# Patient Record
Sex: FEMALE | Race: WHITE | NOT HISPANIC OR LATINO | Employment: OTHER
[De-identification: names, ages, dates, MRNs, and addresses within clinical notes are randomized per-mention and may not be internally consistent; named-entity substitution may affect disease eponyms.]

---

## 2017-08-12 ENCOUNTER — HEALTH MAINTENANCE LETTER (OUTPATIENT)
Age: 57
End: 2017-08-12

## 2017-08-29 ENCOUNTER — HOSPITAL ENCOUNTER (EMERGENCY)
Facility: CLINIC | Age: 57
Discharge: HOME | End: 2017-08-29
Attending: PHYSICIAN ASSISTANT | Admitting: PHYSICIAN ASSISTANT
Payer: COMMERCIAL

## 2017-08-29 VITALS
TEMPERATURE: 98.9 F | SYSTOLIC BLOOD PRESSURE: 153 MMHG | HEART RATE: 87 BPM | OXYGEN SATURATION: 97 % | BODY MASS INDEX: 32.17 KG/M2 | DIASTOLIC BLOOD PRESSURE: 89 MMHG | WEIGHT: 210 LBS

## 2017-08-29 DIAGNOSIS — H92.02 OTALGIA OF LEFT EAR: ICD-10-CM

## 2017-08-29 DIAGNOSIS — H61.22 IMPACTED CERUMEN OF LEFT EAR: ICD-10-CM

## 2017-08-29 PROCEDURE — 99213 OFFICE O/P EST LOW 20 MIN: CPT | Performed by: PHYSICIAN ASSISTANT

## 2017-08-29 PROCEDURE — 99212 OFFICE O/P EST SF 10 MIN: CPT

## 2017-08-29 PROCEDURE — 99213 OFFICE O/P EST LOW 20 MIN: CPT

## 2017-08-29 PROCEDURE — 25600 CLTX DST RDL FX/EPHYS SEP WO: CPT

## 2017-08-29 RX ORDER — AMOXICILLIN 500 MG/1
500 CAPSULE ORAL 2 TIMES DAILY
Qty: 14 CAPSULE | Refills: 0 | Status: SHIPPED | OUTPATIENT
Start: 2017-08-29 | End: 2017-09-05

## 2017-08-29 NOTE — ED AVS SNAPSHOT
[facility] Emergency Department    [address] [facility] [address]    Phone:  [phone]    Fax:  [phone]                                       Monika Nam   MRN: [id number]    Department:  [facility] Emergency Department   Date of Visit:  8/29/2017           Patient Information     Date Of Birth          1960        Your diagnoses for this visit were:     Otalgia of left ear     Impacted cerumen of left ear        You were seen by Lisbeth Nelson PA-C.      Follow-up Information     Follow up with Servando Valladares MD In 1 week.    Specialty:  Family Practice    Why:  As needed    Contact information:    [address] [facility] [id number] [phone]          Discharge Instructions       Use medication as directed.    May use acetaminophen, ibuprofen prn.  Follow up with PCP for recheck in 7 days, return sooner if symptoms worsen or change.    Discussed with patient using debrox drops as long as no ear canal pain or drainage from ears to remove the remaining wax after ear pain resolved from antibiotic use.     Patient voiced understanding of instructions given.         24 Hour Appointment Hotline       To make an appointment at any [facility], call [phone] ([phone]). If you don't have a family doctor or clinic, we will help you find one. [facility] clinics are conveniently located to serve the needs of you and your family.             Review of your medicines      START taking        Dose / Directions Last dose taken    amoxicillin 500 MG capsule   Commonly known as:  AMOXIL   Dose:  500 mg   Quantity:  14 capsule        Take 1 capsule (500 mg) by mouth 2 times daily for 7 days   Refills:  0        carbamide peroxide 6.5 % otic solution   Commonly known as:  DEBROX   Dose:  5-10 drop   Quantity:  7 mL        Place 5-10 drops Into the left ear 2 times daily for 7 days   Refills:  0          Our records show that you are taking the medicines listed  below. If these are incorrect, please call your family doctor or clinic.        Dose / Directions Last dose taken    albuterol 108 (90 BASE) MCG/ACT Inhaler   Commonly known as:  PROAIR HFA   Dose:  2 puff   Quantity:  1 Inhaler        Inhale 2 puffs into the lungs every 4 hours as needed for shortness of breath / dyspnea   Refills:  11        aspirin 81 MG tablet   Dose:  81 mg        Take 1 tablet (81 mg) by mouth daily   Refills:  0        augmented betamethasone dipropionate 0.05 % cream   Commonly known as:  DIPROLENE AF   Quantity:  45 g        Apply  topically 2 times daily.   Refills:  11        CALCIUM 600-D 600-400 MG-UNIT per tablet   Dose:  1 tablet   Generic drug:  calcium-vitamin D        Take 1 tablet by mouth daily   Refills:  0        FLUoxetine 20 MG capsule   Commonly known as:  PROzac   Quantity:  90 capsule        Take 60 mg daily.   Refills:  5        loratadine 10 MG tablet   Commonly known as:  CLARITIN   Dose:  10 mg        Take 10 mg by mouth daily.   Refills:  0        mometasone 50 MCG/ACT spray   Commonly known as:  NASONEX   Dose:  2 spray   Quantity:  1 Box        Spray 2 sprays into both nostrils daily.   Refills:  11        MULTIVITAMIN TABS   OR        1 TABLET BY MOUTH DAILY   Refills:  0        ranitidine 150 MG tablet   Commonly known as:  ZANTAC   Dose:  150 mg        Take 1 tablet (150 mg) by mouth daily   Refills:  0                Prescriptions were sent or printed at these locations (2 Prescriptions)                   [facility] [address] [place] [address] [address] AT [facility] OF [address] [place] [address]   [address] [facility] [address]    Telephone:  [phone]   Fax:  [phone]   Hours:                  E-Prescribed (2 of 2)         amoxicillin (AMOXIL) 500 MG capsule               carbamide peroxide (DEBROX) 6.5 % otic solution                Orders Needing Specimen Collection     None      Pending Results     No orders found from 8/27/2017 to  "2017.            Pending Culture Results     No orders found from 2017 to 2017.            Pending Results Instructions     If you had any lab results that were not finalized at the time of your Discharge, you can call the ED Lab Result RN at [phone]. You will be contacted by this team for any positive Lab results or changes in treatment. The nurses are available 7 days a week from 10A to 6:30P.  You can leave a message 24 hours per day and they will return your call.        Test Results From Your Hospital Stay               Thank you for choosing [facility]       Thank you for choosing [facility] for your care. Our goal is always to provide you with excellent care. Hearing back from our patients is one way we can continue to improve our services. Please take a few minutes to complete the written survey that you may receive in the mail after you visit with us. Thank you!        [employer] Information     [employer] lets you send messages to your doctor, view your test results, renew your prescriptions, schedule appointments and more. To sign up, go to [email or web address] . Click on \"Log in\" on the left side of the screen, which will take you to the Welcome page. Then click on \"Sign up Now\" on the right side of the page.     You will be asked to enter the access code listed below, as well as some personal information. Please follow the directions to create your username and password.     Your access code is: [email or web address]  Expires: 2017  5:55 PM     Your access code will  in 90 days. If you need help or a new code, please call your [facility] clinic or [phone].        Care EveryWhere ID     This is your Care EveryWhere ID. This could be used by other organizations to access your [facility] medical records  [id number]        Equal Access to Services     EREN CRUZ : radha Coyle, scott sher. So " [facility] [phone].    ATENCIÓN: Si habla español, tiene a barraza disposición servicios gratuitos de asistencia lingüística. Llame al [phone].    We comply with applicable federal civil rights laws and [place] laws. We do not discriminate on the basis of race, color, national origin, age, disability sex, sexual orientation or gender identity.            After Visit Summary       This is your record. Keep this with you and show to your community pharmacist(s) and doctor(s) at your next visit.

## 2017-08-29 NOTE — ED AVS SNAPSHOT
[facility] Emergency Department    [address] [facility] [address]    Phone:  [phone]    Fax:  [phone]                                       Monika Nam   MRN: [id number]    Department:  [facility] Emergency Department   Date of Visit:  8/29/2017           After Visit Summary Signature Page     I have received my discharge instructions, and my questions have been answered. I have discussed any challenges I see with this plan with the nurse or doctor.    ..........................................................................................................................................  Patient/Patient Representative Signature      ..........................................................................................................................................  Patient Representative Print Name and Relationship to Patient    ..................................................               ................................................  Date                                            Time    ..........................................................................................................................................  Reviewed by Signature/Title    ...................................................              ..............................................  Date                                                            Time

## 2017-08-29 NOTE — ED PROVIDER NOTES
History     Chief Complaint   Patient presents with     Otalgia     HPI  Monika Nam is a 57 year old female who presents with left ear pain for 1 week(s) worse over the past 2 days.   Severity: mild   Patient states she has had a dry cough, runny nose congestion, sinus pressure, positive nasal drainage for the past week. Patient denies ringing in ears, no pain with touching ear, no sore throat, no loss of hearing, shortness of breath, chest pain, or fevers. Patient states she used to get ear infections yearly, but hasn't had one in about 10 years.       Problem list, Medication list, Allergies, and Medical/Social/Surgical histories reviewed in [facility] and updated as appropriate.    Review of Systems     All normal unless stated above    Physical Exam   BP: 153/89  Pulse: 87  Temp: 98.9  F (37.2  C)  Weight: 95.3 kg (210 lb)  SpO2: 97 %  Physical Exam     /89  Pulse 87  Temp 98.9  F (37.2  C) (Oral)  Wt 95.3 kg (210 lb)  LMP 04/02/2010  SpO2 97%  BMI 32.17 kg/m2   Right TM is normal: no effusions, no erythema, and normal landmarks     The Right auditory canal is normal and without drainage, edema or erythema  Left TM is erythematous noted after partial cerumen removed, but unable to appreciate entire TM due to retained Cerumen after several attempts with ear lavage. Patient tolerated ear lavage well with no pain per patient.   The Left auditory canal is no erythema, non-tender, not swollen and obstructed with cerumen  Oropharynx exam is normal: no lesions, erythema, adenopathy or exudate.  GENERAL: no acute distress  EYES: EOMI,  PERRL, conjunctiva clear  NECK: supple, non-tender to palpation, no adenopathy noted  RESP: lungs clear to auscultation - no rales, rhonchi or wheezes  SKIN: no suspicious lesions or rashes   CV: regular rates and rhythm, normal    No results found for this or any previous visit (from the past 24 hour(s)).    ED Course     ED Course     Procedures            Critical Care  time:  none               Labs Ordered and Resulted from Time of ED Arrival Up to the Time of Departure from the ED - No data to display    Assessments & Plan (with Medical Decision Making)     I have reviewed the nursing notes.    I have reviewed the findings, diagnosis, plan and need for follow up with the patient.    Will treat for otitis media due to history and patient presentation, but discussed with patient that the pain could also be from the retained cerumen pushing up on the TM. Patient aware and agrees with plan.        New Prescriptions    AMOXICILLIN (AMOXIL) 500 MG CAPSULE    Take 1 capsule (500 mg) by mouth 2 times daily for 7 days    CARBAMIDE PEROXIDE (DEBROX) 6.5 % OTIC SOLUTION    Place 5-10 drops Into the left ear 2 times daily for 7 days       Final diagnoses:   Otalgia of left ear   Impacted cerumen of left ear       8/29/2017   [facility] EMERGENCY DEPARTMENT     Lisbeth Nelson PA-C  08/29/17 1190

## 2017-09-11 DIAGNOSIS — L30.9 ECZEMA: ICD-10-CM

## 2017-09-11 NOTE — LETTER
[facility]  [address] [facility] [address]  Phone: [phone]       September 18, 2017         Monika Nam  [id number] [place] [address]            Dear Monika:    We are concerned about your health care.  We recently provided you with medication refills.  Many medications require routine follow-up with your doctor.  You are due for a clinic appointment for further refills of your medications.    Your prescription(s) have been refilled for one month so you may have time for the above noted follow-up. Please call to schedule soon so we can assure you have an appointment before your next refills are needed.    Thank you,      Dr. Valladares's Care Team

## 2017-09-11 NOTE — TELEPHONE ENCOUNTER
Betamethasone     Last Written Prescription Date: 03/30/16  Last Fill Quantity: 45g,  # refills: 11   Last Office Visit with G, [facility] or [facility] prescribing provider: 10/13/16

## 2017-09-18 RX ORDER — BETAMETHASONE DIPROPIONATE 0.5 MG/G
CREAM TOPICAL
Qty: 50 G | Refills: 0 | Status: SHIPPED | OUTPATIENT
Start: 2017-09-18 | End: 2017-11-15

## 2017-11-15 ENCOUNTER — OFFICE VISIT (OUTPATIENT)
Dept: FAMILY MEDICINE | Facility: CLINIC | Age: 57
End: 2017-11-15
Payer: COMMERCIAL

## 2017-11-15 VITALS
SYSTOLIC BLOOD PRESSURE: 137 MMHG | WEIGHT: 215 LBS | TEMPERATURE: 97.9 F | BODY MASS INDEX: 32.58 KG/M2 | HEART RATE: 81 BPM | HEIGHT: 68 IN | DIASTOLIC BLOOD PRESSURE: 85 MMHG

## 2017-11-15 DIAGNOSIS — Z23 NEED FOR PROPHYLACTIC VACCINATION AND INOCULATION AGAINST INFLUENZA: ICD-10-CM

## 2017-11-15 DIAGNOSIS — F34.1 CHRONIC DEPRESSIVE PERSONALITY DISORDER: ICD-10-CM

## 2017-11-15 DIAGNOSIS — J98.01 BRONCHOSPASM: ICD-10-CM

## 2017-11-15 DIAGNOSIS — Z00.00 ROUTINE HISTORY AND PHYSICAL EXAMINATION OF ADULT: Primary | ICD-10-CM

## 2017-11-15 DIAGNOSIS — L30.9 ECZEMA, UNSPECIFIED TYPE: ICD-10-CM

## 2017-11-15 PROCEDURE — 99396 PREV VISIT EST AGE 40-64: CPT | Mod: 25 | Performed by: FAMILY MEDICINE

## 2017-11-15 PROCEDURE — 99213 OFFICE O/P EST LOW 20 MIN: CPT | Mod: 25 | Performed by: FAMILY MEDICINE

## 2017-11-15 PROCEDURE — 90686 IIV4 VACC NO PRSV 0.5 ML IM: CPT | Performed by: FAMILY MEDICINE

## 2017-11-15 PROCEDURE — 90471 IMMUNIZATION ADMIN: CPT | Performed by: FAMILY MEDICINE

## 2017-11-15 RX ORDER — ALBUTEROL SULFATE 90 UG/1
2 AEROSOL, METERED RESPIRATORY (INHALATION) EVERY 4 HOURS PRN
Qty: 1 INHALER | Refills: 11 | Status: SHIPPED | OUTPATIENT
Start: 2017-11-15 | End: 2019-01-07

## 2017-11-15 RX ORDER — BETAMETHASONE DIPROPIONATE 0.5 MG/G
CREAM TOPICAL
Qty: 50 G | Refills: 6 | Status: SHIPPED | OUTPATIENT
Start: 2017-11-15 | End: 2018-11-16

## 2017-11-15 ASSESSMENT — ANXIETY QUESTIONNAIRES
GAD7 TOTAL SCORE: 2
1. FEELING NERVOUS, ANXIOUS, OR ON EDGE: SEVERAL DAYS
3. WORRYING TOO MUCH ABOUT DIFFERENT THINGS: SEVERAL DAYS
6. BECOMING EASILY ANNOYED OR IRRITABLE: NOT AT ALL
5. BEING SO RESTLESS THAT IT IS HARD TO SIT STILL: NOT AT ALL
7. FEELING AFRAID AS IF SOMETHING AWFUL MIGHT HAPPEN: NOT AT ALL
2. NOT BEING ABLE TO STOP OR CONTROL WORRYING: NOT AT ALL

## 2017-11-15 ASSESSMENT — PATIENT HEALTH QUESTIONNAIRE - PHQ9
5. POOR APPETITE OR OVEREATING: NOT AT ALL
SUM OF ALL RESPONSES TO PHQ QUESTIONS 1-9: 4

## 2017-11-15 NOTE — NURSING NOTE
"Chief Complaint   Patient presents with     Physical     General physical exam.     Refill Request     Discuss about medication refill.  For the Fluoxetine she is taking 20 mg daily.       Initial /85  Pulse 81  Temp 97.9  F (36.6  C) (Tympanic)  Ht 5' 8.25\" (1.734 m)  Wt 215 lb (97.5 kg)  LMP 04/02/2010  BMI 32.45 kg/m2 Estimated body mass index is 32.45 kg/(m^2) as calculated from the following:    Height as of this encounter: 5' 8.25\" (1.734 m).    Weight as of this encounter: 215 lb (97.5 kg).  Medication Reconciliation: complete  "

## 2017-11-15 NOTE — PROGRESS NOTES
SUBJECTIVE:   CC: Monika Nam is an 57 year old woman who presents for preventive health visit.     Healthy Habits:    Do you get at least three servings of calcium containing foods daily (dairy, green leafy vegetables, etc.)? 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    Problems taking medications regularly No    Medication side effects: No    Have you had an eye exam in the past two years? yes    Do you see a dentist twice per year? yes  Do you have sleep apnea, excessive snoring or daytime drowsiness?  Daytime drowsiness.       Chief Complaint   Patient presents with     Physical     General physical exam.     Refill Request     Discuss about medication refill.  For the Fluoxetine she is taking 20 mg daily.     Today's PHQ-2 Score: No flowsheet data found.       Current Outpatient Prescriptions:      FLUoxetine (PROZAC) 20 MG capsule, Take 20 mg daily., Disp: , Rfl:      augmented betamethasone dipropionate (DIPROLENE-AF) 0.05 % cream, APPLY EXTERNALLY TO THE AFFECTED AREA TWICE DAILY, Disp: 50 g, Rfl: 0     albuterol (ALBUTEROL) 108 (90 BASE) MCG/ACT inhaler, Inhale 2 puffs into the lungs every 4 hours as needed for shortness of breath / dyspnea, Disp: 1 Inhaler, Rfl: 11     calcium-vitamin D (CALCIUM 600-D) 600-400 MG-UNIT per tablet, Take 1 tablet by mouth daily, Disp: , Rfl:      ranitidine (ZANTAC) 150 MG tablet, Take 1 tablet (150 mg) by mouth daily, Disp: , Rfl:      loratadine (CLARITIN) 10 MG tablet, Take 10 mg by mouth daily.  , Disp: , Rfl:      MULTIVITAMIN TABS   OR, 1 TABLET BY MOUTH DAILY, Disp: , Rfl:      [DISCONTINUED] FLUoxetine (PROZAC) 20 MG capsule, Take 60 mg daily., Disp: 90 capsule, Rfl: 5     aspirin 81 MG tablet, Take 1 tablet (81 mg) by mouth daily, Disp: , Rfl:     Patient Active Problem List   Diagnosis     Allergic rhinitis     Anxiety disorder due to medical condition     Tobacco use disorder      Chronic depressive personality disorder     Obesity     CARDIOVASCULAR SCREENING; LDL GOAL LESS THAN 130     CTS (carpal tunnel syndrome)     Cervical high risk HPV (human papillomavirus) test positive     GERD (gastroesophageal reflux disease)     Obstructive sleep apnea syndrome         Abuse: Current or Past(Physical, Sexual or Emotional)- No  Do you feel safe in your environment - Yes    Social History   Substance Use Topics     Smoking status: Current Every Day Smoker     Packs/day: 0.50     Years: 25.00     Types: Cigarettes     Smokeless tobacco: Never Used      Comment: 1/2 ppd  Has been using electronic. Did try the patch- gets a rash.     Alcohol use Yes      Comment: 3-4 X per week.     The patient does not drink >3 drinks per day nor >7 drinks per week.    Reviewed orders with patient.  Reviewed health maintenance and updated orders accordingly - Yes    The 10-year ASCVD risk score (Homesteadjenni DELATORRE Jr, et al., 2013) is: 5.2%    Values used to calculate the score:      Age: 57 years      Sex: Female      Is Non- : No      Diabetic: No      Tobacco smoker: Yes      Systolic Blood Pressure: 137 mmHg      Is BP treated: No      HDL Cholesterol: 71 mg/dL      Total Cholesterol: 185 mg/dL    Patient is eligible for use of low-dose aspirin for primary prevention of heart attack and stroke.  Provider has discussed aspirin with patient and our decision was:     Prescribe:  Daily low-dose aspirin recommended for primary prevention, patient agrees with plan.    Pap is not needed.   Mammogram ordered.       ROS:  C: NEGATIVE for fever, chills, change in weight  I: NEGATIVE for worrisome rashes, moles or lesions  E: NEGATIVE for vision changes or irritation  ENT: NEGATIVE for ear, mouth and throat problems  R: NEGATIVE for significant cough or SOB  B: NEGATIVE for masses, tenderness or discharge  CV: NEGATIVE for chest pain, palpitations or peripheral edema  GI: NEGATIVE for nausea, abdominal  "pain, heartburn, or change in bowel habits  : NEGATIVE for unusual urinary or vaginal symptoms. No vaginal bleeding.  M: NEGATIVE for significant arthralgias or myalgia  N: NEGATIVE for weakness, dizziness or paresthesias  P: NEGATIVE for changes in mood or affect     OBJECTIVE:   /85  Pulse 81  Temp 97.9  F (36.6  C) (Tympanic)  Ht 5' 8.25\" (1.734 m)  Wt 215 lb (97.5 kg)  LMP 04/02/2010  BMI 32.45 kg/m2  EXAM:  GENERAL APPEARANCE: healthy, alert and no distress  GENERAL APPEARANCE: over weight  EYES: EOMI,  PERRL  HENT: ear canals and TM's normal and nose and mouth without ulcers or lesions  RESP: lungs clear to auscultation - no rales, rhonchi or wheezes  BREAST: normal without masses, tenderness or nipple discharge and no palpable axillary masses or adenopathy  CV: regular rates and rhythm, normal S1 S2, no S3 or S4 and no murmur, click or rub -  ABDOMEN:  soft, nontender, no HSM or masses and bowel sounds normal  GU_female: normal cervix, adnexae, and uterus without masses or discharge  MS: extremities normal- no gross deformities noted, no evidence of inflammation in joints, FROM in all extremities.  SKIN: no suspicious lesions or rashes  NEURO: Normal strength and tone, sensory exam grossly normal, mentation intact and speech normal  PSYCH: mentation appears normal and affect normal/bright  LYMPHATICS: No axillary, cervical, inguinal, or supraclavicular nodes      ASSESSMENT/PLAN:   1.  Routine history and physical examination of adult  - *MA Screening Digital Bilateral; Future  COUNSELING:   Reviewed preventive health counseling, as reflected in patient instructions       Regular exercise       Healthy diet/nutrition       Vision screening       Hearing screening   reports that she has been smoking Cigarettes.  She has a 12.50 pack-year smoking history. She has never used smokeless tobacco.  Tobacco Cessation Action Plan: Self help information given to patient  Estimated body mass index is 32.45 " "kg/(m^2) as calculated from the following:    Height as of this encounter: 5' 8.25\" (1.734 m).    Weight as of this encounter: 215 lb (97.5 kg).   She is using the lower calorie diet, portion control and exercise.   The goal for the weight loss is 2 lbs per month.   Counseling Resources:  ATP IV Guidelines  Pooled Cohorts Equation Calculator  Breast Cancer Risk Calculator  FRAX Risk Assessment  ICSI Preventive Guidelines  Dietary Guidelines for Americans, 2010  USDA's MyPlate  ASA Prophylaxis  Lung CA Screening    Chronic depressive personality disorder  Stay on the med daily and use the non drug therapies.   If doing well then we will refill the med for one year.   - FLUoxetine (PROZAC) 20 MG capsule; Take 20 mg daily.    (J98.01) Bronchospasm  Comment:   Plan: albuterol (PROAIR HFA) 108 (90 BASE) MCG/ACT         Inhaler        Use the inhaler as needed, 2 puffs every 4 hours. Find and avoid triggers if possible.   Exercise daily. We will do the flu shot today and do this annually.     (L30.9) Eczema, unspecified type  Comment:   Plan: augmented betamethasone dipropionate         (DIPROLENE-AF) 0.05 % cream        Use as directed and refills are given.        Servando Valladares MD  [facility]  "

## 2017-11-15 NOTE — PATIENT INSTRUCTIONS
Preventive Health Recommendations  Female Ages 50 - 64    Yearly exam: See your health care provider every year in order to  o Review health changes.   o Discuss preventive care.    o Review your medicines if your doctor has prescribed any.      Get a Pap test every three years (unless you have an abnormal result and your provider advises testing more often).    If you get Pap tests with HPV test, you only need to test every 5 years, unless you have an abnormal result.     You do not need a Pap test if your uterus was removed (hysterectomy) and you have not had cancer.    You should be tested each year for STDs (sexually transmitted diseases) if you're at risk.     Have a mammogram every 1 to 2 years.    Have a colonoscopy at age 50, or have a yearly FIT test (stool test). These exams screen for colon cancer.      Have a cholesterol test every 5 years, or more often if advised.    Have a diabetes test (fasting glucose) every three years. If you are at risk for diabetes, you should have this test more often.     If you are at risk for osteoporosis (brittle bone disease), think about having a bone density scan (DEXA).    Shots: Get a flu shot each year. Get a tetanus shot every 10 years.    Nutrition:     Eat at least 5 servings of fruits and vegetables each day.    Eat whole-grain bread, whole-wheat pasta and brown rice instead of white grains and rice.    Talk to your provider about Calcium and Vitamin D.     Lifestyle    Exercise at least 150 minutes a week (30 minutes a day, 5 days a week). This will help you control your weight and prevent disease.    Limit alcohol to one drink per day.    No smoking.     Wear sunscreen to prevent skin cancer.     See your dentist every six months for an exam and cleaning.    See your eye doctor every 1 to 2 years.            Thank you for choosing [facility].  You may be receiving a survey in the mail from [employer] regarding your visit today.  Please take a few  "minutes to complete and return the survey to let us know how we are doing.      If you have questions or concerns, please contact us via [facility] or you can contact your care team at [phone].    Our Clinic hours are:  Monday 6:40 am  to 7:00 pm  Tuesday -Friday 6:40 am to 5:00 pm    The [place] outpatient lab hours are:  Monday - Friday 6:10 am to 4:45 pm  Saturdays 7:00 am to 11:00 am  Appointments are required, call [phone]    If you have clinical questions after hours or would like to schedule an appointment,  call the clinic at [phone].    ASSESSMENT/PLAN:   1.  Routine history and physical examination of adult  - *MA Screening Digital Bilateral; Future  COUNSELING:   Reviewed preventive health counseling, as reflected in patient instructions       Regular exercise       Healthy diet/nutrition       Vision screening       Hearing screening   reports that she has been smoking Cigarettes.  She has a 12.50 pack-year smoking history. She has never used smokeless tobacco.  Tobacco Cessation Action Plan: Self help information given to patient  Estimated body mass index is 32.45 kg/(m^2) as calculated from the following:    Height as of this encounter: 5' 8.25\" (1.734 m).    Weight as of this encounter: 215 lb (97.5 kg).   She is using the lower calorie diet, portion control and exercise.   The goal for the weight loss is 2 lbs per month.   Counseling Resources:  ATP IV Guidelines  Pooled Cohorts Equation Calculator  Breast Cancer Risk Calculator  FRAX Risk Assessment  ICSI Preventive Guidelines  Dietary Guidelines for Americans, 2010  USDA's MyPlate  ASA Prophylaxis  Lung CA Screening    Chronic depressive personality disorder  Stay on the med daily and use the non drug therapies.   If doing well then we will refill the med for one year.   - FLUoxetine (PROZAC) 20 MG capsule; Take 20 mg daily.    (J98.01) Bronchospasm  Comment:   Plan: albuterol (PROAIR HFA) 108 (90 BASE) MCG/ACT         Inhaler        " Use the inhaler as needed, 2 puffs every 4 hours. Find and avoid triggers if possible.   Exercise daily. We will do the flu shot today and do this annually.     (L30.9) Eczema, unspecified type  Comment:   Plan: augmented betamethasone dipropionate         (DIPROLENE-AF) 0.05 % cream        Use as directed and refills are given.

## 2017-11-15 NOTE — MR AVS SNAPSHOT
After Visit Summary   11/15/2017    Monika Nam    MRN: [id number]           Patient Information     Date Of Birth          1960        Visit Information        Provider Department      11/15/2017 9:20 AM Servando Valladares MD [facility]        Today's Diagnoses     Routine history and physical examination of adult    -  1    Chronic depressive personality disorder        Bronchospasm        Eczema, unspecified type          Care Instructions      Preventive Health Recommendations  Female Ages 50 - 64    Yearly exam: See your health care provider every year in order to  o Review health changes.   o Discuss preventive care.    o Review your medicines if your doctor has prescribed any.      Get a Pap test every three years (unless you have an abnormal result and your provider advises testing more often).    If you get Pap tests with HPV test, you only need to test every 5 years, unless you have an abnormal result.     You do not need a Pap test if your uterus was removed (hysterectomy) and you have not had cancer.    You should be tested each year for STDs (sexually transmitted diseases) if you're at risk.     Have a mammogram every 1 to 2 years.    Have a colonoscopy at age 50, or have a yearly FIT test (stool test). These exams screen for colon cancer.      Have a cholesterol test every 5 years, or more often if advised.    Have a diabetes test (fasting glucose) every three years. If you are at risk for diabetes, you should have this test more often.     If you are at risk for osteoporosis (brittle bone disease), think about having a bone density scan (DEXA).    Shots: Get a flu shot each year. Get a tetanus shot every 10 years.    Nutrition:     Eat at least 5 servings of fruits and vegetables each day.    Eat whole-grain bread, whole-wheat pasta and brown rice instead of white grains and rice.    Talk to your provider about Calcium and Vitamin D.     Lifestyle    Exercise  "at least 150 minutes a week (30 minutes a day, 5 days a week). This will help you control your weight and prevent disease.    Limit alcohol to one drink per day.    No smoking.     Wear sunscreen to prevent skin cancer.     See your dentist every six months for an exam and cleaning.    See your eye doctor every 1 to 2 years.            Thank you for choosing [facility].  You may be receiving a survey in the mail from [employer] regarding your visit today.  Please take a few minutes to complete and return the survey to let us know how we are doing.      If you have questions or concerns, please contact us via Jobyal or you can contact your care team at [phone].    Our Clinic hours are:  Monday 6:40 am  to 7:00 pm  Tuesday -Friday 6:40 am to 5:00 pm    The [place] outpatient lab hours are:  Monday - Friday 6:10 am to 4:45 pm  Saturdays 7:00 am to 11:00 am  Appointments are required, call [phone]    If you have clinical questions after hours or would like to schedule an appointment,  call the clinic at [phone].    ASSESSMENT/PLAN:   1.  Routine history and physical examination of adult  - *MA Screening Digital Bilateral; Future  COUNSELING:   Reviewed preventive health counseling, as reflected in patient instructions       Regular exercise       Healthy diet/nutrition       Vision screening       Hearing screening   reports that she has been smoking Cigarettes.  She has a 12.50 pack-year smoking history. She has never used smokeless tobacco.  Tobacco Cessation Action Plan: Self help information given to patient  Estimated body mass index is 32.45 kg/(m^2) as calculated from the following:    Height as of this encounter: 5' 8.25\" (1.734 m).    Weight as of this encounter: 215 lb (97.5 kg).   She is using the lower calorie diet, portion control and exercise.   The goal for the weight loss is 2 lbs per month.   Counseling Resources:  ATP IV Guidelines  Pooled Cohorts Equation Calculator  Breast " Cancer Risk Calculator  FRAX Risk Assessment  ICSI Preventive Guidelines  Dietary Guidelines for Americans, 2010  USDA's MyPlate  ASA Prophylaxis  Lung CA Screening    Chronic depressive personality disorder  Stay on the med daily and use the non drug therapies.   If doing well then we will refill the med for one year.   - FLUoxetine (PROZAC) 20 MG capsule; Take 20 mg daily.    (J98.01) Bronchospasm  Comment:   Plan: albuterol (PROAIR HFA) 108 (90 BASE) MCG/ACT         Inhaler        Use the inhaler as needed, 2 puffs every 4 hours. Find and avoid triggers if possible.   Exercise daily. We will do the flu shot today and do this annually.     (L30.9) Eczema, unspecified type  Comment:   Plan: augmented betamethasone dipropionate         (DIPROLENE-AF) 0.05 % cream        Use as directed and refills are given.            Follow-ups after your visit        Future tests that were ordered for you today     Open Future Orders        Priority Expected Expires Ordered    *MA Screening Digital Bilateral Routine  11/15/2018 11/15/2017            Who to contact     If you have questions or need follow up information about today's clinic visit or your schedule please contact [facility] directly at [phone].  Normal or non-critical lab and imaging results will be communicated to you by nuMVChart, letter or phone within 4 business days after the clinic has received the results. If you do not hear from us within 7 days, please contact the clinic through nuMVChart or phone. If you have a critical or abnormal lab result, we will notify you by phone as soon as possible.  Submit refill requests through [employer] or call your pharmacy and they will forward the refill request to us. Please allow 3 business days for your refill to be completed.          Additional Information About Your Visit        [employer] Information     [employer] lets you send messages to your doctor, view your test results, renew your prescriptions,  "schedule appointments and more. To sign up, go to [email or web address] . Click on \"Log in\" on the left side of the screen, which will take you to the Welcome page. Then click on \"Sign up Now\" on the right side of the page.     You will be asked to enter the access code listed below, as well as some personal information. Please follow the directions to create your username and password.     Your access code is: [email or web address]  Expires: 2017  4:55 PM     Your access code will  in 90 days. If you need help or a new code, please call your [place] clinic or [phone].        Care EveryWhere ID     This is your Care EveryWhere ID. This could be used by other organizations to access your [place] medical records  [id number]        Your Vitals Were     Pulse Temperature Height Last Period BMI (Body Mass Index)       81 97.9  F (36.6  C) (Tympanic) 5' 8.25\" (1.734 m) 2010 32.45 kg/m2        Blood Pressure from Last 3 Encounters:   11/15/17 137/85   17 153/89   10/14/16 144/84    Weight from Last 3 Encounters:   11/15/17 215 lb (97.5 kg)   17 210 lb (95.3 kg)   10/14/16 212 lb (96.2 kg)                 Today's Medication Changes          These changes are accurate as of: 11/15/17  9:57 AM.  If you have any questions, ask your nurse or doctor.               Start taking these medicines.        Dose/Directions    FLUoxetine 20 MG capsule   Commonly known as:  PROzac   Used for:  Chronic depressive personality disorder   Started by:  Servando Valladares MD        Take 20 mg daily.   Quantity:  30 capsule   Refills:  11         These medicines have changed or have updated prescriptions.        Dose/Directions    augmented betamethasone dipropionate 0.05 % cream   Commonly known as:  DIPROLENE-AF   This may have changed:  See the new instructions.   Used for:  Eczema, unspecified type   Changed by:  Servando Valladares MD        APPLY EXTERNALLY TO THE AFFECTED AREA TWICE DAILY   Quantity:  " 50 g   Refills:  6         Stop taking these medicines if you haven't already. Please contact your care team if you have questions.     mometasone 50 MCG/ACT spray   Commonly known as:  NASONEX   Stopped by:  Servando Valladares MD                Where to get your medicines      These medications were sent to [id number] - [facility] [address]  [address] [address]     Phone:  [phone]     albuterol 108 (90 BASE) MCG/ACT Inhaler    augmented betamethasone dipropionate 0.05 % cream    FLUoxetine 20 MG capsule                Primary Care Provider Office Phone # Fax #    Servando Valladares [phone] [address] [facility] [address]        Equal Access to Services     EREN CRUZ : Hadii robyn alvarez hadasho Sokendy, waaxda luqadaha, qaybta kaalmada adeyudyyada, scott carrera . So [facility] [phone].    ATENCIÓN: Si habla español, tiene a barraza disposición servicios gratuitos de asistencia lingüística. Joycelyn al [phone].    We comply with applicable federal civil rights laws and [place] laws. We do not discriminate on the basis of race, color, national origin, age, disability, sex, sexual orientation, or gender identity.            Thank you!     Thank you for choosing [facility]  for your care. Our goal is always to provide you with excellent care. Hearing back from our patients is one way we can continue to improve our services. Please take a few minutes to complete the written survey that you may receive in the mail after your visit with us. Thank you!             Your Updated Medication List - Protect others around you: Learn how to safely use, store and throw away your medicines at www.disposemymeds.org.          This list is accurate as of: 11/15/17  9:57 AM.  Always use your most recent med list.                   Brand Name Dispense Instructions for use  Diagnosis    albuterol 108 (90 BASE) MCG/ACT Inhaler    PROAIR HFA    1 Inhaler    Inhale 2 puffs into the lungs every 4 hours as needed for shortness of breath / dyspnea    Bronchospasm       aspirin 81 MG tablet      Take 1 tablet (81 mg) by mouth daily        augmented betamethasone dipropionate 0.05 % cream    DIPROLENE-AF    50 g    APPLY EXTERNALLY TO THE AFFECTED AREA TWICE DAILY    Eczema, unspecified type       CALCIUM 600-D 600-400 MG-UNIT per tablet   Generic drug:  calcium-vitamin D      Take 1 tablet by mouth daily        FLUoxetine 20 MG capsule    PROzac    30 capsule    Take 20 mg daily.    Chronic depressive personality disorder       loratadine 10 MG tablet    CLARITIN     Take 10 mg by mouth daily.        MULTIVITAMIN TABS   OR      1 TABLET BY MOUTH DAILY        ranitidine 150 MG tablet    ZANTAC     Take 1 tablet (150 mg) by mouth daily

## 2017-11-16 ASSESSMENT — ANXIETY QUESTIONNAIRES: GAD7 TOTAL SCORE: 2

## 2018-05-23 ENCOUNTER — TELEPHONE (OUTPATIENT)
Dept: FAMILY MEDICINE | Facility: CLINIC | Age: 58
End: 2018-05-23

## 2018-05-23 DIAGNOSIS — J45.20 MILD INTERMITTENT ASTHMA WITHOUT COMPLICATION: Primary | ICD-10-CM

## 2018-05-23 DIAGNOSIS — F34.1 CHRONIC DEPRESSIVE PERSONALITY DISORDER: ICD-10-CM

## 2018-05-24 PROBLEM — J45.20 MILD INTERMITTENT ASTHMA WITHOUT COMPLICATION: Status: ACTIVE | Noted: 2018-05-24

## 2018-05-24 RX ORDER — ALBUTEROL SULFATE 90 UG/1
2 AEROSOL, METERED RESPIRATORY (INHALATION) EVERY 4 HOURS PRN
Qty: 1 INHALER | Refills: 11 | Status: SHIPPED | OUTPATIENT
Start: 2018-05-24 | End: 2018-11-16

## 2018-05-24 NOTE — TELEPHONE ENCOUNTER
"Requested Prescriptions   Pending Prescriptions Disp Refills     FLUoxetine (PROZAC) 20 MG capsule [Pharmacy Med Name: FLUOXETINE 20MG CAPSULES]  Last Written Prescription Date:  11/15/17  Last Fill Quantity: 30,  # refills: 11   Last office visit: 11/15/2017 with prescribing provider:  11/15/17   Future Office Visit:     90 capsule 0     Sig: TAKE 3 CAPSULES BY MOUTH DAILY    SSRIs Protocol Failed    5/23/2018  9:17 PM       Failed - PHQ-9 score less than 5 in past 6 months    Please review last PHQ-9 score.   PHQ-9 SCORE 8/29/2016 10/14/2016 11/15/2017   Total Score - - -   Total Score 13 6 4          Failed - Recent (6 mo) or future (30 days) visit within the authorizing provider's specialty    Patient had office visit in the last 6 months or has a visit in the next 30 days with authorizing provider or within the authorizing provider's specialty.  See \"Patient Info\" tab in inbasket, or \"Choose Columns\" in Meds & Orders section of the refill encounter.           Passed - Patient is age 18 or older       Passed - No active pregnancy on record       Passed - No positive pregnancy test in last 12 months          "

## 2018-08-12 ENCOUNTER — HEALTH MAINTENANCE LETTER (OUTPATIENT)
Age: 58
End: 2018-08-12

## 2018-08-19 ENCOUNTER — HEALTH MAINTENANCE LETTER (OUTPATIENT)
Age: 58
End: 2018-08-19

## 2018-11-16 ENCOUNTER — OFFICE VISIT (OUTPATIENT)
Dept: FAMILY MEDICINE | Facility: CLINIC | Age: 58
End: 2018-11-16
Payer: COMMERCIAL

## 2018-11-16 VITALS
TEMPERATURE: 98 F | DIASTOLIC BLOOD PRESSURE: 80 MMHG | WEIGHT: 207 LBS | BODY MASS INDEX: 31.37 KG/M2 | HEART RATE: 75 BPM | RESPIRATION RATE: 24 BRPM | HEIGHT: 68 IN | OXYGEN SATURATION: 97 % | SYSTOLIC BLOOD PRESSURE: 114 MMHG

## 2018-11-16 DIAGNOSIS — J98.01 BRONCHOSPASM: ICD-10-CM

## 2018-11-16 DIAGNOSIS — F34.1 CHRONIC DEPRESSIVE PERSONALITY DISORDER: ICD-10-CM

## 2018-11-16 DIAGNOSIS — Z00.00 ROUTINE HISTORY AND PHYSICAL EXAMINATION OF ADULT: Primary | ICD-10-CM

## 2018-11-16 DIAGNOSIS — Z12.11 COLON CANCER SCREENING: ICD-10-CM

## 2018-11-16 DIAGNOSIS — J45.20 MILD INTERMITTENT ASTHMA WITHOUT COMPLICATION: ICD-10-CM

## 2018-11-16 DIAGNOSIS — L30.9 ECZEMA, UNSPECIFIED TYPE: ICD-10-CM

## 2018-11-16 PROCEDURE — 99396 PREV VISIT EST AGE 40-64: CPT | Performed by: FAMILY MEDICINE

## 2018-11-16 RX ORDER — ALBUTEROL SULFATE 90 UG/1
2 AEROSOL, METERED RESPIRATORY (INHALATION) EVERY 4 HOURS PRN
Qty: 1 INHALER | Refills: 11 | Status: SHIPPED | OUTPATIENT
Start: 2018-11-16 | End: 2020-01-15

## 2018-11-16 RX ORDER — BETAMETHASONE DIPROPIONATE 0.5 MG/G
CREAM TOPICAL
Qty: 50 G | Refills: 6 | Status: SHIPPED | OUTPATIENT
Start: 2018-11-16 | End: 2020-02-03

## 2018-11-16 ASSESSMENT — PATIENT HEALTH QUESTIONNAIRE - PHQ9
SUM OF ALL RESPONSES TO PHQ QUESTIONS 1-9: 2
5. POOR APPETITE OR OVEREATING: NOT AT ALL

## 2018-11-16 ASSESSMENT — ANXIETY QUESTIONNAIRES
7. FEELING AFRAID AS IF SOMETHING AWFUL MIGHT HAPPEN: NOT AT ALL
GAD7 TOTAL SCORE: 2
3. WORRYING TOO MUCH ABOUT DIFFERENT THINGS: SEVERAL DAYS
1. FEELING NERVOUS, ANXIOUS, OR ON EDGE: SEVERAL DAYS
5. BEING SO RESTLESS THAT IT IS HARD TO SIT STILL: NOT AT ALL
2. NOT BEING ABLE TO STOP OR CONTROL WORRYING: NOT AT ALL
6. BECOMING EASILY ANNOYED OR IRRITABLE: NOT AT ALL

## 2018-11-16 NOTE — PROGRESS NOTES
SUBJECTIVE:   CC: Monika Nam is an 58 year old woman who presents for preventive health visit.     Healthy Habits:    Do you get at least three servings of calcium containing foods daily (dairy, green leafy vegetables, etc.)? yes    Amount of exercise or daily activities, outside of work: Walking some.    Problems taking medications regularly No    Medication side effects: No    Have you had an eye exam in the past two years? yes    Do you see a dentist twice per year? yes    Do you have sleep apnea, excessive snoring or daytime drowsiness?no      Chief Complaint   Patient presents with     Physical     General physical exam.         Today's PHQ-2 Score: No flowsheet data found.    Abuse: Current or Past(Physical, Sexual or Emotional)- No  Do you feel safe in your environment - Yes    Social History   Substance Use Topics     Smoking status: Current Every Day Smoker     Packs/day: 0.50     Years: 25.00     Types: Cigarettes     Smokeless tobacco: Never Used      Comment: 1/2 ppd . Did try the patch- gets a rash.     Alcohol use Yes      Comment: 3-4 X per week.     If you drink alcohol do you typically have >3 drinks per day or >7 drinks per week? No                     Reviewed orders with patient.  Reviewed health maintenance and updated orders accordingly - Yes  Mammogram is ordered for 11-20-18.     No more Paps needed.   PAP / HPV 7/25/2014 7/15/2013 5/24/2012   PAP ASC-US(A) ASC-US(A) NIL     Reviewed and updated as needed this visit by clinical staff  Tobacco  Allergies  Meds  Med Hx  Surg Hx  Fam Hx  Soc Hx        Reviewed and updated as needed this visit by Provider        ROS:  CONSTITUTIONAL: NEGATIVE for fever, chills, change in weight  INTEGUMENTARY/SKIN: NEGATIVE for worrisome rashes, moles or lesions  EYES: NEGATIVE for vision changes or irritation  ENT: NEGATIVE for ear, mouth and throat problems  RESP: NEGATIVE for significant cough or SOB  BREAST: NEGATIVE for masses, tenderness or  "discharge  CV: NEGATIVE for chest pain, palpitations or peripheral edema  GI: NEGATIVE for nausea, abdominal pain, heartburn, or change in bowel habits  : NEGATIVE for unusual urinary or vaginal symptoms. No vaginal bleeding.  MUSCULOSKELETAL: NEGATIVE for significant arthralgias or myalgia  NEURO: NEGATIVE for weakness, dizziness or paresthesias  PSYCHIATRIC: NEGATIVE for changes in mood or affect     OBJECTIVE:   LMP 04/02/2010   Blood pressure 114/80, pulse 75, temperature 98  F (36.7  C), temperature source Tympanic, resp. rate 24, height 5' 8.25\" (1.734 m), weight 207 lb (93.9 kg), last menstrual period 04/02/2010, SpO2 97 %.    EXAM:  GENERAL APPEARANCE: healthy, alert and no distress  EYES: EOMI,  PERRL  HENT: ear canals and TM's normal and nose and mouth without ulcers or lesions  NECK: no adenopathy, no asymmetry, masses, or scars and thyroid normal to palpation  RESP: lungs clear to auscultation - no rales, rhonchi or wheezes  BREAST: normal without masses, tenderness or nipple discharge and no palpable axillary masses or adenopathy  CV: regular rates and rhythm, normal S1 S2, no S3 or S4 and no murmur, click or rub -  ABDOMEN:  soft, nontender, no HSM or masses and bowel sounds normal  GU_male: testicles normal without atrophy or masses, no hernias and penis normal without urethral discharge  MS: extremities normal- no gross deformities noted, no evidence of inflammation in joints, FROM in all extremities.  SKIN: no suspicious lesions or rashes  NEURO: Normal strength and tone, sensory exam grossly normal, mentation intact and speech normal  PSYCH: mentation appears normal and affect normal/bright  LYMPHATICS: No axillary, cervical, inguinal, or supraclavicular nodes      ASSESSMENT/PLAN:   (Z00.00) Routine history and physical examination of adult  (primary encounter diagnosis)  Comment:   Plan: COUNSELING:   Reviewed preventive health counseling, as reflected in patient instructions       Regular " "exercise       Healthy diet/nutrition       Vision screening       Hearing screening    BP Readings from Last 1 Encounters:   11/15/17 137/85     Estimated body mass index is 32.45 kg/(m^2) as calculated from the following:    Height as of 11/15/17: 5' 8.25\" (1.734 m).    Weight as of 11/15/17: 215 lb (97.5 kg).     reports that she has been smoking Cigarettes.  She has a 12.50 pack-year smoking history. She has never used smokeless tobacco.  Tobacco Cessation Action Plan: Self help information given to patient    Counseling Resources:  ATP IV Guidelines  Pooled Cohorts Equation Calculator  Breast Cancer Risk Calculator  FRAX Risk Assessment  ICSI Preventive Guidelines  Dietary Guidelines for Americans, 2010  USDA's MyPlate  ASA Prophylaxis  Lung CA Screening    (J45.20) Mild intermittent asthma without complication  Comment:   Plan: albuterol (PROAIR HFA/PROVENTIL HFA/VENTOLIN         HFA) 108 (90 Base) MCG/ACT inhaler        Use the med as discussed. Refills are done for one year. Identify triggers to avoid.   You had the flu shot.     (L30.9) Eczema, unspecified type  Comment:   Plan: augmented betamethasone dipropionate         (DIPROLENE-AF) 0.05 % cream        Instructions given on diagnoses and refills done for one year.     (F34.1) Chronic depressive personality disorder  Comment:   Plan: FLUoxetine (PROZAC) 20 MG capsule        Today the PHQ and the LEBRON were both 2!!! You are doing very well and continue the med daily and the   Non drug therapies.     (Z12.11) Colon cancer screening  Comment:   Plan: COLORECTAL SURGERY REFERRAL        Call [phone] to schedule.       Servando Valladares MD  [facility]  "

## 2018-11-16 NOTE — MR AVS SNAPSHOT
After Visit Summary   11/16/2018    Monika Nam    MRN: [id number]           Patient Information     Date Of Birth          1960        Visit Information        Provider Department      11/16/2018 1:40 PM Servando Valladares MD [facility]        Today's Diagnoses     Routine history and physical examination of adult    -  1    Bronchospasm        Mild intermittent asthma without complication        Eczema, unspecified type        Chronic depressive personality disorder        Colon cancer screening          Care Instructions      Preventive Health Recommendations  Female Ages 50 - 64    Yearly exam: See your health care provider every year in order to  o Review health changes.   o Discuss preventive care.    o Review your medicines if your doctor has prescribed any.      Get a Pap test every three years (unless you have an abnormal result and your provider advises testing more often).    If you get Pap tests with HPV test, you only need to test every 5 years, unless you have an abnormal result.     You do not need a Pap test if your uterus was removed (hysterectomy) and you have not had cancer.    You should be tested each year for STDs (sexually transmitted diseases) if you're at risk.     Have a mammogram every 1 to 2 years.    Have a colonoscopy at age 50, or have a yearly FIT test (stool test). These exams screen for colon cancer.      Have a cholesterol test every 5 years, or more often if advised.    Have a diabetes test (fasting glucose) every three years. If you are at risk for diabetes, you should have this test more often.     If you are at risk for osteoporosis (brittle bone disease), think about having a bone density scan (DEXA).    Shots: Get a flu shot each year. Get a tetanus shot every 10 years.    Nutrition:     Eat at least 5 servings of fruits and vegetables each day.    Eat whole-grain bread, whole-wheat pasta and brown rice instead of white grains and  "rice.    Get adequate Calcium and Vitamin D.     Lifestyle    Exercise at least 150 minutes a week (30 minutes a day, 5 days a week). This will help you control your weight and prevent disease.    Limit alcohol to one drink per day.    No smoking.     Wear sunscreen to prevent skin cancer.     See your dentist every six months for an exam and cleaning.    See your eye doctor every 1 to 2 years.    ASSESSMENT/PLAN:   (Z00.00) Routine history and physical examination of adult  (primary encounter diagnosis)  Comment:   Plan: COUNSELING:   Reviewed preventive health counseling, as reflected in patient instructions       Regular exercise       Healthy diet/nutrition       Vision screening       Hearing screening    BP Readings from Last 1 Encounters:   11/15/17 137/85     Estimated body mass index is 32.45 kg/(m^2) as calculated from the following:    Height as of 11/15/17: 5' 8.25\" (1.734 m).    Weight as of 11/15/17: 215 lb (97.5 kg).     reports that she has been smoking Cigarettes.  She has a 12.50 pack-year smoking history. She has never used smokeless tobacco.  Tobacco Cessation Action Plan: Self help information given to patient    Counseling Resources:  ATP IV Guidelines  Pooled Cohorts Equation Calculator  Breast Cancer Risk Calculator  FRAX Risk Assessment  ICSI Preventive Guidelines  Dietary Guidelines for Americans, 2010  USDA's MyPlate  ASA Prophylaxis  Lung CA Screening    (J45.20) Mild intermittent asthma without complication  Comment:   Plan: albuterol (PROAIR HFA/PROVENTIL HFA/VENTOLIN         HFA) 108 (90 Base) MCG/ACT inhaler        Use the med as discussed. Refills are done for one year. Identify triggers to avoid.   You had the flu shot.     (L30.9) Eczema, unspecified type  Comment:   Plan: augmented betamethasone dipropionate         (DIPROLENE-AF) 0.05 % cream        Instructions given on diagnoses and refills done for one year.     (F34.1) Chronic depressive personality disorder  Comment:   Plan: " FLUoxetine (PROZAC) 20 MG capsule        Today the PHQ and the LEBRON were both 2!!! You are doing very well and continue the med daily and the   Non drug therapies.     (Z12.11) Colon cancer screening  Comment:   Plan: COLORECTAL SURGERY REFERRAL        Call [phone] to schedule.           Follow-ups after your visit        Additional Services     COLORECTAL SURGERY REFERRAL       Your provider has referred you to:  Jan    Referral ReasonsColon Cancer  Special concerns None  This referral is Elective (week +)  It is OK to leave a message on patient's voicemail.    Please be aware that coverage of these services is subject to the terms and limitations of your health insurance plan.  Call member services at your health plan with any benefit or coverage questions.      Please bring the following to your appointment:  >>   Any x-rays, CTs or MRIs which have been performed.  Contact the facility where they were done to arrange for  prior to your scheduled appointment.  Any new CT, MRI or other procedures ordered by your specialist must be performed at a [place] facility or coordinated by your clinic's referral office.   >>   List of current medications  >>   This referral request   >>   Any documents/labs given to you for this referral                  Your next 10 appointments already scheduled     Nov 20, 2018  2:45 PM CST   MA SCREENING DIGITAL BILATERAL with [facility]   [facility] ([facility])    [address] [address] [phone]           How do I prepare for my exam? (Food and drink instructions) No Food and Drink Restrictions.  How do I prepare for my exam? (Other instructions) Do not use any powder, lotion or deodorant under your arms or on your breast. If you do, we will ask you to remove it before your exam.  What should I wear: Wear comfortable, two-piece clothing.  How long does the exam take: Most scans will take 15 minutes.  What should I bring:  "Bring any previous mammograms from other facilities or have them mailed to the breast center.  Do I need a :  No  is needed.  What do I need to tell my doctor: If you have any allergies, tell your care team.  What should I do after the exam: No restrictions, You may resume normal activities.  What is this test: This test is an x-ray of the breast to look for breast disease. The breast is pressed between two plates to flatten and spread the tissue. An X-ray is taken of the breast from different angles.  Who should I call with questions: If you have any questions, please call the Imaging Department where you will have your exam. Directions, parking instructions, and other information is available on our website, [email or web address].  Other information about my exam Three-dimensional (3D) mammograms are available at [facility] locations in [facility], [facility], [place], [facility] and [place]. [facility] locations include [place] and [facility] in [place].  Benefits of 3D mammograms include * Improved rate of cancer detection * Decreases your chance of having to go back for more tests, which means fewer: * \"False-positive\" results (This means that there is an abnormal area but it isn't cancer.) * Invasive testing procedures, such as a biopsy or surgery * Can provide clearer images of the breast if you have dense breast tissue.  *3D mammography is an optional exam that anyone can have with a 2D mammogram. It doesn't replace or take the place of a 2D mammogram. 2D mammograms remain an effective screening test for all women.  Not all insurance companies cover the cost of a 3D mammogram. Check with your insurance. Three-dimensional (3D) mammograms are available at [facility] locations in [facility], [facility], [facility], and [place]. [facility] locations include [place] and [facility] in " "[place]. Benefits of 3D mammograms include: - Improved rate of cancer detection - Decreases your chance of having to go back for more tests, which means fewer: - \"False-positive\" results (This means that there is an abnormal area but it isn't cancer.) - Invasive testing procedures, such as a biopsy or surgery - Can provide clearer images of the breast if you have dense breast tissue. 3D mammography is an optional exam that anyone can have with a 2D mammogram. It doesn't replace or take the place of a 2D mammogram. 2D mammograms remain an effective screening test for all women.  Not all insurance companies cover the cost of a 3D mammogram. Check with your insurance.              Who to contact     If you have questions or need follow up information about today's clinic visit or your schedule please contact [facility] directly at [phone].  Normal or non-critical lab and imaging results will be communicated to you by DailyCredhart, letter or phone within 4 business days after the clinic has received the results. If you do not hear from us within 7 days, please contact the clinic through BioCeet or phone. If you have a critical or abnormal lab result, we will notify you by phone as soon as possible.  Submit refill requests through [employer] or call your pharmacy and they will forward the refill request to us. Please allow 3 business days for your refill to be completed.          Additional Information About Your Visit        MyChart Information     [employer] lets you send messages to your doctor, view your test results, renew your prescriptions, schedule appointments and more. To sign up, go to [email or web address] . Click on \"Log in\" on the left side of the screen, which will take you to the Welcome page. Then click on \"Sign up Now\" on the right side of the page.     You will be asked to enter the access code listed below, as well as some personal information. Please follow the directions to create " "your username and password.     Your access code is: [email or web address]  Expires: 2019  1:31 PM     Your access code will  in 90 days. If you need help or a new code, please call your [place] clinic or [phone].        Care EveryWhere ID     This is your Care EveryWhere ID. This could be used by other organizations to access your [place] medical records  [id number]        Your Vitals Were     Pulse Temperature Respirations Height Last Period Pulse Oximetry    75 98  F (36.7  C) (Tympanic) 24 5' 8.25\" (1.734 m) 2010 97%    BMI (Body Mass Index)                   31.24 kg/m2            Blood Pressure from Last 3 Encounters:   18 114/80   11/15/17 137/85   17 153/89    Weight from Last 3 Encounters:   18 207 lb (93.9 kg)   11/15/17 215 lb (97.5 kg)   17 210 lb (95.3 kg)              We Performed the Following     COLORECTAL SURGERY REFERRAL          Where to get your medicines      These medications were sent to [facility] [address] [place] [address] [address] [facility] AT [facility] OF [address] [place] [address]  [address] [facility] [address]     Phone:  [phone]     albuterol 108 (90 Base) MCG/ACT inhaler    augmented betamethasone dipropionate 0.05 % cream    FLUoxetine 20 MG capsule          Primary Care Provider Office Phone # Fax #    Servando Valladares [phone] [address] [facility] [address]        Equal Access to Services     EREN CRUZ AH: Hadeloy Rainey, wagatoda luqadaha, qaybta kaalscott mueller. So [facility] [phone].    ATENCIÓN: Si habla español, tiene a barraza disposición servicios gratuitos de asistencia lingüística. Joycelyn rm [phone].    We comply with applicable federal civil rights laws and [place] laws. We do not discriminate on the basis of race, color, national origin, age, disability, sex, sexual orientation, or gender identity.            Thank you!  "    Thank you for choosing [facility]  for your care. Our goal is always to provide you with excellent care. Hearing back from our patients is one way we can continue to improve our services. Please take a few minutes to complete the written survey that you may receive in the mail after your visit with us. Thank you!             Your Updated Medication List - Protect others around you: Learn how to safely use, store and throw away your medicines at www.disposemymeds.org.          This list is accurate as of 11/16/18  2:31 PM.  Always use your most recent med list.                   Brand Name Dispense Instructions for use Diagnosis    * albuterol 108 (90 Base) MCG/ACT inhaler    PROAIR HFA    1 Inhaler    Inhale 2 puffs into the lungs every 4 hours as needed for shortness of breath / dyspnea    Bronchospasm       * albuterol 108 (90 Base) MCG/ACT inhaler    PROAIR HFA/PROVENTIL HFA/VENTOLIN HFA    1 Inhaler    Inhale 2 puffs into the lungs every 4 hours as needed for shortness of breath / dyspnea or wheezing    Mild intermittent asthma without complication       aspirin 81 MG tablet      Take 1 tablet (81 mg) by mouth daily        augmented betamethasone dipropionate 0.05 % cream    DIPROLENE-AF    50 g    APPLY EXTERNALLY TO THE AFFECTED AREA TWICE DAILY    Eczema, unspecified type       CALCIUM 600-D 600-400 MG-UNIT per tablet   Generic drug:  calcium carbonate 600 mg-vitamin D 400 units      Take 1 tablet by mouth daily        FLUoxetine 20 MG capsule    PROzac    30 capsule    Take 20 mg daily.    Chronic depressive personality disorder       loratadine 10 MG tablet    CLARITIN     Take 10 mg by mouth daily.        MULTIVITAMIN TABS   OR      1 TABLET BY MOUTH DAILY        ranitidine 150 MG tablet    ZANTAC     Take 1 tablet (150 mg) by mouth daily        * Notice:  This list has 2 medication(s) that are the same as other medications prescribed for you. Read the directions carefully, and ask your  doctor or other care provider to review them with you.

## 2018-11-16 NOTE — PATIENT INSTRUCTIONS
Preventive Health Recommendations  Female Ages 50 - 64    Yearly exam: See your health care provider every year in order to  o Review health changes.   o Discuss preventive care.    o Review your medicines if your doctor has prescribed any.      Get a Pap test every three years (unless you have an abnormal result and your provider advises testing more often).    If you get Pap tests with HPV test, you only need to test every 5 years, unless you have an abnormal result.     You do not need a Pap test if your uterus was removed (hysterectomy) and you have not had cancer.    You should be tested each year for STDs (sexually transmitted diseases) if you're at risk.     Have a mammogram every 1 to 2 years.    Have a colonoscopy at age 50, or have a yearly FIT test (stool test). These exams screen for colon cancer.      Have a cholesterol test every 5 years, or more often if advised.    Have a diabetes test (fasting glucose) every three years. If you are at risk for diabetes, you should have this test more often.     If you are at risk for osteoporosis (brittle bone disease), think about having a bone density scan (DEXA).    Shots: Get a flu shot each year. Get a tetanus shot every 10 years.    Nutrition:     Eat at least 5 servings of fruits and vegetables each day.    Eat whole-grain bread, whole-wheat pasta and brown rice instead of white grains and rice.    Get adequate Calcium and Vitamin D.     Lifestyle    Exercise at least 150 minutes a week (30 minutes a day, 5 days a week). This will help you control your weight and prevent disease.    Limit alcohol to one drink per day.    No smoking.     Wear sunscreen to prevent skin cancer.     See your dentist every six months for an exam and cleaning.    See your eye doctor every 1 to 2 years.    ASSESSMENT/PLAN:   (Z00.00) Routine history and physical examination of adult  (primary encounter diagnosis)  Comment:   Plan: COUNSELING:   Reviewed preventive health  "counseling, as reflected in patient instructions       Regular exercise       Healthy diet/nutrition       Vision screening       Hearing screening    BP Readings from Last 1 Encounters:   11/15/17 137/85     Estimated body mass index is 32.45 kg/(m^2) as calculated from the following:    Height as of 11/15/17: 5' 8.25\" (1.734 m).    Weight as of 11/15/17: 215 lb (97.5 kg).     reports that she has been smoking Cigarettes.  She has a 12.50 pack-year smoking history. She has never used smokeless tobacco.  Tobacco Cessation Action Plan: Self help information given to patient    Counseling Resources:  ATP IV Guidelines  Pooled Cohorts Equation Calculator  Breast Cancer Risk Calculator  FRAX Risk Assessment  ICSI Preventive Guidelines  Dietary Guidelines for Americans, 2010  USDA's MyPlate  ASA Prophylaxis  Lung CA Screening    (J45.20) Mild intermittent asthma without complication  Comment:   Plan: albuterol (PROAIR HFA/PROVENTIL HFA/VENTOLIN         HFA) 108 (90 Base) MCG/ACT inhaler        Use the med as discussed. Refills are done for one year. Identify triggers to avoid.   You had the flu shot.     (L30.9) Eczema, unspecified type  Comment:   Plan: augmented betamethasone dipropionate         (DIPROLENE-AF) 0.05 % cream        Instructions given on diagnoses and refills done for one year.     (F34.1) Chronic depressive personality disorder  Comment:   Plan: FLUoxetine (PROZAC) 20 MG capsule        Today the PHQ and the LEBRON were both 2!!! You are doing very well and continue the med daily and the   Non drug therapies.     (Z12.11) Colon cancer screening  Comment:   Plan: COLORECTAL SURGERY REFERRAL        Call [phone] to schedule.   "

## 2018-11-17 ASSESSMENT — ASTHMA QUESTIONNAIRES: ACT_TOTALSCORE: 23

## 2018-11-17 ASSESSMENT — ANXIETY QUESTIONNAIRES: GAD7 TOTAL SCORE: 2

## 2018-11-19 DIAGNOSIS — J98.01 BRONCHOSPASM: ICD-10-CM

## 2018-11-19 NOTE — TELEPHONE ENCOUNTER
"Requested Prescriptions   Pending Prescriptions Disp Refills     albuterol (PROAIR HFA) 108 (90 Base) MCG/ACT inhaler 1 Inhaler 11    Last Written Prescription Date:  11/16/18  Last Fill Quantity: 1,  # refills: 11   Last office visit: 11/16/2018 with prescribing provider:  Jacquelyn   Future Office Visit:     Sig: Inhale 2 puffs into the lungs every 4 hours as needed for shortness of breath / dyspnea    Asthma Maintenance Inhalers - Anticholinergics Passed    11/19/2018  4:13 PM       Passed - Patient is age 12 years or older       Passed - Asthma control assessment score within normal limits in last 6 months    Please review ACT score.          Passed - Recent (6 mo) or future (30 days) visit within the authorizing provider's specialty    Patient had office visit in the last 6 months or has a visit in the next 30 days with authorizing provider or within the authorizing provider's specialty.  See \"Patient Info\" tab in inbasket, or \"Choose Columns\" in Meds & Orders section of the refill encounter.              "

## 2018-11-20 ENCOUNTER — HOSPITAL ENCOUNTER (OUTPATIENT)
Dept: MAMMOGRAPHY | Facility: CLINIC | Age: 58
Discharge: HOME | End: 2018-11-20
Attending: FAMILY MEDICINE | Admitting: FAMILY MEDICINE
Payer: COMMERCIAL

## 2018-11-20 DIAGNOSIS — Z12.31 VISIT FOR SCREENING MAMMOGRAM: ICD-10-CM

## 2018-11-20 PROCEDURE — 77067 SCR MAMMO BI INCL CAD: CPT

## 2018-11-20 RX ORDER — ALBUTEROL SULFATE 90 UG/1
2 AEROSOL, METERED RESPIRATORY (INHALATION) EVERY 4 HOURS PRN
Qty: 1 INHALER | Refills: 11 | OUTPATIENT
Start: 2018-11-20

## 2018-11-20 NOTE — TELEPHONE ENCOUNTER
Duplicate  Medication Detail      Disp Refills Start End LOLA   albuterol (PROAIR HFA/PROVENTIL HFA/VENTOLIN HFA) 108 (90 Base) MCG/ACT inhaler 1 Inhaler 11 11/16/2018  No   Sig - Route: Inhale 2 puffs into the lungs every 4 hours as needed for shortness of breath / dyspnea or wheezing - Inhalation   Class: E-Prescribe   Order: [id number]   E-Prescribing Status: Receipt confirmed by pharmacy (11/16/2018  2:26 PM CST)     Bindu LAWRENCE RN

## 2018-11-30 ENCOUNTER — HOSPITAL ENCOUNTER (OUTPATIENT)
Dept: ULTRASOUND IMAGING | Facility: CLINIC | Age: 58
Discharge: HOME | End: 2018-11-30
Attending: FAMILY MEDICINE | Admitting: FAMILY MEDICINE
Payer: COMMERCIAL

## 2018-11-30 DIAGNOSIS — R92.8 ABNORMAL MAMMOGRAM: ICD-10-CM

## 2018-11-30 PROCEDURE — 76642 ULTRASOUND BREAST LIMITED: CPT | Mod: LT

## 2018-12-16 DIAGNOSIS — F34.1 CHRONIC DEPRESSIVE PERSONALITY DISORDER: ICD-10-CM

## 2018-12-17 ENCOUNTER — HOSPITAL ENCOUNTER (OUTPATIENT)
Facility: CLINIC | Age: 58
End: 2018-12-17
Attending: SURGERY | Admitting: SURGERY
Payer: COMMERCIAL

## 2018-12-17 ENCOUNTER — HOSPITAL ENCOUNTER (OUTPATIENT)
Dept: ULTRASOUND IMAGING | Facility: CLINIC | Age: 58
Discharge: HOME | End: 2018-12-17
Attending: FAMILY MEDICINE | Admitting: FAMILY MEDICINE
Payer: COMMERCIAL

## 2018-12-17 ENCOUNTER — HOSPITAL ENCOUNTER (OUTPATIENT)
Dept: MAMMOGRAPHY | Facility: CLINIC | Age: 58
End: 2018-12-17
Attending: FAMILY MEDICINE
Payer: COMMERCIAL

## 2018-12-17 DIAGNOSIS — N63.20 LEFT BREAST MASS: ICD-10-CM

## 2018-12-17 PROCEDURE — 88342 IMHCHEM/IMCYTCHM 1ST ANTB: CPT | Mod: 26 | Performed by: RADIOLOGY

## 2018-12-17 PROCEDURE — 27211116 US BREAST BIOPSY CORE NEEDLE LEFT

## 2018-12-17 PROCEDURE — 88377 M/PHMTRC ALYS ISHQUANT/SEMIQ: CPT | Performed by: PATHOLOGY

## 2018-12-17 PROCEDURE — 25000125 ZZHC RX 250: Performed by: RADIOLOGY

## 2018-12-17 PROCEDURE — 40000986 MA POST PROCEDURE LEFT

## 2018-12-17 PROCEDURE — 88360 TUMOR IMMUNOHISTOCHEM/MANUAL: CPT | Performed by: RADIOLOGY

## 2018-12-17 PROCEDURE — 00000159 ZZHCL STATISTIC H-SEND OUTS PREP: Performed by: RADIOLOGY

## 2018-12-17 PROCEDURE — 88360 TUMOR IMMUNOHISTOCHEM/MANUAL: CPT | Mod: 26 | Performed by: RADIOLOGY

## 2018-12-17 PROCEDURE — 88305 TISSUE EXAM BY PATHOLOGIST: CPT | Mod: 26 | Performed by: RADIOLOGY

## 2018-12-17 PROCEDURE — 88305 TISSUE EXAM BY PATHOLOGIST: CPT | Performed by: RADIOLOGY

## 2018-12-17 PROCEDURE — 00000158 ZZHCL STATISTIC H-FISH PROCESS B/S: Performed by: RADIOLOGY

## 2018-12-17 PROCEDURE — 88342 IMHCHEM/IMCYTCHM 1ST ANTB: CPT | Mod: XU | Performed by: RADIOLOGY

## 2018-12-17 RX ORDER — LIDOCAINE HYDROCHLORIDE 10 MG/ML
10 INJECTION, SOLUTION EPIDURAL; INFILTRATION; INTRACAUDAL; PERINEURAL ONCE
Status: COMPLETED | OUTPATIENT
Start: 2018-12-17 | End: 2018-12-17

## 2018-12-17 RX ADMIN — LIDOCAINE HYDROCHLORIDE 8 ML: 10 INJECTION, SOLUTION EPIDURAL; INFILTRATION; INTRACAUDAL; PERINEURAL at 12:47

## 2018-12-17 NOTE — IP AVS SNAPSHOT
[facility] [place] [facility]  [address]  [facility] [address]  Phone:  [phone]                                    After Visit Summary   12/17/2018    Monika Nam    MRN: [id number]           After Visit Summary Signature Page    I have received my discharge instructions, and my questions have been answered. I have discussed any challenges I see with this plan with the nurse or doctor.    ..........................................................................................................................................  Patient/Patient Representative Signature      ..........................................................................................................................................  Patient Representative Print Name and Relationship to Patient    ..................................................               ................................................  Date                                   Time    ..........................................................................................................................................  Reviewed by Signature/Title    ...................................................              ..............................................  Date                                               Time          22EPIC Rev 08/18

## 2018-12-17 NOTE — DISCHARGE INSTRUCTIONS
Breast Biopsy Care Instructions      Site Care:    You may have some discomfort in the breast and may see some bruising at the needle site.    You will be given an ice pack which should be kept in place over the dressing until bedtime tonight.Always keep a gauze pad between your skin and the ice pack.    Wearing your bra continuously for 24 hours post biopsy will give optimal support to the biopsy site.    Activity:       You may go back to your normal routine.     No heavy lifting for 24 hours.    Diet and Medications:       You may go back to your regular diet.     Tylenol is the best choice to relieve your discomfort, the first 24 hours. If you have no bleeding on the first day, Ibuprofen (such as Advil, or Motrin), may be taken on the second day after for pain.     Do not take aspirin for 72 hours following your biopsy.    Questions:     If you have any questions about your procedure performed in Ultrasound, you may contact us at [phone].If you have any questions about your procedure performed in Mammography, you may contact us at [phone].    Results:       The pathology report on your biopsy is usually available within 72 hours. The Radiologist or your personal physician will call you with the results.     You will receive information about any further follow up from your physician.    Call your doctor if you have:       More than slight bleeding or significant pain, or experience swelling of the breast.     If your physician is unavailable, please call the Nurse Advice Line at [phone], or [facility] Emergency Department at [phone].      Patient:______________________________  Time:_________  Date:_____________    Instructor:____________________________   Time:_________  Date:_____________

## 2018-12-17 NOTE — PROGRESS NOTES
RADIOLOGY PROCEDURE NOTE  Patient name: Monika Nam  MRN: [id number]  : 1960    Pre-procedure diagnosis: Left breast lesion.  Post-procedure diagnosis: Same    Procedure Date/Time: 2018  1:57 PM  Procedure: US guided needle core biopsy.  Estimated blood loss: None  Specimen(s) collected with description:  Three needle cores.  The patient tolerated the procedure well with no immediate complications.    See imaging dictation for procedural details.    Provider name: Omid Powell  Assistant(s):None

## 2018-12-19 LAB — COPATH REPORT: NORMAL

## 2018-12-20 ENCOUNTER — TELEPHONE (OUTPATIENT)
Dept: ONCOLOGY | Facility: CLINIC | Age: 58
End: 2018-12-20

## 2018-12-20 NOTE — TELEPHONE ENCOUNTER
RECORDS STATUS - BREAST    RECORDS RECEIVED FROM: [facility]   DATE RECEIVED: 12/20/18   NOTES STATUS DETAILS   OFFICE NOTE from referring provider [facility]   OFFICE NOTE from medical oncologist N/A    OFFICE NOTE from surgeon [facility]   OFFICE NOTE from radiation oncologist     DISCHARGE SUMMARY from hospital     DISCHARGE REPORT from the ER     OPERATIVE REPORT     MEDICATION LIST [facility]   CLINICAL TRIAL TREATMENTS TO DATE     LABS     PATHOLOGY REPORTS  (Tissue diagnosis, Stage, ER/IN percentage positive and intensity of staining, HER2 IHC, FISH, and all biopsies from breast and any distant metastasis)                 Complete Epic   GENONOMIC TESTING     TYPE:   (Next Generation Sequencing, including Foundation One testing, and Oncotype score) N/A    IMAGING (NEED IMAGES & REPORT)     CT SCANS     MRI     MAMMO Complete PACS   ULTRASOUND Complete PACS   PET     BONE SCAN     BRAIN MRI

## 2018-12-20 NOTE — TELEPHONE ENCOUNTER
ONCOLOGY INTAKE: Records Information      APPT INFORMATION: 01/07  Referring provider:  Servando Valladares MD  Referring provider s clinic:  [facility]  Reason for visit/diagnosis:  Breast Cancer     Were the records received with the referral (via [employer])? Complete     Has patient been seen for any external appt for this diagnosis (enter clinic/location)? [facility]    Patient was informed of results from her primary and was given phone numbers for Oncology clinic and the surgeon. She was told to call and set up appointments.  Left message on patient's answering machine. Tried to reach out to patient to get her scheduled. Tenatively scheduled her for 9 AM on 12/21/18.  Yuliet Posada RN

## 2018-12-21 ENCOUNTER — PRE VISIT (OUTPATIENT)
Dept: ONCOLOGY | Facility: CLINIC | Age: 58
End: 2018-12-21

## 2018-12-22 LAB — COPATH REPORT: NORMAL

## 2018-12-26 ENCOUNTER — ONCOLOGY VISIT (OUTPATIENT)
Dept: ONCOLOGY | Facility: CLINIC | Age: 58
End: 2018-12-26
Attending: INTERNAL MEDICINE
Payer: COMMERCIAL

## 2018-12-26 VITALS
WEIGHT: 205 LBS | RESPIRATION RATE: 16 BRPM | DIASTOLIC BLOOD PRESSURE: 81 MMHG | TEMPERATURE: 98.7 F | SYSTOLIC BLOOD PRESSURE: 146 MMHG | HEIGHT: 68 IN | BODY MASS INDEX: 31.07 KG/M2 | OXYGEN SATURATION: 97 % | HEART RATE: 104 BPM

## 2018-12-26 DIAGNOSIS — F34.1 CHRONIC DEPRESSIVE PERSONALITY DISORDER: ICD-10-CM

## 2018-12-26 DIAGNOSIS — C50.112 INFILTRATING DUCTAL CARCINOMA OF CENTRAL PORTION OF LEFT BREAST IN FEMALE (H): ICD-10-CM

## 2018-12-26 DIAGNOSIS — F17.200 TOBACCO USE DISORDER: ICD-10-CM

## 2018-12-26 DIAGNOSIS — K21.9 GASTROESOPHAGEAL REFLUX DISEASE WITHOUT ESOPHAGITIS: ICD-10-CM

## 2018-12-26 DIAGNOSIS — J45.20 MILD INTERMITTENT ASTHMA WITHOUT COMPLICATION: ICD-10-CM

## 2018-12-26 PROCEDURE — G0463 HOSPITAL OUTPT CLINIC VISIT: HCPCS

## 2018-12-26 PROCEDURE — 99205 OFFICE O/P NEW HI 60 MIN: CPT | Performed by: INTERNAL MEDICINE

## 2018-12-26 ASSESSMENT — PAIN SCALES - GENERAL: PAINLEVEL: NO PAIN (0)

## 2018-12-26 ASSESSMENT — MIFFLIN-ST. JEOR: SCORE: 1553.37

## 2018-12-26 NOTE — NURSING NOTE
"Oncology Rooming Note    December 26, 2018 2:02 PM   Monika Nam is a 58 year old female who presents for:    Chief Complaint   Patient presents with     Oncology Clinic Visit     New Patient - Left Breast CA, review Pathology     Initial Vitals: /81 (BP Location: Right arm, Patient Position: Sitting, Cuff Size: Adult Large)   Pulse 104   Temp 98.7  F (37.1  C) (Tympanic)   Resp 16   Ht 1.727 m (5' 8\")   Wt 93 kg (205 lb)   LMP 04/02/2010   SpO2 97%   BMI 31.17 kg/m   Estimated body mass index is 31.17 kg/m  as calculated from the following:    Height as of this encounter: 1.727 m (5' 8\").    Weight as of this encounter: 93 kg (205 lb). Body surface area is 2.11 meters squared.  No Pain (0) Comment: Data Unavailable   Patient's last menstrual period was 04/02/2010.  Allergies reviewed: Yes  Medications reviewed: Yes    Medications: Medication refills not needed today.  Pharmacy name entered into [employer]:    [facility] - [address] AT [address]      Clinical concerns: New Patient - Left Breast CA, review Pathology    7  minutes for nursing intake (face to face time)     Monika Curtis CMA              "

## 2018-12-26 NOTE — PATIENT INSTRUCTIONS
We would like you to keep your appointment with the surgeon on 12/31/19. Dr. Gonzales would like to see you back in 2 weeks after your surgery for a follow up appointment.     Copy of appointments, and after visit summary (AVS) given to patient.      If you have any questions during business hours (M-F 8 AM- 4PM), please call Yuliet Posada RN Oncology Hematology  at [facility] [phone].       For questions after business hours, or on holidays/weekends, please call our after hours Nurse Triage line [phone]. Thank you.

## 2018-12-26 NOTE — LETTER
12/26/2018         RE: Monika Nam  [address] [facility] [id number]        Dear Colleague,    Thank you for referring your patient, Monika Nam, to the [facility]. Please see a copy of my visit note below.        DATE OF VISIT: Dec 26, 2018    REASON FOR REFERRAL: Management of breast cancer    CHIEF COMPLAINT:   Chief Complaint   Patient presents with     Oncology Clinic Visit     New Patient - Left Breast CA, review Pathology       HISTORY OF PRESENT ILLNESS:   This is a 58-year-old female patient who presented following her annual mammogram with new focal asymmetry at 12-1 o'clock position of the left breast around 10.7 cm from the nipple.  It measured 1.4 cm.  Subsequently the patient went on to have breast ultrasound on member 30th 2018 showing 1.6 cm in maximum dimension hypoechoic mass.  Subsequently ultrasound breast biopsy was done on December 17, 2018 showing invasive ductal carcinoma grade 3 out of 3 angiolymphatic invasion was not identified.  Ductal carcinoma in situ was not identified . estrogen receptor was negative, progesterone receptor was negative and HER-2/mercedes receptor was negative.  The patient is here today to discuss management plan.    REVIEW OF SYSTEMS:   Constitutional: Negative for fever, chills, and night sweats.  Skin: negative.  Eyes: negative.  Ears/Nose/Throat: negative.  Respiratory: No shortness of breath, dyspnea on exertion, cough, or hemoptysis.  Cardiovascular: negative.  Gastrointestinal: negative.  Genitourinary: negative.  Musculoskeletal: negative.  Neurologic: negative.  Psychiatric: negative.  Hematologic/Lymphatic/Immunologic: negative.  Endocrine: negative.    PAST MEDICAL HISTORY:   Past Medical History:   Diagnosis Date     Abnormal Papanicolaou smear of cervix and cervical HPV     history of LEEP ion 1994     ASCUS favor benign 7/2013, 7/2014    Neg high risk HPV      Cervical high risk HPV (human papillomavirus) test positive  5/24/12    type 66       PAST SURGICAL HISTORY:   Past Surgical History:   Procedure Laterality Date     SURGICAL HISTORY OF -   1996    cholecystectomy     SURGICAL HISTORY OF -   1994    LEEP        ALLERGIES:   Allergies as of 12/26/2018 - Reviewed 12/26/2018   Allergen Reaction Noted     Sulfa drugs Swelling 06/09/2005       MEDICATIONS:   Current Outpatient Medications   Medication Sig Dispense Refill     albuterol (PROAIR HFA) 108 (90 BASE) MCG/ACT Inhaler Inhale 2 puffs into the lungs every 4 hours as needed for shortness of breath / dyspnea 1 Inhaler 11     albuterol (PROAIR HFA/PROVENTIL HFA/VENTOLIN HFA) 108 (90 Base) MCG/ACT inhaler Inhale 2 puffs into the lungs every 4 hours as needed for shortness of breath / dyspnea or wheezing 1 Inhaler 11     augmented betamethasone dipropionate (DIPROLENE-AF) 0.05 % cream APPLY EXTERNALLY TO THE AFFECTED AREA TWICE DAILY 50 g 6     calcium-vitamin D (CALCIUM 600-D) 600-400 MG-UNIT per tablet Take 1 tablet by mouth daily       FLUoxetine (PROZAC) 20 MG capsule Take 20 mg daily. 30 capsule 11     loratadine (CLARITIN) 10 MG tablet Take 10 mg by mouth daily.         MULTIVITAMIN TABS   OR 1 TABLET BY MOUTH DAILY       ranitidine (ZANTAC) 150 MG tablet Take 1 tablet (150 mg) by mouth daily       aspirin 81 MG tablet Take 1 tablet (81 mg) by mouth daily          FAMILY HISTORY:   Family History   Problem Relation Age of Onset     Gastrointestinal Disease Mother         desmond dz     Hypertension Father      Lipids Father      Breast Cancer Paternal Grandmother      Neurologic Disorder Paternal Grandfather         parkinsons     Cancer - colorectal No family hx of      Prostate Cancer No family hx of      Diabetes No family hx of      C.A.D. No family hx of       Paternal grandmother had breast cancer in her late 80s    SOCIAL HISTORY:   Social History     Socioeconomic History     Marital status:      Spouse name: None     Number of children: None     Years of  "education: None     Highest education level: None   Social Needs     Financial resource strain: None     Food insecurity - worry: None     Food insecurity - inability: None     Transportation needs - medical: None     Transportation needs - non-medical: None   Occupational History     None   Tobacco Use     Smoking status: Current Every Day Smoker     Packs/day: 0.50     Years: 25.00     Pack years: 12.50     Types: Cigarettes     Smokeless tobacco: Never Used     Tobacco comment: 1/2 ppd . Did try the patch- gets a rash.   Substance and Sexual Activity     Alcohol use: Yes     Comment: 3-4 X per week.  About 7 drinks per week.     Drug use: No     Sexual activity: Yes     Partners: Male   Other Topics Concern     Parent/sibling w/ CABG, MI or angioplasty before 65F 55M? No   Social History Narrative     None       PHYSICAL EXAMINATION:   /81 (BP Location: Right arm, Patient Position: Sitting, Cuff Size: Adult Large)   Pulse 104   Temp 98.7  F (37.1  C) (Tympanic)   Resp 16   Ht 1.727 m (5' 8\")   Wt 93 kg (205 lb)   LMP 04/02/2010   SpO2 97%   BMI 31.17 kg/m     Wt Readings from Last 10 Encounters:   12/26/18 93 kg (205 lb)   11/16/18 93.9 kg (207 lb)   11/15/17 97.5 kg (215 lb)   08/29/17 95.3 kg (210 lb)   10/14/16 96.2 kg (212 lb)   08/24/16 93 kg (205 lb)   03/30/16 94.8 kg (209 lb)   08/11/15 93 kg (205 lb)   06/26/15 94.1 kg (207 lb 6.4 oz)   07/25/14 98 kg (216 lb)      ECOG performance status: 0  GENERAL APPEARANCE: Healthy, alert and in no acute distress.  HEENT: Sclerae anicteric. PERRLA. Oropharynx without ulcers, lesions, or thrush.  NECK: Supple. No asymmetry or masses.  LYMPHATICS: No palpable cervical, supraclavicular, axillary, or inguinal lymphadenopathy.  RESP: Lungs clear to auscultation bilaterally without rales, rhonchi or wheezes.  CARDIOVASCULAR: Regular rate and rhythm. Normal S1, S2; no S3 or S4. No murmur, gallop, or rub.  BREAST: Left breast there is about 1.5 cm lesion felt " in the left breast.  There is no axillary adenopathy.  Right breast no dominant masses or axillary adenopathy.  ABDOMEN: Soft, nontender. Bowel sounds normal. No palpable organomegaly or masses.  MUSCULOSKELETAL: Extremities without gross deformities noted. No edema of bilateral lower extremities.  SKIN: No suspicious lesions or rashes.  NEURO: Alert and oriented x 3. Cranial nerves II-XII grossly intact.  PSYCHIATRIC: Mentation and affect appear normal.    LABORATORY RESULTS:  Hospital Outpatient Visit on 12/17/2018   Component Date Value Ref Range Status     Copath Report 12/17/2018    Final                    Value:Patient Name: BRY ASCENCIO  MR#: [id number]  Specimen #: [id number]  Collected: 12/17/2018  Received: 12/18/2018  Reported: 12/19/2018 14:23  Ordering Phy(s): MAYKEL PERKINS    For improved result formatting, select 'View Enhanced Report Format' under   Linked Documents section.    SPECIMEN(S):  Left breast core needle biopsy    FINAL DIAGNOSIS:  Breast, left, ultrasound-guided needle core biopsy:  - Invasive ductal carcinoma:       - Kemp grade: III/III; Vern score: 8/9 (tubules:3;   nuclear:3; mitosis:3)       - Angiolymphatic invasion not identified in the planes examined.       - Ductal carcinoma in-situ not identified in the planes examined       - ER: Negative (no staining in tumor nuclei)       - WA: Negative (no staining in tumor nuclei)    COMMENT:  HER2 gene amplification will be performed and reported by the Cytogenetics   laboratory.    Electronically signed out by:    Hector Wolfe M.D., PhD    CLINICAL HISTORY:  58 year old female.  There is a new foc                          al asymmetry at 12-1:00 in the left   breast and 10.7 cm from the nipple.  It measures 1.4 cm.  Directed sonography in the left breast at the site of   the mammographic finding shows a  hypoechoic mass which measures 1.6 cm in maximal dimension.    GROSS:  A single specimen container with  "formalin and saline is received labeled   with the patient's name, date of  birth, and medical record number. Information on the requisition slip,   container, and associated labels is  confirmed.    The specimen is designated \"left breast mass core needle biopsy\". The   specimen consists of 3 pale tan needle  core biopsies ranging in length from 1.4 to 1.8 cm and each approximately   0.1 cm in diameter. The specimen is  wrapped and is entirely submitted in one cassette. Time tissue removed   1255 hours, time in formalin 1255 hours  12/17/2018. (Dictated by: Ashanti Ashraf 12/18/2018 11:05 AM)    MICROSCOPIC:  Microscopic examination is performed.  The immunohistochemical stains for   p63, ER and FL wer                          e performed and  reviewed.  The staining patterns support the interpretation.    IMMUNOHISTOCHEMICAL ANALYSIS FOR ESTROGEN AND PROGESTERONE RECEPTORS    External and internal controls stain appropriately.  Standard assay conditions are met.  Assay Conditions:  Fixative and processing:  10% neutral buffered formalin, paraffin   embedded.  Cold ischemia time less than one hour.  Time of specimen collection 12:55.    Time placed in formalin 12:55.  Total duration of formalin fixation greater than 6 hours and less than 72   hours.  Staining method used:  [place] predilute monoclonal antibodies, estrogen   receptor clone SP1 and progesterone  receptor clone 1E2, antigen heat retrieval in EDTA alkaline pH for 60   minutes, [place] ultraview detection  system and automatic immunostainer.    Scoring system:  The ASCO-CAP scoring guidelines are used.  A positive   test is defined as positive nuclei  staining in greater than or equal to 1% of tumor cells.  A negative test   is defined as nuclear                           staining in  less than 1% of tumor cells.  For positive tests, an estimation of   intensity of nuclear staining over the  entire tumor on tissue sections is also provided.    Reference:  " MALINA Mejia, DELTA Busby, MARGOT Dinh, et al.  American Society of   Clinical Oncology/College of  American Pathologists guideline recommendations for immunohistochemical   testing of estrogen and progesterone  receptors in breast cancer, Arch Pathol Lab Med.  2010;134:907-922    CPT Codes:  A: 06871-WM9, 20875-WM, 02751-GW, HFISH, SOH, 83308-LXQ    TESTING LAB LOCATION:  [address] [address] [phone]    COLLECTION SITE:  Client: [facility]  Location: [place] [place])       Copath Report 12/17/2018    Final                    Value:Patient Name: MONIKA ASCENCIO  MR#: [id number]  Specimen #: [id number]  Collected: 12/17/2018 12:55  Received: 12/20/2018 10:24  Reported: 12/22/2018 23:14  Ordering Phy(s): TREVA GONZALEZ TAWFISAIRA  Additional Phy(s): MAYKEL PERKINS    For improved result formatting, select 'View Enhanced Report Format' under   Linked Documents section.  __________________________________________    TEST(S) REQUESTED:  [facility] HER2 FISH    SPECIMEN DESCRIPTION:  Breast Tissue, Paraffin Embedded    CLINICAL COMMENTS:  [id number].  Left breast core needle biopsy.    RESULTS:    Ratio of HER2/OMAR-17 signals  Monika Ascencio:  1.8 (ASIF Negative)                               Avg.   number HER2 signals/nucleus:  3.3                                                                        Avg.   number OMAR-17 signals/nucleus:  1.9    **Interpretive guidelines per the American Society of Clinical   Oncology/College of American Pathologists  Clinical Practice Guideline Focused Update (Akiko THOMAS et al                          , 2018, Arch   Pathol Lab Med  doi:10.5858/arpa.[id number]-QR):    -- Group 1: HER2/OMAR-17 ratio 2.0 or more -AND- avg. number HER2   signals/nucleus 4.0 or more (ASIF Positive)  -- Group 2: HER2/OMAR-17 ratio 2.0 or more -AND- avg. number HER2   signals/nucleus <4.0 (Additional  work  required)  -- Group 3: HER2/OMAR-17 ratio <2.0 -AND- avg. number HER2 signals/nucleus   6.0 or more (Additional work  required)  -- Group 4: HER2/OMAR-17 ratio <2.0 -AND- avg. number HER2 signals/nucleus   4.0 or more and <6.0 (Additional  work required)  -- Group 5: HER2/OMAR-17 ratio <2.0 -AND- avg. number HER2 signals/nucleus   <4.0 (ASIF Negative)    INTERPRETATION:  Per the American Society of Clinical Oncology/College of American   Pathologists Clinical Practice Guideline  Focused Update (Akiko THOMAS et al, 2018, Arch Pathol Lab Med    doi:10.5858/arpa.[id number]-ZG), the HER2/OMAR 17  ratio of 1.8 and average number of HER2 signals/cell of 3.3 places this   specimen in Group 5 (ASIF Negative).    Specimen:  Brian                          e [id number], Block A1  Reported formalin fixation time:  6-72 hours  Number of cells scored: 60  Probe:   [employer]ako HER2/OMAR-17 IQFISH pharmDx Probe Mix to HER2 (17q12) and to   the centromere region of chromosome  17    ADDITIONAL COMMENTS:  The IQFISH pharmDx test has been approved by the FDA for the evaluation of   HER2 (ERBB2) gene amplification  status in formalin-fixed, paraffin-embedded breast cancer tissue specimens   and gastric or gastroesophageal  junction adenocarcinoma.  It is intended for use as an adjunct to other   existing clinicopathologic information  used to evaluate patients with such tumors. This test was developed and   its performance characteristics  determined by the [facility] [place].    Electronically Signed Out By:  Belkis Solorio M.D., [facility] MacombsiciansT Codes:  A: 13342-ERD3, [id number]    TESTING LAB LOCATION:  [facility]   [address], [facility] [address] [facility]  [place] [id number] [phone]    COLLECTION SITE:  Client:  [facility]  Location:  [facility] ()         IMAGING RESULTS:  Recent Results (from the past 744  hour(s))   US Breast Left    Narrative    US BREAST LEFT LIMITED 1-3 QUADRANTS 11/30/2018 3:52 PM    HISTORY:  Focal asymmetry in left breast.    COMPARISON:  11/20/2018.    FINDINGS: Directed sonography in the left breast at the site of the  mammographic finding shows a hypoechoic mass which measures 1.6 cm in  maximal dimension. Ultrasound-guided needle core biopsy is recommended  at this time. This was discussed with the patient. We will help her  get set up for this. Additional sonography to the left axilla shows no  abnormal lymph nodes.      Impression    IMPRESSION: BI-RADS CATEGORY: 4 - Suspicious Abnormality-Biopsy Should  Be Considered.    RECOMMENDED FOLLOW-UP: Biopsy.    MAYKEL PERKINS MD   US Breast Biopsy Core Needle Left    Addendum: 12/19/2018    Monika Nam  Accession # [id number]    Addendum begins:    ADDENDUM:  The original report on this patient was dictated by Dr. Perkins.  The pathology results have returned and show invasive ductal  carcinoma without DCIS. These pathology results are concordant with  the imaging findings. These results were called to Dr. Valladares today.  He will make sure the patient is contacted and set up for further  workup.    ( Date of Addendum: 12/19/2018 )    Addendum ends.    MAYKEL PERKINS MD      Narrative    HISTORY: Left breast lesion.    COMPARISON: 11/30/2018.    ULTRASOUND-GUIDED NEEDLE CORE BIOPSY OF LEFT BREAST LESION: Risks and  benefits of the procedure are discussed with the patient. Sonographic  guidance and 8 mL of 1% lidocaine (local anesthetic) are utilized.  Three 14-gauge core biopsy samples are obtained of the left breast  lesion at the wall o'clock position. The patient tolerated the  procedure well.  There is no significant blood loss.    After the biopsy a hydrophilic marker is placed in case any further  future intervention is necessary. Before this is done risks and  benefits are discussed with the patient.    POSTPROCEDURE MAMMOGRAMS FOR MARKER  PLACEMENT: The hydrophilic marker  has appropriately deployed.      Impression    IMPRESSION: Technically uneventful ultrasound-guided needle core  biopsy of breast lesion. Pathologic results are pending.    MAYKEL PERKINS MD   MA Post Procedure Left    Addendum: 12/19/2018    Monika Nam  Accession # [id number]    Addendum begins:    ADDENDUM:  The original report on this patient was dictated by Dr. Perkins.  The pathology results have returned and show invasive ductal  carcinoma without DCIS. These pathology results are concordant with  the imaging findings. These results were called to Dr. Valladares today.  He will make sure the patient is contacted and set up for further  workup.    ( Date of Addendum: 12/19/2018 )    Addendum ends.    MAYKEL PERKINS MD      Narrative    HISTORY: Left breast lesion.    COMPARISON: 11/30/2018.    ULTRASOUND-GUIDED NEEDLE CORE BIOPSY OF LEFT BREAST LESION: Risks and  benefits of the procedure are discussed with the patient. Sonographic  guidance and 8 mL of 1% lidocaine (local anesthetic) are utilized.  Three 14-gauge core biopsy samples are obtained of the left breast  lesion at the wall o'clock position. The patient tolerated the  procedure well.  There is no significant blood loss.    After the biopsy a hydrophilic marker is placed in case any further  future intervention is necessary. Before this is done risks and  benefits are discussed with the patient.    POSTPROCEDURE MAMMOGRAMS FOR MARKER PLACEMENT: The hydrophilic marker  has appropriately deployed.      Impression    IMPRESSION: Technically uneventful ultrasound-guided needle core  biopsy of breast lesion. Pathologic results are pending.    MAYKEL PERKINS MD       ASSESSMENT AND PLAN:    (C50.112) Infiltrating ductal carcinoma of central portion of left breast in female (H)  This is a 58-year-old female postmenopausal women with infiltrating ductal carcinoma of the left breast clinical stage T1cN0 M0.  Hormone receptor  negative and HER-2/mercedes negative as well.  I reviewed with the patient today the natural history of breast cancer, we talked about biology, staging, management, follow-up and prognosis.  We talked about different modalities and treatment of breast cancer including surgical resection, breast radiation therapy and systemic therapy.  Patient is scheduled to see surgery next week.  Today we talked about lumpectomy versus mastectomy.  We talked about neoadjuvant therapy versus adjuvant therapy.  The patient is a candidate for chemotherapy with dose dense Adriamycin Cytoxan and Taxol.  I gave the patient overview about potential side effects.  I will meet with the patient 2 weeks following surgical resection to discuss management plan in details.    (F17.200) Tobacco use disorder  Strongly emphasized the importance of quitting smoking.    (K21.9) Gastroesophageal reflux disease without esophagitis  Currently on Zantac 150 milligrams daily    (J45.20) Mild intermittent asthma without complication  Patient currently on butyryl inhaler as needed.    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    The patient is ready to learn, no apparent learning barriers were identified, Diagnosis and treatment plans were explained to the patient. The patient expressed understanding of the content. The patient questions were answered to her satisfaction.    Madison Gonzales MD    Chart documentation with Dragon Voice recognition Software. Although reviewed after completion, some words and grammatical errors may remain.Again, thank you for allowing me to participate in the care of your patient.        Sincerely,        Madison Gonzales MD

## 2018-12-27 NOTE — PROGRESS NOTES
DATE OF VISIT: Dec 26, 2018    REASON FOR REFERRAL: Management of breast cancer    CHIEF COMPLAINT:   Chief Complaint   Patient presents with     Oncology Clinic Visit     New Patient - Left Breast CA, review Pathology       HISTORY OF PRESENT ILLNESS:   This is a 58-year-old female patient who presented following her annual mammogram with new focal asymmetry at 12-1 o'clock position of the left breast around 10.7 cm from the nipple.  It measured 1.4 cm.  Subsequently the patient went on to have breast ultrasound on member 30th 2018 showing 1.6 cm in maximum dimension hypoechoic mass.  Subsequently ultrasound breast biopsy was done on December 17, 2018 showing invasive ductal carcinoma grade 3 out of 3 angiolymphatic invasion was not identified.  Ductal carcinoma in situ was not identified . estrogen receptor was negative, progesterone receptor was negative and HER-2/mercedes receptor was negative.  The patient is here today to discuss management plan.    REVIEW OF SYSTEMS:   Constitutional: Negative for fever, chills, and night sweats.  Skin: negative.  Eyes: negative.  Ears/Nose/Throat: negative.  Respiratory: No shortness of breath, dyspnea on exertion, cough, or hemoptysis.  Cardiovascular: negative.  Gastrointestinal: negative.  Genitourinary: negative.  Musculoskeletal: negative.  Neurologic: negative.  Psychiatric: negative.  Hematologic/Lymphatic/Immunologic: negative.  Endocrine: negative.    PAST MEDICAL HISTORY:   Past Medical History:   Diagnosis Date     Abnormal Papanicolaou smear of cervix and cervical HPV     history of LEEP ion 1994     ASCUS favor benign 7/2013, 7/2014    Neg high risk HPV      Cervical high risk HPV (human papillomavirus) test positive 5/24/12    type 66       PAST SURGICAL HISTORY:   Past Surgical History:   Procedure Laterality Date     SURGICAL HISTORY OF -   1996    cholecystectomy     SURGICAL HISTORY OF -   1994    LEEP        ALLERGIES:   Allergies as of 12/26/2018 - Reviewed  12/26/2018   Allergen Reaction Noted     Sulfa drugs Swelling 06/09/2005       MEDICATIONS:   Current Outpatient Medications   Medication Sig Dispense Refill     albuterol (PROAIR HFA) 108 (90 BASE) MCG/ACT Inhaler Inhale 2 puffs into the lungs every 4 hours as needed for shortness of breath / dyspnea 1 Inhaler 11     albuterol (PROAIR HFA/PROVENTIL HFA/VENTOLIN HFA) 108 (90 Base) MCG/ACT inhaler Inhale 2 puffs into the lungs every 4 hours as needed for shortness of breath / dyspnea or wheezing 1 Inhaler 11     augmented betamethasone dipropionate (DIPROLENE-AF) 0.05 % cream APPLY EXTERNALLY TO THE AFFECTED AREA TWICE DAILY 50 g 6     calcium-vitamin D (CALCIUM 600-D) 600-400 MG-UNIT per tablet Take 1 tablet by mouth daily       FLUoxetine (PROZAC) 20 MG capsule Take 20 mg daily. 30 capsule 11     loratadine (CLARITIN) 10 MG tablet Take 10 mg by mouth daily.         MULTIVITAMIN TABS   OR 1 TABLET BY MOUTH DAILY       ranitidine (ZANTAC) 150 MG tablet Take 1 tablet (150 mg) by mouth daily       aspirin 81 MG tablet Take 1 tablet (81 mg) by mouth daily          FAMILY HISTORY:   Family History   Problem Relation Age of Onset     Gastrointestinal Disease Mother         desmond dz     Hypertension Father      Lipids Father      Breast Cancer Paternal Grandmother      Neurologic Disorder Paternal Grandfather         parkinsons     Cancer - colorectal No family hx of      Prostate Cancer No family hx of      Diabetes No family hx of      C.A.D. No family hx of       Paternal grandmother had breast cancer in her late 80s    SOCIAL HISTORY:   Social History     Socioeconomic History     Marital status:      Spouse name: None     Number of children: None     Years of education: None     Highest education level: None   Social Needs     Financial resource strain: None     Food insecurity - worry: None     Food insecurity - inability: None     Transportation needs - medical: None     Transportation needs - non-medical:  "None   Occupational History     None   Tobacco Use     Smoking status: Current Every Day Smoker     Packs/day: 0.50     Years: 25.00     Pack years: 12.50     Types: Cigarettes     Smokeless tobacco: Never Used     Tobacco comment: 1/2 ppd . Did try the patch- gets a rash.   Substance and Sexual Activity     Alcohol use: Yes     Comment: 3-4 X per week.  About 7 drinks per week.     Drug use: No     Sexual activity: Yes     Partners: Male   Other Topics Concern     Parent/sibling w/ CABG, MI or angioplasty before 65F 55M? No   Social History Narrative     None       PHYSICAL EXAMINATION:   /81 (BP Location: Right arm, Patient Position: Sitting, Cuff Size: Adult Large)   Pulse 104   Temp 98.7  F (37.1  C) (Tympanic)   Resp 16   Ht 1.727 m (5' 8\")   Wt 93 kg (205 lb)   LMP 04/02/2010   SpO2 97%   BMI 31.17 kg/m    Wt Readings from Last 10 Encounters:   12/26/18 93 kg (205 lb)   11/16/18 93.9 kg (207 lb)   11/15/17 97.5 kg (215 lb)   08/29/17 95.3 kg (210 lb)   10/14/16 96.2 kg (212 lb)   08/24/16 93 kg (205 lb)   03/30/16 94.8 kg (209 lb)   08/11/15 93 kg (205 lb)   06/26/15 94.1 kg (207 lb 6.4 oz)   07/25/14 98 kg (216 lb)      ECOG performance status: 0  GENERAL APPEARANCE: Healthy, alert and in no acute distress.  HEENT: Sclerae anicteric. PERRLA. Oropharynx without ulcers, lesions, or thrush.  NECK: Supple. No asymmetry or masses.  LYMPHATICS: No palpable cervical, supraclavicular, axillary, or inguinal lymphadenopathy.  RESP: Lungs clear to auscultation bilaterally without rales, rhonchi or wheezes.  CARDIOVASCULAR: Regular rate and rhythm. Normal S1, S2; no S3 or S4. No murmur, gallop, or rub.  BREAST: Left breast there is about 1.5 cm lesion felt in the left breast.  There is no axillary adenopathy.  Right breast no dominant masses or axillary adenopathy.  ABDOMEN: Soft, nontender. Bowel sounds normal. No palpable organomegaly or masses.  MUSCULOSKELETAL: Extremities without gross deformities " "noted. No edema of bilateral lower extremities.  SKIN: No suspicious lesions or rashes.  NEURO: Alert and oriented x 3. Cranial nerves II-XII grossly intact.  PSYCHIATRIC: Mentation and affect appear normal.    LABORATORY RESULTS:  Hospital Outpatient Visit on 12/17/2018   Component Date Value Ref Range Status     Copath Report 12/17/2018    Final                    Value:Patient Name: BRY ASCENCIO  MR#: [id number]  Specimen #: [id number]  Collected: 12/17/2018  Received: 12/18/2018  Reported: 12/19/2018 14:23  Ordering Phy(s): MAYKEL PERIKNS    For improved result formatting, select 'View Enhanced Report Format' under   Linked Documents section.    SPECIMEN(S):  Left breast core needle biopsy    FINAL DIAGNOSIS:  Breast, left, ultrasound-guided needle core biopsy:  - Invasive ductal carcinoma:       - [place] grade: III/III; [place] score: 8/9 (tubules:3;   nuclear:3; mitosis:3)       - Angiolymphatic invasion not identified in the planes examined.       - Ductal carcinoma in-situ not identified in the planes examined       - ER: Negative (no staining in tumor nuclei)       - MA: Negative (no staining in tumor nuclei)    COMMENT:  HER2 gene amplification will be performed and reported by the [facility]   laboratory.    Electronically signed out by:    Hector Wolfe M.D., PhD    CLINICAL HISTORY:  58 year old female.  There is a new foc                          al asymmetry at 12-1:00 in the left   breast and 10.7 cm from the nipple.  It measures 1.4 cm.  Directed sonography in the left breast at the site of   the mammographic finding shows a  hypoechoic mass which measures 1.6 cm in maximal dimension.    GROSS:  A single specimen container with formalin and saline is received labeled   with the patient's name, date of  birth, and medical record number. Information on the requisition slip,   container, and associated labels is  confirmed.    The specimen is designated \"left breast mass core needle " "biopsy\". The   specimen consists of 3 pale tan needle  core biopsies ranging in length from 1.4 to 1.8 cm and each approximately   0.1 cm in diameter. The specimen is  wrapped and is entirely submitted in one cassette. Time tissue removed   1255 hours, time in formalin 1255 hours  12/17/2018. (Dictated by: Ashanti Ashraf 12/18/2018 11:05 AM)    MICROSCOPIC:  Microscopic examination is performed.  The immunohistochemical stains for   p63, ER and NC wer                          e performed and  reviewed.  The staining patterns support the interpretation.    IMMUNOHISTOCHEMICAL ANALYSIS FOR ESTROGEN AND PROGESTERONE RECEPTORS    External and internal controls stain appropriately.  Standard assay conditions are met.  Assay Conditions:  Fixative and processing:  10% neutral buffered formalin, paraffin   embedded.  Cold ischemia time less than one hour.  Time of specimen collection 12:55.    Time placed in formalin 12:55.  Total duration of formalin fixation greater than 6 hours and less than 72   hours.  Staining method used:  [place] predilute monoclonal antibodies, estrogen   receptor clone SP1 and progesterone  receptor clone 1E2, antigen heat retrieval in EDTA alkaline pH for 60   minutes, Event[employer] ultraview detection  system and automatic immunostainer.    Scoring system:  The ASCO-CAP scoring guidelines are used.  A positive   test is defined as positive nuclei  staining in greater than or equal to 1% of tumor cells.  A negative test   is defined as nuclear                           staining in  less than 1% of tumor cells.  For positive tests, an estimation of   intensity of nuclear staining over the  entire tumor on tissue sections is also provided.    Reference:  MALINA Mejia, DELTA Busby, MARGOT Dinh, et al.  American Society of   Clinical Oncology/College of  American Pathologists guideline recommendations for immunohistochemical   testing of estrogen and progesterone  receptors in breast cancer, Arch Pathol Lab Med.  " 2010;134:907-922    CPT Codes:  A: 14412-DB9, 60563-IO, 04094-SV, HFISH, SOH, 59356-BRW    TESTING LAB LOCATION:  [address] [id number] [phone]    COLLECTION SITE:  Client: [facility]  Location: [facility] ()       Copath Report 12/17/2018    Final                    Value:Patient Name: MONIKA ASCENCIO  MR#: [id number]  Specimen #: [id number]  Collected: 12/17/2018 12:55  Received: 12/20/2018 10:24  Reported: 12/22/2018 23:14  Ordering Phy(s): TREVA SANDHU  Additional Phy(s): MAYKEL PERKINS    For improved result formatting, select 'View Enhanced Report Format' under   Linked Documents section.  __________________________________________    TEST(S) REQUESTED:  [facility] HER2 FISH    SPECIMEN DESCRIPTION:  Breast Tissue, Paraffin Embedded    CLINICAL COMMENTS:  [id number].  Left breast core needle biopsy.    RESULTS:    Ratio of HER2/OMAR-17 signals  Monika Ascencio:  1.8 (ASIF Negative)                               Avg.   number HER2 signals/nucleus:  3.3                                                                        Avg.   number OMAR-17 signals/nucleus:  1.9    **Interpretive guidelines per the American Society of Clinical   Oncology/College of American Pathologists  Clinical Practice Guideline Focused Update (Akiko THOMAS et al                          , 2018, Arch   Pathol Lab Med  doi:10.5858/arpa.[id number]-UQ):    -- Group 1: HER2/OMAR-17 ratio 2.0 or more -AND- avg. number HER2   signals/nucleus 4.0 or more (ASIF Positive)  -- Group 2: HER2/OMAR-17 ratio 2.0 or more -AND- avg. number HER2   signals/nucleus <4.0 (Additional work  required)  -- Group 3: HER2/OMAR-17 ratio <2.0 -AND- avg. number HER2 signals/nucleus   6.0 or more (Additional work  required)  -- Group 4: HER2/OMAR-17 ratio <2.0 -AND- avg. number HER2 signals/nucleus   4.0 or more and <6.0 (Additional  work required)  --  Group 5: HER2/OMAR-17 ratio <2.0 -AND- avg. number HER2 signals/nucleus   <4.0 (ASIF Negative)    INTERPRETATION:  Per the American Society of Clinical Oncology/College of American   Pathologists Clinical Practice Guideline  Focused Update (Akiko THOMAS et al, 2018, Arch Pathol Lab Med    doi:10.5858/arpa.[id number]-NF), the HER2/OMAR 17  ratio of 1.8 and average number of HER2 signals/cell of 3.3 places this   specimen in Group 5 (ASIF Negative).    Specimen:  Brian                          e [id number], Block A1  Reported formalin fixation time:  6-72 hours  Number of cells scored: 60  Probe:   [employer] HER2/OMAR-17 IQFISH pharmDx Probe Mix to HER2 (17q12) and to   the centromere region of chromosome  17    ADDITIONAL COMMENTS:  The IQFISH pharmDx test has been approved by the FDA for the evaluation of   HER2 (ERBB2) gene amplification  status in formalin-fixed, paraffin-embedded breast cancer tissue specimens   and gastric or gastroesophageal  junction adenocarcinoma.  It is intended for use as an adjunct to other   existing clinicopathologic information  used to evaluate patients with such tumors. This test was developed and   its performance characteristics  determined by the [facility] [place]   Clinical Laboratories.    Electronically Signed Out By:  Belkis Solorio M.D., UMPhysiciansT Codes:  A: 20102-ZWM8, [id number]    TESTING LAB LOCATION:  [address], [address] [address]  [phone]    COLLECTION SITE:  Client:  [facility]  Location:  [facility])         IMAGING RESULTS:  Recent Results (from the past 744 hour(s))   US Breast Left    Narrative    US BREAST LEFT LIMITED 1-3 QUADRANTS 11/30/2018 3:52 PM    HISTORY:  Focal asymmetry in left breast.    COMPARISON:  11/20/2018.    FINDINGS: Directed sonography in the left breast at the site of the  mammographic finding  shows a hypoechoic mass which measures 1.6 cm in  maximal dimension. Ultrasound-guided needle core biopsy is recommended  at this time. This was discussed with the patient. We will help her  get set up for this. Additional sonography to the left axilla shows no  abnormal lymph nodes.      Impression    IMPRESSION: BI-RADS CATEGORY: 4 - Suspicious Abnormality-Biopsy Should  Be Considered.    RECOMMENDED FOLLOW-UP: Biopsy.    MAYKEL PERKINS MD   US Breast Biopsy Core Needle Left    Addendum: 12/19/2018    Monika Nam  Accession # [id number]    Addendum begins:    ADDENDUM:  The original report on this patient was dictated by Dr. Perkins.  The pathology results have returned and show invasive ductal  carcinoma without DCIS. These pathology results are concordant with  the imaging findings. These results were called to Dr. Valladares today.  He will make sure the patient is contacted and set up for further  workup.    ( Date of Addendum: 12/19/2018 )    Addendum ends.    MAYKEL PERKINS MD      Narrative    HISTORY: Left breast lesion.    COMPARISON: 11/30/2018.    ULTRASOUND-GUIDED NEEDLE CORE BIOPSY OF LEFT BREAST LESION: Risks and  benefits of the procedure are discussed with the patient. Sonographic  guidance and 8 mL of 1% lidocaine (local anesthetic) are utilized.  Three 14-gauge core biopsy samples are obtained of the left breast  lesion at the wall o'clock position. The patient tolerated the  procedure well.  There is no significant blood loss.    After the biopsy a hydrophilic marker is placed in case any further  future intervention is necessary. Before this is done risks and  benefits are discussed with the patient.    POSTPROCEDURE MAMMOGRAMS FOR MARKER PLACEMENT: The hydrophilic marker  has appropriately deployed.      Impression    IMPRESSION: Technically uneventful ultrasound-guided needle core  biopsy of breast lesion. Pathologic results are pending.    MAYKEL PERKINS MD   MA Post Procedure Left    Addendum:  12/19/2018    Monika Nam  Accession # [id number]    Addendum begins:    ADDENDUM:  The original report on this patient was dictated by Dr. Powell.  The pathology results have returned and show invasive ductal  carcinoma without DCIS. These pathology results are concordant with  the imaging findings. These results were called to Dr. Valladares today.  He will make sure the patient is contacted and set up for further  workup.    ( Date of Addendum: 12/19/2018 )    Addendum ends.    MAYKEL POWELL MD      Narrative    HISTORY: Left breast lesion.    COMPARISON: 11/30/2018.    ULTRASOUND-GUIDED NEEDLE CORE BIOPSY OF LEFT BREAST LESION: Risks and  benefits of the procedure are discussed with the patient. Sonographic  guidance and 8 mL of 1% lidocaine (local anesthetic) are utilized.  Three 14-gauge core biopsy samples are obtained of the left breast  lesion at the wall o'clock position. The patient tolerated the  procedure well.  There is no significant blood loss.    After the biopsy a hydrophilic marker is placed in case any further  future intervention is necessary. Before this is done risks and  benefits are discussed with the patient.    POSTPROCEDURE MAMMOGRAMS FOR MARKER PLACEMENT: The hydrophilic marker  has appropriately deployed.      Impression    IMPRESSION: Technically uneventful ultrasound-guided needle core  biopsy of breast lesion. Pathologic results are pending.    MAYKEL POWELL MD       ASSESSMENT AND PLAN:    (C50.112) Infiltrating ductal carcinoma of central portion of left breast in female (H)  This is a 58-year-old female postmenopausal women with infiltrating ductal carcinoma of the left breast clinical stage T1cN0 M0.  Hormone receptor negative and HER-2/mercedes negative as well.  I reviewed with the patient today the natural history of breast cancer, we talked about biology, staging, management, follow-up and prognosis.  We talked about different modalities and treatment of breast cancer including  surgical resection, breast radiation therapy and systemic therapy.  Patient is scheduled to see surgery next week.  Today we talked about lumpectomy versus mastectomy.  We talked about neoadjuvant therapy versus adjuvant therapy.  The patient is a candidate for chemotherapy with dose dense Adriamycin Cytoxan and Taxol.  I gave the patient overview about potential side effects.  I will meet with the patient 2 weeks following surgical resection to discuss management plan in details.    (F17.200) Tobacco use disorder  Strongly emphasized the importance of quitting smoking.    (K21.9) Gastroesophageal reflux disease without esophagitis  Currently on Zantac 150 milligrams daily    (J45.20) Mild intermittent asthma without complication  Patient currently on butyryl inhaler as needed.    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    The patient is ready to learn, no apparent learning barriers were identified, Diagnosis and treatment plans were explained to the patient. The patient expressed understanding of the content. The patient questions were answered to her satisfaction.    Madison Gonzales MD    Chart documentation with Dragon Voice recognition Software. Although reviewed after completion, some words and grammatical errors may remain.

## 2018-12-31 ENCOUNTER — OFFICE VISIT (OUTPATIENT)
Dept: SURGERY | Facility: CLINIC | Age: 58
End: 2018-12-31
Payer: COMMERCIAL

## 2018-12-31 VITALS
DIASTOLIC BLOOD PRESSURE: 89 MMHG | HEART RATE: 105 BPM | HEIGHT: 68 IN | WEIGHT: 205 LBS | TEMPERATURE: 98 F | BODY MASS INDEX: 31.07 KG/M2 | SYSTOLIC BLOOD PRESSURE: 169 MMHG

## 2018-12-31 DIAGNOSIS — C50.112 MALIGNANT NEOPLASM OF CENTRAL PORTION OF LEFT BREAST IN FEMALE, ESTROGEN RECEPTOR NEGATIVE (H): Primary | ICD-10-CM

## 2018-12-31 DIAGNOSIS — Z17.1 MALIGNANT NEOPLASM OF CENTRAL PORTION OF LEFT BREAST IN FEMALE, ESTROGEN RECEPTOR NEGATIVE (H): Primary | ICD-10-CM

## 2018-12-31 PROCEDURE — 99205 OFFICE O/P NEW HI 60 MIN: CPT | Performed by: SURGERY

## 2018-12-31 ASSESSMENT — MIFFLIN-ST. JEOR: SCORE: 1553.37

## 2018-12-31 NOTE — LETTER
12/31/2018         RE: Monika Nam  [id number] [facility] [address]        Dear Colleague,    Thank you for referring your patient, Monika Nam, to the [facility]. Please see a copy of my visit note below.    PCP:  Servando Valladares    Chief complaint: Newly diagnosed left breast cancer    History of Present Illness: Patient is a 58-year-old female generally in good health who presents with a newly diagnosed left breast cancer.  Her history is that she had a normal screening mammogram done November.  He was found to have a new 1.6 cm mass in the 12 o'clock position of the left breast approximately 10 cm from the nipple.  Subsequent ultrasound revealed this to be a solid nodule.  Biopsy was recommended and then subsequently performed.  The results were an infiltrating ductal carcinoma.  This was a grade 3 ER [place] and HER-2 negative tumor.  Ultrasound scans of the left axilla were negative for any adenopathy.    No family history of breast cancer except in a paternal grandmother who was in her 90s when she developed cancer.  She is not on any estrogen replacement.  She still has her ovaries and is about 4-5 years post menopausal.    No previous breast abnormalities.    She is a current smoker at 1/2 pack/day x 25 years.    Histories:  Past Medical History:   Diagnosis Date     Abnormal Papanicolaou smear of cervix and cervical HPV     history of LEEP ion 1994     ASCUS favor benign 7/2013, 7/2014    Neg high risk HPV      Cervical high risk HPV (human papillomavirus) test positive 5/24/12    type 66       Past Surgical History:   Procedure Laterality Date     SURGICAL HISTORY OF -   1996    cholecystectomy     SURGICAL HISTORY OF -   1994    LEEP        Family History   Problem Relation Age of Onset     Gastrointestinal Disease Mother         desmond dz     Hypertension Father      Lipids Father      Breast Cancer Paternal Grandmother      Neurologic Disorder Paternal  Grandfather         parkinsons     Cancer - colorectal No family hx of      Prostate Cancer No family hx of      Diabetes No family hx of      C.A.D. No family hx of        Social History     Tobacco Use     Smoking status: Current Every Day Smoker     Packs/day: 0.50     Years: 25.00     Pack years: 12.50     Types: Cigarettes     Smokeless tobacco: Never Used     Tobacco comment: 1/2 ppd . Did try the patch- gets a rash.   Substance Use Topics     Alcohol use: Yes     Comment: 3-4 X per week.  About 7 drinks per week.       Current Outpatient Medications   Medication Sig Dispense Refill     albuterol (PROAIR HFA) 108 (90 BASE) MCG/ACT Inhaler Inhale 2 puffs into the lungs every 4 hours as needed for shortness of breath / dyspnea 1 Inhaler 11     albuterol (PROAIR HFA/PROVENTIL HFA/VENTOLIN HFA) 108 (90 Base) MCG/ACT inhaler Inhale 2 puffs into the lungs every 4 hours as needed for shortness of breath / dyspnea or wheezing 1 Inhaler 11     aspirin 81 MG tablet Take 1 tablet (81 mg) by mouth daily       augmented betamethasone dipropionate (DIPROLENE-AF) 0.05 % cream APPLY EXTERNALLY TO THE AFFECTED AREA TWICE DAILY 50 g 6     calcium-vitamin D (CALCIUM 600-D) 600-400 MG-UNIT per tablet Take 1 tablet by mouth daily       FLUoxetine (PROZAC) 20 MG capsule Take 20 mg daily. 30 capsule 11     loratadine (CLARITIN) 10 MG tablet Take 10 mg by mouth daily.         MULTIVITAMIN TABS   OR 1 TABLET BY MOUTH DAILY       ranitidine (ZANTAC) 150 MG tablet Take 1 tablet (150 mg) by mouth daily         Allergies   Allergen Reactions     Sulfa Drugs Swelling       Images:  Recent Results (from the past 744 hour(s))   US Breast Left    Narrative    US BREAST LEFT LIMITED 1-3 QUADRANTS 11/30/2018 3:52 PM    HISTORY:  Focal asymmetry in left breast.    COMPARISON:  11/20/2018.    FINDINGS: Directed sonography in the left breast at the site of the  mammographic finding shows a hypoechoic mass which measures 1.6 cm in  maximal  dimension. Ultrasound-guided needle core biopsy is recommended  at this time. This was discussed with the patient. We will help her  get set up for this. Additional sonography to the left axilla shows no  abnormal lymph nodes.      Impression    IMPRESSION: BI-RADS CATEGORY: 4 - Suspicious Abnormality-Biopsy Should  Be Considered.    RECOMMENDED FOLLOW-UP: Biopsy.    MAYKEL PERKINS MD   US Breast Biopsy Core Needle Left    Addendum: 12/19/2018    Monika Nam  Accession # [id number]    Addendum begins:    ADDENDUM:  The original report on this patient was dictated by Dr. Perkins.  The pathology results have returned and show invasive ductal  carcinoma without DCIS. These pathology results are concordant with  the imaging findings. These results were called to Dr. Valladares today.  He will make sure the patient is contacted and set up for further  workup.    ( Date of Addendum: 12/19/2018 )    Addendum ends.    MAYKEL PERKINS MD      Narrative    HISTORY: Left breast lesion.    COMPARISON: 11/30/2018.    ULTRASOUND-GUIDED NEEDLE CORE BIOPSY OF LEFT BREAST LESION: Risks and  benefits of the procedure are discussed with the patient. Sonographic  guidance and 8 mL of 1% lidocaine (local anesthetic) are utilized.  Three 14-gauge core biopsy samples are obtained of the left breast  lesion at the wall o'clock position. The patient tolerated the  procedure well.  There is no significant blood loss.    After the biopsy a hydrophilic marker is placed in case any further  future intervention is necessary. Before this is done risks and  benefits are discussed with the patient.    POSTPROCEDURE MAMMOGRAMS FOR MARKER PLACEMENT: The hydrophilic marker  has appropriately deployed.      Impression    IMPRESSION: Technically uneventful ultrasound-guided needle core  biopsy of breast lesion. Pathologic results are pending.    MAYKEL PERKINS MD   MA Post Procedure Left    Addendum: 12/19/2018    Monika Nam  Accession #  [id number]    Addendum begins:    ADDENDUM:  The original report on this patient was dictated by Dr. Powell.  The pathology results have returned and show invasive ductal  carcinoma without DCIS. These pathology results are concordant with  the imaging findings. These results were called to Dr. Valladares today.  He will make sure the patient is contacted and set up for further  workup.    ( Date of Addendum: 12/19/2018 )    Addendum ends.    MAYKEL POWELL MD      Narrative    HISTORY: Left breast lesion.    COMPARISON: 11/30/2018.    ULTRASOUND-GUIDED NEEDLE CORE BIOPSY OF LEFT BREAST LESION: Risks and  benefits of the procedure are discussed with the patient. Sonographic  guidance and 8 mL of 1% lidocaine (local anesthetic) are utilized.  Three 14-gauge core biopsy samples are obtained of the left breast  lesion at the wall o'clock position. The patient tolerated the  procedure well.  There is no significant blood loss.    After the biopsy a hydrophilic marker is placed in case any further  future intervention is necessary. Before this is done risks and  benefits are discussed with the patient.    POSTPROCEDURE MAMMOGRAMS FOR MARKER PLACEMENT: The hydrophilic marker  has appropriately deployed.      Impression    IMPRESSION: Technically uneventful ultrasound-guided needle core  biopsy of breast lesion. Pathologic results are pending.    MAYKEL POWELL MD       Labs:  Results for orders placed or performed during the hospital encounter of 12/17/18   MA Post Procedure Left    Addendum: 12/19/2018    Monika Nam  Accession # [id number]    Addendum begins:    ADDENDUM:  The original report on this patient was dictated by Dr. Powell.  The pathology results have returned and show invasive ductal  carcinoma without DCIS. These pathology results are concordant with  the imaging findings. These results were called to Dr. Valladares today.  He will make sure the patient is contacted and set up for further  workup.    ( Date of  "Addendum: 12/19/2018 )    Addendum ends.    MAYKEL PERKINS MD      Narrative    HISTORY: Left breast lesion.    COMPARISON: 11/30/2018.    ULTRASOUND-GUIDED NEEDLE CORE BIOPSY OF LEFT BREAST LESION: Risks and  benefits of the procedure are discussed with the patient. Sonographic  guidance and 8 mL of 1% lidocaine (local anesthetic) are utilized.  Three 14-gauge core biopsy samples are obtained of the left breast  lesion at the wall o'clock position. The patient tolerated the  procedure well.  There is no significant blood loss.    After the biopsy a hydrophilic marker is placed in case any further  future intervention is necessary. Before this is done risks and  benefits are discussed with the patient.    POSTPROCEDURE MAMMOGRAMS FOR MARKER PLACEMENT: The hydrophilic marker  has appropriately deployed.      Impression    IMPRESSION: Technically uneventful ultrasound-guided needle core  biopsy of breast lesion. Pathologic results are pending.    MAYKEL PERKINS MD       ROS:  Constitutional - Denies fevers, weight loss, malaise, lethargy  Neuro - Denies tremors or seizures  Pulmon - Denies SOB, dyspnea, hemoptysis, admits to chronic cough and currently uses an inhaler  CV - Denies CP, SOB, lower extremity edema, difficulty w/ stairs, has never used NTG  GI - Denies hematemesis, BRBPR, melena, chronic diarrhea or epigastric pain   - Denies hematuria, difficulty voiding, h/o STDs  Hematology - Denies blood clotting disorders, chronic anemias  Dermatology - No melanomas or skin cancers  Rheumatology - No h/o RA  Pysch - Denies depression, bipolar d/o or schizophrenia    /89 (BP Location: Right arm, Patient Position: Sitting, Cuff Size: Adult Regular)   Pulse 105   Temp 98  F (36.7  C) (Oral)   Ht 1.727 m (5' 8\")   Wt 93 kg (205 lb)   LMP 04/02/2010   BMI 31.17 kg/m       Exam:  General - Alert and Oriented X4, NAD, well nourished  HEENT - Normocephalic, atraumatic,    Neck - supple, no LAD  Lungs -respirations " unlabored, chronic cough noted   CV - Heart RRR  Abdomen - Soft, non-tender  Groins -deferred  Rectal -deferred   Neuro - Full ROM, Strength 5/5 and major muscle groups, sensation intact  Extremities - No cyanosis, clubbing or edema.      Assessment and Plan: Newly diagnosed 1.8 cm clinical stage I triple negative breast cancer of the left breast.  I spent an hour with her discussing the pathophysiology, treatment, and prognosis of breast cancer.  She has already been seen by oncology and it is recommended that she proceed with chemotherapy in addition to her surgical treatment primarily due to the triple negative status of the tumor.    We talked about lumpectomy versus mastectomy and the need for radiation if the breast is conserved.    We also talked about the risks benefits and alternatives of lumpectomy, mastectomy, and placement of a Port-A-Cath.    Plan is for:  #1 left lumpectomy after wire localization  #2 left sentinel lymph node biopsy with possible axillary dissection  #3 right subclavian Port-A-Cath placement    We will arrange these when the radiologist is available.  She understands the risks benefits and alternatives and would like to proceed.      Servando Claros MD FACS            Again, thank you for allowing me to participate in the care of your patient.        Sincerely,        Servando Claros MD

## 2018-12-31 NOTE — PROGRESS NOTES
PCP:  Servando Valladares    Chief complaint: Newly diagnosed left breast cancer    History of Present Illness: Patient is a 58-year-old female generally in good health who presents with a newly diagnosed left breast cancer.  Her history is that she had a normal screening mammogram done November.  He was found to have a new 1.6 cm mass in the 12 o'clock position of the left breast approximately 10 cm from the nipple.  Subsequent ultrasound revealed this to be a solid nodule.  Biopsy was recommended and then subsequently performed.  The results were an infiltrating ductal carcinoma.  This was a grade 3 ER [place] and HER-2 negative tumor.  Ultrasound scans of the left axilla were negative for any adenopathy.    No family history of breast cancer except in a paternal grandmother who was in her 90s when she developed cancer.  She is not on any estrogen replacement.  She still has her ovaries and is about 4-5 years post menopausal.    No previous breast abnormalities.    She is a current smoker at 1/2 pack/day x 25 years.    Histories:  Past Medical History:   Diagnosis Date     Abnormal Papanicolaou smear of cervix and cervical HPV     history of LEEP ion 1994     ASCUS favor benign 7/2013, 7/2014    Neg high risk HPV      Cervical high risk HPV (human papillomavirus) test positive 5/24/12    type 66       Past Surgical History:   Procedure Laterality Date     SURGICAL HISTORY OF -   1996    cholecystectomy     SURGICAL HISTORY OF -   1994    LEEP        Family History   Problem Relation Age of Onset     Gastrointestinal Disease Mother         desmond dz     Hypertension Father      Lipids Father      Breast Cancer Paternal Grandmother      Neurologic Disorder Paternal Grandfather         parkinsons     Cancer - colorectal No family hx of      Prostate Cancer No family hx of      Diabetes No family hx of      C.A.D. No family hx of        Social History     Tobacco Use     Smoking status: Current Every Day Smoker      Packs/day: 0.50     Years: 25.00     Pack years: 12.50     Types: Cigarettes     Smokeless tobacco: Never Used     Tobacco comment: 1/2 ppd . Did try the patch- gets a rash.   Substance Use Topics     Alcohol use: Yes     Comment: 3-4 X per week.  About 7 drinks per week.       Current Outpatient Medications   Medication Sig Dispense Refill     albuterol (PROAIR HFA) 108 (90 BASE) MCG/ACT Inhaler Inhale 2 puffs into the lungs every 4 hours as needed for shortness of breath / dyspnea 1 Inhaler 11     albuterol (PROAIR HFA/PROVENTIL HFA/VENTOLIN HFA) 108 (90 Base) MCG/ACT inhaler Inhale 2 puffs into the lungs every 4 hours as needed for shortness of breath / dyspnea or wheezing 1 Inhaler 11     aspirin 81 MG tablet Take 1 tablet (81 mg) by mouth daily       augmented betamethasone dipropionate (DIPROLENE-AF) 0.05 % cream APPLY EXTERNALLY TO THE AFFECTED AREA TWICE DAILY 50 g 6     calcium-vitamin D (CALCIUM 600-D) 600-400 MG-UNIT per tablet Take 1 tablet by mouth daily       FLUoxetine (PROZAC) 20 MG capsule Take 20 mg daily. 30 capsule 11     loratadine (CLARITIN) 10 MG tablet Take 10 mg by mouth daily.         MULTIVITAMIN TABS   OR 1 TABLET BY MOUTH DAILY       ranitidine (ZANTAC) 150 MG tablet Take 1 tablet (150 mg) by mouth daily         Allergies   Allergen Reactions     Sulfa Drugs Swelling       Images:  Recent Results (from the past 744 hour(s))   US Breast Left    Narrative    US BREAST LEFT LIMITED 1-3 QUADRANTS 11/30/2018 3:52 PM    HISTORY:  Focal asymmetry in left breast.    COMPARISON:  11/20/2018.    FINDINGS: Directed sonography in the left breast at the site of the  mammographic finding shows a hypoechoic mass which measures 1.6 cm in  maximal dimension. Ultrasound-guided needle core biopsy is recommended  at this time. This was discussed with the patient. We will help her  get set up for this. Additional sonography to the left axilla shows no  abnormal lymph nodes.      Impression    IMPRESSION:  BI-RADS CATEGORY: 4 - Suspicious Abnormality-Biopsy Should  Be Considered.    RECOMMENDED FOLLOW-UP: Biopsy.    MAYKEL POWELL MD   US Breast Biopsy Core Needle Left    Addendum: 12/19/2018    Monika Mcdermottton  Accession # [id number]    Addendum begins:    ADDENDUM:  The original report on this patient was dictated by Dr. Powell.  The pathology results have returned and show invasive ductal  carcinoma without DCIS. These pathology results are concordant with  the imaging findings. These results were called to Dr. Valladares today.  He will make sure the patient is contacted and set up for further  workup.    ( Date of Addendum: 12/19/2018 )    Addendum ends.    MAYKEL POWELL MD      Narrative    HISTORY: Left breast lesion.    COMPARISON: 11/30/2018.    ULTRASOUND-GUIDED NEEDLE CORE BIOPSY OF LEFT BREAST LESION: Risks and  benefits of the procedure are discussed with the patient. Sonographic  guidance and 8 mL of 1% lidocaine (local anesthetic) are utilized.  Three 14-gauge core biopsy samples are obtained of the left breast  lesion at the wall o'clock position. The patient tolerated the  procedure well.  There is no significant blood loss.    After the biopsy a hydrophilic marker is placed in case any further  future intervention is necessary. Before this is done risks and  benefits are discussed with the patient.    POSTPROCEDURE MAMMOGRAMS FOR MARKER PLACEMENT: The hydrophilic marker  has appropriately deployed.      Impression    IMPRESSION: Technically uneventful ultrasound-guided needle core  biopsy of breast lesion. Pathologic results are pending.    MAYKEL POWELL MD   MA Post Procedure Left    Addendum: 12/19/2018    Monikalynnette Nam  Accession # [id number]    Addendum begins:    ADDENDUM:  The original report on this patient was dictated by Dr. Powell.  The pathology results have returned and show invasive ductal  carcinoma without DCIS. These pathology results are concordant with  the imaging findings. These results  were called to Dr. Valladares today.  He will make sure the patient is contacted and set up for further  workup.    ( Date of Addendum: 12/19/2018 )    Addendum ends.    MAYKEL PERKINS MD      Narrative    HISTORY: Left breast lesion.    COMPARISON: 11/30/2018.    ULTRASOUND-GUIDED NEEDLE CORE BIOPSY OF LEFT BREAST LESION: Risks and  benefits of the procedure are discussed with the patient. Sonographic  guidance and 8 mL of 1% lidocaine (local anesthetic) are utilized.  Three 14-gauge core biopsy samples are obtained of the left breast  lesion at the wall o'clock position. The patient tolerated the  procedure well.  There is no significant blood loss.    After the biopsy a hydrophilic marker is placed in case any further  future intervention is necessary. Before this is done risks and  benefits are discussed with the patient.    POSTPROCEDURE MAMMOGRAMS FOR MARKER PLACEMENT: The hydrophilic marker  has appropriately deployed.      Impression    IMPRESSION: Technically uneventful ultrasound-guided needle core  biopsy of breast lesion. Pathologic results are pending.    MAYKEL PERKINS MD       Labs:  Results for orders placed or performed during the hospital encounter of 12/17/18   MA Post Procedure Left    Addendum: 12/19/2018    Monika Nam  Accession # [id number]    Addendum begins:    ADDENDUM:  The original report on this patient was dictated by Dr. Perkins.  The pathology results have returned and show invasive ductal  carcinoma without DCIS. These pathology results are concordant with  the imaging findings. These results were called to Dr. Valladares today.  He will make sure the patient is contacted and set up for further  workup.    ( Date of Addendum: 12/19/2018 )    Addendum ends.    MAYKEL PERKINS MD      Narrative    HISTORY: Left breast lesion.    COMPARISON: 11/30/2018.    ULTRASOUND-GUIDED NEEDLE CORE BIOPSY OF LEFT BREAST LESION: Risks and  benefits of the procedure are discussed with the patient.  "Sonographic  guidance and 8 mL of 1% lidocaine (local anesthetic) are utilized.  Three 14-gauge core biopsy samples are obtained of the left breast  lesion at the wall o'clock position. The patient tolerated the  procedure well.  There is no significant blood loss.    After the biopsy a hydrophilic marker is placed in case any further  future intervention is necessary. Before this is done risks and  benefits are discussed with the patient.    POSTPROCEDURE MAMMOGRAMS FOR MARKER PLACEMENT: The hydrophilic marker  has appropriately deployed.      Impression    IMPRESSION: Technically uneventful ultrasound-guided needle core  biopsy of breast lesion. Pathologic results are pending.    MAYKEL PERKINS MD       ROS:  Constitutional - Denies fevers, weight loss, malaise, lethargy  Neuro - Denies tremors or seizures  Pulmon - Denies SOB, dyspnea, hemoptysis, admits to chronic cough and currently uses an inhaler  CV - Denies CP, SOB, lower extremity edema, difficulty w/ stairs, has never used NTG  GI - Denies hematemesis, BRBPR, melena, chronic diarrhea or epigastric pain   - Denies hematuria, difficulty voiding, h/o STDs  Hematology - Denies blood clotting disorders, chronic anemias  Dermatology - No melanomas or skin cancers  Rheumatology - No h/o RA  Pysch - Denies depression, bipolar d/o or schizophrenia    /89 (BP Location: Right arm, Patient Position: Sitting, Cuff Size: Adult Regular)   Pulse 105   Temp 98  F (36.7  C) (Oral)   Ht 1.727 m (5' 8\")   Wt 93 kg (205 lb)   LMP 04/02/2010   BMI 31.17 kg/m      Exam:  General - Alert and Oriented X4, NAD, well nourished  HEENT - Normocephalic, atraumatic,    Neck - supple, no LAD  Lungs -respirations unlabored, chronic cough noted   CV - Heart RRR  Abdomen - Soft, non-tender  Groins -deferred  Rectal -deferred   Neuro - Full ROM, Strength 5/5 and major muscle groups, sensation intact  Extremities - No cyanosis, clubbing or edema.      Assessment and Plan: Newly " diagnosed 1.8 cm clinical stage I triple negative breast cancer of the left breast.  I spent an hour with her discussing the pathophysiology, treatment, and prognosis of breast cancer.  She has already been seen by oncology and it is recommended that she proceed with chemotherapy in addition to her surgical treatment primarily due to the triple negative status of the tumor.    We talked about lumpectomy versus mastectomy and the need for radiation if the breast is conserved.    We also talked about the risks benefits and alternatives of lumpectomy, mastectomy, and placement of a Port-A-Cath.    Plan is for:  #1 left lumpectomy after wire localization  #2 left sentinel lymph node biopsy with possible axillary dissection  #3 right subclavian Port-A-Cath placement    We will arrange these when the radiologist is available.  She understands the risks benefits and alternatives and would like to proceed.      Servando Claros MD FACS

## 2018-12-31 NOTE — NURSING NOTE
"Initial /89 (BP Location: Right arm, Patient Position: Sitting, Cuff Size: Adult Regular)   Pulse 105   Temp 98  F (36.7  C) (Oral)   Ht 1.727 m (5' 8\")   Wt 93 kg (205 lb)   LMP 04/02/2010   BMI 31.17 kg/m   Estimated body mass index is 31.17 kg/m  as calculated from the following:    Height as of this encounter: 1.727 m (5' 8\").    Weight as of this encounter: 93 kg (205 lb). .    Tonya Lehman CMA    "

## 2019-01-01 NOTE — PROGRESS NOTES
Injectable Influenza Immunization Documentation    1.  Is the person to be vaccinated sick today?   No    2. Does the person to be vaccinated have an allergy to a component   of the vaccine?   No  Egg Allergy Algorithm Link    3. Has the person to be vaccinated ever had a serious reaction   to influenza vaccine in the past?   No    4. Has the person to be vaccinated ever had Guillain-Barré syndrome?   No    Form completed by Britni Figueroa CMA              Patient:   MARIAN, GIRL            MRN: [id number]            FIN: [id number]              Age:   1 hours     Sex:  FEMALE     :  18   Associated Diagnoses:   Acute respiratory distress in ; Chorioamnionitis  Author:   AMANDA EATON     Basic Information   Admitted from:  Date/Time of Birth  19 15:40:00, Labor and Delivery, Patient seen and examined by me.  Resident/Student/ nurse practitioner's note reviewed and care discussed with resident/student/ nurse practitioner and agree with the assessment and plan with the following revisions and/or additions:.         Reason for NICU Consult: Chorioamnioitis, Respiratory Distress.         JAZMINE/EDC: Estimated Due Date/Estimated Date of Confinement  2019.         Gestational Age by Dates: 39  weeks, 1  days.      Histories   Maternal History: General information:  female, Medications received during pregnancy: Antibiotics (ampicillin, ancefx1), no corticosteroids,   JAZMINE: 19    CC: IOL - AMA    HPI: Pt is a 37yo  with PMHx notable for hypothyroidism and IVF presenting for induction of labor due to advanced maternal age. Denies VB, LOF. Feeling contractions irregularly at this time. Endorses good FM. Denies HA/vision changes/RUQ pain/nausea/vomiting/fever/chills.     Prenatal Course: IVF, AMA, echogenic focus   - Vaccines: UTD including flu and tdap   - Genetics: AFP neg, cell free DNA neg, panorama - low risk    U/S:   3/19/19: [id number] 2484G WT:75%i KYLAH 12.4    Prenatal Labs:    B+/tiara neg    Rubella: immune    HBsAg: neg    RPR: neg    GBBS: neg    HIV: nonreactive    Varicella: immune    GC/CT: neg/neg    Glucola: 137    3Hr GTT: fasting - 78, 1 - 155, 2 - 145, 3- 65    2nd tri H&H: 11.5/35.2    3rd tri RPR/HIV: nonreactive/nonreactive    ObHx:   G1: current pregnancy    Pregnancy History   ,0,0,0          No previous pregnancies history have been recorded    GynHx: Denies hx of STIs, patient reports  history of cervical dysplasia s/p LEEP    PMHx: hypothyroidism  SHx: colposcopy, LEEP (2004)  Soc: Denies alcohol, tobacco or drug use  Meds: levothyroxine 50mcg QD, PNV         History: Full term, uncomplicated  delivery, Birth: This facility, Membranes: Length of time ruptured prior to delivery 14  hours, the fluid was meconium stained   Information: Delivery: Female infant, single birth, Management: Routine warmth, drying, clearing airway and stimulation, suctioned with bulb syringe, APGAR score 1 minute: Total score 8 /10, APGAR score 5 minutes: Total score 9 /10     Narrative Summary   Narrative Summary:  Narrative Summary NICU at delivery for meconium, chorioamnionitis. Infant cried after birth. Routine  care.    NICU called to L and D at ~ 1 hour of life to evaluate for respiratory distress. Infant was pink, breathing comfortably, intermittent tachypnea, no retractions, no grunting. Sats persistently <90%. Improved on giving oxygen. Infant transferred to NICU for further evaluation.      Physical Examination   VS/Measurements   Growth parameters at birth   Weight: 3,675  grams.   Length: 48  cms.   Head Circumference: 35.5  cms.       General:  Mild distress.    HENT:  Normocephalic, Palate intact, Anterior fontanelle open/soft/flat, Ears normally set and rotated.   Neck:  Clavicles intact.    Respiratory:  Lungs are clear to auscultation, Breath sounds are equal, Symmetrical chest wall expansion, intermittent tachypnea.   Cardiovascular:  Normal rate, Regular rhythm, Normal peripheral perfusion.   Gastrointestinal:  Soft, 3 vessel umbilical cord, Anus patent.    Genitourinary:  Normal genitalia for age and sex.    Musculoskeletal     Normal range of motion.     Normal strength.     Integumentary:  Warm, Dry, Pink.    Neurologic:  Alert, Moves all extremities appropriately.      Impression and Plan   [place]:     Diagnosis     Acute respiratory distress in  (UZL97-KH  P22.9, Diagnosis, Medical).    Acute respiratory distress in  ([id number]-ZA P22.9, Diagnosis, Medical).    Chorioamnionitis (XQX87-PM O41.1290, Diagnosis, Medical).     .    Admission     Admit to NICU.     Plan: Peripheral IV, Total Fluids, Blood Work, CBC, Blood Culture, CXR.    Antibiotics: Ampicillin, Gentamicin.     .    Counseled:  Family (Updatd parents RV/BG).

## 2019-01-07 ENCOUNTER — OFFICE VISIT (OUTPATIENT)
Dept: FAMILY MEDICINE | Facility: CLINIC | Age: 59
End: 2019-01-07
Payer: COMMERCIAL

## 2019-01-07 VITALS
OXYGEN SATURATION: 95 % | WEIGHT: 203 LBS | RESPIRATION RATE: 24 BRPM | BODY MASS INDEX: 30.77 KG/M2 | DIASTOLIC BLOOD PRESSURE: 84 MMHG | HEIGHT: 68 IN | HEART RATE: 117 BPM | SYSTOLIC BLOOD PRESSURE: 120 MMHG | TEMPERATURE: 97.4 F

## 2019-01-07 DIAGNOSIS — R94.31 ABNORMAL ELECTROCARDIOGRAM: ICD-10-CM

## 2019-01-07 DIAGNOSIS — Z01.818 PREOP GENERAL PHYSICAL EXAM: Primary | ICD-10-CM

## 2019-01-07 LAB
BASOPHILS # BLD AUTO: 0.1 10E9/L (ref 0–0.2)
BASOPHILS NFR BLD AUTO: 1.2 %
CREAT SERPL-MCNC: 0.74 MG/DL (ref 0.52–1.04)
DIFFERENTIAL METHOD BLD: NORMAL
EOSINOPHIL # BLD AUTO: 0.2 10E9/L (ref 0–0.7)
EOSINOPHIL NFR BLD AUTO: 3.1 %
ERYTHROCYTE [DISTWIDTH] IN BLOOD BY AUTOMATED COUNT: 13.1 % (ref 10–15)
GFR SERPL CREATININE-BSD FRML MDRD: 88 ML/MIN/{1.73_M2}
HCT VFR BLD AUTO: 43.4 % (ref 35–47)
HGB BLD-MCNC: 14.5 G/DL (ref 11.7–15.7)
LYMPHOCYTES # BLD AUTO: 1.8 10E9/L (ref 0.8–5.3)
LYMPHOCYTES NFR BLD AUTO: 26.6 %
MCH RBC QN AUTO: 29.7 PG (ref 26.5–33)
MCHC RBC AUTO-ENTMCNC: 33.4 G/DL (ref 31.5–36.5)
MCV RBC AUTO: 89 FL (ref 78–100)
MONOCYTES # BLD AUTO: 0.5 10E9/L (ref 0–1.3)
MONOCYTES NFR BLD AUTO: 7.9 %
NEUTROPHILS # BLD AUTO: 4.1 10E9/L (ref 1.6–8.3)
NEUTROPHILS NFR BLD AUTO: 61.2 %
PLATELET # BLD AUTO: 184 10E9/L (ref 150–450)
RBC # BLD AUTO: 4.89 10E12/L (ref 3.8–5.2)
WBC # BLD AUTO: 6.7 10E9/L (ref 4–11)

## 2019-01-07 PROCEDURE — 36415 COLL VENOUS BLD VENIPUNCTURE: CPT | Performed by: FAMILY MEDICINE

## 2019-01-07 PROCEDURE — 93000 ELECTROCARDIOGRAM COMPLETE: CPT | Performed by: FAMILY MEDICINE

## 2019-01-07 PROCEDURE — 82565 ASSAY OF CREATININE: CPT | Performed by: FAMILY MEDICINE

## 2019-01-07 PROCEDURE — 85025 COMPLETE CBC W/AUTO DIFF WBC: CPT | Performed by: FAMILY MEDICINE

## 2019-01-07 PROCEDURE — 99214 OFFICE O/P EST MOD 30 MIN: CPT | Performed by: FAMILY MEDICINE

## 2019-01-07 ASSESSMENT — MIFFLIN-ST. JEOR: SCORE: 1544.3

## 2019-01-07 NOTE — LETTER
My Asthma Action Plan  Name: Monika Nam   YOB: 1960  Date: 1/7/2019   My doctor: Servando Valladares MD   My clinic: [facility]        My Control Medicine:   My Rescue Medicine: Albuterol (Proair/Ventolin/Proventil) inhaler As needed.   My Asthma Severity: intermittent  Avoid your asthma triggers: A list of triggers is enclosed with your letter.               GREEN ZONE   Good Control    I feel good    No cough or wheeze    Can work, sleep and play without asthma symptoms       Take your asthma control medicine every day.     1. If exercise triggers your asthma, take your rescue medication    15 minutes before exercise or sports, and    During exercise if you have asthma symptoms  2. Spacer to use with inhaler: If you have a spacer, make sure to use it with your inhaler             YELLOW ZONE Getting Worse  I have ANY of these:    I do not feel good    Cough or wheeze    Chest feels tight    Wake up at night   1. Keep taking your Green Zone medications  2. Start taking your rescue medicine:    every 20 minutes for up to 1 hour. Then every 4 hours for 24-48 hours.  3. If you stay in the Yellow Zone for more than 12-24 hours, contact your doctor.  4. If you do not return to the Green Zone in 12-24 hours or you get worse, start taking your oral steroid medicine if prescribed by your provider.           RED ZONE Medical Alert - Get Help  I have ANY of these:    I feel awful    Medicine is not helping    Breathing getting harder    Trouble walking or talking    Nose opens wide to breathe       1. Take your rescue medicine NOW  2. If your provider has prescribed an oral steroid medicine, start taking it NOW  3. Call your doctor NOW  4. If you are still in the Red Zone after 20 minutes and you have not reached your doctor:    Take your rescue medicine again and    Call 911 or go to the emergency room right away    See your regular doctor within 2 weeks of an Emergency Room or Urgent  Care visit for follow-up treatment.          Annual Reminders:  Meet with Asthma Educator,  Flu Shot in the Fall, consider Pneumonia Vaccination for patients with asthma (aged 19 and older).    Pharmacy:    [id number] - [place], [place] - [address] AT [facility] OF [address]  [id number] - [place], [place] - [address] AT [facility] [address]  [id number] - [facility] [place], [place] - [address] AT [facility] & [facility] [place] - [facility] [address] [facility]                      Asthma Triggers  How To Control Things That Make Your Asthma Worse    Triggers are things that make your asthma worse.  Look at the list below to help you find your triggers and what you can do about them.  You can help prevent asthma flare-ups by staying away from your triggers.      Trigger                                                          What you can do   Cigarette Smoke  Tobacco smoke can make asthma worse. Do not allow smoking in your home, car or around you.  Be sure no one smokes at a child s day care or school.  If you smoke, ask your health care provider for ways to help you quit.  Ask family members to quit too.  Ask your health care provider for a referral to Quit Plan to help you quit smoking, or call 0-280-040-PLAN.     Colds, Flu, Bronchitis  These are common triggers of asthma. Wash your hands often.  Don t touch your eyes, nose or mouth.  Get a flu shot every year.     Dust Mites  These are tiny bugs that live in cloth or carpet. They are too small to see. Wash sheets and blankets in hot water every week.   Encase pillows and mattress in dust mite proof covers.  Avoid having carpet if you can. If you have carpet, vacuum weekly.   Use a dust mask and HEPA vacuum.   Pollen and Outdoor Mold  Some people are allergic to trees, grass, or weed pollen, or molds. Try to keep your windows closed.  Limit time out doors when pollen  count is high.   Ask you health care provider about taking medicine during allergy season.     Animal Dander  Some people are allergic to skin flakes, urine or saliva from pets with fur or feathers. Keep pets with fur or feathers out of your home.    If you can t keep the pet outdoors, then keep the pet out of your bedroom.  Keep the bedroom door closed.  Keep pets off cloth furniture and away from stuffed toys.     Mice, Rats, and Cockroaches  Some people are allergic to the waste from these pests.   Cover food and garbage.  Clean up spills and food crumbs.  Store grease in the refrigerator.   Keep food out of the bedroom.   Indoor Mold  This can be a trigger if your home has high moisture. Fix leaking faucets, pipes, or other sources of water.   Clean moldy surfaces.  Dehumidify basement if it is damp and smelly.   Smoke, Strong Odors, and Sprays  These can reduce air quality. Stay away from strong odors and sprays, such as perfume, powder, hair spray, paints, smoke incense, paint, cleaning products, candles and new carpet.   Exercise or Sports  Some people with asthma have this trigger. Be active!  Ask your doctor about taking medicine before sports or exercise to prevent symptoms.    Warm up for 5-10 minutes before and after sports or exercise.     Other Triggers of Asthma  Cold air:  Cover your nose and mouth with a scarf.  Sometimes laughing or crying can be a trigger.  Some medicines and food can trigger asthma.

## 2019-01-07 NOTE — PROGRESS NOTES
[facility]  [address] [address]  [phone]  Dept: [phone]    PRE-OP EVALUATION:  Today's date: 2019    Monika Nam (: 1960) presents for pre-operative evaluation assessment as requested by Dr. Claros. She requires evaluation and anesthesia risk assessment prior to undergoing surgery/procedure for treatment of malignant neoplasm of central portion of the left breast. Proposed Surgery/ Procedure: Combined lumpectomy breast with sentinel node injection.  Port-a-cath placement.  Left breast.       Date of Surgery/ Procedure: 19  Time of Surgery/ Procedure: Arriving at 7:30 for a 10:00 surgery.  Hospital/Surgical Facility: [facility]  Fax number for surgical facility: No fax needed.  Primary Physician: Servando Valladares  Type of Anesthesia Anticipated: General    Patient has a Health Care Directive or Living Will:  NO    1. NO - Do you have a history of heart attack, stroke, stent, bypass or surgery on an artery in the head, neck, heart or legs?  2. NO - Do you ever have any pain or discomfort in your chest?  3. NO - Do you have a history of  Heart Failure?  4. NO - Are you troubled by shortness of breath when: walking on the level, up a slight hill or at night?  5. NO - Do you currently have a cold, bronchitis or other respiratory infection?  6. NO - Do you have a cough, shortness of breath or wheezing?  7. NO - Do you sometimes get pains in the calves of your legs when you walk?  8. NO - Do you or anyone in your family have previous history of blood clots?  9. NO - Do you or does anyone in your family have a serious bleeding problem such as prolonged bleeding following surgeries or cuts?  10. NO - Have you ever had problems with anemia or been told to take iron pills?  11. NO - Have you had any abnormal blood loss such as black, tarry or bloody stools, or abnormal vaginal bleeding?  12. NO - Have you ever had a blood transfusion?  13. NO - Have you or  any of your relatives ever had problems with anesthesia?  14. NO - Do you have sleep apnea, excessive snoring or daytime drowsiness?  15. NO - Do you have any prosthetic heart valves?  16. NO - Do you have prosthetic joints?  17. NO - Is there any chance that you may be pregnant? Post-menopausal.       HPI:     HPI related to upcoming procedure: see above.       See problem list for active medical problems.  Problems all longstanding and stable, except as noted/documented.  See ROS for pertinent symptoms related to these conditions.                                                                                                                                                          .    MEDICAL HISTORY:     Patient Active Problem List    Diagnosis Date Noted     Infiltrating ductal carcinoma of central portion of left breast in female (H) 12/27/2018     Priority: Medium     Mild intermittent asthma without complication 05/24/2018     Priority: Medium     Obstructive sleep apnea syndrome 07/25/2014     Priority: Medium     GERD (gastroesophageal reflux disease) 09/07/2012     Priority: Medium     Cervical high risk HPV (human papillomavirus) test positive 05/24/2012     Priority: Medium     5/24/12: Pap--NIL, + HR HPV 66. Repeat pap and HPV in 1 year. Reminder in epic.   7/15/13:Pap--ASCUS, neg for High Risk HPV. Repeat pap 1 year. Reminder in epic.   7/25/14:Pap--ASCUS, neg for High Risk HPV. Repeat pap 1 year. 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 She has tried going off these and the sx recur. We discussed staying on these indefinitely.        Obesity 04/16/2008     Priority: Medium     Problem list name updated by automated process. Provider to review       Tobacco use  disorder 07/09/2007     Priority: Medium     Allergic rhinitis 01/26/2006     Priority: Medium     Problem list name updated by automated process. Provider to review       Anxiety disorder due to medical condition 01/26/2006     Priority: Medium     Problem list name updated by automated process. Provider to review        Past Medical History:   Diagnosis Date     Abnormal Papanicolaou smear of cervix and cervical HPV     history of LEEP ion 1994     ASCUS favor benign 7/2013, 7/2014    Neg high risk HPV      Cervical high risk HPV (human papillomavirus) test positive 5/24/12    type 66     Past Surgical History:   Procedure Laterality Date     SURGICAL HISTORY OF -   1996    cholecystectomy     SURGICAL HISTORY OF -   1994    LEEP      Current Outpatient Medications   Medication Sig Dispense Refill     albuterol (PROAIR HFA/PROVENTIL HFA/VENTOLIN HFA) 108 (90 Base) MCG/ACT inhaler Inhale 2 puffs into the lungs every 4 hours as needed for shortness of breath / dyspnea or wheezing 1 Inhaler 11     augmented betamethasone dipropionate (DIPROLENE-AF) 0.05 % cream APPLY EXTERNALLY TO THE AFFECTED AREA TWICE DAILY 50 g 6     calcium-vitamin D (CALCIUM 600-D) 600-400 MG-UNIT per tablet Take 1 tablet by mouth daily       FLUoxetine (PROZAC) 20 MG capsule Take 20 mg daily. 30 capsule 11     loratadine (CLARITIN) 10 MG tablet Take 10 mg by mouth daily.         MULTIVITAMIN TABS   OR 1 TABLET BY MOUTH DAILY       ranitidine (ZANTAC) 150 MG tablet Take 1 tablet (150 mg) by mouth daily       aspirin 81 MG tablet Take 1 tablet (81 mg) by mouth daily       OTC products: None, except as noted above.  She has not been taking the Aspirin.     Allergies   Allergen Reactions     Sulfa Drugs Swelling      Latex Allergy: NO    Social History     Tobacco Use     Smoking status: Current Every Day Smoker     Packs/day: 0.50     Years: 25.00     Pack years: 12.50     Types: Cigarettes     Smokeless tobacco: Never Used     Tobacco comment:  "1/2 ppd . Did try the patch- gets a rash.  She is trying to quit.   Substance Use Topics     Alcohol use: Yes     Comment: 3-4 X per week.  About 7 drinks per week.     History   Drug Use No       REVIEW OF SYSTEMS:   CONSTITUTIONAL: NEGATIVE for fever, chills, change in weight  ENT/MOUTH: NEGATIVE for ear, mouth and throat problems  RESP: NEGATIVE for significant cough or SOB  CV: NEGATIVE for chest pain, palpitations or peripheral edema    EXAM:   /84   Pulse 117   Temp 97.4  F (36.3  C) (Tympanic)   Resp 24   Ht 1.727 m (5' 8\")   Wt 92.1 kg (203 lb)   LMP 2010   SpO2 95%   BMI 30.87 kg/m    Exam:  GENERAL APPEARANCE: healthy, alert and no distress  EYES: EOMI,  PERRL  HENT: ear canals and TM's normal and nose and mouth without ulcers or lesions  NECK: no adenopathy, no asymmetry, masses, or scars and thyroid normal to palpation  RESP: lungs clear to auscultation - no rales, rhonchi or wheezes  CV: regular rates and rhythm, normal S1 S2, no S3 or S4 and no murmur, click or rub -  ABDOMEN:  soft, nontender, no HSM or masses and bowel sounds normal  MS: extremities normal- no gross deformities noted, no evidence of inflammation in joints, FROM in all extremities.  SKIN: no suspicious lesions or rashes  NEURO: Normal strength and tone, sensory exam grossly normal, mentation intact and speech normal  PSYCH: mentation appears normal and affect normal/bright      DIAGNOSTICS:     EKG: normal axis, normal intervals, no acute ST/T changes c/w ischemia, no LVH by voltage criteria, Q waves in inferior leads II, III and avF,  And this is changed from previous tracings in .  Labs Drawn and in Process:   Unresulted Labs Ordered in the Past 30 Days of this Admission     No orders found from 2018 to 2019.          Recent Labs   Lab Test 12  1920   HGB 13.3         POTASSIUM 3.7   CR 0.90        IMPRESSION:   Reason for surgery/procedure: Monika Nam (: 1960) " presents for pre-operative evaluation assessment as requested by Dr. Claros. She requires evaluation and anesthesia risk assessment prior to undergoing surgery/procedure for treatment of malignant neoplasm of central portion of the left breast. Proposed Surgery/ Procedure: Combined lumpectomy breast with sentinel node injection.  Port-a-cath placement.  Left breast.     The proposed surgical procedure is considered unknown risk. See below for the cardiac stress test.     REVISED CARDIAC RISK INDEX  The patient has the following serious cardiovascular risks for perioperative complications such as (MI, PE, VFib and 3  AV Block):  Risk  Is unknown. 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   INTERPRETATION: unknown risk. See below    The patient has the following additional risks for perioperative complications:  No identified additional risks      ICD-10-CM    1. Preop general physical exam Z01.818        RECOMMENDATIONS:     --Patient is to take all scheduled medications on the day before surgery EXCEPT for modifications listed below.  Take no aspirin or advil or aleve for one week before surgery. Tylenol is OK.   Your stomach should be empty for 8 hours before surgery    The Stress Echocardiogram is ordered and if this is normal then approval will be given.     Addendum on 1-16-19: Interpretation Summary  This was a normal stress echocardiogram with no evidence of stress-induced  Ischemia. So she is authorized for surgery. Servando Valladares      Monitor the pulse and the normal range at rest is between  per minute. Avoid stimulants.   If the pulse stays high off stimulants, then call our RN at [phone] and I will order a 24 hour Holter monitor will be ordered.        Signed Electronically by: Servando Valladares MD    Copy of this evaluation report is provided to requesting physician.    [facility] Preop Guidelines    Revised Cardiac Risk Index

## 2019-01-07 NOTE — PATIENT INSTRUCTIONS
Before Your Surgery      Call your surgeon if there is any change in your health. This includes signs of a cold or flu (such as a sore throat, runny nose, cough, rash or fever).    Do not smoke, drink alcohol or take over the counter medicine (unless your surgeon or primary care doctor tells you to) for the 24 hours before and after surgery.    If you take prescribed drugs: Follow your doctor s orders about which medicines to take and which to stop until after surgery.    Eating and drinking prior to surgery: follow the instructions from your surgeon    Take a shower or bath the night before surgery. Use the soap your surgeon gave you to gently clean your skin. If you do not have soap from your surgeon, use your regular soap. Do not shave or scrub the surgery site.  Wear clean pajamas and have clean sheets on your bed.             Thank you for choosing [facility]  You may be receiving a survey in the mail from [employer] [facility] regarding your visit today.  Please take a few minutes to complete and return the survey to let us know how we are doing.      If you have questions or concerns, please contact us via [employer] or you can contact your care team at [phone].    Our Clinic hours are:  Monday 6:40 am  to 7:00 pm  Tuesday -Friday 6:40 am to 5:00 pm    The [place] outpatient lab hours are:  Monday - Friday 6:10 am to 4:45 pm  Saturdays 7:00 am to 11:00 am  Appointments are required, call [phone]    If you have clinical questions after hours or would like to schedule an appointment,  call the clinic at [phone].      DIAGNOSTICS:     EKG: normal axis, normal intervals, no acute ST/T changes c/w ischemia, no LVH by voltage criteria, Q waves in inferior leads II, III and avF,  And this is changed from previous tracings in 2012.  Labs Drawn and in Process:   Unresulted Labs Ordered in the Past 30 Days of this Admission     No orders found from 11/8/2018 to 1/8/2019.          Recent Labs   Lab Test  120   HGB 13.3         POTASSIUM 3.7   CR 0.90        IMPRESSION:   Reason for surgery/procedure: Monika Nam (: 1960) presents for pre-operative evaluation assessment as requested by Dr. Claros. She requires evaluation and anesthesia risk assessment prior to undergoing surgery/procedure for treatment of malignant neoplasm of central portion of the left breast. Proposed Surgery/ Procedure: Combined lumpectomy breast with sentinel node injection.  Port-a-cath placement.  Left breast.     The proposed surgical procedure is considered unknown risk. See below for the cardiac stress test.     REVISED CARDIAC RISK INDEX  The patient has the following serious cardiovascular risks for perioperative complications such as (MI, PE, VFib and 3  AV Block):  Risk  Is unknown. 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   INTERPRETATION: unknown risk. See below    The patient has the following additional risks for perioperative complications:  No identified additional risks      ICD-10-CM    1. Preop general physical exam Z01.818        RECOMMENDATIONS:     --Patient is to take all scheduled medications on the day before surgery EXCEPT for modifications listed below.  Take no aspirin or advil or aleve for one week before surgery. Tylenol is OK.   Your stomach should be empty for 8 hours before surgery    The Stress Echocardiogram is ordered and if this is normal then approval will be given.     Monitor the pulse and the normal range at rest is between  per minute. Avoid stimulants.   If the pulse stays high off stimulants, then call our RN at [phone] and I will order a 24 hour Holter monitor will be ordered.

## 2019-01-07 NOTE — H&P (VIEW-ONLY)
[facility]  [address] [address]  [phone]  Dept: [phone]    PRE-OP EVALUATION:  Today's date: 2019    Monika Nam (: 1960) presents for pre-operative evaluation assessment as requested by Dr. Claros. She requires evaluation and anesthesia risk assessment prior to undergoing surgery/procedure for treatment of malignant neoplasm of central portion of the left breast. Proposed Surgery/ Procedure: Combined lumpectomy breast with sentinel node injection.  Port-a-cath placement.  Left breast.       Date of Surgery/ Procedure: 19  Time of Surgery/ Procedure: Arriving at 7:30 for a 10:00 surgery.  Hospital/Surgical Facility: [facility]  Fax number for surgical facility: No fax needed.  Primary Physician: Servando Valladares  Type of Anesthesia Anticipated: General    Patient has a Health Care Directive or Living Will:  NO    1. NO - Do you have a history of heart attack, stroke, stent, bypass or surgery on an artery in the head, neck, heart or legs?  2. NO - Do you ever have any pain or discomfort in your chest?  3. NO - Do you have a history of  Heart Failure?  4. NO - Are you troubled by shortness of breath when: walking on the level, up a slight hill or at night?  5. NO - Do you currently have a cold, bronchitis or other respiratory infection?  6. NO - Do you have a cough, shortness of breath or wheezing?  7. NO - Do you sometimes get pains in the calves of your legs when you walk?  8. NO - Do you or anyone in your family have previous history of blood clots?  9. NO - Do you or does anyone in your family have a serious bleeding problem such as prolonged bleeding following surgeries or cuts?  10. NO - Have you ever had problems with anemia or been told to take iron pills?  11. NO - Have you had any abnormal blood loss such as black, tarry or bloody stools, or abnormal vaginal bleeding?  12. NO - Have you ever had a blood transfusion?  13. NO - Have you or  any of your relatives ever had problems with anesthesia?  14. NO - Do you have sleep apnea, excessive snoring or daytime drowsiness?  15. NO - Do you have any prosthetic heart valves?  16. NO - Do you have prosthetic joints?  17. NO - Is there any chance that you may be pregnant? Post-menopausal.       HPI:     HPI related to upcoming procedure: see above.       See problem list for active medical problems.  Problems all longstanding and stable, except as noted/documented.  See ROS for pertinent symptoms related to these conditions.                                                                                                                                                          .    MEDICAL HISTORY:     Patient Active Problem List    Diagnosis Date Noted     Infiltrating ductal carcinoma of central portion of left breast in female (H) 12/27/2018     Priority: Medium     Mild intermittent asthma without complication 05/24/2018     Priority: Medium     Obstructive sleep apnea syndrome 07/25/2014     Priority: Medium     GERD (gastroesophageal reflux disease) 09/07/2012     Priority: Medium     Cervical high risk HPV (human papillomavirus) test positive 05/24/2012     Priority: Medium     5/24/12: Pap--NIL, + HR HPV 66. Repeat pap and HPV in 1 year. Reminder in epic.   7/15/13:Pap--ASCUS, neg for High Risk HPV. Repeat pap 1 year. Reminder in epic.   7/25/14:Pap--ASCUS, neg for High Risk HPV. Repeat pap 1 year. 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 She has tried going off these and the sx recur. We discussed staying on these indefinitely.        Obesity 04/16/2008     Priority: Medium     Problem list name updated by automated process. Provider to review       Tobacco use  disorder 07/09/2007     Priority: Medium     Allergic rhinitis 01/26/2006     Priority: Medium     Problem list name updated by automated process. Provider to review       Anxiety disorder due to medical condition 01/26/2006     Priority: Medium     Problem list name updated by automated process. Provider to review        Past Medical History:   Diagnosis Date     Abnormal Papanicolaou smear of cervix and cervical HPV     history of LEEP ion 1994     ASCUS favor benign 7/2013, 7/2014    Neg high risk HPV      Cervical high risk HPV (human papillomavirus) test positive 5/24/12    type 66     Past Surgical History:   Procedure Laterality Date     SURGICAL HISTORY OF -   1996    cholecystectomy     SURGICAL HISTORY OF -   1994    LEEP      Current Outpatient Medications   Medication Sig Dispense Refill     albuterol (PROAIR HFA/PROVENTIL HFA/VENTOLIN HFA) 108 (90 Base) MCG/ACT inhaler Inhale 2 puffs into the lungs every 4 hours as needed for shortness of breath / dyspnea or wheezing 1 Inhaler 11     augmented betamethasone dipropionate (DIPROLENE-AF) 0.05 % cream APPLY EXTERNALLY TO THE AFFECTED AREA TWICE DAILY 50 g 6     calcium-vitamin D (CALCIUM 600-D) 600-400 MG-UNIT per tablet Take 1 tablet by mouth daily       FLUoxetine (PROZAC) 20 MG capsule Take 20 mg daily. 30 capsule 11     loratadine (CLARITIN) 10 MG tablet Take 10 mg by mouth daily.         MULTIVITAMIN TABS   OR 1 TABLET BY MOUTH DAILY       ranitidine (ZANTAC) 150 MG tablet Take 1 tablet (150 mg) by mouth daily       aspirin 81 MG tablet Take 1 tablet (81 mg) by mouth daily       OTC products: None, except as noted above.  She has not been taking the Aspirin.     Allergies   Allergen Reactions     Sulfa Drugs Swelling      Latex Allergy: NO    Social History     Tobacco Use     Smoking status: Current Every Day Smoker     Packs/day: 0.50     Years: 25.00     Pack years: 12.50     Types: Cigarettes     Smokeless tobacco: Never Used     Tobacco comment:  "1/2 ppd . Did try the patch- gets a rash.  She is trying to quit.   Substance Use Topics     Alcohol use: Yes     Comment: 3-4 X per week.  About 7 drinks per week.     History   Drug Use No       REVIEW OF SYSTEMS:   CONSTITUTIONAL: NEGATIVE for fever, chills, change in weight  ENT/MOUTH: NEGATIVE for ear, mouth and throat problems  RESP: NEGATIVE for significant cough or SOB  CV: NEGATIVE for chest pain, palpitations or peripheral edema    EXAM:   /84   Pulse 117   Temp 97.4  F (36.3  C) (Tympanic)   Resp 24   Ht 1.727 m (5' 8\")   Wt 92.1 kg (203 lb)   LMP 2010   SpO2 95%   BMI 30.87 kg/m    Exam:  GENERAL APPEARANCE: healthy, alert and no distress  EYES: EOMI,  PERRL  HENT: ear canals and TM's normal and nose and mouth without ulcers or lesions  NECK: no adenopathy, no asymmetry, masses, or scars and thyroid normal to palpation  RESP: lungs clear to auscultation - no rales, rhonchi or wheezes  CV: regular rates and rhythm, normal S1 S2, no S3 or S4 and no murmur, click or rub -  ABDOMEN:  soft, nontender, no HSM or masses and bowel sounds normal  MS: extremities normal- no gross deformities noted, no evidence of inflammation in joints, FROM in all extremities.  SKIN: no suspicious lesions or rashes  NEURO: Normal strength and tone, sensory exam grossly normal, mentation intact and speech normal  PSYCH: mentation appears normal and affect normal/bright      DIAGNOSTICS:     EKG: normal axis, normal intervals, no acute ST/T changes c/w ischemia, no LVH by voltage criteria, Q waves in inferior leads II, III and avF,  And this is changed from previous tracings in .  Labs Drawn and in Process:   Unresulted Labs Ordered in the Past 30 Days of this Admission     No orders found from 2018 to 2019.          Recent Labs   Lab Test 12  1920   HGB 13.3         POTASSIUM 3.7   CR 0.90        IMPRESSION:   Reason for surgery/procedure: Monika Nam (: 1960) " presents for pre-operative evaluation assessment as requested by Dr. Claros. She requires evaluation and anesthesia risk assessment prior to undergoing surgery/procedure for treatment of malignant neoplasm of central portion of the left breast. Proposed Surgery/ Procedure: Combined lumpectomy breast with sentinel node injection.  Port-a-cath placement.  Left breast.     The proposed surgical procedure is considered unknown risk. See below for the cardiac stress test.     REVISED CARDIAC RISK INDEX  The patient has the following serious cardiovascular risks for perioperative complications such as (MI, PE, VFib and 3  AV Block):  Risk  Is unknown. 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   INTERPRETATION: unknown risk. See below    The patient has the following additional risks for perioperative complications:  No identified additional risks      ICD-10-CM    1. Preop general physical exam Z01.818        RECOMMENDATIONS:     --Patient is to take all scheduled medications on the day before surgery EXCEPT for modifications listed below.  Take no aspirin or advil or aleve for one week before surgery. Tylenol is OK.   Your stomach should be empty for 8 hours before surgery    The Stress Echocardiogram is ordered and if this is normal then approval will be given.     Addendum on 1-16-19: Interpretation Summary  This was a normal stress echocardiogram with no evidence of stress-induced  Ischemia. So she is authorized for surgery. Servando Valladares      Monitor the pulse and the normal range at rest is between  per minute. Avoid stimulants.   If the pulse stays high off stimulants, then call our RN at [phone] and I will order a 24 hour Holter monitor will be ordered.        Signed Electronically by: Servando Valladares MD    Copy of this evaluation report is provided to requesting physician.    [facility] Preop Guidelines    Revised Cardiac Risk Index

## 2019-01-15 ENCOUNTER — HOSPITAL ENCOUNTER (OUTPATIENT)
Dept: CARDIOLOGY | Facility: CLINIC | Age: 59
Discharge: HOME | End: 2019-01-15
Attending: FAMILY MEDICINE | Admitting: FAMILY MEDICINE
Payer: COMMERCIAL

## 2019-01-15 DIAGNOSIS — R94.31 ABNORMAL ELECTROCARDIOGRAM: ICD-10-CM

## 2019-01-15 PROCEDURE — 25500064 ZZH RX 255 OP 636: Performed by: FAMILY MEDICINE

## 2019-01-15 PROCEDURE — 93321 DOPPLER ECHO F-UP/LMTD STD: CPT | Mod: 26 | Performed by: INTERNAL MEDICINE

## 2019-01-15 PROCEDURE — 93016 CV STRESS TEST SUPVJ ONLY: CPT | Performed by: FAMILY MEDICINE

## 2019-01-15 PROCEDURE — 40000264 ECHO STRESS ECHOCARDIOGRAM

## 2019-01-15 PROCEDURE — 93018 CV STRESS TEST I&R ONLY: CPT | Performed by: INTERNAL MEDICINE

## 2019-01-15 PROCEDURE — 93325 DOPPLER ECHO COLOR FLOW MAPG: CPT | Mod: 26 | Performed by: INTERNAL MEDICINE

## 2019-01-15 PROCEDURE — 99207 ECHO STRESS ECHOCARDIOGRAM: CPT | Performed by: INTERNAL MEDICINE

## 2019-01-15 PROCEDURE — 93350 STRESS TTE ONLY: CPT | Mod: 26 | Performed by: INTERNAL MEDICINE

## 2019-01-15 RX ADMIN — HUMAN ALBUMIN MICROSPHERES AND PERFLUTREN 1 ML: 10; .22 INJECTION, SOLUTION INTRAVENOUS at 09:18

## 2019-01-21 ENCOUNTER — TELEPHONE (OUTPATIENT)
Dept: ONCOLOGY | Facility: CLINIC | Age: 59
End: 2019-01-21

## 2019-01-21 ENCOUNTER — ANESTHESIA EVENT (OUTPATIENT)
Dept: SURGERY | Facility: CLINIC | Age: 59
End: 2019-01-21
Payer: COMMERCIAL

## 2019-01-21 ASSESSMENT — LIFESTYLE VARIABLES: TOBACCO_USE: 1

## 2019-01-21 NOTE — TELEPHONE ENCOUNTER
ONCOLOGY INTAKE: Records Information      APPT INFORMATION: 4/10/19 at 10:30AM  Referring provider:  Dr. Madison Gonzales  Referring provider s clinic:  [facility]  Reason for visit/diagnosis:  Genetic Counseling    Were the records received with the referral (via [employer])? In Epic    Has patient been seen for any external appt for this diagnosis (enter clinic/location)? No - per pt no genetics done prior.

## 2019-01-21 NOTE — ANESTHESIA PREPROCEDURE EVALUATION
Anesthesia Pre-Procedure Evaluation    Patient: Monika Nam   MRN: [id number] : 1960          Preoperative Diagnosis: Malignant neoplasm of central portion of left breast in female, estrogen receptor negative (H)    Procedure(s):  COMBINED LUMPECTOMY BREAST WITH SENTINEL NODE  (sentinel node injection at 8:30)  Port-a-Cath Placement    Past Medical History:   Diagnosis Date     Abnormal Papanicolaou smear of cervix and cervical HPV     history of LEEP ion      ASCUS favor benign 2013, 2014    Neg high risk HPV      Cervical high risk HPV (human papillomavirus) test positive 12    type 66     Past Surgical History:   Procedure Laterality Date     SURGICAL HISTORY OF -       cholecystectomy     SURGICAL HISTORY OF -       LEEP        Anesthesia Evaluation     . Pt has had prior anesthetic. Type: General and MAC           ROS/MED HX    ENT/Pulmonary:     (+)sleep apnea, allergic rhinitis, tobacco use, Current use 0.75 packs/day  Intermittent asthma Treatment: Inhaler prn,  , . .    Neurologic:  - neg neurologic ROS     Cardiovascular:     (+) Dyslipidemia, ----. : . . . :. . Previous cardiac testing Echodate:1/15/19results:Interpretation Summary  This was a normal stress echocardiogram with no evidence of stress-induced  ischemia.  _____________________________________________________________________________  __     Stress  The patient exercised 5:55.  There was a borderline hypertensive BP response to exercise.  A treadmill exercise test according to the Keanu protocol was performed.  Target Heart Rate was achieved.  The EKG portion of this stress test was negative for inducible ischemia (see  echo results below).  Arrhythmia noted in recovery: occasional PVC's.  Left ventricular cavity size decreases with exercise.  Global LV systolic function augments with exercise.  The visual ejection fraction is estimated at 65-70%.  Normal left ventricular function and wall motion at rest and  post-stress.     Baseline  The patient is in normal sinus rhythm.  Normal left ventricular function and wall motion at rest.  The visual ejection fraction is estimated at 55-60%.     Stress Results         Protocol:  Keanu Protocol        Maximum Predicted HR:   161 bpm         Target HR: 137 bpm               % Maximum Predicted HR: 98 %          Stage DurationHeart Rate  BP                  Comment              (mm:ss)   (bpm)      Stage 1  3:00      112   166/84       Peak    2:55      157   172/92Term-SOB; [facility](5); FAC-below; [id number]     Recovery  6:00      86    138/76                        Stress Duration:   5:55 mm:ss *                  Maximum Stress HR: 157 bpm *           METS: 7     Mitral Valve  There is trace mitral regurgitation.     Tricuspid Valve  There is trace tricuspid regurgitation.     Aortic Valve  There is mild trileaflet aortic sclerosis. No aortic regurgitation is present.  No aortic stenosis is present.                         date: results:ECG reviewed date:1/7/19 results:Sinus Rhythm   -Old inferior infarct.     ABNORMAL    date: results:          METS/Exercise Tolerance:     Hematologic:  - neg hematologic  ROS       Musculoskeletal:   (+) , , other musculoskeletal- CTS      GI/Hepatic:     (+) GERD       Renal/Genitourinary:  - ROS Renal section negative       Endo:     (+) Obesity, .      Psychiatric:     (+) psychiatric history anxiety and depression      Infectious Disease:  - neg infectious disease ROS       Malignancy:   (+) Malignancy History of Breast  Breast CA Active status post.         Other: Comment: Abnormal PAP                         Physical Exam  Normal systems: cardiovascular and pulmonary    Airway   Mallampati: II  TM distance: >3 FB  Neck ROM: full    Dental   (+) partials and upper dentures    Cardiovascular       Pulmonary             Lab Results   Component Value Date    WBC 6.7 01/07/2019    HGB 14.5 01/07/2019    HCT 43.4 01/07/2019     01/07/2019     " 09/04/2012    POTASSIUM 3.7 09/04/2012    CHLORIDE 103 09/04/2012    CO2 26 09/04/2012    BUN 15 09/04/2012    CR 0.74 01/07/2019    GLC 80 04/19/2016    DANI 9.2 09/04/2012    ALBUMIN 4.0 05/13/2005    PROTTOTAL 7.4 05/13/2005    ALT 43 05/13/2005    AST 45 05/13/2005    ALKPHOS 86 05/13/2005    BILITOTAL 0.4 05/13/2005    TSH 0.62 08/02/2005       Preop Vitals  BP Readings from Last 3 Encounters:   01/07/19 120/84   12/31/18 169/89   12/26/18 146/81    Pulse Readings from Last 3 Encounters:   01/07/19 117   12/31/18 105   12/26/18 104      Resp Readings from Last 3 Encounters:   01/07/19 24   12/26/18 16   11/16/18 24    SpO2 Readings from Last 3 Encounters:   01/07/19 95%   12/26/18 97%   11/16/18 97%      Temp Readings from Last 1 Encounters:   01/07/19 36.3  C (97.4  F) (Tympanic)    Ht Readings from Last 1 Encounters:   01/07/19 1.727 m (5' 8\")      Wt Readings from Last 1 Encounters:   01/07/19 92.1 kg (203 lb)    Estimated body mass index is 30.87 kg/m  as calculated from the following:    Height as of 1/7/19: 1.727 m (5' 8\").    Weight as of 1/7/19: 92.1 kg (203 lb).       Anesthesia Plan      History & Physical Review  History and physical reviewed and following examination; no interval change.    ASA Status:  3 .    NPO Status:  > 6 hours    Plan for General and ETT with Propofol and Intravenous induction. Maintenance will be Balanced.    PONV prophylaxis:  Ondansetron (or other 5HT-3) and Dexamethasone or Solumedrol  Additional equipment: Videolaryngoscope      Postoperative Care  Postoperative pain management:  IV analgesics, Oral pain medications and Multi-modal analgesia.      Consents  Anesthetic plan, risks, benefits and alternatives discussed with:  Patient..                 HAYLIE Allred CRNA  "

## 2019-01-23 ENCOUNTER — HOSPITAL ENCOUNTER (OUTPATIENT)
Dept: MAMMOGRAPHY | Facility: CLINIC | Age: 59
End: 2019-01-23
Attending: SURGERY | Admitting: SURGERY
Payer: COMMERCIAL

## 2019-01-23 ENCOUNTER — APPOINTMENT (OUTPATIENT)
Dept: GENERAL RADIOLOGY | Facility: CLINIC | Age: 59
End: 2019-01-23
Attending: SURGERY
Payer: COMMERCIAL

## 2019-01-23 ENCOUNTER — HOSPITAL ENCOUNTER (OUTPATIENT)
Facility: CLINIC | Age: 59
Discharge: HOME | End: 2019-01-23
Attending: SURGERY | Admitting: SURGERY
Payer: COMMERCIAL

## 2019-01-23 ENCOUNTER — HOSPITAL ENCOUNTER (OUTPATIENT)
Dept: ULTRASOUND IMAGING | Facility: CLINIC | Age: 59
End: 2019-01-23
Attending: SURGERY | Admitting: SURGERY
Payer: COMMERCIAL

## 2019-01-23 ENCOUNTER — ANESTHESIA (OUTPATIENT)
Dept: SURGERY | Facility: CLINIC | Age: 59
End: 2019-01-23
Payer: COMMERCIAL

## 2019-01-23 ENCOUNTER — HOSPITAL ENCOUNTER (OUTPATIENT)
Dept: NUCLEAR MEDICINE | Facility: CLINIC | Age: 59
Setting detail: NUCLEAR MEDICINE
Discharge: HOME | End: 2019-01-23
Attending: SURGERY | Admitting: SURGERY
Payer: COMMERCIAL

## 2019-01-23 VITALS
DIASTOLIC BLOOD PRESSURE: 50 MMHG | RESPIRATION RATE: 16 BRPM | WEIGHT: 203 LBS | OXYGEN SATURATION: 92 % | SYSTOLIC BLOOD PRESSURE: 107 MMHG | TEMPERATURE: 98 F | HEART RATE: 88 BPM | HEIGHT: 68 IN | BODY MASS INDEX: 30.77 KG/M2

## 2019-01-23 DIAGNOSIS — C50.112 MALIGNANT NEOPLASM OF CENTRAL PORTION OF LEFT BREAST IN FEMALE, ESTROGEN RECEPTOR NEGATIVE (H): ICD-10-CM

## 2019-01-23 DIAGNOSIS — Z17.1 MALIGNANT NEOPLASM OF CENTRAL PORTION OF LEFT BREAST IN FEMALE, ESTROGEN RECEPTOR NEGATIVE (H): ICD-10-CM

## 2019-01-23 DIAGNOSIS — C50.112 INFILTRATING DUCTAL CARCINOMA OF CENTRAL PORTION OF LEFT BREAST IN FEMALE (H): Primary | ICD-10-CM

## 2019-01-23 PROCEDURE — 25000125 ZZHC RX 250: Performed by: NURSE ANESTHETIST, CERTIFIED REGISTERED

## 2019-01-23 PROCEDURE — 25000132 ZZH RX MED GY IP 250 OP 250 PS 637: Performed by: NURSE ANESTHETIST, CERTIFIED REGISTERED

## 2019-01-23 PROCEDURE — C1788 PORT, INDWELLING, IMP: HCPCS | Performed by: SURGERY

## 2019-01-23 PROCEDURE — 40000986 MA POST PROCEDURE LEFT

## 2019-01-23 PROCEDURE — 25000128 H RX IP 250 OP 636: Performed by: NURSE ANESTHETIST, CERTIFIED REGISTERED

## 2019-01-23 PROCEDURE — 88307 TISSUE EXAM BY PATHOLOGIST: CPT | Mod: 26 | Performed by: SURGERY

## 2019-01-23 PROCEDURE — 37000009 ZZH ANESTHESIA TECHNICAL FEE, EACH ADDTL 15 MIN: Performed by: SURGERY

## 2019-01-23 PROCEDURE — 40000277 XR SURGERY CARM FLUORO LESS THAN 5 MIN W STILLS: Mod: TC

## 2019-01-23 PROCEDURE — 19285 PERQ DEV BREAST 1ST US IMAG: CPT | Mod: LT,59

## 2019-01-23 PROCEDURE — 76098 X-RAY EXAM SURGICAL SPECIMEN: CPT

## 2019-01-23 PROCEDURE — A9541 TC99M SULFUR COLLOID: HCPCS | Performed by: SURGERY

## 2019-01-23 PROCEDURE — 36000060 ZZH SURGERY LEVEL 3 W FLUORO 1ST 30 MIN: Performed by: SURGERY

## 2019-01-23 PROCEDURE — 78195 LYMPH SYSTEM IMAGING: CPT

## 2019-01-23 PROCEDURE — 37000008 ZZH ANESTHESIA TECHNICAL FEE, 1ST 30 MIN: Performed by: SURGERY

## 2019-01-23 PROCEDURE — 71000027 ZZH RECOVERY PHASE 2 EACH 15 MINS: Performed by: SURGERY

## 2019-01-23 PROCEDURE — 36000058 ZZH SURGERY LEVEL 3 EA 15 ADDTL MIN: Performed by: SURGERY

## 2019-01-23 PROCEDURE — 88307 TISSUE EXAM BY PATHOLOGIST: CPT | Performed by: SURGERY

## 2019-01-23 PROCEDURE — 77001 FLUOROGUIDE FOR VEIN DEVICE: CPT | Mod: 26 | Performed by: SURGERY

## 2019-01-23 PROCEDURE — 36561 INSERT TUNNELED CV CATH: CPT | Mod: 51 | Performed by: SURGERY

## 2019-01-23 PROCEDURE — 34300033 ZZH RX 343: Performed by: SURGERY

## 2019-01-23 PROCEDURE — 25000125 ZZHC RX 250: Performed by: RADIOLOGY

## 2019-01-23 PROCEDURE — 27110028 ZZH OR GENERAL SUPPLY NON-STERILE: Performed by: SURGERY

## 2019-01-23 PROCEDURE — 25000128 H RX IP 250 OP 636: Performed by: SURGERY

## 2019-01-23 PROCEDURE — 25000125 ZZHC RX 250: Performed by: SURGERY

## 2019-01-23 PROCEDURE — 40000986 XR CHEST PORT 1 VW

## 2019-01-23 PROCEDURE — 27210995 ZZH RX 272: Performed by: SURGERY

## 2019-01-23 PROCEDURE — 40000305 ZZH STATISTIC PRE PROC ASSESS I: Performed by: SURGERY

## 2019-01-23 PROCEDURE — 19301 PARTIAL MASTECTOMY: CPT | Mod: LT | Performed by: SURGERY

## 2019-01-23 PROCEDURE — 27210794 ZZH OR GENERAL SUPPLY STERILE: Performed by: SURGERY

## 2019-01-23 PROCEDURE — 71000012 ZZH RECOVERY PHASE 1 LEVEL 1 FIRST HR: Performed by: SURGERY

## 2019-01-23 PROCEDURE — 38525 BIOPSY/REMOVAL LYMPH NODES: CPT | Mod: LT | Performed by: SURGERY

## 2019-01-23 DEVICE — CATH PORT MRI POWERPORT 8FR SL 1808000: Type: IMPLANTABLE DEVICE | Site: CHEST  WALL | Status: FUNCTIONAL

## 2019-01-23 RX ORDER — BUPIVACAINE HYDROCHLORIDE AND EPINEPHRINE 2.5; 5 MG/ML; UG/ML
INJECTION, SOLUTION INFILTRATION; PERINEURAL PRN
Status: DISCONTINUED | OUTPATIENT
Start: 2019-01-23 | End: 2019-01-23 | Stop reason: HOSPADM

## 2019-01-23 RX ORDER — LIDOCAINE 40 MG/G
CREAM TOPICAL
Status: DISCONTINUED | OUTPATIENT
Start: 2019-01-23 | End: 2019-01-23 | Stop reason: HOSPADM

## 2019-01-23 RX ORDER — HYDROXYZINE HYDROCHLORIDE 25 MG/1
25 TABLET, FILM COATED ORAL EVERY 6 HOURS PRN
Status: DISCONTINUED | OUTPATIENT
Start: 2019-01-23 | End: 2019-01-23 | Stop reason: HOSPADM

## 2019-01-23 RX ORDER — ONDANSETRON 2 MG/ML
INJECTION INTRAMUSCULAR; INTRAVENOUS PRN
Status: DISCONTINUED | OUTPATIENT
Start: 2019-01-23 | End: 2019-01-23

## 2019-01-23 RX ORDER — HYDROXYZINE HYDROCHLORIDE 50 MG/1
50 TABLET, FILM COATED ORAL EVERY 6 HOURS PRN
Status: DISCONTINUED | OUTPATIENT
Start: 2019-01-23 | End: 2019-01-23 | Stop reason: HOSPADM

## 2019-01-23 RX ORDER — ALBUTEROL SULFATE 90 UG/1
AEROSOL, METERED RESPIRATORY (INHALATION) PRN
Status: DISCONTINUED | OUTPATIENT
Start: 2019-01-23 | End: 2019-01-23

## 2019-01-23 RX ORDER — LIDOCAINE HYDROCHLORIDE 10 MG/ML
INJECTION, SOLUTION INFILTRATION; PERINEURAL PRN
Status: DISCONTINUED | OUTPATIENT
Start: 2019-01-23 | End: 2019-01-23

## 2019-01-23 RX ORDER — CEFAZOLIN SODIUM 2 G/100ML
2 INJECTION, SOLUTION INTRAVENOUS
Status: COMPLETED | OUTPATIENT
Start: 2019-01-23 | End: 2019-01-23

## 2019-01-23 RX ORDER — HYDROCODONE BITARTRATE AND ACETAMINOPHEN 5; 325 MG/1; MG/1
1-2 TABLET ORAL EVERY 4 HOURS PRN
Qty: 20 TABLET | Refills: 0 | Status: SHIPPED | OUTPATIENT
Start: 2019-01-23 | End: 2019-03-06

## 2019-01-23 RX ORDER — DEXAMETHASONE SODIUM PHOSPHATE 4 MG/ML
4 INJECTION, SOLUTION INTRA-ARTICULAR; INTRALESIONAL; INTRAMUSCULAR; INTRAVENOUS; SOFT TISSUE EVERY 10 MIN PRN
Status: DISCONTINUED | OUTPATIENT
Start: 2019-01-23 | End: 2019-01-23 | Stop reason: HOSPADM

## 2019-01-23 RX ORDER — GLYCOPYRROLATE 0.2 MG/ML
INJECTION, SOLUTION INTRAMUSCULAR; INTRAVENOUS PRN
Status: DISCONTINUED | OUTPATIENT
Start: 2019-01-23 | End: 2019-01-23

## 2019-01-23 RX ORDER — ONDANSETRON 2 MG/ML
4 INJECTION INTRAMUSCULAR; INTRAVENOUS EVERY 30 MIN PRN
Status: DISCONTINUED | OUTPATIENT
Start: 2019-01-23 | End: 2019-01-23 | Stop reason: HOSPADM

## 2019-01-23 RX ORDER — NEOSTIGMINE METHYLSULFATE 1 MG/ML
VIAL (ML) INJECTION PRN
Status: DISCONTINUED | OUTPATIENT
Start: 2019-01-23 | End: 2019-01-23

## 2019-01-23 RX ORDER — SODIUM CHLORIDE, SODIUM LACTATE, POTASSIUM CHLORIDE, CALCIUM CHLORIDE 600; 310; 30; 20 MG/100ML; MG/100ML; MG/100ML; MG/100ML
INJECTION, SOLUTION INTRAVENOUS CONTINUOUS
Status: DISCONTINUED | OUTPATIENT
Start: 2019-01-23 | End: 2019-01-23 | Stop reason: HOSPADM

## 2019-01-23 RX ORDER — HYDROCODONE BITARTRATE AND ACETAMINOPHEN 5; 325 MG/1; MG/1
1-2 TABLET ORAL EVERY 4 HOURS PRN
Status: DISCONTINUED | OUTPATIENT
Start: 2019-01-23 | End: 2019-01-23 | Stop reason: HOSPADM

## 2019-01-23 RX ORDER — DEXAMETHASONE SODIUM PHOSPHATE 4 MG/ML
INJECTION, SOLUTION INTRA-ARTICULAR; INTRALESIONAL; INTRAMUSCULAR; INTRAVENOUS; SOFT TISSUE PRN
Status: DISCONTINUED | OUTPATIENT
Start: 2019-01-23 | End: 2019-01-23

## 2019-01-23 RX ORDER — NALOXONE HYDROCHLORIDE 0.4 MG/ML
.1-.4 INJECTION, SOLUTION INTRAMUSCULAR; INTRAVENOUS; SUBCUTANEOUS
Status: DISCONTINUED | OUTPATIENT
Start: 2019-01-23 | End: 2019-01-23 | Stop reason: HOSPADM

## 2019-01-23 RX ORDER — PROPOFOL 10 MG/ML
INJECTION, EMULSION INTRAVENOUS PRN
Status: DISCONTINUED | OUTPATIENT
Start: 2019-01-23 | End: 2019-01-23

## 2019-01-23 RX ORDER — FENTANYL CITRATE 50 UG/ML
25-50 INJECTION, SOLUTION INTRAMUSCULAR; INTRAVENOUS
Status: DISCONTINUED | OUTPATIENT
Start: 2019-01-23 | End: 2019-01-23 | Stop reason: HOSPADM

## 2019-01-23 RX ORDER — CEFAZOLIN SODIUM 1 G/50ML
1 INJECTION, SOLUTION INTRAVENOUS SEE ADMIN INSTRUCTIONS
Status: DISCONTINUED | OUTPATIENT
Start: 2019-01-23 | End: 2019-01-23 | Stop reason: HOSPADM

## 2019-01-23 RX ORDER — KETOROLAC TROMETHAMINE 30 MG/ML
INJECTION, SOLUTION INTRAMUSCULAR; INTRAVENOUS PRN
Status: DISCONTINUED | OUTPATIENT
Start: 2019-01-23 | End: 2019-01-23

## 2019-01-23 RX ORDER — HYDROMORPHONE HYDROCHLORIDE 1 MG/ML
.3-.5 INJECTION, SOLUTION INTRAMUSCULAR; INTRAVENOUS; SUBCUTANEOUS EVERY 10 MIN PRN
Status: DISCONTINUED | OUTPATIENT
Start: 2019-01-23 | End: 2019-01-23 | Stop reason: HOSPADM

## 2019-01-23 RX ORDER — FENTANYL CITRATE 50 UG/ML
INJECTION, SOLUTION INTRAMUSCULAR; INTRAVENOUS PRN
Status: DISCONTINUED | OUTPATIENT
Start: 2019-01-23 | End: 2019-01-23

## 2019-01-23 RX ORDER — MEPERIDINE HYDROCHLORIDE 25 MG/ML
12.5 INJECTION INTRAMUSCULAR; INTRAVENOUS; SUBCUTANEOUS
Status: DISCONTINUED | OUTPATIENT
Start: 2019-01-23 | End: 2019-01-23 | Stop reason: HOSPADM

## 2019-01-23 RX ORDER — ONDANSETRON 4 MG/1
4 TABLET, ORALLY DISINTEGRATING ORAL EVERY 30 MIN PRN
Status: DISCONTINUED | OUTPATIENT
Start: 2019-01-23 | End: 2019-01-23 | Stop reason: HOSPADM

## 2019-01-23 RX ORDER — ACETAMINOPHEN 325 MG/1
975 TABLET ORAL ONCE
Status: DISCONTINUED | OUTPATIENT
Start: 2019-01-23 | End: 2019-01-23 | Stop reason: HOSPADM

## 2019-01-23 RX ORDER — EPHEDRINE SULFATE 50 MG/ML
INJECTION, SOLUTION INTRAVENOUS PRN
Status: DISCONTINUED | OUTPATIENT
Start: 2019-01-23 | End: 2019-01-23

## 2019-01-23 RX ADMIN — PHENYLEPHRINE HYDROCHLORIDE 150 MCG: 10 INJECTION, SOLUTION INTRAMUSCULAR; INTRAVENOUS; SUBCUTANEOUS at 10:29

## 2019-01-23 RX ADMIN — SODIUM CHLORIDE, POTASSIUM CHLORIDE, SODIUM LACTATE AND CALCIUM CHLORIDE: 600; 310; 30; 20 INJECTION, SOLUTION INTRAVENOUS at 11:13

## 2019-01-23 RX ADMIN — MIDAZOLAM 2 MG: 1 INJECTION INTRAMUSCULAR; INTRAVENOUS at 10:05

## 2019-01-23 RX ADMIN — SODIUM CHLORIDE, POTASSIUM CHLORIDE, SODIUM LACTATE AND CALCIUM CHLORIDE: 600; 310; 30; 20 INJECTION, SOLUTION INTRAVENOUS at 09:50

## 2019-01-23 RX ADMIN — LIDOCAINE HYDROCHLORIDE 50 MG: 10 INJECTION, SOLUTION INFILTRATION; PERINEURAL at 10:14

## 2019-01-23 RX ADMIN — LIDOCAINE HYDROCHLORIDE 8 ML: 10 INJECTION, SOLUTION EPIDURAL; INFILTRATION; INTRACAUDAL; PERINEURAL at 08:33

## 2019-01-23 RX ADMIN — PHENYLEPHRINE HYDROCHLORIDE 150 MCG: 10 INJECTION, SOLUTION INTRAMUSCULAR; INTRAVENOUS; SUBCUTANEOUS at 10:33

## 2019-01-23 RX ADMIN — ONDANSETRON 4 MG: 2 INJECTION INTRAMUSCULAR; INTRAVENOUS at 13:25

## 2019-01-23 RX ADMIN — ONDANSETRON 4 MG: 2 INJECTION INTRAMUSCULAR; INTRAVENOUS at 11:28

## 2019-01-23 RX ADMIN — PROPOFOL 200 MG: 10 INJECTION, EMULSION INTRAVENOUS at 10:14

## 2019-01-23 RX ADMIN — ALBUTEROL SULFATE 6 PUFF: 90 AEROSOL, METERED RESPIRATORY (INHALATION) at 11:45

## 2019-01-23 RX ADMIN — PHENYLEPHRINE HYDROCHLORIDE 200 MCG: 10 INJECTION, SOLUTION INTRAMUSCULAR; INTRAVENOUS; SUBCUTANEOUS at 10:47

## 2019-01-23 RX ADMIN — KETOROLAC TROMETHAMINE 30 MG: 30 INJECTION, SOLUTION INTRAMUSCULAR at 11:19

## 2019-01-23 RX ADMIN — PHENYLEPHRINE HYDROCHLORIDE 150 MCG: 10 INJECTION, SOLUTION INTRAMUSCULAR; INTRAVENOUS; SUBCUTANEOUS at 10:41

## 2019-01-23 RX ADMIN — FENTANYL CITRATE 150 MCG: 50 INJECTION, SOLUTION INTRAMUSCULAR; INTRAVENOUS at 10:20

## 2019-01-23 RX ADMIN — PROCHLORPERAZINE EDISYLATE 10 MG: 5 INJECTION INTRAMUSCULAR; INTRAVENOUS at 14:11

## 2019-01-23 RX ADMIN — ROCURONIUM BROMIDE 50 MG: 10 INJECTION INTRAVENOUS at 10:14

## 2019-01-23 RX ADMIN — CEFAZOLIN SODIUM 2 G: 2 INJECTION, SOLUTION INTRAVENOUS at 10:05

## 2019-01-23 RX ADMIN — LIDOCAINE HYDROCHLORIDE 0.1 ML: 10 INJECTION, SOLUTION EPIDURAL; INFILTRATION; INTRACAUDAL; PERINEURAL at 08:00

## 2019-01-23 RX ADMIN — HYDROMORPHONE HYDROCHLORIDE 0.5 MG: 1 INJECTION, SOLUTION INTRAMUSCULAR; INTRAVENOUS; SUBCUTANEOUS at 12:40

## 2019-01-23 RX ADMIN — TECHNETIUM TC 99M SULFUR COLLOID 752 UCI.: KIT at 09:15

## 2019-01-23 RX ADMIN — HYDROMORPHONE HYDROCHLORIDE 1 MG: 1 INJECTION, SOLUTION INTRAMUSCULAR; INTRAVENOUS; SUBCUTANEOUS at 10:22

## 2019-01-23 RX ADMIN — PHENYLEPHRINE HYDROCHLORIDE 200 MCG: 10 INJECTION, SOLUTION INTRAMUSCULAR; INTRAVENOUS; SUBCUTANEOUS at 10:52

## 2019-01-23 RX ADMIN — HYDROXYZINE HYDROCHLORIDE 50 MG: 50 TABLET ORAL at 12:28

## 2019-01-23 RX ADMIN — PHENYLEPHRINE HYDROCHLORIDE 150 MCG: 10 INJECTION, SOLUTION INTRAMUSCULAR; INTRAVENOUS; SUBCUTANEOUS at 11:01

## 2019-01-23 RX ADMIN — GLYCOPYRROLATE 0.2 MG: 0.2 INJECTION, SOLUTION INTRAMUSCULAR; INTRAVENOUS at 10:14

## 2019-01-23 RX ADMIN — EPHEDRINE SULFATE 5 MG: 50 INJECTION, SOLUTION INTRAVENOUS at 11:07

## 2019-01-23 RX ADMIN — GLYCOPYRROLATE 0.4 MG: 0.2 INJECTION, SOLUTION INTRAMUSCULAR; INTRAVENOUS at 11:28

## 2019-01-23 RX ADMIN — DEXAMETHASONE SODIUM PHOSPHATE 10 MG: 4 INJECTION, SOLUTION INTRA-ARTICULAR; INTRALESIONAL; INTRAMUSCULAR; INTRAVENOUS; SOFT TISSUE at 10:14

## 2019-01-23 RX ADMIN — HYDROMORPHONE HYDROCHLORIDE 0.5 MG: 1 INJECTION, SOLUTION INTRAMUSCULAR; INTRAVENOUS; SUBCUTANEOUS at 12:29

## 2019-01-23 RX ADMIN — FENTANYL CITRATE 100 MCG: 50 INJECTION, SOLUTION INTRAMUSCULAR; INTRAVENOUS at 10:14

## 2019-01-23 RX ADMIN — Medication 3 MG: at 11:28

## 2019-01-23 ASSESSMENT — MIFFLIN-ST. JEOR: SCORE: 1544.3

## 2019-01-23 NOTE — PROGRESS NOTES
RADIOLOGY PROCEDURE NOTE  Patient name: Monika Nam  MRN: [id number]  : 1960    Pre-procedure diagnosis: Left breast malignancy.  Post-procedure diagnosis: Same    Procedure Date/Time: 2019  8:53 AM  Procedure: US guided left breast wire localization and sentinel node injection for surgery to follow.  Estimated blood loss: None  Specimen(s) collected with description:  None.  The patient tolerated the procedure well with no immediate complications.    See imaging dictation for procedural details.    Provider name: Omid Powell  Assistant(s):None

## 2019-01-23 NOTE — OR NURSING
To pacu. Vss. Iv patent. Drowsy. Denies pain. Breast binder on. incis d/i. Breast on left and port on right. Report to phylicia villegas rn

## 2019-01-23 NOTE — DISCHARGE INSTRUCTIONS
Same Day Surgery Discharge Instructions  Special Precautions After Surgery - Adult    1. It is not unusual to feel lightheaded or faint, up to 24 hours after surgery or while taking pain medication.  If you have these symptoms; sit for a few minutes before standing and have someone assist you when getting up.  2. You should rest and relax for the next 24 hours and must have someone stay with you for at least 24 hours after your discharge.  3. DO NOT DRIVE any vehicle or operate mechanical equipment for 24 hours following the end of your surgery.  DO NOT DRIVE while taking narcotic pain medications that have been prescribed by your physician.  If you had a limb operated on, you must be able to use it fully to drive.  4. DO NOT drink alcoholic beverages for 24 hours following surgery or while taking prescription pain medication.  5. Drink clear liquids (apple juice, ginger ale, broth, 7-Up, etc.).  Progress to your regular diet as you feel able.  6. Any questions call your physician and do not make important decisions for 24 hours.    ACTIVITY  ? Up as tolerated.     INCISIONAL CARE  ? May shower tomorrow.     Keep appointment to return to the clinic on Monday.    Medications:  ? Start a pain pill as soon as you start to feel any pain.     Additional discharge instructions: Use your recliner to elevate chest for less swelling & pain.  __________________________________________________________________________________________________________________________________  IMPORTANT NUMBERS:    [facility] Main Number:  [phone], [phone]  Pharmacy:  [phone]  Same Day Surgery:  [phone], Monday - Friday until 8:30 p.m.  Urgent Care:  [phone]  Emergency Room:  [phone]      [facility]:  [phone]                                                                             [facility]:  [phone] option 1  [facility]:  [phone]     [facility]  Clinic:  [phone]   [facility]:  [phone]   Home Medical Equipment: [phone]  [facility]:  [phone]    Wash incisions daily with soap and water. Some mild redness or swelling is expected. If draining, cover with dry gauze.    Minimize left arm movement.    Wear bra day and night for a week.  This is for comfort and not mandatory.    Okay to use ice pack over wound as necessary for comfort.    Use pain medication as necessary but avoid constipating side effects. Ibuprofen okay but avoid Tylenol as your pain medication already contains this.    Diet as tolerated. No restrictions.    Follow up with Dr Claros in 1-2 weeks.    Stay off of work until all surgical care is completed.

## 2019-01-23 NOTE — BRIEF OP NOTE
[facility]    Brief Operative Note    Pre-operative diagnosis: Malignant neoplasm of central portion of left breast in female, estrogen receptor negative (H)  Post-operative diagnosis left breast cancer  Procedure: Procedure(s):  COMBINED LUMPECTOMY BREAST WITH SENTINEL NODE  Port-a-Cath Placement  Surgeon: Surgeon(s) and Role:     * Servando Claros MD - Primary     * Tu Laguna PA-C - Assisting  Anesthesia: General   Estimated blood loss: Less than 50 ml  Drains: None  Specimens:   ID Type Source Tests Collected by Time Destination   A : left axillary sentinel node Tissue Axilla, Left SURGICAL PATHOLOGY EXAM Servando Claros MD 1/23/2019 11:13 AM    B :  Tissue Breast, Left SURGICAL PATHOLOGY EXAM Servando Claros MD 1/23/2019 11:21 AM      Findings:   unremarkable port placement, lumpectomy margins good (grossly), and SNL (2) located.  Complications: none.  Implants: port

## 2019-01-23 NOTE — ANESTHESIA POSTPROCEDURE EVALUATION
Patient: Mnoika Nam    Procedure(s):  COMBINED LUMPECTOMY BREAST WITH SENTINEL NODE  Port-a-Cath Placement    Diagnosis:Malignant neoplasm of central portion of left breast in female, estrogen receptor negative (H)  Diagnosis Additional Information: No value filed.    Anesthesia Type:  General, ETT    Note:  Anesthesia Post Evaluation    Patient location during evaluation: PACU and Bedside  Patient participation: Able to fully participate in evaluation  Level of consciousness: awake and alert  Pain management: adequate  Airway patency: patent  Cardiovascular status: acceptable and hemodynamically stable  Respiratory status: acceptable  Hydration status: acceptable  PONV: none     Anesthetic complications: None          Last vitals:  Vitals:    01/23/19 1230 01/23/19 1242 01/23/19 1245   BP: 151/81  136/83   Pulse:      Resp: 20  20   Temp:      SpO2: 97% 95% 95%         Electronically Signed By: HAYLIE Olivia CRNA  January 23, 2019  12:49 PM

## 2019-01-24 NOTE — OP NOTE
Procedure Date: 01/23/2019      PREOPERATIVE DIAGNOSIS:  Infiltrating ductal carcinoma of the left breast with triple negative status.      POSTOPERATIVE DIAGNOSIS:  Infiltrating ductal carcinoma of the left breast with triple negative status.      PROCEDURE:   1.  Placement of right subclavian Port-A-Cath.   2.  Lumpectomy (partial mastectomy, left breast) after wire localization.   3.  Left axillary sentinel lymph node biopsy.      SURGEON:  Servando Claros MD      ASSISTANT:  PAUL Arzola.  His assistance was required for retraction, hemostasis, control of hemorrhage, and also wound closure.      ANESTHESIA:  General.      INDICATIONS:  This 58-year-old female who presented with a left breast mass which was biopsy proven to be an infiltrating ductal carcinoma with triple negative receptor status.  Chemotherapy is planned.  She came to see me for surgical care.  Options were discussed and we elected breast conservation.  Therefore, she elected to undergo lumpectomy with sentinel lymph node biopsy and at the same time, place a Port-A-Cath.  Prior to the procedure, she was counseled about the risks, benefits and alternatives and was willing to proceed.  All of her questions were answered.      DESCRIPTION OF PROCEDURE:  The patient was brought to the operating room and placed on the table in the supine position.  Preoperatively, she had a wire placed by Radiology and has had a radioactive tracer injected.  After she was administered an anesthetic, she underwent a surgical timeout and everyone was in agreement.  I then injected 3 mL of 50% diluted methylene blue into the tissue in and around the left nipple.  We then performed the prep and drape of the patient in a sterile fashion.        We began on the right side so as not to potentially cross contaminate.  A subclavian puncture was performed using the finder needle and the wire was positioned.  Radiographically, the wire was in good position in the subclavian  vein.  The site for the port was chosen and opened with cautery.  A subcutaneous pocket was developed bluntly.  A tunnel was then created between the port site and the wire site.  The catheter was tunneled through this area.  The Port-A-Cath was assembled and placed into the pocket.  It was secured with 2 silk sutures to the posterior musculature.  The catheter was flushed at this point.  It was trimmed to appropriate size using x-ray to guide the length.  The introducer dilator was then placed over the wire and the wire and dilator were removed.  The catheter was inserted into the introducer and the introducer was peeled away.  Radiographically, the catheter was in good position with a gentle curve and no kinking.  The catheter aspirated and flushed appropriately.  The port was flushed with heparinized saline.  The port site was closed with 3-0 Vicryl in the subcutaneous tissues and running 4-0 Monocryl in the skin.  Both this wound and the puncture site were covered with surgical glue.        We then turned attention to the left side.  The wire had been placed and I had reviewed the x-rays preoperatively.  An elliptical skin incision was made right over the wire entrance site.  This was marked on the skin.  We also had located very radioactive area in the axilla.  About a 2 cm incision was made in the axilla at this point and using the Neoprobe, we were able to work our way deeper into the axilla and locate the very hot sentinel lymph node.  The lymph node had over 2000 counts.  There were actually 2 lymph nodes side by side.  These were excised and sent for pathology.  Residual counts in the axilla were minimal.  There was one bleeder that we had to take care of.  We used a combination of cautery and one tie to control bleeding.  We also at the end filled the wound with Floseal.  This wound was closed with interrupted 3-0 Vicryl in the subcutaneous tissues and a running 4-0 Monocryl in the skin.  Once again,  surgical glue was applied.      We then did the lumpectomy.  The elliptical incision was created using cautery and flaps were created superiorly, inferiorly, medially and laterally, and the mass was surrounded completely all the way down to the chest wall.  Our deep margin is the chest wall.  The specimen was removed and sent for x-ray analysis.  The x-ray showed the wire and clip were removed and the mass was removed.  No obvious positive margins.      This wound was made hemostatic and then closed with again 3-0 Vicryl on the subcutaneous tissues and in this case 4-0 Stratafix on the skin.  These wounds were covered with surgical glue and allowed to dry.  The patient was placed in a postoperative breast garment.  The patient was then awakened from her anesthetic and taken to the recovery room awake and in stable condition.  She tolerated the procedure well.  There were no apparent complications.  Needle, sponge, and instruments were correct x2.  Her blood loss was less than 50 mL and probably more like about 10-15.         JASON MOSS MD             D: 2019   T: 2019   MT: RAFAEL      Name:     BRY ASCENCIO   MRN:      [id number]        Account:        [id number]   :      1960           Procedure Date: 2019      Document: [id number]

## 2019-01-28 ENCOUNTER — OFFICE VISIT (OUTPATIENT)
Dept: SURGERY | Facility: CLINIC | Age: 59
End: 2019-01-28
Payer: COMMERCIAL

## 2019-01-28 VITALS
HEART RATE: 94 BPM | WEIGHT: 203 LBS | DIASTOLIC BLOOD PRESSURE: 89 MMHG | SYSTOLIC BLOOD PRESSURE: 135 MMHG | BODY MASS INDEX: 30.77 KG/M2 | TEMPERATURE: 98 F | HEIGHT: 68 IN

## 2019-01-28 DIAGNOSIS — Z98.890 POSTOPERATIVE STATE: Primary | ICD-10-CM

## 2019-01-28 PROCEDURE — 99024 POSTOP FOLLOW-UP VISIT: CPT | Performed by: SURGERY

## 2019-01-28 ASSESSMENT — MIFFLIN-ST. JEOR: SCORE: 1544.3

## 2019-01-28 NOTE — PROGRESS NOTES
Monika returns 5 days following a left lumpectomy, sentinel lymph node biopsy, and port placement.  She feels well has no complaints and is in a very pleasant mood.    I reviewed pathology with him.  The tumor was 1.8 cm so she is a T1.  Her sentinel lymph node was negative.  Therefore, she is N0, and as far as we know M0.    Unfortunately, she is a triple negative so she will be getting chemotherapy.    On exam: Her wounds are healing fine.  There is just the slightest amount of redness around the lateral aspect of her port incision, but the port itself is nontender and perfectly normal.    Impression: Satisfactory postoperative course.  The results of all of our testing so far are good.  The next step is her chemotherapy and radiation which will be handled by the oncology department.    I would recommend a repeat mammogram in 4 months to establish new baseline.    I will see her back as needed.  For the time being she is released from surgical care.    Servando Claros MD FACS

## 2019-01-28 NOTE — LETTER
1/28/2019         RE: Monika Nam  [address] [address] [address]        Dear Colleague,    Thank you for referring your patient, Monika Nam, to the [facility]. Please see a copy of my visit note below.    Monika returns 5 days following a left lumpectomy, sentinel lymph node biopsy, and port placement.  She feels well has no complaints and is in a very pleasant mood.    I reviewed pathology with him.  The tumor was 1.8 cm so she is a T1.  Her sentinel lymph node was negative.  Therefore, she is N0, and as far as we know M0.    Unfortunately, she is a triple negative so she will be getting chemotherapy.    On exam: Her wounds are healing fine.  There is just the slightest amount of redness around the lateral aspect of her port incision, but the port itself is nontender and perfectly normal.    Impression: Satisfactory postoperative course.  The results of all of our testing so far are good.  The next step is her chemotherapy and radiation which will be handled by the oncology department.    I would recommend a repeat mammogram in 4 months to establish new baseline.    I will see her back as needed.  For the time being she is released from surgical care.    Servando Claros MD FACS    Again, thank you for allowing me to participate in the care of your patient.        Sincerely,        Servando Claros MD

## 2019-01-28 NOTE — NURSING NOTE
"Initial /89 (BP Location: Right arm, Patient Position: Sitting, Cuff Size: Adult Large)   Pulse 94   Temp 98  F (36.7  C) (Oral)   Ht 1.727 m (5' 8\")   Wt 92.1 kg (203 lb)   LMP 04/02/2010   BMI 30.87 kg/m   Estimated body mass index is 30.87 kg/m  as calculated from the following:    Height as of this encounter: 1.727 m (5' 8\").    Weight as of this encounter: 92.1 kg (203 lb). .    Tonya Lehman CMA    "

## 2019-01-29 LAB — COPATH REPORT: NORMAL

## 2019-01-31 ENCOUNTER — TELEPHONE (OUTPATIENT)
Dept: FAMILY MEDICINE | Facility: CLINIC | Age: 59
End: 2019-01-31

## 2019-01-31 NOTE — TELEPHONE ENCOUNTER
Reason for call:  Patient reporting a symptom Left lumpectomy swelling.    Symptom or request: Left lumpectomy left breast it is swelling where they removed the lymph knobs she wants to know if it is normal. 1/23/19 had done by Dr. Sotelo, she called them and she stated that he is out of the office. No fevers chill no redness, not warm. She is stating the area is hard.     Duration (how long have symptoms been present): Noticed this am    Have you been treated for this before? No    Additional comments: what's to know if this is ok     Phone Number patient can be reached at:  Home number on file [phone] (home)    Best Time:  any    Can we leave a detailed message on this number:  YES    Call taken on 1/31/2019 at 2:41 PM by Martah Reyes

## 2019-01-31 NOTE — TELEPHONE ENCOUNTER
Pt reports she noticed swelling at the site of the lumpectomy of left armpit.  Area is firm to the touch and is not painful.  No drainage, no redness and site does not feel warm to the touch.  She denies any numbness or tingling down to the fingertips.  No fever and no pain.    Pt was advised that the swelling can occur after this procedure and she may have a hematoma causing the firmness that she is experiencing.      Can take 600mg Ibuprofen every 6 hours to help with the swelling, apply warm compresses to aid body in reabsorbing blood.  If increase of swelling, redness, warm to the touch or discharge or fever, she will need to f/u calling surgery clinic RN.    Pt agrees with this plan.    Jen Kwan  [facility] RN

## 2019-02-08 ENCOUNTER — TELEPHONE (OUTPATIENT)
Dept: ONCOLOGY | Facility: CLINIC | Age: 59
End: 2019-02-08

## 2019-02-08 NOTE — TELEPHONE ENCOUNTER
"Patient called to report that she needs to have dates adjusted for her FMLA paperwork. She went back to work for a few days not realizing that she was to be on full leave as of 12/26/18. Insurance will not approve FMLA until the date is adjusted to 1/22/19. This is when she began her full SHAISTA. She would also like to have included 1/7/19 and 1/15/19 for doctors appointments. She would like SHAISTA to end on 3/22/19 as she is wanting to redo her SHAISTA for intermittent.   She will have other forms then.    She would like adjusted forms to be emailed to her at \"[email or web address]\"    Dates adjusted on forms, emailed to patient and sent to scanning.  Yuliet Posada RN    "

## 2019-02-09 ENCOUNTER — HOSPITAL ENCOUNTER (EMERGENCY)
Facility: CLINIC | Age: 59
Discharge: HOME | End: 2019-02-09
Attending: FAMILY MEDICINE | Admitting: FAMILY MEDICINE
Payer: COMMERCIAL

## 2019-02-09 VITALS
RESPIRATION RATE: 18 BRPM | HEART RATE: 90 BPM | OXYGEN SATURATION: 96 % | TEMPERATURE: 99.2 F | DIASTOLIC BLOOD PRESSURE: 91 MMHG | SYSTOLIC BLOOD PRESSURE: 142 MMHG | BODY MASS INDEX: 30.87 KG/M2 | HEIGHT: 68 IN

## 2019-02-09 DIAGNOSIS — N61.0 BREAST INFLAMMATION: ICD-10-CM

## 2019-02-09 LAB
ALBUMIN SERPL-MCNC: 3.7 G/DL (ref 3.4–5)
ALP SERPL-CCNC: 98 U/L (ref 40–150)
ALT SERPL W P-5'-P-CCNC: 26 U/L (ref 0–50)
ANION GAP SERPL CALCULATED.3IONS-SCNC: 5 MMOL/L (ref 3–14)
AST SERPL W P-5'-P-CCNC: 21 U/L (ref 0–45)
BASOPHILS # BLD AUTO: 0.1 10E9/L (ref 0–0.2)
BASOPHILS NFR BLD AUTO: 0.8 %
BILIRUB SERPL-MCNC: 0.6 MG/DL (ref 0.2–1.3)
BUN SERPL-MCNC: 13 MG/DL (ref 7–30)
CALCIUM SERPL-MCNC: 8.8 MG/DL (ref 8.5–10.1)
CHLORIDE SERPL-SCNC: 106 MMOL/L (ref 94–109)
CO2 SERPL-SCNC: 27 MMOL/L (ref 20–32)
CREAT SERPL-MCNC: 0.74 MG/DL (ref 0.52–1.04)
DIFFERENTIAL METHOD BLD: NORMAL
EOSINOPHIL # BLD AUTO: 0.3 10E9/L (ref 0–0.7)
EOSINOPHIL NFR BLD AUTO: 4.7 %
ERYTHROCYTE [DISTWIDTH] IN BLOOD BY AUTOMATED COUNT: 13.7 % (ref 10–15)
GFR SERPL CREATININE-BSD FRML MDRD: 88 ML/MIN/{1.73_M2}
GLUCOSE SERPL-MCNC: 104 MG/DL (ref 70–99)
HCT VFR BLD AUTO: 40 % (ref 35–47)
HGB BLD-MCNC: 13.2 G/DL (ref 11.7–15.7)
IMM GRANULOCYTES # BLD: 0 10E9/L (ref 0–0.4)
IMM GRANULOCYTES NFR BLD: 0.2 %
LACTATE BLD-SCNC: 0.7 MMOL/L (ref 0.7–2)
LYMPHOCYTES # BLD AUTO: 1.6 10E9/L (ref 0.8–5.3)
LYMPHOCYTES NFR BLD AUTO: 25.8 %
MCH RBC QN AUTO: 30.6 PG (ref 26.5–33)
MCHC RBC AUTO-ENTMCNC: 33 G/DL (ref 31.5–36.5)
MCV RBC AUTO: 93 FL (ref 78–100)
MONOCYTES # BLD AUTO: 0.4 10E9/L (ref 0–1.3)
MONOCYTES NFR BLD AUTO: 6.1 %
NEUTROPHILS # BLD AUTO: 3.9 10E9/L (ref 1.6–8.3)
NEUTROPHILS NFR BLD AUTO: 62.4 %
NRBC # BLD AUTO: 0 10*3/UL
NRBC BLD AUTO-RTO: 0 /100
PLATELET # BLD AUTO: 183 10E9/L (ref 150–450)
POTASSIUM SERPL-SCNC: 3.6 MMOL/L (ref 3.4–5.3)
PROT SERPL-MCNC: 7.5 G/DL (ref 6.8–8.8)
RBC # BLD AUTO: 4.32 10E12/L (ref 3.8–5.2)
SODIUM SERPL-SCNC: 138 MMOL/L (ref 133–144)
WBC # BLD AUTO: 6.2 10E9/L (ref 4–11)

## 2019-02-09 PROCEDURE — 83605 ASSAY OF LACTIC ACID: CPT | Performed by: FAMILY MEDICINE

## 2019-02-09 PROCEDURE — 99283 EMERGENCY DEPT VISIT LOW MDM: CPT | Mod: Z6 | Performed by: FAMILY MEDICINE

## 2019-02-09 PROCEDURE — 85025 COMPLETE CBC W/AUTO DIFF WBC: CPT | Performed by: FAMILY MEDICINE

## 2019-02-09 PROCEDURE — 99283 EMERGENCY DEPT VISIT LOW MDM: CPT | Performed by: FAMILY MEDICINE

## 2019-02-09 PROCEDURE — 80053 COMPREHEN METABOLIC PANEL: CPT | Performed by: FAMILY MEDICINE

## 2019-02-09 ASSESSMENT — ENCOUNTER SYMPTOMS
DIAPHORESIS: 0
COLOR CHANGE: 1
FATIGUE: 0
SHORTNESS OF BREATH: 0
FEVER: 0
CHILLS: 0

## 2019-02-09 NOTE — ED NOTES
Pt presents to ED with concern for post op problem.  Pt had recent left breast lumpectomy.  Pt noticed discoloration, pain, and swelling as of yesterday with worsening in symptoms today.  No injury to area.  Pt color is yellowish on left breast.  Denies fevers at home, nausea or vomiting.

## 2019-02-09 NOTE — ED PROVIDER NOTES
History     Chief Complaint   Patient presents with     Post-op Problem     HPI  Monika Nam is a 58 year old female who presents with left breast pain.  She had a lumpectomy on 1/23 as well as a sentinel lymph node biopsy and the margins of the resection were clear and the lymph node biopsy was negative.  Over the last day or 2 she has noticed discoloration of the inferior portion of the breast some swelling of the lymph node area and some redness and cracking of the nipple area.    She denies any fever chills or sweats.    She is scheduled to start her chemo next week.    Allergies:  Allergies   Allergen Reactions     Sulfa Drugs Swelling       Problem List:    Patient Active Problem List    Diagnosis Date Noted     Infiltrating ductal carcinoma of central portion of left breast in female (H) 12/27/2018     Priority: Medium     Mild intermittent asthma without complication 05/24/2018     Priority: Medium     Obstructive sleep apnea syndrome 07/25/2014     Priority: Medium     GERD (gastroesophageal reflux disease) 09/07/2012     Priority: Medium     Cervical high risk HPV (human papillomavirus) test positive 05/24/2012     Priority: Medium     5/24/12: Pap--NIL, + HR HPV 66. Repeat pap and HPV in 1 year. Reminder in epic.   7/15/13:Pap--ASCUS, neg for High Risk HPV. Repeat pap 1 year. Reminder in epic.   7/25/14:Pap--ASCUS, neg for High Risk HPV. Repeat pap 1 year. 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 She has tried going off these and the sx recur. We discussed staying on these indefinitely.        Obesity 04/16/2008     Priority: Medium     Problem list name updated by automated process. Provider to review       Tobacco use disorder 07/09/2007     Priority: Medium      Allergic rhinitis 01/26/2006     Priority: Medium     Problem list name updated by automated process. Provider to review       Anxiety disorder due to medical condition 01/26/2006     Priority: Medium     Problem list name updated by automated process. Provider to review          Past Medical History:    Past Medical History:   Diagnosis Date     Abnormal Papanicolaou smear of cervix and cervical HPV      ASCUS favor benign 7/2013, 7/2014     Cancer (H) 01/23/2019     Cervical high risk HPV (human papillomavirus) test positive 5/24/12     PONV (postoperative nausea and vomiting)        Past Surgical History:    Past Surgical History:   Procedure Laterality Date     BIOPSY       BREAST SURGERY       INSERT PORT VASCULAR ACCESS Right 1/23/2019    Procedure: Port-a-Cath Placement;  Surgeon: Servando Claros MD;  Location: [facility] OR     LUMPECTOMY BREAST WITH SENTINEL NODE, COMBINED Left 1/23/2019    Procedure: COMBINED LUMPECTOMY BREAST WITH SENTINEL NODE;  Surgeon: Servando Claros MD;  Location: [facility] OR     SURGICAL HISTORY OF -   1996    cholecystectomy     SURGICAL HISTORY OF -   1994    LEEP        Family History:    Family History   Problem Relation Age of Onset     Gastrointestinal Disease Mother         desmond dz     Hypertension Father      Lipids Father      Breast Cancer Paternal Grandmother      Neurologic Disorder Paternal Grandfather         parkinsons     Cancer - colorectal No family hx of      Prostate Cancer No family hx of      Diabetes No family hx of      C.A.D. No family hx of        Social History:  Marital Status:   [2]  Social History     Tobacco Use     Smoking status: Current Every Day Smoker     Packs/day: 0.50     Years: 25.00     Pack years: 12.50     Types: Cigarettes     Smokeless tobacco: Never Used     Tobacco comment: 1/2 ppd . Did try the patch- gets a rash.  She is trying to quit.   Substance Use Topics     Alcohol use: Yes     Comment: 3-4 X per week.  About 7 drinks per week.  "    Drug use: No        Medications:      calcium-vitamin D (CALCIUM 600-D) 600-400 MG-UNIT per tablet   FLUoxetine (PROZAC) 20 MG capsule   HYDROcodone-acetaminophen (NORCO) 5-325 MG tablet   loratadine (CLARITIN) 10 MG tablet   MULTIVITAMIN TABS   OR   ranitidine (ZANTAC) 150 MG tablet   albuterol (PROAIR HFA/PROVENTIL HFA/VENTOLIN HFA) 108 (90 Base) MCG/ACT inhaler   aspirin 81 MG tablet   augmented betamethasone dipropionate (DIPROLENE-AF) 0.05 % cream         Review of Systems   Constitutional: Negative for chills, diaphoresis, fatigue and fever.   Respiratory: Negative for shortness of breath.    Cardiovascular: Negative for chest pain.   Skin: Positive for color change.       Physical Exam   BP: (!) 171/103  Pulse: 98  Temp: 99.2  F (37.3  C)  Resp: 18  Height: 172.7 cm (5' 8\")  SpO2: 100 %      Physical Exam   Constitutional: She appears well-developed and well-nourished. No distress.   HENT:   Head: Normocephalic and atraumatic.   Eyes: EOM are normal.   Neck: Normal range of motion. Neck supple.   Pulmonary/Chest: Effort normal.       Neurological: She is alert.   Skin: Skin is warm and dry. She is not diaphoretic.   Psychiatric: She has a normal mood and affect.       ED Course        Procedures               Critical Care time:  none               Results for orders placed or performed during the hospital encounter of 02/09/19 (from the past 24 hour(s))   Comprehensive metabolic panel   Result Value Ref Range    Sodium 138 133 - 144 mmol/L    Potassium 3.6 3.4 - 5.3 mmol/L    Chloride 106 94 - 109 mmol/L    Carbon Dioxide 27 20 - 32 mmol/L    Anion Gap 5 3 - 14 mmol/L    Glucose 104 (H) 70 - 99 mg/dL    Urea Nitrogen 13 7 - 30 mg/dL    Creatinine 0.74 0.52 - 1.04 mg/dL    GFR Estimate 88 >60 mL/min/[1.73_m2]    GFR Estimate If Black >90 >60 mL/min/[1.73_m2]    Calcium 8.8 8.5 - 10.1 mg/dL    Bilirubin Total 0.6 0.2 - 1.3 mg/dL    Albumin 3.7 3.4 - 5.0 g/dL    Protein Total 7.5 6.8 - 8.8 g/dL    Alkaline " Phosphatase 98 40 - 150 U/L    ALT 26 0 - 50 U/L    AST 21 0 - 45 U/L   CBC with platelets differential   Result Value Ref Range    WBC 6.2 4.0 - 11.0 10e9/L    RBC Count 4.32 3.8 - 5.2 10e12/L    Hemoglobin 13.2 11.7 - 15.7 g/dL    Hematocrit 40.0 35.0 - 47.0 %    MCV 93 78 - 100 fl    MCH 30.6 26.5 - 33.0 pg    MCHC 33.0 31.5 - 36.5 g/dL    RDW 13.7 10.0 - 15.0 %    Platelet Count 183 150 - 450 10e9/L    Diff Method Automated Method     % Neutrophils 62.4 %    % Lymphocytes 25.8 %    % Monocytes 6.1 %    % Eosinophils 4.7 %    % Basophils 0.8 %    % Immature Granulocytes 0.2 %    Nucleated RBCs 0 0 /100    Absolute Neutrophil 3.9 1.6 - 8.3 10e9/L    Absolute Lymphocytes 1.6 0.8 - 5.3 10e9/L    Absolute Monocytes 0.4 0.0 - 1.3 10e9/L    Absolute Eosinophils 0.3 0.0 - 0.7 10e9/L    Absolute Basophils 0.1 0.0 - 0.2 10e9/L    Abs Immature Granulocytes 0.0 0 - 0.4 10e9/L    Absolute Nucleated RBC 0.0    Lactic acid whole blood   Result Value Ref Range    Lactic Acid 0.7 0.7 - 2.0 mmol/L       Medications - No data to display    Assessments & Plan (with Medical Decision Making)     I have reviewed the nursing notes.    I have reviewed the findings, diagnosis, plan and need for follow up with the patient.   Findings are consistent with an inflammatory reaction from the dye used for the lymph node procedure.  Discussed this case with the surgeon, Dr. Sotelo, and he concurs.  He recommends no antibiotics at this point but just local application of heat and monitoring of symptoms and following up as needed.       Medication List      There are no discharge medications for this visit.         Final diagnoses:   Breast inflammation       2/9/2019   [facility] EMERGENCY DEPARTMENT     Abhishek Harris MD  02/09/19 1906       Abhishek Harris MD  02/09/19 1902

## 2019-02-09 NOTE — ED AVS SNAPSHOT
[facility] Emergency Department  [address] [address]  Phone:  [phone]  Fax:  [phone]                                    Monika Nam   MRN: [id number]    Department:  [facility] Emergency Department   Date of Visit:  2/9/2019           After Visit Summary Signature Page    I have received my discharge instructions, and my questions have been answered. I have discussed any challenges I see with this plan with the nurse or doctor.    ..........................................................................................................................................  Patient/Patient Representative Signature      ..........................................................................................................................................  Patient Representative Print Name and Relationship to Patient    ..................................................               ................................................  Date                                   Time    ..........................................................................................................................................  Reviewed by Signature/Title    ...................................................              ..............................................  Date                                               Time          22EPIC Rev 08/18

## 2019-02-13 NOTE — PROGRESS NOTES
"Oncology Follow up Visit:     CC:  Left breast TN breast cancer post op    Pt of Dr. Gonzales    HISTORY OF PRESENT ILLNESS:    This is a 58-year-old female patient who presented following her annual mammogram with new focal asymmetry at 12-1 o'clock position of the left breast around 10.7 cm from the nipple.  It measured 1.4 cm.    Subsequently the patient went on to have breast ultrasound on 2018 showing 1.6 cm in maximum dimension hypoechoic mass.    Subsequently ultrasound breast biopsy was done on 2018 showing invasive ductal carcinoma grade 3 out of 3 angiolymphatic invasion was not identified. Ductal carcinoma in situ was not identified . estrogen receptor was negative, progesterone receptor was negative and HER-2/mercedes receptor was negative.     2019 left lumpectomy found 1.8 cm IDC, grade III, LN-, margin is clear, pT1cN0      PMH  TOY  Asthma  GERD  CTS  Smoker  anxiety    REVIEW OF SYSTEMS:      PHYSICAL EXAMINATION:   VITAL SIGNS: Blood pressure 158/86, pulse 88, temperature 97.8  F (36.6  C), temperature source Tympanic, resp. rate 16, height 1.727 m (5' 8\"), weight 93.2 kg (205 lb 6.4 oz), last menstrual period 2010, SpO2 96 %, not currently breastfeeding.       ECO    GENERAL APPEARANCE:  looks like her stated age, very pleasant, not in acute distress.   HEENT: The patient is normocephalic, atraumatic. Pupils are equally reactive to light.  Sclerae are anicteric.  Moist oral mucosa.  Negative pharynx.  No oral thrush.   NECK:  Supple.  No jugular venous distention.  Thyroid is not palpable.   LYMPH NODES:  Superficial lymphadenopathy is not appreciable in the bilateral cervical, supraclavicular, axillary or inguinal areas.   CARDIOVASCULAR:  S1, S2 regular with no murmurs or gallops.  No carotid or abdominal bruits.   PULMONARY:  Lungs are clear to auscultation and percussion bilaterally.  There is no wheezing or rhonchi.   GASTROINTESTINAL:  Abdomen is soft, " nontender.  No hepatosplenomegaly.  No signs of ascites.  No mass appreciable.   MUSCULOSKELETAL/EXTREMITIES:  No edema.  No cyanotic changes.  No signs of joint deformity.  No lymphedema.   NEUROLOGIC:  Cranial nerves II-XII are grossly intact.  Sensation intact.  Muscle strength and muscle tone symmetrical, 5/5 throughout.   BACK:  No spinal or paraspinal tenderness.  No CVA tenderness.   SKIN:  No petechiae.  No rash.  No signs of cellulitis.   BREASTS:  Bilateral breast exam revealed no mass or skin changes or nipple discharges.          CURRENT LAB DATA REVIEWED  1/2018 left lumpectomy found 1.8 cm IDC, grade III, LN-, margin is clear, pT1cN0        CURRENT IMAGING REVIEWED  Stress echo 1/2019 - nl.        OLD DATA REVIEWED TODAY WITH SUMMARY:  ultrasound breast biopsy was done on December 17, 2018 showing invasive ductal carcinoma grade 3 out of 3 angiolymphatic invasion was not identified.  Ductal carcinoma in situ was not identified . estrogen receptor was negative, progesterone receptor was negative and HER-2/mercedes receptor was negative.     11/2018 mammogram with new focal asymmetry at 12-1 o'clock position of the left breast around 10.7 cm from the nipple.  It measured 1.4 cm.  breast ultrasound on member 30th 2018 showing 1.6 cm in maximum dimension hypoechoic mass.          ASSESSMENT AND PLAN:    At age 58, she is dx via MA 12/2018 left breast [place] breast cancer.   1/2018 left lumpectomy found 1.8 cm IDC, grade III, LN-, margin is clear, pT1cN0    Recommend her to consider DD AC -- T.   We discussed the side effects of chemo include not limit to N/V/hair loss/lower immunity/cardiac toxicity/rare leukemia/infusion related reaction and mortality.     She is encouraged to d/w Dr. Gonzales further on the pick of regimen. She is firm, she wants to move forward with chemo teach and treatment.     2. Smoker - smoking cessation is advised. She is working on it.     Total time is about 40 minutes, more than 25  minutes spent in counseling regarding her pathology report, the recommendation of adjuvant chemotherapy, the regimen he discussed, the side effect associated with this particular adjuvant chemotherapy regimen, the benefit of this adjuvant chemotherapy regimen, and how she should be followed during this treatment.

## 2019-02-14 ENCOUNTER — TELEPHONE (OUTPATIENT)
Dept: ONCOLOGY | Facility: CLINIC | Age: 59
End: 2019-02-14

## 2019-02-14 ENCOUNTER — ONCOLOGY VISIT (OUTPATIENT)
Dept: ONCOLOGY | Facility: CLINIC | Age: 59
End: 2019-02-14
Attending: INTERNAL MEDICINE
Payer: COMMERCIAL

## 2019-02-14 VITALS
SYSTOLIC BLOOD PRESSURE: 158 MMHG | DIASTOLIC BLOOD PRESSURE: 86 MMHG | WEIGHT: 205.4 LBS | RESPIRATION RATE: 16 BRPM | HEART RATE: 88 BPM | BODY MASS INDEX: 31.13 KG/M2 | OXYGEN SATURATION: 96 % | HEIGHT: 68 IN | TEMPERATURE: 97.8 F

## 2019-02-14 DIAGNOSIS — C50.112 INFILTRATING DUCTAL CARCINOMA OF CENTRAL PORTION OF LEFT BREAST IN FEMALE (H): Primary | ICD-10-CM

## 2019-02-14 DIAGNOSIS — F17.200 SMOKER: ICD-10-CM

## 2019-02-14 PROCEDURE — G0463 HOSPITAL OUTPT CLINIC VISIT: HCPCS

## 2019-02-14 PROCEDURE — 99215 OFFICE O/P EST HI 40 MIN: CPT | Performed by: INTERNAL MEDICINE

## 2019-02-14 PROCEDURE — G0463 HOSPITAL OUTPT CLINIC VISIT: HCPCS | Mod: 25

## 2019-02-14 ASSESSMENT — MIFFLIN-ST. JEOR: SCORE: 1555.19

## 2019-02-14 ASSESSMENT — PAIN SCALES - GENERAL: PAINLEVEL: NO PAIN (0)

## 2019-02-14 NOTE — PATIENT INSTRUCTIONS
Dr. Bundy would like you to start chemotherapy as soon as the insurance approval is in place.     We would like to see you back in with cycle 2 for a follow up appointment with Dr. Gonzales.     When you are in need of a refill, please call your pharmacy and they will send us a request.      Copy of appointments, and after visit summary (AVS) given to patient.      If you have any questions please call Jennifer Smith RN, BSN Oncology Hematology  [facility] [phone]. For questions after business hours, or on holidays/weekends, please call our after hours Nurse Triage line [phone]. Thank you.         Chemo teach DD AC - T. Can start post insurance approval. F/u with MD BLACKWELL

## 2019-02-14 NOTE — TELEPHONE ENCOUNTER
"Chemotherapy Education    Patient is a 58 year old female here today for chemotherapy education, accompanied by  Fernando.  Pt has a cancer diagnosis of Infiltrating ductal carcinoma of central portion of left breast in female and their main concern is side effects and traveling during therapy.  Their Oncologist is Dr. MACRINA Gonzales, and PCP is Servando Valladares.  Reviewed the following with the patient and their support person:  Treatment goal: Adjuvent  Treatment regimen & duration: Doxorubicin / Cyclophosphamide every 14 days for 4 cycles followed by Paclitaxel weekly for 12 cycles  and rationale for strict adherence to this schedule, including specific medication names including pre-treatment medications and at home scheduled or as needed medications, delivery methods,.  Potential side effects: Reviewed side effects and management; including skin changes/hand-foot syndrome, anemia, neutropenia, thrombocytopenia, diarrhea/constipation, hair loss syndrome, memory changes/ \"chemobrain\", mouth sores, taste changes, neuropathy, fatigue, myelosuppression, sexuality/infertility, and risk of extravasation or infiltration.  Infection prevention, and monitoring of lab values, what lab tests and what changes of these values meant, along with the possibility of hydration or blood product transfusion, or the need to defer or hold treatment.    Chemotherapy information, including ways it is excreted from the body and cleaning and containment of vomitus or other bodily fluid, use of the bathroom, sexual health and intimacy, what to do if needing to miss a treatment, when to call a provider and the need for staff to wear protective equipment.  Importance of Central line care (port) or IV site care.  Proper use of the take home infusion pump, troubleshooting, and who to call if there is a malfunction.  Written information:  Written information including the \"Getting Ready for Chemotherapy: What to Expect, Before, During, " "and After your Treatment\" booklets in the cancer new patient binder, specific drug information guides printed from [employer],  Also, information on when to contact the provider, various programs offered at [facility], and our business card with contact information given; Oncology Clinic, RN Case Manager, and the after hours Nurse Advise Line.  General orientation to the Medical Oncology department, Infusion Services department, Huc/scheduling, bathrooms and usual flow of the treatment day provided as well as introduction to the Infusion nurses.  No barriers to learning identified. Patient and family verbalized understanding of all written and verbal information. All questions answered to patients satisfaction.   Other concerns: no concerns at this time  Pt instructed to call with further questions or concerns.  Patient states understanding and is in agreement with this plan.  Copy of appointments, and after visit summary (AVS) given to patient. Patient discharged to home.    Face to Face time with patient: 30 minutes    Yuliet Posada RN  Oncology Hematology   [facility]  [email or web address]  Phone [phone]      "

## 2019-02-14 NOTE — LETTER
"    2019         RE: Monika Nam  [address] [address] [address]        Dear Colleague,    Thank you for referring your patient, Monika Nam, to the [facility]. Please see a copy of my visit note below.    Oncology Follow up Visit:     CC:  Left breast TN breast cancer post op    Pt of Dr. Gonzales    HISTORY OF PRESENT ILLNESS:    This is a 58-year-old female patient who presented following her annual mammogram with new focal asymmetry at 12-1 o'clock position of the left breast around 10.7 cm from the nipple.  It measured 1.4 cm.    Subsequently the patient went on to have breast ultrasound on 2018 showing 1.6 cm in maximum dimension hypoechoic mass.    Subsequently ultrasound breast biopsy was done on 2018 showing invasive ductal carcinoma grade 3 out of 3 angiolymphatic invasion was not identified. Ductal carcinoma in situ was not identified . estrogen receptor was negative, progesterone receptor was negative and HER-2/mercedes receptor was negative.     2019 left lumpectomy found 1.8 cm IDC, grade III, LN-, margin is clear, pT1cN0      PMH  TOY  Asthma  GERD  CTS  Smoker  anxiety    REVIEW OF SYSTEMS:      PHYSICAL EXAMINATION:   VITAL SIGNS: Blood pressure 158/86, pulse 88, temperature 97.8  F (36.6  C), temperature source Tympanic, resp. rate 16, height 1.727 m (5' 8\"), weight 93.2 kg (205 lb 6.4 oz), last menstrual period 2010, SpO2 96 %, not currently breastfeeding.       ECO    GENERAL APPEARANCE:  looks like her stated age, very pleasant, not in acute distress.   HEENT: The patient is normocephalic, atraumatic. Pupils are equally reactive to light.  Sclerae are anicteric.  Moist oral mucosa.  Negative pharynx.  No oral thrush.   NECK:  Supple.  No jugular venous distention.  Thyroid is not palpable.   LYMPH NODES:  Superficial lymphadenopathy is not appreciable in the bilateral cervical, supraclavicular, axillary or inguinal areas. "   CARDIOVASCULAR:  S1, S2 regular with no murmurs or gallops.  No carotid or abdominal bruits.   PULMONARY:  Lungs are clear to auscultation and percussion bilaterally.  There is no wheezing or rhonchi.   GASTROINTESTINAL:  Abdomen is soft, nontender.  No hepatosplenomegaly.  No signs of ascites.  No mass appreciable.   MUSCULOSKELETAL/EXTREMITIES:  No edema.  No cyanotic changes.  No signs of joint deformity.  No lymphedema.   NEUROLOGIC:  Cranial nerves II-XII are grossly intact.  Sensation intact.  Muscle strength and muscle tone symmetrical, 5/5 throughout.   BACK:  No spinal or paraspinal tenderness.  No CVA tenderness.   SKIN:  No petechiae.  No rash.  No signs of cellulitis.   BREASTS:  Bilateral breast exam revealed no mass or skin changes or nipple discharges.          CURRENT LAB DATA REVIEWED  1/2018 left lumpectomy found 1.8 cm IDC, grade III, LN-, margin is clear, pT1cN0        CURRENT IMAGING REVIEWED  Stress echo 1/2019 - nl.        OLD DATA REVIEWED TODAY WITH SUMMARY:  ultrasound breast biopsy was done on December 17, 2018 showing invasive ductal carcinoma grade 3 out of 3 angiolymphatic invasion was not identified.  Ductal carcinoma in situ was not identified . estrogen receptor was negative, progesterone receptor was negative and HER-2/mercedes receptor was negative.     11/2018 mammogram with new focal asymmetry at 12-1 o'clock position of the left breast around 10.7 cm from the nipple.  It measured 1.4 cm.  breast ultrasound on member 30th 2018 showing 1.6 cm in maximum dimension hypoechoic mass.          ASSESSMENT AND PLAN:    At age 58, she is dx via MA 12/2018 left breast TN breast cancer.   1/2018 left lumpectomy found 1.8 cm IDC, grade III, LN-, margin is clear, pT1cN0    Recommend her to consider DD AC -- T.   We discussed the side effects of chemo include not limit to N/V/hair loss/lower immunity/cardiac toxicity/rare leukemia/infusion related reaction and mortality.     She is encouraged to  d/w Dr. Gonzales further on the pick of regimen. She is firm, she wants to move forward with chemo teach and treatment.     2. Smoker - smoking cessation is advised. She is working on it.     Total time is about 40 minutes, more than 25 minutes spent in counseling regarding her pathology report, the recommendation of adjuvant chemotherapy, the regimen he discussed, the side effect associated with this particular adjuvant chemotherapy regimen, the benefit of this adjuvant chemotherapy regimen, and how she should be followed during this treatment.              Again, thank you for allowing me to participate in the care of your patient.        Sincerely,        Chely Bundy MD, MD

## 2019-02-14 NOTE — NURSING NOTE
"Oncology Rooming Note    February 14, 2019 2:41 PM   Monika Nam is a 58 year old female who presents for:    Chief Complaint   Patient presents with     Oncology Clinic Visit     Follow up breast cancer. Post lumpectomy. Review pathology.      Initial Vitals: /86 (BP Location: Right arm, Patient Position: Sitting, Cuff Size: Adult Large)   Pulse 88   Temp 97.8  F (36.6  C) (Tympanic)   Resp 16   Ht 1.727 m (5' 8\")   Wt 93.2 kg (205 lb 6.4 oz)   LMP 04/02/2010   SpO2 96%   Breastfeeding? No   BMI 31.23 kg/m   Estimated body mass index is 31.23 kg/m  as calculated from the following:    Height as of this encounter: 1.727 m (5' 8\").    Weight as of this encounter: 93.2 kg (205 lb 6.4 oz). Body surface area is 2.11 meters squared.  No Pain (0) Comment: Data Unavailable   Patient's last menstrual period was 04/02/2010.  Allergies reviewed: Yes  Medications reviewed: Yes    Medications: Medication refills not needed today.  Pharm[facility] entered into [employer]: [id number] [place], [place] - [address] [facility] AT [address]    Clinical concerns: Follow up breast cancer. Post lumpectomy.     8 minutes for nursing intake (face to face time)     [facility] V[facility]            "

## 2019-02-21 ENCOUNTER — INFUSION THERAPY VISIT (OUTPATIENT)
Dept: INFUSION THERAPY | Facility: CLINIC | Age: 59
End: 2019-02-21
Attending: INTERNAL MEDICINE
Payer: COMMERCIAL

## 2019-02-21 ENCOUNTER — INFUSION THERAPY VISIT (OUTPATIENT)
Dept: SPIRITUAL SERVICES | Facility: CLINIC | Age: 59
End: 2019-02-21

## 2019-02-21 VITALS — TEMPERATURE: 97.3 F | HEART RATE: 71 BPM | DIASTOLIC BLOOD PRESSURE: 64 MMHG | SYSTOLIC BLOOD PRESSURE: 133 MMHG

## 2019-02-21 DIAGNOSIS — C50.112 INFILTRATING DUCTAL CARCINOMA OF CENTRAL PORTION OF LEFT BREAST IN FEMALE (H): Primary | ICD-10-CM

## 2019-02-21 PROCEDURE — 96411 CHEMO IV PUSH ADDL DRUG: CPT

## 2019-02-21 PROCEDURE — 96377 APPLICATON ON-BODY INJECTOR: CPT | Mod: XS

## 2019-02-21 PROCEDURE — 25800030 ZZH RX IP 258 OP 636: Performed by: INTERNAL MEDICINE

## 2019-02-21 PROCEDURE — 25000128 H RX IP 250 OP 636: Performed by: INTERNAL MEDICINE

## 2019-02-21 PROCEDURE — 96375 TX/PRO/DX INJ NEW DRUG ADDON: CPT

## 2019-02-21 PROCEDURE — 96413 CHEMO IV INFUSION 1 HR: CPT

## 2019-02-21 PROCEDURE — 96367 TX/PROPH/DG ADDL SEQ IV INF: CPT

## 2019-02-21 RX ORDER — PALONOSETRON 0.05 MG/ML
0.25 INJECTION, SOLUTION INTRAVENOUS ONCE
Status: COMPLETED | OUTPATIENT
Start: 2019-02-21 | End: 2019-02-21

## 2019-02-21 RX ORDER — PROCHLORPERAZINE MALEATE 10 MG
5 TABLET ORAL EVERY 6 HOURS PRN
Qty: 30 TABLET | Refills: 5 | Status: SHIPPED | OUTPATIENT
Start: 2019-02-21 | End: 2019-08-23

## 2019-02-21 RX ORDER — MEPERIDINE HYDROCHLORIDE 25 MG/ML
25 INJECTION INTRAMUSCULAR; INTRAVENOUS; SUBCUTANEOUS EVERY 30 MIN PRN
Status: CANCELLED | OUTPATIENT
Start: 2019-02-21

## 2019-02-21 RX ORDER — DIPHENHYDRAMINE HYDROCHLORIDE 50 MG/ML
50 INJECTION INTRAMUSCULAR; INTRAVENOUS
Status: CANCELLED
Start: 2019-02-21

## 2019-02-21 RX ORDER — SODIUM CHLORIDE 9 MG/ML
1000 INJECTION, SOLUTION INTRAVENOUS CONTINUOUS PRN
Status: CANCELLED
Start: 2019-02-21

## 2019-02-21 RX ORDER — DEXAMETHASONE 4 MG/1
8 TABLET ORAL DAILY
Qty: 40 TABLET | Refills: 0 | Status: SHIPPED | OUTPATIENT
Start: 2019-02-21 | End: 2019-03-06

## 2019-02-21 RX ORDER — ALBUTEROL SULFATE 90 UG/1
1-2 AEROSOL, METERED RESPIRATORY (INHALATION)
Status: CANCELLED
Start: 2019-02-21

## 2019-02-21 RX ORDER — HEPARIN SODIUM (PORCINE) LOCK FLUSH IV SOLN 100 UNIT/ML 100 UNIT/ML
5 SOLUTION INTRAVENOUS
Status: DISCONTINUED | OUTPATIENT
Start: 2019-02-21 | End: 2019-02-21 | Stop reason: HOSPADM

## 2019-02-21 RX ORDER — EPINEPHRINE 0.3 MG/.3ML
0.3 INJECTION SUBCUTANEOUS EVERY 5 MIN PRN
Status: CANCELLED | OUTPATIENT
Start: 2019-02-21

## 2019-02-21 RX ORDER — DOXORUBICIN HYDROCHLORIDE 2 MG/ML
60 INJECTION, SOLUTION INTRAVENOUS ONCE
Status: COMPLETED | OUTPATIENT
Start: 2019-02-21 | End: 2019-02-21

## 2019-02-21 RX ORDER — LORAZEPAM 2 MG/ML
0.5 INJECTION INTRAMUSCULAR EVERY 4 HOURS PRN
Status: CANCELLED
Start: 2019-02-21

## 2019-02-21 RX ORDER — EPINEPHRINE 1 MG/ML
0.3 INJECTION, SOLUTION, CONCENTRATE INTRAVENOUS EVERY 5 MIN PRN
Status: CANCELLED | OUTPATIENT
Start: 2019-02-21

## 2019-02-21 RX ORDER — METHYLPREDNISOLONE SODIUM SUCCINATE 125 MG/2ML
125 INJECTION, POWDER, LYOPHILIZED, FOR SOLUTION INTRAMUSCULAR; INTRAVENOUS
Status: CANCELLED
Start: 2019-02-21

## 2019-02-21 RX ORDER — ALBUTEROL SULFATE 0.83 MG/ML
2.5 SOLUTION RESPIRATORY (INHALATION)
Status: CANCELLED | OUTPATIENT
Start: 2019-02-21

## 2019-02-21 RX ORDER — LORAZEPAM 0.5 MG/1
0.5 TABLET ORAL EVERY 4 HOURS PRN
Qty: 30 TABLET | Refills: 5 | Status: SHIPPED | OUTPATIENT
Start: 2019-02-21 | End: 2019-05-22

## 2019-02-21 RX ADMIN — Medication 5 ML: at 12:32

## 2019-02-21 RX ADMIN — PALONOSETRON HYDROCHLORIDE 0.25 MG: 0.25 INJECTION INTRAVENOUS at 10:02

## 2019-02-21 RX ADMIN — CYCLOPHOSPHAMIDE 1265 MG: 1 INJECTION, POWDER, FOR SOLUTION INTRAVENOUS; ORAL at 11:19

## 2019-02-21 RX ADMIN — DEXAMETHASONE SODIUM PHOSPHATE: 10 INJECTION, SOLUTION INTRAMUSCULAR; INTRAVENOUS at 10:13

## 2019-02-21 RX ADMIN — FAMOTIDINE 20 MG: 20 INJECTION, SOLUTION INTRAVENOUS at 10:01

## 2019-02-21 RX ADMIN — DOXORUBICIN HYDROCHLORIDE 127 MG: 2 INJECTION, SOLUTION INTRAVENOUS at 10:55

## 2019-02-21 RX ADMIN — SODIUM CHLORIDE 250 ML: 9 INJECTION, SOLUTION INTRAVENOUS at 10:00

## 2019-02-21 RX ADMIN — PEGFILGRASTIM 6 MG: KIT SUBCUTANEOUS at 12:31

## 2019-02-21 NOTE — PATIENT INSTRUCTIONS
Monster Frank,      I would recommend taking a Claritin (Loratadine) 10 mg tablet the day before, day of, and the day after you get your Neulasta.  This helps with bone pain you may experience from the Neulasta.  You can remove your OnPro tomorrow at 5:30 pm.  Please call us with any questions at [phone] during clinic hours.     It has been a pleasure working with you today.    Sincerely,    Rosana

## 2019-02-21 NOTE — PROGRESS NOTES
Infusion Nursing Note:  Monika Nam presents today for C1D1 AC with Neulasta OnPro  Patient seen by provider today: No   present during visit today: Not Applicable.    Note: Discussed main side effects of adriamycin and cytoxan with both patient and wife.  Went over Neulasta OnPro with them as well and Claritin use to help offset the bone pain from medication.  Pt aware to  take home medications today prior to going home.  Went over each take home medication and how and when to take them.  Pt will be taking her prescribed Decadron on days 2-4 in the morning to help prevent the steroid from keeping her awake during the night. Pt appeared to understand information provided today and both spouse and pt verbalized understanding of information provided today.      Intravenous Access:  Implanted Port.    Treatment Conditions:  Lab Results   Component Value Date    HGB 13.2 02/09/2019     Lab Results   Component Value Date    WBC 6.2 02/09/2019      Lab Results   Component Value Date    ANEU 3.9 02/09/2019     Lab Results   Component Value Date     02/09/2019      Lab Results   Component Value Date     02/09/2019                   Lab Results   Component Value Date    POTASSIUM 3.6 02/09/2019           No results found for: MAG         Lab Results   Component Value Date    CR 0.74 02/09/2019                   Lab Results   Component Value Date    DANI 8.8 02/09/2019                Lab Results   Component Value Date    BILITOTAL 0.6 02/09/2019           Lab Results   Component Value Date    ALBUMIN 3.7 02/09/2019                    Lab Results   Component Value Date    ALT 26 02/09/2019           Lab Results   Component Value Date    AST 21 02/09/2019       Results reviewed, labs did meet treatment parameters.      Post Infusion Assessment:  Patient tolerated A/C infusion without incident.  Blood return noted pre and post infusion.  Blood return noted every 2 ml of adriamycin push.   Site  patent and intact, free from redness, edema or discomfort.  No evidence of extravasations.  Access discontinued per protocol.    On pro Neulasta injector applied to mid abdomen. Pt given instructions and verbal teaching about the on body injector. Questions answered and pt verbalizes understanding of teaching.    Discharge Plan:   Patient discharged in stable condition accompanied by: .  Departure Mode: Ambulatory.  Pt to return on 3/6/19 at 10:30 am for C2D1 A/C followed by clinic visit at 11;00 am with Dr. Gonzales.     Rosana Yarbrough RN

## 2019-02-21 NOTE — PROGRESS NOTES
SPIRITUAL HEALTH SERVICES Progress Note  [place] [facility]    Introductory visit with Monika Nam to offer ongoing emotional/spiritual support during cancer care.      Illness Narrative: Breast cancer      Distress: This was Monika's first infusion appointment and she said she had 12-15 more to go.      Coping: , Fernando, was with her and they were going through this first experience together.      Meaning-Making: Our initial visit centered more around my introduction of SHS and encouraging Monika and Fernando to feel free to ask for anything they might need (blanket, coffee, etc)       Plan: Monika welcomed follow up visits to check in and see how she is doing.  I will do that as schedules allow.    Tu Luo M.A., [facility]  Staff   [facility]  Office [phone]  Cell [phone]  Pager [phone]

## 2019-02-22 ENCOUNTER — TELEPHONE (OUTPATIENT)
Dept: ONCOLOGY | Facility: CLINIC | Age: 59
End: 2019-02-22

## 2019-02-22 PROBLEM — C50.112: Status: ACTIVE | Noted: 2018-12-27

## 2019-02-22 NOTE — TELEPHONE ENCOUNTER
Status Check:    Pt is a 58 year old female, recently in 2/21/19 for DOXOrubicin / Cyclophosphamide Cycle 1.  Call placed for status check.    Primary care provider is: Servando Valladares    Diagnosis: Infiltrating ductal carcinoma of central portion of left breast in female (H)     Oncology provider is:  Dr. MACRINA Gonzales    How are you doing/feeling?: Patient is doing well. She denies nausea, constipation. She is well hydrated. She did have a headache, but went away after she had a coke  Any further issues?: no concerns  Next Follow up/Recommendations: Keep f/u appointment on 3/6/19    Patient instructed to call with any questions or concerns.  Patient states understanding and is in agreement with this plan.    Approximately 3 minutes spent on telephone with patient reviewing assessment and symptom(s) and providing symptom management.    Yuliet Posada RN  Oncology Hematology   [facility]  [email or web address]  [phone]

## 2019-03-06 ENCOUNTER — HOSPITAL ENCOUNTER (OUTPATIENT)
Facility: CLINIC | Age: 59
Discharge: HOME | End: 2019-03-06
Attending: INTERNAL MEDICINE | Admitting: INTERNAL MEDICINE
Payer: COMMERCIAL

## 2019-03-06 ENCOUNTER — ONCOLOGY VISIT (OUTPATIENT)
Dept: ONCOLOGY | Facility: CLINIC | Age: 59
End: 2019-03-06
Attending: INTERNAL MEDICINE
Payer: COMMERCIAL

## 2019-03-06 ENCOUNTER — INFUSION THERAPY VISIT (OUTPATIENT)
Dept: INFUSION THERAPY | Facility: CLINIC | Age: 59
End: 2019-03-06
Attending: INTERNAL MEDICINE
Payer: COMMERCIAL

## 2019-03-06 VITALS
HEART RATE: 79 BPM | WEIGHT: 203.2 LBS | BODY MASS INDEX: 30.8 KG/M2 | RESPIRATION RATE: 16 BRPM | DIASTOLIC BLOOD PRESSURE: 89 MMHG | SYSTOLIC BLOOD PRESSURE: 147 MMHG | HEIGHT: 68 IN | TEMPERATURE: 98.3 F | OXYGEN SATURATION: 99 %

## 2019-03-06 DIAGNOSIS — C50.112 INFILTRATING DUCTAL CARCINOMA OF CENTRAL PORTION OF LEFT BREAST IN FEMALE (H): Primary | ICD-10-CM

## 2019-03-06 LAB
BASOPHILS # BLD AUTO: 0.1 10E9/L (ref 0–0.2)
BASOPHILS NFR BLD AUTO: 2 %
DIFFERENTIAL METHOD BLD: ABNORMAL
EOSINOPHIL # BLD AUTO: 0.1 10E9/L (ref 0–0.7)
EOSINOPHIL NFR BLD AUTO: 1 %
ERYTHROCYTE [DISTWIDTH] IN BLOOD BY AUTOMATED COUNT: 13.1 % (ref 10–15)
HCT VFR BLD AUTO: 39.1 % (ref 35–47)
HGB BLD-MCNC: 13 G/DL (ref 11.7–15.7)
LYMPHOCYTES # BLD AUTO: 1.3 10E9/L (ref 0.8–5.3)
LYMPHOCYTES NFR BLD AUTO: 26 %
MCH RBC QN AUTO: 30.4 PG (ref 26.5–33)
MCHC RBC AUTO-ENTMCNC: 33.2 G/DL (ref 31.5–36.5)
MCV RBC AUTO: 91 FL (ref 78–100)
MONOCYTES # BLD AUTO: 0.6 10E9/L (ref 0–1.3)
MONOCYTES NFR BLD AUTO: 11 %
MYELOCYTES # BLD: 0.1 10E9/L
MYELOCYTES NFR BLD MANUAL: 1 %
NEUTROPHILS # BLD AUTO: 3 10E9/L (ref 1.6–8.3)
NEUTROPHILS NFR BLD AUTO: 59 %
PLATELET # BLD AUTO: 130 10E9/L (ref 150–450)
PLATELET # BLD EST: ABNORMAL 10*3/UL
RBC # BLD AUTO: 4.28 10E12/L (ref 3.8–5.2)
RBC MORPH BLD: NORMAL
VARIANT LYMPHS BLD QL SMEAR: PRESENT
WBC # BLD AUTO: 5.1 10E9/L (ref 4–11)

## 2019-03-06 PROCEDURE — 85025 COMPLETE CBC W/AUTO DIFF WBC: CPT | Performed by: INTERNAL MEDICINE

## 2019-03-06 PROCEDURE — 25000128 H RX IP 250 OP 636: Performed by: INTERNAL MEDICINE

## 2019-03-06 PROCEDURE — 96413 CHEMO IV INFUSION 1 HR: CPT

## 2019-03-06 PROCEDURE — 99214 OFFICE O/P EST MOD 30 MIN: CPT | Performed by: INTERNAL MEDICINE

## 2019-03-06 PROCEDURE — G0463 HOSPITAL OUTPT CLINIC VISIT: HCPCS | Mod: 25

## 2019-03-06 PROCEDURE — 96375 TX/PRO/DX INJ NEW DRUG ADDON: CPT

## 2019-03-06 PROCEDURE — 96377 APPLICATON ON-BODY INJECTOR: CPT | Mod: XS

## 2019-03-06 PROCEDURE — 96367 TX/PROPH/DG ADDL SEQ IV INF: CPT

## 2019-03-06 PROCEDURE — 96411 CHEMO IV PUSH ADDL DRUG: CPT

## 2019-03-06 PROCEDURE — 25800030 ZZH RX IP 258 OP 636: Performed by: INTERNAL MEDICINE

## 2019-03-06 RX ORDER — MEPERIDINE HYDROCHLORIDE 25 MG/ML
25 INJECTION INTRAMUSCULAR; INTRAVENOUS; SUBCUTANEOUS EVERY 30 MIN PRN
Status: CANCELLED | OUTPATIENT
Start: 2019-03-06

## 2019-03-06 RX ORDER — DOXORUBICIN HYDROCHLORIDE 2 MG/ML
60 INJECTION, SOLUTION INTRAVENOUS ONCE
Status: CANCELLED | OUTPATIENT
Start: 2019-03-06

## 2019-03-06 RX ORDER — PALONOSETRON 0.05 MG/ML
0.25 INJECTION, SOLUTION INTRAVENOUS ONCE
Status: CANCELLED
Start: 2019-03-06

## 2019-03-06 RX ORDER — LORAZEPAM 2 MG/ML
0.5 INJECTION INTRAMUSCULAR EVERY 4 HOURS PRN
Status: CANCELLED
Start: 2019-03-06

## 2019-03-06 RX ORDER — EPINEPHRINE 1 MG/ML
0.3 INJECTION, SOLUTION, CONCENTRATE INTRAVENOUS EVERY 5 MIN PRN
Status: CANCELLED | OUTPATIENT
Start: 2019-03-06

## 2019-03-06 RX ORDER — METHYLPREDNISOLONE SODIUM SUCCINATE 125 MG/2ML
125 INJECTION, POWDER, LYOPHILIZED, FOR SOLUTION INTRAMUSCULAR; INTRAVENOUS
Status: CANCELLED
Start: 2019-03-06

## 2019-03-06 RX ORDER — DOXORUBICIN HYDROCHLORIDE 2 MG/ML
60 INJECTION, SOLUTION INTRAVENOUS ONCE
Status: COMPLETED | OUTPATIENT
Start: 2019-03-06 | End: 2019-03-06

## 2019-03-06 RX ORDER — ALBUTEROL SULFATE 90 UG/1
1-2 AEROSOL, METERED RESPIRATORY (INHALATION)
Status: CANCELLED
Start: 2019-03-06

## 2019-03-06 RX ORDER — EPINEPHRINE 0.3 MG/.3ML
0.3 INJECTION SUBCUTANEOUS EVERY 5 MIN PRN
Status: CANCELLED | OUTPATIENT
Start: 2019-03-06

## 2019-03-06 RX ORDER — PALONOSETRON 0.05 MG/ML
0.25 INJECTION, SOLUTION INTRAVENOUS ONCE
Status: COMPLETED | OUTPATIENT
Start: 2019-03-06 | End: 2019-03-06

## 2019-03-06 RX ORDER — ALBUTEROL SULFATE 0.83 MG/ML
2.5 SOLUTION RESPIRATORY (INHALATION)
Status: CANCELLED | OUTPATIENT
Start: 2019-03-06

## 2019-03-06 RX ORDER — SODIUM CHLORIDE 9 MG/ML
1000 INJECTION, SOLUTION INTRAVENOUS CONTINUOUS PRN
Status: CANCELLED
Start: 2019-03-06

## 2019-03-06 RX ORDER — ONDANSETRON 4 MG/1
4 TABLET, FILM COATED ORAL EVERY 8 HOURS PRN
Qty: 20 TABLET | Refills: 1 | Status: SHIPPED | OUTPATIENT
Start: 2019-03-06 | End: 2019-05-22

## 2019-03-06 RX ORDER — DIPHENHYDRAMINE HYDROCHLORIDE 50 MG/ML
50 INJECTION INTRAMUSCULAR; INTRAVENOUS
Status: CANCELLED
Start: 2019-03-06

## 2019-03-06 RX ORDER — HEPARIN SODIUM (PORCINE) LOCK FLUSH IV SOLN 100 UNIT/ML 100 UNIT/ML
5 SOLUTION INTRAVENOUS
Status: DISCONTINUED | OUTPATIENT
Start: 2019-03-06 | End: 2019-03-06 | Stop reason: HOSPADM

## 2019-03-06 RX ADMIN — PALONOSETRON HYDROCHLORIDE 0.25 MG: 0.25 INJECTION INTRAVENOUS at 11:37

## 2019-03-06 RX ADMIN — PEGFILGRASTIM 6 MG: KIT SUBCUTANEOUS at 13:10

## 2019-03-06 RX ADMIN — DEXAMETHASONE SODIUM PHOSPHATE: 10 INJECTION, SOLUTION INTRAMUSCULAR; INTRAVENOUS at 11:42

## 2019-03-06 RX ADMIN — FAMOTIDINE 20 MG: 20 INJECTION, SOLUTION INTRAVENOUS at 11:23

## 2019-03-06 RX ADMIN — DOXORUBICIN HYDROCHLORIDE 127 MG: 2 INJECTION, SOLUTION INTRAVENOUS at 12:20

## 2019-03-06 RX ADMIN — Medication 5 ML: at 13:17

## 2019-03-06 RX ADMIN — SODIUM CHLORIDE 250 ML: 9 INJECTION, SOLUTION INTRAVENOUS at 11:22

## 2019-03-06 RX ADMIN — CYCLOPHOSPHAMIDE 1265 MG: 1 INJECTION, POWDER, FOR SOLUTION INTRAVENOUS; ORAL at 12:30

## 2019-03-06 ASSESSMENT — MIFFLIN-ST. JEOR: SCORE: 1545.08

## 2019-03-06 ASSESSMENT — PAIN SCALES - GENERAL: PAINLEVEL: NO PAIN (0)

## 2019-03-06 NOTE — PATIENT INSTRUCTIONS
We will have you proceed with chemotherapy today per your treatment plan. Dr. Gonzales would like to see you back in 2 weeks for a follow up appointment.      When you are in need of a refill, please call your pharmacy and they will send us a request.      Copy of appointments, and after visit summary (AVS) given to patient.      If you have any questions during business hours (M-F 8 AM- 4PM), please call Yuliet Posada RN Oncology Hematology  at [facility] [phone].       For questions after business hours, or on holidays/weekends, please call our after hours Nurse Triage line [phone]. Thank you.        Proceed with chemotherapy as per treatment plan today.  Follow-up in 2 weeks

## 2019-03-06 NOTE — ASSESSMENT & PLAN NOTE
Monika Nam presented following her annual mammogram with new focal asymmetry at 12-1 o'clock position of the left breast around 10.7 cm from the nipple.  It measured 1.4 cm.  Subsequently the patient went on to have breast ultrasound on member 30th 2018 showing 1.6 cm in maximum dimension hypoechoic mass.  Subsequently ultrasound breast biopsy was done on December 17, 2018 showing invasive ductal carcinoma grade 3 out of 3 angiolymphatic invasion was not identified.  Ductal carcinoma in situ was not identified . estrogen receptor was negative, progesterone receptor was negative and HER-2/mercedes receptor was negative.        1/2019 left lumpectomy found 1.8 cm IDC, grade III, LN-, margin is clear, pT1cN0    The patient was started on dose dense chemotherapy with Adriamycin and Cytoxan followed by Taxol

## 2019-03-06 NOTE — NURSING NOTE
"Oncology Rooming Note    March 6, 2019 10:40 AM   Monika Nam is a 58 year old female who presents for:    Chief Complaint   Patient presents with     Oncology Clinic Visit     Recheck Infiltrating ductal carcinoma of central portion of left breast in female, Labs & Chemo today      Initial Vitals: /89 (BP Location: Right arm, Patient Position: Sitting, Cuff Size: Adult Regular)   Pulse 79   Temp 98.3  F (36.8  C) (Tympanic)   Resp 16   Ht 1.727 m (5' 7.99\")   Wt 92.2 kg (203 lb 3.2 oz)   LMP 04/02/2010   SpO2 99%   BMI 30.90 kg/m   Estimated body mass index is 30.9 kg/m  as calculated from the following:    Height as of this encounter: 1.727 m (5' 7.99\").    Weight as of this encounter: 92.2 kg (203 lb 3.2 oz). Body surface area is 2.1 meters squared.  No Pain (0) Comment: Data Unavailable   Patient's last menstrual period was 04/02/2010.  Allergies reviewed: Yes  Medications reviewed: Yes    Medications: Medication refills not needed today.  Pharmacy name entered into [employer]: [facility] [address] AT [address]    Clinical concerns: Recheck Infiltrating ductal carcinoma of central portion of left breast in female, Labs & Chemo today       Monika Curtis CMA              "

## 2019-03-06 NOTE — PROGRESS NOTES
Infusion Nursing Note:  Monika Nam presents today for Adriamycin/Cytoxan.    Patient seen by provider today: Yes: Dr. Gonzales   present during visit today: Not Applicable.    Note: Pt states that she has anxiety on the days she takes oral steroid. She denies nausea after chemo treatment cycle 1.  Per Dr. Gonzaels's instructions, pt was advised to stop taking the oral Decadron on days 2-4.  She will used Zofran for nausea as needed.      Intravenous Access:  No Intravenous access/labs at this visit.    Treatment Conditions:  Results reviewed, labs MET treatment parameters, ok to proceed with treatment.    Post Infusion Assessment:  Patient tolerated infusion without incident.    Discharge Plan:   On pro Neulasta injector applied to abdomen.  Pt given instructions and verbal teaching about the on body injector. Questions answered and pt verbalizes understanding of teaching.    Patient discharged in stable condition accompanied by: .  Departure Mode: Ambulatory.      Valentine Gongora RN

## 2019-03-06 NOTE — LETTER
3/6/2019         RE: Monika Nam  [id number] [facility] [id number]        Dear Colleague,    Thank you for referring your patient, Monika Nam, to the [facility]. Please see a copy of my visit note below.    Hematology/ Oncology Follow-up Visit:  Mar 6, 2019    Reason for Visit:   Chief Complaint   Patient presents with     Oncology Clinic Visit     Recheck Infiltrating ductal carcinoma of central portion of left breast in female, Labs & Chemo today        Oncologic History:    Infiltrating ductal carcinoma of central portion of left breast in female (H)  Monika Nam presented following her annual mammogram with new focal asymmetry at 12-1 o'clock position of the left breast around 10.7 cm from the nipple.  It measured 1.4 cm.  Subsequently the patient went on to have breast ultrasound on member 30th 2018 showing 1.6 cm in maximum dimension hypoechoic mass.  Subsequently ultrasound breast biopsy was done on December 17, 2018 showing invasive ductal carcinoma grade 3 out of 3 angiolymphatic invasion was not identified.  Ductal carcinoma in situ was not identified . estrogen receptor was negative, progesterone receptor was negative and HER-2/mercedes receptor was negative.        1/2019 left lumpectomy found 1.8 cm IDC, grade III, LN-, margin is clear, pT1cN0    The patient was started on dose dense chemotherapy with Adriamycin and Cytoxan followed by Taxol      Interval History:  Patient returning following her first cycle of chemotherapy with azithromycin and Cytoxan.  She tolerated chemotherapy well without significant nausea.  She denies any weight loss or fever or chills.  She denies any pain.    Review Of Systems:  Constitutional: Negative for fever, chills, and night sweats.  Skin: negative.  Eyes: negative.  Ears/Nose/Throat: negative.  Respiratory: No shortness of breath, dyspnea on exertion, cough, or hemoptysis.  Cardiovascular: negative.  Gastrointestinal:  negative.  Genitourinary: negative.  Musculoskeletal: negative.  Neurologic: negative.  Psychiatric: negative.  Hematologic/Lymphatic/Immunologic: negative.  Endocrine: negative.    All other ROS negative unless mentioned in interval history.    Past medical, social, surgical, and family histories reviewed.    Allergies:  Allergies as of 03/06/2019 - Reviewed 03/06/2019   Allergen Reaction Noted     Sulfa drugs Swelling 06/09/2005       Current Medications:  Current Outpatient Medications   Medication Sig Dispense Refill     albuterol (PROAIR HFA/PROVENTIL HFA/VENTOLIN HFA) 108 (90 Base) MCG/ACT inhaler Inhale 2 puffs into the lungs every 4 hours as needed for shortness of breath / dyspnea or wheezing 1 Inhaler 11     augmented betamethasone dipropionate (DIPROLENE-AF) 0.05 % cream APPLY EXTERNALLY TO THE AFFECTED AREA TWICE DAILY 50 g 6     calcium-vitamin D (CALCIUM 600-D) 600-400 MG-UNIT per tablet Take 1 tablet by mouth daily       FLUoxetine (PROZAC) 20 MG capsule Take 20 mg daily. (Patient taking differently: Take 20 mg by mouth every evening ) 30 capsule 11     loratadine (CLARITIN) 10 MG tablet Take 10 mg by mouth daily.         LORazepam (ATIVAN) 0.5 MG tablet Take 1 tablet (0.5 mg) by mouth every 4 hours as needed (Anxiety, Nausea/Vomiting or Sleep) (Patient taking differently: Take 0.5 mg by mouth every 4 hours as needed for anxiety (Anxiety, Nausea/Vomiting or Sleep) ) 30 tablet 5     MULTIVITAMIN TABS   OR 1 TABLET BY MOUTH DAILY       ranitidine (ZANTAC) 150 MG tablet Take 1 tablet (150 mg) by mouth daily       aspirin 81 MG tablet Take 1 tablet (81 mg) by mouth daily       prochlorperazine (COMPAZINE) 10 MG tablet Take 0.5 tablets (5 mg) by mouth every 6 hours as needed (Nausea/Vomiting) (Patient not taking: Reported on 3/6/2019) 30 tablet 5        Physical Exam:  /89 (BP Location: Right arm, Patient Position: Sitting, Cuff Size: Adult Regular)   Pulse 79   Temp 98.3  F (36.8  C) (Tympanic)    "Resp 16   Ht 1.727 m (5' 7.99\")   Wt 92.2 kg (203 lb 3.2 oz)   LMP 04/02/2010   SpO2 99%   BMI 30.90 kg/m     Wt Readings from Last 12 Encounters:   03/06/19 92.2 kg (203 lb 3.2 oz)   02/14/19 93.2 kg (205 lb 6.4 oz)   01/28/19 92.1 kg (203 lb)   01/23/19 92.1 kg (203 lb)   01/07/19 92.1 kg (203 lb)   12/31/18 93 kg (205 lb)   12/26/18 93 kg (205 lb)   11/16/18 93.9 kg (207 lb)   11/15/17 97.5 kg (215 lb)   08/29/17 95.3 kg (210 lb)   10/14/16 96.2 kg (212 lb)   08/24/16 93 kg (205 lb)     ECOG performance status: 1  GENERAL APPEARANCE: Healthy, alert and in no acute distress.  HEENT: Sclerae anicteric. PERRLA. Oropharynx without ulcers, lesions, or thrush.  NECK: Supple. No asymmetry or masses.  LYMPHATICS: No palpable cervical, supraclavicular, axillary, or inguinal lymphadenopathy.  RESP: Lungs clear to auscultation bilaterally without rales, rhonchi or wheezes.  CARDIOVASCULAR: Regular rate and rhythm. Normal S1, S2; no S3 or S4. No murmur, gallop, or rub.  ABDOMEN: Soft, nontender. Bowel sounds normal. No palpable organomegaly or masses.  MUSCULOSKELETAL: Extremities without gross deformities noted. No edema of bilateral lower extremities.  SKIN: No suspicious lesions or rashes.  NEURO: Alert and oriented x 3. Cranial nerves II-XII grossly intact.  PSYCHIATRIC: Mentation and affect appear normal.    Laboratory/Imaging Studies:  Infusion Therapy Visit on 03/06/2019   Component Date Value Ref Range Status     WBC 03/06/2019 5.1  4.0 - 11.0 10e9/L Final     RBC Count 03/06/2019 4.28  3.8 - 5.2 10e12/L Final     Hemoglobin 03/06/2019 13.0  11.7 - 15.7 g/dL Final     Hematocrit 03/06/2019 39.1  35.0 - 47.0 % Final     MCV 03/06/2019 91  78 - 100 fl Final     MCH 03/06/2019 30.4  26.5 - 33.0 pg Final     MCHC 03/06/2019 33.2  31.5 - 36.5 g/dL Final     RDW 03/06/2019 13.1  10.0 - 15.0 % Final     Platelet Count 03/06/2019 130* 150 - 450 10e9/L Final     Diff Method 03/06/2019 PENDING   Incomplete    "     .    Assessment and plan:    (C50.112) Infiltrating ductal carcinoma of central portion of left breast in female (H)  (primary encounter diagnosis)  We will proceed with cycle #2 of chemotherapy according to the treatment plan.  We will see the patient in 2 weeks or sooner if there are new developments or concerns.    Mild intermittent asthma.  Patient currently on albuterol inhaler as needed.    Gastroesophageal reflux disease.    Patient currently on ranitidine 150 mg orally daily.    Anxiety disorder.    The patient currently on Celexa 20 mg orally daily.    The patient is ready to learn, no apparent learning barriers were identified.  Diagnosis and treatment plans were explained to the patient. The patient expressed understanding of the content. The patient asked appropriate questions. The patient questions were answered to her satisfaction.    Chart documentation with Dragon Voice recognition Software. Although reviewed after completion, some words and grammatical errors may remain.    Again, thank you for allowing me to participate in the care of your patient.        Sincerely,        Madison Gonzales MD

## 2019-03-06 NOTE — PROGRESS NOTES
Hematology/ Oncology Follow-up Visit:  Mar 6, 2019    Reason for Visit:   Chief Complaint   Patient presents with     Oncology Clinic Visit     Recheck Infiltrating ductal carcinoma of central portion of left breast in female, Labs & Chemo today        Oncologic History:    Infiltrating ductal carcinoma of central portion of left breast in female (H)  Monika Nam presented following her annual mammogram with new focal asymmetry at 12-1 o'clock position of the left breast around 10.7 cm from the nipple.  It measured 1.4 cm.  Subsequently the patient went on to have breast ultrasound on member 30th 2018 showing 1.6 cm in maximum dimension hypoechoic mass.  Subsequently ultrasound breast biopsy was done on December 17, 2018 showing invasive ductal carcinoma grade 3 out of 3 angiolymphatic invasion was not identified.  Ductal carcinoma in situ was not identified . estrogen receptor was negative, progesterone receptor was negative and HER-2/mercedes receptor was negative.        1/2019 left lumpectomy found 1.8 cm IDC, grade III, LN-, margin is clear, pT1cN0    The patient was started on dose dense chemotherapy with Adriamycin and Cytoxan followed by Taxol      Interval History:  Patient returning following her first cycle of chemotherapy with azithromycin and Cytoxan.  She tolerated chemotherapy well without significant nausea.  She denies any weight loss or fever or chills.  She denies any pain.    Review Of Systems:  Constitutional: Negative for fever, chills, and night sweats.  Skin: negative.  Eyes: negative.  Ears/Nose/Throat: negative.  Respiratory: No shortness of breath, dyspnea on exertion, cough, or hemoptysis.  Cardiovascular: negative.  Gastrointestinal: negative.  Genitourinary: negative.  Musculoskeletal: negative.  Neurologic: negative.  Psychiatric: negative.  Hematologic/Lymphatic/Immunologic: negative.  Endocrine: negative.    All other ROS negative unless mentioned in interval history.    Past medical,  "social, surgical, and family histories reviewed.    Allergies:  Allergies as of 03/06/2019 - Reviewed 03/06/2019   Allergen Reaction Noted     Sulfa drugs Swelling 06/09/2005       Current Medications:  Current Outpatient Medications   Medication Sig Dispense Refill     albuterol (PROAIR HFA/PROVENTIL HFA/VENTOLIN HFA) 108 (90 Base) MCG/ACT inhaler Inhale 2 puffs into the lungs every 4 hours as needed for shortness of breath / dyspnea or wheezing 1 Inhaler 11     augmented betamethasone dipropionate (DIPROLENE-AF) 0.05 % cream APPLY EXTERNALLY TO THE AFFECTED AREA TWICE DAILY 50 g 6     calcium-vitamin D (CALCIUM 600-D) 600-400 MG-UNIT per tablet Take 1 tablet by mouth daily       FLUoxetine (PROZAC) 20 MG capsule Take 20 mg daily. (Patient taking differently: Take 20 mg by mouth every evening ) 30 capsule 11     loratadine (CLARITIN) 10 MG tablet Take 10 mg by mouth daily.         LORazepam (ATIVAN) 0.5 MG tablet Take 1 tablet (0.5 mg) by mouth every 4 hours as needed (Anxiety, Nausea/Vomiting or Sleep) (Patient taking differently: Take 0.5 mg by mouth every 4 hours as needed for anxiety (Anxiety, Nausea/Vomiting or Sleep) ) 30 tablet 5     MULTIVITAMIN TABS   OR 1 TABLET BY MOUTH DAILY       ranitidine (ZANTAC) 150 MG tablet Take 1 tablet (150 mg) by mouth daily       aspirin 81 MG tablet Take 1 tablet (81 mg) by mouth daily       prochlorperazine (COMPAZINE) 10 MG tablet Take 0.5 tablets (5 mg) by mouth every 6 hours as needed (Nausea/Vomiting) (Patient not taking: Reported on 3/6/2019) 30 tablet 5        Physical Exam:  /89 (BP Location: Right arm, Patient Position: Sitting, Cuff Size: Adult Regular)   Pulse 79   Temp 98.3  F (36.8  C) (Tympanic)   Resp 16   Ht 1.727 m (5' 7.99\")   Wt 92.2 kg (203 lb 3.2 oz)   LMP 04/02/2010   SpO2 99%   BMI 30.90 kg/m    Wt Readings from Last 12 Encounters:   03/06/19 92.2 kg (203 lb 3.2 oz)   02/14/19 93.2 kg (205 lb 6.4 oz)   01/28/19 92.1 kg (203 lb)   01/23/19 " 92.1 kg (203 lb)   01/07/19 92.1 kg (203 lb)   12/31/18 93 kg (205 lb)   12/26/18 93 kg (205 lb)   11/16/18 93.9 kg (207 lb)   11/15/17 97.5 kg (215 lb)   08/29/17 95.3 kg (210 lb)   10/14/16 96.2 kg (212 lb)   08/24/16 93 kg (205 lb)     ECOG performance status: 1  GENERAL APPEARANCE: Healthy, alert and in no acute distress.  HEENT: Sclerae anicteric. PERRLA. Oropharynx without ulcers, lesions, or thrush.  NECK: Supple. No asymmetry or masses.  LYMPHATICS: No palpable cervical, supraclavicular, axillary, or inguinal lymphadenopathy.  RESP: Lungs clear to auscultation bilaterally without rales, rhonchi or wheezes.  CARDIOVASCULAR: Regular rate and rhythm. Normal S1, S2; no S3 or S4. No murmur, gallop, or rub.  ABDOMEN: Soft, nontender. Bowel sounds normal. No palpable organomegaly or masses.  MUSCULOSKELETAL: Extremities without gross deformities noted. No edema of bilateral lower extremities.  SKIN: No suspicious lesions or rashes.  NEURO: Alert and oriented x 3. Cranial nerves II-XII grossly intact.  PSYCHIATRIC: Mentation and affect appear normal.    Laboratory/Imaging Studies:  Infusion Therapy Visit on 03/06/2019   Component Date Value Ref Range Status     WBC 03/06/2019 5.1  4.0 - 11.0 10e9/L Final     RBC Count 03/06/2019 4.28  3.8 - 5.2 10e12/L Final     Hemoglobin 03/06/2019 13.0  11.7 - 15.7 g/dL Final     Hematocrit 03/06/2019 39.1  35.0 - 47.0 % Final     MCV 03/06/2019 91  78 - 100 fl Final     MCH 03/06/2019 30.4  26.5 - 33.0 pg Final     MCHC 03/06/2019 33.2  31.5 - 36.5 g/dL Final     RDW 03/06/2019 13.1  10.0 - 15.0 % Final     Platelet Count 03/06/2019 130* 150 - 450 10e9/L Final     Diff Method 03/06/2019 PENDING   Incomplete        .    Assessment and plan:    (C50.112) Infiltrating ductal carcinoma of central portion of left breast in female (H)  (primary encounter diagnosis)  We will proceed with cycle #2 of chemotherapy according to the treatment plan.  We will see the patient in 2 weeks or  sooner if there are new developments or concerns.    Mild intermittent asthma.  Patient currently on albuterol inhaler as needed.    Gastroesophageal reflux disease.    Patient currently on ranitidine 150 mg orally daily.    Anxiety disorder.    The patient currently on Celexa 20 mg orally daily.    The patient is ready to learn, no apparent learning barriers were identified.  Diagnosis and treatment plans were explained to the patient. The patient expressed understanding of the content. The patient asked appropriate questions. The patient questions were answered to her satisfaction.    Chart documentation with Dragon Voice recognition Software. Although reviewed after completion, some words and grammatical errors may remain.

## 2019-03-11 ENCOUNTER — TELEPHONE (OUTPATIENT)
Dept: ONCOLOGY | Facility: CLINIC | Age: 59
End: 2019-03-11

## 2019-03-11 DIAGNOSIS — C50.112 INFILTRATING DUCTAL CARCINOMA OF CENTRAL PORTION OF LEFT BREAST IN FEMALE (H): Primary | ICD-10-CM

## 2019-03-11 NOTE — TELEPHONE ENCOUNTER
Telephone Triage:    Pt is a 58 year old female, 5 day(s) post C2 chemotherapy infusion of AC. Calling to report a toothache that developed on Friday 03.08.19. She is going to see the dentist this morning and would like recommendations for the dentist in regards to possible tooth extraction.    Diagnosis:   Encounter Diagnosis   Name Primary?     Infiltrating ductal carcinoma of central portion of left breast in female (H) Yes     Primary care provider is: Servando Valladares    Oncology provider is:  Dr. MACRINA Gonzales    Chief complaint: toothache.  Any other side effects noted from treatment?  Bruising: denies  Bleeding: denies  Pain: 8  on pain scale with 0 being no pain and 10 being intolerable, when not eating or drinking. 10/10 when trying to eat or drink.  Other: Denies fever, chills. Denies SOB, dyspnea, chest pain or chest congestion. Pt is due for cycle 3 AC 03.20.19 (Wednesday next week).  Assessment: Please see above.   Recommendations: Pt will be seen by the dentist today. She will call our office back with the dentist's recommendations. Advised patient that it is not recommended by Oncology to proceed with tooth extraction today as she is only 5 days post infusion, has not had labs drawn and her risk for infection and bleeding are high.   Will discuss with Dr. Gonzales, once patient has been seen by dentist and their recommendations are known.    Patient instructed to call with any questions or concerns.  Patient states understanding and is in agreement with this plan.    Approximately 10 minutes spent on telephone with patient reviewing assessment and symptom(s) and providing symptom management.    Sayda Peace, RN, BSN, OCN  Oncology Hematology   Breast Cancer Navigator  [facility] [place] [facility] [place]  [email or web address]  [phone]

## 2019-03-11 NOTE — TELEPHONE ENCOUNTER
Patient called our office back with an update from her dentist. Dentist is recommending she have tooth extracted by a specialist. This is scheduled for 03.19.19.    Her dentist will need a written letter from Dr. Gonzales stating it is OK to proceed with extraction.    Will review with Dr. Gonzales and update pt with his recommendations (possible, lab draw prior, etc.). Pt states understanding and will wait for our office to call her back tomorrow 03.12.19.

## 2019-03-12 ENCOUNTER — PATIENT OUTREACH (OUTPATIENT)
Dept: ONCOLOGY | Facility: CLINIC | Age: 59
End: 2019-03-12

## 2019-03-12 NOTE — PROGRESS NOTES
Called pt regarding her tooth extraction scheduled for 3/19. Dr. Gonzales would prefer her to wait until after chemotherapy if she can. Pt will call dentist to discuss and get recommendation. If extraction must be done at this time, she will need a CBC prior to assess her counts. Note left for Dr. Gonzales's  regarding plan. Pt stated that she will call us tomorrow and let us know. She is currently on 500 mg Amoxicillin daily until extraction.     John Barrera RN on 3/12/2019 at 3:17 PM

## 2019-03-13 NOTE — TELEPHONE ENCOUNTER
Patient reports that her dentist is going to keep her on the antibiotic and not pull the tooth at this time.  Yuliet Posada RN on 3/13/19 at 11:05 AM      Madison Gonzales MD Bergan, Malisha A, RN 20 hours ago (2:46 PM)     If it is absolutely necessary to have extraction she can go ahead with extraction.  Please make sure she has a CBC to see if there is thrombocytopenia or neutropenia before dental extraction.    Routing Comment       John Barrera RN Hussein, Mohamed R, MD 20 hours ago (2:42 PM)     I spoke with the patient. She said the dentist started her on 500 mg Amoxicillin until tooth is scheduled to be removed on the 19th. She is wondering if you want her to wait until after C4, and if she should be on abx until that time? She is worried about the pain/infection worsening during that time.   Thanks again.     Routing Comment       Madison Gonzales MD Bergan, Malisha A, RN 20 hours ago (2:22 PM)     I rather wait until she finishes chemotherapy    Routing Comment

## 2019-03-19 ENCOUNTER — PATIENT OUTREACH (OUTPATIENT)
Dept: ONCOLOGY | Facility: CLINIC | Age: 59
End: 2019-03-19

## 2019-03-20 ENCOUNTER — HOSPITAL ENCOUNTER (OUTPATIENT)
Facility: CLINIC | Age: 59
Discharge: HOME | End: 2019-03-20
Attending: INTERNAL MEDICINE | Admitting: INTERNAL MEDICINE
Payer: COMMERCIAL

## 2019-03-20 ENCOUNTER — ONCOLOGY VISIT (OUTPATIENT)
Dept: ONCOLOGY | Facility: CLINIC | Age: 59
End: 2019-03-20
Attending: PHYSICIAN ASSISTANT
Payer: COMMERCIAL

## 2019-03-20 ENCOUNTER — INFUSION THERAPY VISIT (OUTPATIENT)
Dept: INFUSION THERAPY | Facility: CLINIC | Age: 59
End: 2019-03-20
Attending: INTERNAL MEDICINE
Payer: COMMERCIAL

## 2019-03-20 VITALS
HEART RATE: 88 BPM | RESPIRATION RATE: 16 BRPM | TEMPERATURE: 98.7 F | SYSTOLIC BLOOD PRESSURE: 135 MMHG | HEIGHT: 68 IN | DIASTOLIC BLOOD PRESSURE: 85 MMHG | BODY MASS INDEX: 30.95 KG/M2 | OXYGEN SATURATION: 98 % | WEIGHT: 204.2 LBS

## 2019-03-20 VITALS
OXYGEN SATURATION: 99 % | BODY MASS INDEX: 30.78 KG/M2 | WEIGHT: 202.4 LBS | DIASTOLIC BLOOD PRESSURE: 79 MMHG | TEMPERATURE: 96.9 F | HEART RATE: 71 BPM | RESPIRATION RATE: 18 BRPM | SYSTOLIC BLOOD PRESSURE: 148 MMHG

## 2019-03-20 DIAGNOSIS — C50.112 INFILTRATING DUCTAL CARCINOMA OF CENTRAL PORTION OF LEFT BREAST IN FEMALE (H): Primary | ICD-10-CM

## 2019-03-20 DIAGNOSIS — C50.112 INFILTRATING DUCTAL CARCINOMA OF CENTRAL PORTION OF LEFT BREAST IN FEMALE (H): ICD-10-CM

## 2019-03-20 LAB
ALBUMIN SERPL-MCNC: 3.6 G/DL (ref 3.4–5)
ALP SERPL-CCNC: 96 U/L (ref 40–150)
ALT SERPL W P-5'-P-CCNC: 20 U/L (ref 0–50)
ANION GAP SERPL CALCULATED.3IONS-SCNC: 5 MMOL/L (ref 3–14)
AST SERPL W P-5'-P-CCNC: 15 U/L (ref 0–45)
BASOPHILS # BLD AUTO: 0.1 10E9/L (ref 0–0.2)
BASOPHILS NFR BLD AUTO: 2 %
BILIRUB SERPL-MCNC: 0.3 MG/DL (ref 0.2–1.3)
BUN SERPL-MCNC: 9 MG/DL (ref 7–30)
CALCIUM SERPL-MCNC: 8.4 MG/DL (ref 8.5–10.1)
CHLORIDE SERPL-SCNC: 109 MMOL/L (ref 94–109)
CO2 SERPL-SCNC: 27 MMOL/L (ref 20–32)
CREAT SERPL-MCNC: 0.69 MG/DL (ref 0.52–1.04)
DIFFERENTIAL METHOD BLD: ABNORMAL
EOSINOPHIL # BLD AUTO: 0.1 10E9/L (ref 0–0.7)
EOSINOPHIL NFR BLD AUTO: 1 %
ERYTHROCYTE [DISTWIDTH] IN BLOOD BY AUTOMATED COUNT: 13.4 % (ref 10–15)
GFR SERPL CREATININE-BSD FRML MDRD: >90 ML/MIN/{1.73_M2}
GLUCOSE SERPL-MCNC: 88 MG/DL (ref 70–99)
HCT VFR BLD AUTO: 33.9 % (ref 35–47)
HGB BLD-MCNC: 11.4 G/DL (ref 11.7–15.7)
LYMPHOCYTES # BLD AUTO: 0.9 10E9/L (ref 0.8–5.3)
LYMPHOCYTES NFR BLD AUTO: 16 %
MCH RBC QN AUTO: 30.5 PG (ref 26.5–33)
MCHC RBC AUTO-ENTMCNC: 33.6 G/DL (ref 31.5–36.5)
MCV RBC AUTO: 91 FL (ref 78–100)
METAMYELOCYTES # BLD: 0.1 10E9/L
METAMYELOCYTES NFR BLD MANUAL: 2 %
MONOCYTES # BLD AUTO: 0.5 10E9/L (ref 0–1.3)
MONOCYTES NFR BLD AUTO: 10 %
MYELOCYTES # BLD: 0.1 10E9/L
MYELOCYTES NFR BLD MANUAL: 2 %
NEUTROPHILS # BLD AUTO: 3.6 10E9/L (ref 1.6–8.3)
NEUTROPHILS NFR BLD AUTO: 67 %
PLATELET # BLD AUTO: 150 10E9/L (ref 150–450)
PLATELET # BLD EST: ABNORMAL 10*3/UL
POTASSIUM SERPL-SCNC: 3.9 MMOL/L (ref 3.4–5.3)
PROT SERPL-MCNC: 6.7 G/DL (ref 6.8–8.8)
RBC # BLD AUTO: 3.74 10E12/L (ref 3.8–5.2)
RBC MORPH BLD: NORMAL
SODIUM SERPL-SCNC: 141 MMOL/L (ref 133–144)
WBC # BLD AUTO: 5.4 10E9/L (ref 4–11)

## 2019-03-20 PROCEDURE — 96411 CHEMO IV PUSH ADDL DRUG: CPT

## 2019-03-20 PROCEDURE — 80053 COMPREHEN METABOLIC PANEL: CPT | Performed by: INTERNAL MEDICINE

## 2019-03-20 PROCEDURE — 96377 APPLICATON ON-BODY INJECTOR: CPT | Mod: XS

## 2019-03-20 PROCEDURE — G0463 HOSPITAL OUTPT CLINIC VISIT: HCPCS | Mod: 25

## 2019-03-20 PROCEDURE — 85025 COMPLETE CBC W/AUTO DIFF WBC: CPT | Performed by: INTERNAL MEDICINE

## 2019-03-20 PROCEDURE — 96413 CHEMO IV INFUSION 1 HR: CPT

## 2019-03-20 PROCEDURE — 99214 OFFICE O/P EST MOD 30 MIN: CPT | Performed by: PHYSICIAN ASSISTANT

## 2019-03-20 PROCEDURE — 25000128 H RX IP 250 OP 636: Performed by: PHYSICIAN ASSISTANT

## 2019-03-20 PROCEDURE — 96367 TX/PROPH/DG ADDL SEQ IV INF: CPT

## 2019-03-20 PROCEDURE — 25800030 ZZH RX IP 258 OP 636: Performed by: PHYSICIAN ASSISTANT

## 2019-03-20 PROCEDURE — 96375 TX/PRO/DX INJ NEW DRUG ADDON: CPT

## 2019-03-20 RX ORDER — PALONOSETRON 0.05 MG/ML
0.25 INJECTION, SOLUTION INTRAVENOUS ONCE
Status: COMPLETED | OUTPATIENT
Start: 2019-03-20 | End: 2019-03-20

## 2019-03-20 RX ORDER — SODIUM CHLORIDE 9 MG/ML
1000 INJECTION, SOLUTION INTRAVENOUS CONTINUOUS PRN
Status: CANCELLED
Start: 2019-03-20

## 2019-03-20 RX ORDER — ALBUTEROL SULFATE 0.83 MG/ML
2.5 SOLUTION RESPIRATORY (INHALATION)
Status: CANCELLED | OUTPATIENT
Start: 2019-03-20

## 2019-03-20 RX ORDER — DOXORUBICIN HYDROCHLORIDE 2 MG/ML
60 INJECTION, SOLUTION INTRAVENOUS ONCE
Status: CANCELLED | OUTPATIENT
Start: 2019-03-20

## 2019-03-20 RX ORDER — PALONOSETRON 0.05 MG/ML
0.25 INJECTION, SOLUTION INTRAVENOUS ONCE
Status: CANCELLED
Start: 2019-03-20

## 2019-03-20 RX ORDER — EPINEPHRINE 0.3 MG/.3ML
0.3 INJECTION SUBCUTANEOUS EVERY 5 MIN PRN
Status: CANCELLED | OUTPATIENT
Start: 2019-03-20

## 2019-03-20 RX ORDER — HEPARIN SODIUM (PORCINE) LOCK FLUSH IV SOLN 100 UNIT/ML 100 UNIT/ML
5 SOLUTION INTRAVENOUS
Status: CANCELLED | OUTPATIENT
Start: 2019-03-20

## 2019-03-20 RX ORDER — MEPERIDINE HYDROCHLORIDE 25 MG/ML
25 INJECTION INTRAMUSCULAR; INTRAVENOUS; SUBCUTANEOUS EVERY 30 MIN PRN
Status: CANCELLED | OUTPATIENT
Start: 2019-03-20

## 2019-03-20 RX ORDER — DOXORUBICIN HYDROCHLORIDE 2 MG/ML
60 INJECTION, SOLUTION INTRAVENOUS ONCE
Status: COMPLETED | OUTPATIENT
Start: 2019-03-20 | End: 2019-03-20

## 2019-03-20 RX ORDER — LORAZEPAM 2 MG/ML
0.5 INJECTION INTRAMUSCULAR EVERY 4 HOURS PRN
Status: CANCELLED
Start: 2019-03-20

## 2019-03-20 RX ORDER — METHYLPREDNISOLONE SODIUM SUCCINATE 125 MG/2ML
125 INJECTION, POWDER, LYOPHILIZED, FOR SOLUTION INTRAMUSCULAR; INTRAVENOUS
Status: CANCELLED
Start: 2019-03-20

## 2019-03-20 RX ORDER — EPINEPHRINE 1 MG/ML
0.3 INJECTION, SOLUTION, CONCENTRATE INTRAVENOUS EVERY 5 MIN PRN
Status: CANCELLED | OUTPATIENT
Start: 2019-03-20

## 2019-03-20 RX ORDER — HEPARIN SODIUM (PORCINE) LOCK FLUSH IV SOLN 100 UNIT/ML 100 UNIT/ML
5 SOLUTION INTRAVENOUS
Status: DISCONTINUED | OUTPATIENT
Start: 2019-03-20 | End: 2019-03-20 | Stop reason: HOSPADM

## 2019-03-20 RX ORDER — DIPHENHYDRAMINE HYDROCHLORIDE 50 MG/ML
50 INJECTION INTRAMUSCULAR; INTRAVENOUS
Status: CANCELLED
Start: 2019-03-20

## 2019-03-20 RX ORDER — ALBUTEROL SULFATE 90 UG/1
1-2 AEROSOL, METERED RESPIRATORY (INHALATION)
Status: CANCELLED
Start: 2019-03-20

## 2019-03-20 RX ADMIN — Medication 5 ML: at 13:16

## 2019-03-20 RX ADMIN — PEGFILGRASTIM 6 MG: KIT SUBCUTANEOUS at 13:15

## 2019-03-20 RX ADMIN — FAMOTIDINE 20 MG: 20 INJECTION, SOLUTION INTRAVENOUS at 11:26

## 2019-03-20 RX ADMIN — PALONOSETRON HYDROCHLORIDE 0.25 MG: 0.25 INJECTION INTRAVENOUS at 11:22

## 2019-03-20 RX ADMIN — CYCLOPHOSPHAMIDE 1265 MG: 1 INJECTION, POWDER, FOR SOLUTION INTRAVENOUS; ORAL at 12:38

## 2019-03-20 RX ADMIN — DEXAMETHASONE SODIUM PHOSPHATE: 10 INJECTION, SOLUTION INTRAMUSCULAR; INTRAVENOUS at 11:59

## 2019-03-20 RX ADMIN — SODIUM CHLORIDE 250 ML: 9 INJECTION, SOLUTION INTRAVENOUS at 10:54

## 2019-03-20 RX ADMIN — DOXORUBICIN HYDROCHLORIDE 127 MG: 2 INJECTION, SOLUTION INTRAVENOUS at 12:29

## 2019-03-20 ASSESSMENT — MIFFLIN-ST. JEOR: SCORE: 1549.62

## 2019-03-20 ASSESSMENT — PAIN SCALES - GENERAL: PAINLEVEL: NO PAIN (0)

## 2019-03-20 NOTE — PROGRESS NOTES
"Oncology Rooming Note    March 20, 2019 10:16 AM   Monika Nam is a 58 year old female who presents for:    Chief Complaint   Patient presents with     Oncology Clinic Visit     Recheck Infiltrating ductal carcinoma of central portion of left breast in female, Labs & cycle 3  today     Initial Vitals: /85 (BP Location: Right arm, Patient Position: Sitting, Cuff Size: Adult Large)   Pulse 88   Temp 98.7  F (37.1  C) (Tympanic)   Resp 16   Ht 1.727 m (5' 7.99\")   Wt 92.6 kg (204 lb 3.2 oz)   LMP 04/02/2010   SpO2 98%   BMI 31.06 kg/m   Estimated body mass index is 31.06 kg/m  as calculated from the following:    Height as of this encounter: 1.727 m (5' 7.99\").    Weight as of this encounter: 92.6 kg (204 lb 3.2 oz). Body surface area is 2.11 meters squared.  No Pain (0) Comment: Data Unavailable   Patient's last menstrual period was 04/02/2010.  Allergies reviewed: Yes  Medications reviewed: Yes    Medications: Medication refills not needed today.  Pharmacy [facility] entered into [employer] [address] [place] [address] AVE AT [address]    Clinical concerns Recheck Infiltrating ductal carcinoma of central portion of left breast in female, Labs & cycle 3  Today.     1. ? About taking Dexamethasone.   2.  Last cycle did not take dexamethasone and had headaches, insomnia and decreased appetite.   3. Just finished a round of Amoxicillin 500 mg  X 7 days for tooth infection.          Monika Curtis CMA              "

## 2019-03-20 NOTE — LETTER
3/20/2019         RE: Monika Nam  [address] [facility] [id number]        Dear Colleague,    Thank you for referring your patient, Monika Nam, to the [facility]. Please see a copy of my visit note below.    Hematology/ Oncology Follow-up Visit:  Mar 20, 2019   Oncologist: Dr Gonzales    Reason for Visit:   Follow up triple negative breast cancer, here for C3 AC    Oncologic History: Monika Nam is a 58 year old female with Infiltrating ductal carcinoma of central portion of left breast in female (H).    Monika Nam presented following her annual mammogram with new focal asymmetry at 12-1 o'clock position of the left breast around 10.7 cm from the nipple.  It measured 1.4 cm.  Subsequently the patient went on to have breast ultrasound on member 30th 2018 showing 1.6 cm in maximum dimension hypoechoic mass.  Subsequently ultrasound breast biopsy was done on December 17, 2018 showing invasive ductal carcinoma grade 3 out of 3 angiolymphatic invasion was not identified.  Ductal carcinoma in situ was not identified . estrogen receptor was negative, progesterone receptor was negative and HER-2/mercedes receptor was negative.        1/2019 left lumpectomy found 1.8 cm IDC, grade III, LN-, margin is clear, pT1cN0     The patient was started on dose dense chemotherapy with Adriamycin and Cytoxan followed by Taxol    Interval History/ROS:  Monika is doing well. She didn't take the PO dexamethasone last cycle due to anxiety and insomnia but didn't feel as well as the first cycle without it (headaches, loss of appetite, nausea). She would like to take it this time. She had a dental abscess that was treated with amoxicillin. The dentist wants to pull the tooth but will wait until after chemotherapy. She no longer has pain. No fevers/chills. Otherwise 10 pt ROS is negative.     Past medical, social, surgical, and family histories reviewed.    Allergies:  Allergies as of 03/20/2019 -  "Reviewed 03/06/2019   Allergen Reaction Noted     Sulfa drugs Swelling 06/09/2005       Current Medications:  Current Outpatient Medications   Medication Sig Dispense Refill     albuterol (PROAIR HFA/PROVENTIL HFA/VENTOLIN HFA) 108 (90 Base) MCG/ACT inhaler Inhale 2 puffs into the lungs every 4 hours as needed for shortness of breath / dyspnea or wheezing 1 Inhaler 11     augmented betamethasone dipropionate (DIPROLENE-AF) 0.05 % cream APPLY EXTERNALLY TO THE AFFECTED AREA TWICE DAILY 50 g 6     FLUoxetine (PROZAC) 20 MG capsule Take 20 mg daily. (Patient taking differently: Take 20 mg by mouth every evening ) 30 capsule 11     loratadine (CLARITIN) 10 MG tablet Take 10 mg by mouth daily.         MULTIVITAMIN TABS   OR 1 TABLET BY MOUTH DAILY       ondansetron (ZOFRAN) 4 MG tablet Take 1 tablet (4 mg) by mouth every 8 hours as needed for nausea 20 tablet 1     ranitidine (ZANTAC) 150 MG tablet Take 1 tablet (150 mg) by mouth daily       aspirin 81 MG tablet Take 1 tablet (81 mg) by mouth daily       calcium-vitamin D (CALCIUM 600-D) 600-400 MG-UNIT per tablet Take 1 tablet by mouth daily       LORazepam (ATIVAN) 0.5 MG tablet Take 1 tablet (0.5 mg) by mouth every 4 hours as needed (Anxiety, Nausea/Vomiting or Sleep) (Patient not taking: Reported on 3/20/2019) 30 tablet 5     prochlorperazine (COMPAZINE) 10 MG tablet Take 0.5 tablets (5 mg) by mouth every 6 hours as needed (Nausea/Vomiting) (Patient not taking: Reported on 3/6/2019) 30 tablet 5        Physical Exam:  /85 (BP Location: Right arm, Patient Position: Sitting, Cuff Size: Adult Large)   Pulse 88   Temp 98.7  F (37.1  C) (Tympanic)   Resp 16   Ht 1.727 m (5' 7.99\")   Wt 92.6 kg (204 lb 3.2 oz)   LMP 04/02/2010   SpO2 98%   BMI 31.06 kg/m     Wt Readings from Last 12 Encounters:   03/20/19 91.8 kg (202 lb 6.4 oz)   03/06/19 92.2 kg (203 lb 3.2 oz)   02/14/19 93.2 kg (205 lb 6.4 oz)   01/28/19 92.1 kg (203 lb)   01/23/19 92.1 kg (203 lb) "   01/07/19 92.1 kg (203 lb)   12/31/18 93 kg (205 lb)   12/26/18 93 kg (205 lb)   11/16/18 93.9 kg (207 lb)   11/15/17 97.5 kg (215 lb)   08/29/17 95.3 kg (210 lb)   10/14/16 96.2 kg (212 lb)     ECOG performance status: 1  GENERAL APPEARANCE: Healthy, alert and in no acute distress.  HEENT: Sclerae anicteric. PERRLA. Oropharynx without ulcers, lesions, or thrush. Left lower molar is without redness or swelling. No pain on palpation.   NECK: Supple. No asymmetry or masses.  LYMPHATICS: No palpable cervical, supraclavicular, axillary, or inguinal lymphadenopathy.  RESP: Lungs clear to auscultation bilaterally without rales, rhonchi or wheezes.  CARDIOVASCULAR: Regular rate and rhythm. Normal S1, S2; no S3 or S4. No murmur, gallop, or rub.  ABDOMEN: Soft, nontender. Bowel sounds normal. No palpable organomegaly or masses.  MUSCULOSKELETAL: Extremities without gross deformities noted. No edema of bilateral lower extremities.  SKIN: No suspicious lesions or rashes.  NEURO: Alert and oriented x 3. Cranial nerves II-XII grossly intact.  PSYCHIATRIC: Mentation and affect appear normal.    Laboratory/Imaging Studies:  Results for BRY ASCENCIO (MRN [id number]) as of 3/20/2019 10:41   Ref. Range 3/20/2019 10:00   Sodium Latest Ref Range: 133 - 144 mmol/L 141   Potassium Latest Ref Range: 3.4 - 5.3 mmol/L 3.9   Chloride Latest Ref Range: 94 - 109 mmol/L 109   Carbon Dioxide Latest Ref Range: 20 - 32 mmol/L 27   Urea Nitrogen Latest Ref Range: 7 - 30 mg/dL 9   Creatinine Latest Ref Range: 0.52 - 1.04 mg/dL 0.69   GFR Estimate Latest Ref Range: >60 mL/min/1.73_m2 >90   GFR Estimate If Black Latest Ref Range: >60 mL/min/1.73_m2 >90   Calcium Latest Ref Range: 8.5 - 10.1 mg/dL 8.4 (L)   Anion Gap Latest Ref Range: 3 - 14 mmol/L 5   Albumin Latest Ref Range: 3.4 - 5.0 g/dL 3.6   Protein Total Latest Ref Range: 6.8 - 8.8 g/dL 6.7 (L)   Bilirubin Total Latest Ref Range: 0.2 - 1.3 mg/dL 0.3   Alkaline Phosphatase Latest Ref  Range: 40 - 150 U/L 96   ALT Latest Ref Range: 0 - 50 U/L 20   AST Latest Ref Range: 0 - 45 U/L 15   Glucose Latest Ref Range: 70 - 99 mg/dL 88   WBC Latest Ref Range: 4.0 - 11.0 10e9/L 5.4   Hemoglobin Latest Ref Range: 11.7 - 15.7 g/dL 11.4 (L)   Hematocrit Latest Ref Range: 35.0 - 47.0 % 33.9 (L)   Platelet Count Latest Ref Range: 150 - 450 10e9/L 150   RBC Count Latest Ref Range: 3.8 - 5.2 10e12/L 3.74 (L)   MCV Latest Ref Range: 78 - 100 fl 91   MCH Latest Ref Range: 26.5 - 33.0 pg 30.5   MCHC Latest Ref Range: 31.5 - 36.5 g/dL 33.6   RDW Latest Ref Range: 10.0 - 15.0 % 13.4   Diff Method Unknown PENDING     Assessment and plan:    (C50.112) Infiltrating ductal carcinoma of central portion of left breast in female. S/p lumpectomy.   We will proceed with cycle #3 of AC.  We will see the patient in 2 weeks or sooner if there are new developments or concerns.  She will take the PO dex as prescribed  4/17 she will start weekly taxol x12    Mild intermittent asthma. Not a current issue  Patient currently on albuterol inhaler as needed.    Gastroesophageal reflux disease.  Not a current issue  Patient currently on ranitidine 150 mg orally daily.    Anxiety disorder.    The patient currently on Celexa 20 mg orally daily.  Ativan prn (needed while on dexamethasone)    Recent dental abscess (left lower molar)  S/p course of amoxicillin. No current sx. Tooth will be pulled after chemotherapy    Over 50% of 25 min visit was spent counseling and coordinating care    Sara Tomas PA-C    Oncology Rooming Note    March 20, 2019 10:16 AM   Monika Nam is a 58 year old female who presents for:    Chief Complaint   Patient presents with     Oncology Clinic Visit     Recheck Infiltrating ductal carcinoma of central portion of left breast in female, Labs & cycle 3  today     Initial Vitals: /85 (BP Location: Right arm, Patient Position: Sitting, Cuff Size: Adult Large)   Pulse 88   Temp 98.7  F (37.1  C) (Tympanic)    "Resp 16   Ht 1.727 m (5' 7.99\")   Wt 92.6 kg (204 lb 3.2 oz)   LMP 04/02/2010   SpO2 98%   BMI 31.06 kg/m    Estimated body mass index is 31.06 kg/m  as calculated from the following:    Height as of this encounter: 1.727 m (5' 7.99\").    Weight as of this encounter: 92.6 kg (204 lb 3.2 oz). Body surface area is 2.11 meters squared.  No Pain (0) Comment: Data Unavailable   Patient's last menstrual period was 04/02/2010.  Allergies reviewed: Yes  Medications reviewed: Yes    Medications: Medication refills not needed today.  Pharmacy name entered into [employer]: [facility] - [address] AT [address]    Clinical concerns Recheck Infiltrating ductal carcinoma of central portion of left breast in female, Labs & cycle 3  Today.     1. ? About taking Dexamethasone.   2.  Last cycle did not take dexamethasone and had headaches, insomnia and decreased appetite.   3. Just finished a round of Amoxicillin 500 mg  X 7 days for tooth infection.          Monika Curtis [facility]                Again, thank you for allowing me to participate in the care of your patient.        Sincerely,        Sara Tomas PA-C    "

## 2019-03-20 NOTE — PROGRESS NOTES
Hematology/ Oncology Follow-up Visit:  Mar 20, 2019   Oncologist: Dr Gonzales    Reason for Visit:   Follow up triple negative breast cancer, here for C3 AC    Oncologic History: Monika Nam is a 58 year old female with Infiltrating ductal carcinoma of central portion of left breast in female (H).    Monika Nam presented following her annual mammogram with new focal asymmetry at 12-1 o'clock position of the left breast around 10.7 cm from the nipple.  It measured 1.4 cm.  Subsequently the patient went on to have breast ultrasound on member 30th 2018 showing 1.6 cm in maximum dimension hypoechoic mass.  Subsequently ultrasound breast biopsy was done on December 17, 2018 showing invasive ductal carcinoma grade 3 out of 3 angiolymphatic invasion was not identified.  Ductal carcinoma in situ was not identified . estrogen receptor was negative, progesterone receptor was negative and HER-2/mercedes receptor was negative.        1/2019 left lumpectomy found 1.8 cm IDC, grade III, LN-, margin is clear, pT1cN0     The patient was started on dose dense chemotherapy with Adriamycin and Cytoxan followed by Taxol    Interval History/ROS:  Monika is doing well. She didn't take the PO dexamethasone last cycle due to anxiety and insomnia but didn't feel as well as the first cycle without it (headaches, loss of appetite, nausea). She would like to take it this time. She had a dental abscess that was treated with amoxicillin. The dentist wants to pull the tooth but will wait until after chemotherapy. She no longer has pain. No fevers/chills. Otherwise 10 pt ROS is negative.     Past medical, social, surgical, and family histories reviewed.    Allergies:  Allergies as of 03/20/2019 - Reviewed 03/06/2019   Allergen Reaction Noted     Sulfa drugs Swelling 06/09/2005       Current Medications:  Current Outpatient Medications   Medication Sig Dispense Refill     albuterol (PROAIR HFA/PROVENTIL HFA/VENTOLIN HFA) 108 (90 Base) MCG/ACT  "inhaler Inhale 2 puffs into the lungs every 4 hours as needed for shortness of breath / dyspnea or wheezing 1 Inhaler 11     augmented betamethasone dipropionate (DIPROLENE-AF) 0.05 % cream APPLY EXTERNALLY TO THE AFFECTED AREA TWICE DAILY 50 g 6     FLUoxetine (PROZAC) 20 MG capsule Take 20 mg daily. (Patient taking differently: Take 20 mg by mouth every evening ) 30 capsule 11     loratadine (CLARITIN) 10 MG tablet Take 10 mg by mouth daily.         MULTIVITAMIN TABS   OR 1 TABLET BY MOUTH DAILY       ondansetron (ZOFRAN) 4 MG tablet Take 1 tablet (4 mg) by mouth every 8 hours as needed for nausea 20 tablet 1     ranitidine (ZANTAC) 150 MG tablet Take 1 tablet (150 mg) by mouth daily       aspirin 81 MG tablet Take 1 tablet (81 mg) by mouth daily       calcium-vitamin D (CALCIUM 600-D) 600-400 MG-UNIT per tablet Take 1 tablet by mouth daily       LORazepam (ATIVAN) 0.5 MG tablet Take 1 tablet (0.5 mg) by mouth every 4 hours as needed (Anxiety, Nausea/Vomiting or Sleep) (Patient not taking: Reported on 3/20/2019) 30 tablet 5     prochlorperazine (COMPAZINE) 10 MG tablet Take 0.5 tablets (5 mg) by mouth every 6 hours as needed (Nausea/Vomiting) (Patient not taking: Reported on 3/6/2019) 30 tablet 5        Physical Exam:  /85 (BP Location: Right arm, Patient Position: Sitting, Cuff Size: Adult Large)   Pulse 88   Temp 98.7  F (37.1  C) (Tympanic)   Resp 16   Ht 1.727 m (5' 7.99\")   Wt 92.6 kg (204 lb 3.2 oz)   LMP 04/02/2010   SpO2 98%   BMI 31.06 kg/m    Wt Readings from Last 12 Encounters:   03/20/19 91.8 kg (202 lb 6.4 oz)   03/06/19 92.2 kg (203 lb 3.2 oz)   02/14/19 93.2 kg (205 lb 6.4 oz)   01/28/19 92.1 kg (203 lb)   01/23/19 92.1 kg (203 lb)   01/07/19 92.1 kg (203 lb)   12/31/18 93 kg (205 lb)   12/26/18 93 kg (205 lb)   11/16/18 93.9 kg (207 lb)   11/15/17 97.5 kg (215 lb)   08/29/17 95.3 kg (210 lb)   10/14/16 96.2 kg (212 lb)     ECOG performance status: 1  GENERAL APPEARANCE: Healthy, alert " and in no acute distress.  HEENT: Sclerae anicteric. PERRLA. Oropharynx without ulcers, lesions, or thrush. Left lower molar is without redness or swelling. No pain on palpation.   NECK: Supple. No asymmetry or masses.  LYMPHATICS: No palpable cervical, supraclavicular, axillary, or inguinal lymphadenopathy.  RESP: Lungs clear to auscultation bilaterally without rales, rhonchi or wheezes.  CARDIOVASCULAR: Regular rate and rhythm. Normal S1, S2; no S3 or S4. No murmur, gallop, or rub.  ABDOMEN: Soft, nontender. Bowel sounds normal. No palpable organomegaly or masses.  MUSCULOSKELETAL: Extremities without gross deformities noted. No edema of bilateral lower extremities.  SKIN: No suspicious lesions or rashes.  NEURO: Alert and oriented x 3. Cranial nerves II-XII grossly intact.  PSYCHIATRIC: Mentation and affect appear normal.    Laboratory/Imaging Studies:  Results for BRY ASCENCIO (MRN [id number]) as of 3/20/2019 10:41   Ref. Range 3/20/2019 10:00   Sodium Latest Ref Range: 133 - 144 mmol/L 141   Potassium Latest Ref Range: 3.4 - 5.3 mmol/L 3.9   Chloride Latest Ref Range: 94 - 109 mmol/L 109   Carbon Dioxide Latest Ref Range: 20 - 32 mmol/L 27   Urea Nitrogen Latest Ref Range: 7 - 30 mg/dL 9   Creatinine Latest Ref Range: 0.52 - 1.04 mg/dL 0.69   GFR Estimate Latest Ref Range: >60 mL/min/1.73_m2 >90   GFR Estimate If Black Latest Ref Range: >60 mL/min/1.73_m2 >90   Calcium Latest Ref Range: 8.5 - 10.1 mg/dL 8.4 (L)   Anion Gap Latest Ref Range: 3 - 14 mmol/L 5   Albumin Latest Ref Range: 3.4 - 5.0 g/dL 3.6   Protein Total Latest Ref Range: 6.8 - 8.8 g/dL 6.7 (L)   Bilirubin Total Latest Ref Range: 0.2 - 1.3 mg/dL 0.3   Alkaline Phosphatase Latest Ref Range: 40 - 150 U/L 96   ALT Latest Ref Range: 0 - 50 U/L 20   AST Latest Ref Range: 0 - 45 U/L 15   Glucose Latest Ref Range: 70 - 99 mg/dL 88   WBC Latest Ref Range: 4.0 - 11.0 10e9/L 5.4   Hemoglobin Latest Ref Range: 11.7 - 15.7 g/dL 11.4 (L)   Hematocrit  Latest Ref Range: 35.0 - 47.0 % 33.9 (L)   Platelet Count Latest Ref Range: 150 - 450 10e9/L 150   RBC Count Latest Ref Range: 3.8 - 5.2 10e12/L 3.74 (L)   MCV Latest Ref Range: 78 - 100 fl 91   MCH Latest Ref Range: 26.5 - 33.0 pg 30.5   MCHC Latest Ref Range: 31.5 - 36.5 g/dL 33.6   RDW Latest Ref Range: 10.0 - 15.0 % 13.4   Diff Method Unknown PENDING     Assessment and plan:    (C50.112) Infiltrating ductal carcinoma of central portion of left breast in female. S/p lumpectomy.   We will proceed with cycle #3 of AC.  We will see the patient in 2 weeks or sooner if there are new developments or concerns.  She will take the PO dex as prescribed  4/17 she will start weekly taxol x12    Mild intermittent asthma. Not a current issue  Patient currently on albuterol inhaler as needed.    Gastroesophageal reflux disease.  Not a current issue  Patient currently on ranitidine 150 mg orally daily.    Anxiety disorder.    The patient currently on Celexa 20 mg orally daily.  Ativan prn (needed while on dexamethasone)    Recent dental abscess (left lower molar)  S/p course of amoxicillin. No current sx. Tooth will be pulled after chemotherapy    Over 50% of 25 min visit was spent counseling and coordinating care    Sara Tomas PA-C

## 2019-03-20 NOTE — PATIENT INSTRUCTIONS
Dr Gonzales or Abigail prior to C4 in 2 weeks  Start weekly Taxol 4/17 (she wants to stay on wednesdays).

## 2019-03-20 NOTE — PROGRESS NOTES
Infusion Nursing Note:    Monika Nam presents today for A/C Neulasta.    Patient seen by provider today: Yes: BRIDGETT Tomas   present during visit today: Not Applicable.    Note: N/A.    Intravenous Access:  Implanted Port.    Treatment Conditions:  Lab Results   Component Value Date    HGB 11.4 03/20/2019     Lab Results   Component Value Date    WBC 5.4 03/20/2019      Lab Results   Component Value Date    ANEU 3.6 03/20/2019     Lab Results   Component Value Date     03/20/2019      Lab Results   Component Value Date     03/20/2019                   Lab Results   Component Value Date    POTASSIUM 3.9 03/20/2019           No results found for: MAG         Lab Results   Component Value Date    CR 0.69 03/20/2019                   Lab Results   Component Value Date    DANI 8.4 03/20/2019                Lab Results   Component Value Date    BILITOTAL 0.3 03/20/2019           Lab Results   Component Value Date    ALBUMIN 3.6 03/20/2019                    Lab Results   Component Value Date    ALT 20 03/20/2019           Lab Results   Component Value Date    AST 15 03/20/2019       Results reviewed, labs MET treatment parameters, ok to proceed with treatment.      Post Infusion Assessment:  Patient tolerated infusion without incident.  Blood return noted pre and post infusion.  Blood return noted during administration every 5 ml durin Adriamycin.  Site patent and intact, free from redness, edema or discomfort.  No evidence of extravasations.  Access discontinued per protocol.       Discharge Plan:   Patient discharged in stable condition accompanied by: .  Departure Mode: Ambulatory.    Shirin Riddle RN

## 2019-03-26 NOTE — TELEPHONE ENCOUNTER
RECORDS STATUS - BREAST    RECORDS REQUESTED FROM: Internal   DATE REQUESTED: 3.26.19   NOTES DETAILS STATUS   OFFICE NOTE from referring provider [facility] 12.26.18, Oncology Visit, Dr. Madison Gonzales   OFFICE NOTE from medical oncologist     OFFICE NOTE from surgeon     OFFICE NOTE from radiation oncologist     DISCHARGE SUMMARY from hospital     DISCHARGE REPORT from the ER     OPERATIVE REPORT     MEDICATION LIST     CLINICAL TRIAL TREATMENTS TO DATE     LABS     PATHOLOGY REPORTS  (Tissue diagnosis, Stage, ER/ND percentage positive and intensity of staining, HER2 IHC, FISH, and all biopsies from breast and any distant metastasis)                 Internal 1/23/19, Surg Path, Left Breast  12/17/18, HER 2 ELLIE FISH  12/17/18, Needle Biopsy, Left Breast     GENONOMIC TESTING     TYPE:   (Next Generation Sequencing, including Foundation One testing, and Oncotype score)     IMAGING (NEED IMAGES & REPORT)     CT SCANS     MRI     MAMMO     ULTRASOUND     PET     BONE SCAN     BRAIN MRI

## 2019-04-03 ENCOUNTER — HOSPITAL ENCOUNTER (OUTPATIENT)
Facility: CLINIC | Age: 59
Discharge: HOME | End: 2019-04-03
Attending: INTERNAL MEDICINE | Admitting: INTERNAL MEDICINE
Payer: COMMERCIAL

## 2019-04-03 ENCOUNTER — ONCOLOGY VISIT (OUTPATIENT)
Dept: ONCOLOGY | Facility: CLINIC | Age: 59
End: 2019-04-03
Attending: INTERNAL MEDICINE
Payer: COMMERCIAL

## 2019-04-03 ENCOUNTER — INFUSION THERAPY VISIT (OUTPATIENT)
Dept: INFUSION THERAPY | Facility: CLINIC | Age: 59
End: 2019-04-03
Attending: INTERNAL MEDICINE
Payer: COMMERCIAL

## 2019-04-03 VITALS
WEIGHT: 201.9 LBS | DIASTOLIC BLOOD PRESSURE: 86 MMHG | HEIGHT: 68 IN | RESPIRATION RATE: 16 BRPM | OXYGEN SATURATION: 98 % | SYSTOLIC BLOOD PRESSURE: 131 MMHG | TEMPERATURE: 98.8 F | HEART RATE: 96 BPM | BODY MASS INDEX: 30.6 KG/M2

## 2019-04-03 VITALS — SYSTOLIC BLOOD PRESSURE: 130 MMHG | HEART RATE: 80 BPM | DIASTOLIC BLOOD PRESSURE: 74 MMHG

## 2019-04-03 DIAGNOSIS — C50.112 INFILTRATING DUCTAL CARCINOMA OF CENTRAL PORTION OF LEFT BREAST IN FEMALE (H): Primary | ICD-10-CM

## 2019-04-03 DIAGNOSIS — K21.9 GASTROESOPHAGEAL REFLUX DISEASE WITHOUT ESOPHAGITIS: ICD-10-CM

## 2019-04-03 DIAGNOSIS — J45.20 MILD INTERMITTENT ASTHMA WITHOUT COMPLICATION: ICD-10-CM

## 2019-04-03 DIAGNOSIS — C50.912 ESTROGEN RECEPTOR NEGATIVE CARCINOMA OF BREAST, LEFT (H): ICD-10-CM

## 2019-04-03 DIAGNOSIS — G47.33 OBSTRUCTIVE SLEEP APNEA SYNDROME: ICD-10-CM

## 2019-04-03 DIAGNOSIS — Z17.1 ESTROGEN RECEPTOR NEGATIVE CARCINOMA OF BREAST, LEFT (H): ICD-10-CM

## 2019-04-03 DIAGNOSIS — F06.4 ANXIETY DISORDER DUE TO MEDICAL CONDITION: ICD-10-CM

## 2019-04-03 LAB
ANISOCYTOSIS BLD QL SMEAR: SLIGHT
BASOPHILS # BLD AUTO: 0 10E9/L (ref 0–0.2)
BASOPHILS NFR BLD AUTO: 0 %
DIFFERENTIAL METHOD BLD: ABNORMAL
EOSINOPHIL # BLD AUTO: 0 10E9/L (ref 0–0.7)
EOSINOPHIL NFR BLD AUTO: 0 %
ERYTHROCYTE [DISTWIDTH] IN BLOOD BY AUTOMATED COUNT: 16.1 % (ref 10–15)
HCT VFR BLD AUTO: 32.2 % (ref 35–47)
HGB BLD-MCNC: 10.7 G/DL (ref 11.7–15.7)
LYMPHOCYTES # BLD AUTO: 0.7 10E9/L (ref 0.8–5.3)
LYMPHOCYTES NFR BLD AUTO: 13 %
MCH RBC QN AUTO: 30.7 PG (ref 26.5–33)
MCHC RBC AUTO-ENTMCNC: 33.2 G/DL (ref 31.5–36.5)
MCV RBC AUTO: 92 FL (ref 78–100)
METAMYELOCYTES # BLD: 0.1 10E9/L
METAMYELOCYTES NFR BLD MANUAL: 2 %
MONOCYTES # BLD AUTO: 0.6 10E9/L (ref 0–1.3)
MONOCYTES NFR BLD AUTO: 12 %
MYELOCYTES # BLD: 0.4 10E9/L
MYELOCYTES NFR BLD MANUAL: 8 %
NEUTROPHILS # BLD AUTO: 3.4 10E9/L (ref 1.6–8.3)
NEUTROPHILS NFR BLD AUTO: 65 %
PLATELET # BLD AUTO: 164 10E9/L (ref 150–450)
PLATELET # BLD EST: ABNORMAL 10*3/UL
POLYCHROMASIA BLD QL SMEAR: SLIGHT
RBC # BLD AUTO: 3.49 10E12/L (ref 3.8–5.2)
WBC # BLD AUTO: 5.3 10E9/L (ref 4–11)

## 2019-04-03 PROCEDURE — G0463 HOSPITAL OUTPT CLINIC VISIT: HCPCS | Mod: 25

## 2019-04-03 PROCEDURE — 96413 CHEMO IV INFUSION 1 HR: CPT

## 2019-04-03 PROCEDURE — 96411 CHEMO IV PUSH ADDL DRUG: CPT

## 2019-04-03 PROCEDURE — 96375 TX/PRO/DX INJ NEW DRUG ADDON: CPT

## 2019-04-03 PROCEDURE — 96377 APPLICATON ON-BODY INJECTOR: CPT | Mod: XS

## 2019-04-03 PROCEDURE — 25800030 ZZH RX IP 258 OP 636: Performed by: INTERNAL MEDICINE

## 2019-04-03 PROCEDURE — 96367 TX/PROPH/DG ADDL SEQ IV INF: CPT

## 2019-04-03 PROCEDURE — 85025 COMPLETE CBC W/AUTO DIFF WBC: CPT | Performed by: INTERNAL MEDICINE

## 2019-04-03 PROCEDURE — 25000128 H RX IP 250 OP 636: Performed by: INTERNAL MEDICINE

## 2019-04-03 PROCEDURE — 99215 OFFICE O/P EST HI 40 MIN: CPT | Performed by: INTERNAL MEDICINE

## 2019-04-03 RX ORDER — PALONOSETRON 0.05 MG/ML
0.25 INJECTION, SOLUTION INTRAVENOUS ONCE
Status: CANCELLED
Start: 2019-04-03

## 2019-04-03 RX ORDER — DIPHENHYDRAMINE HYDROCHLORIDE 50 MG/ML
50 INJECTION INTRAMUSCULAR; INTRAVENOUS
Status: CANCELLED
Start: 2019-04-03

## 2019-04-03 RX ORDER — METHYLPREDNISOLONE SODIUM SUCCINATE 125 MG/2ML
125 INJECTION, POWDER, LYOPHILIZED, FOR SOLUTION INTRAMUSCULAR; INTRAVENOUS
Status: CANCELLED
Start: 2019-04-03

## 2019-04-03 RX ORDER — DEXAMETHASONE 4 MG/1
TABLET ORAL
Qty: 10 TABLET | Refills: 0 | Status: SHIPPED | OUTPATIENT
Start: 2019-04-03 | End: 2019-06-19

## 2019-04-03 RX ORDER — ACETAMINOPHEN 500 MG
500-1000 TABLET ORAL EVERY 6 HOURS PRN
Status: ON HOLD | COMMUNITY
End: 2021-06-23

## 2019-04-03 RX ORDER — LORAZEPAM 2 MG/ML
0.5 INJECTION INTRAMUSCULAR EVERY 4 HOURS PRN
Status: CANCELLED
Start: 2019-04-03

## 2019-04-03 RX ORDER — ALBUTEROL SULFATE 0.83 MG/ML
2.5 SOLUTION RESPIRATORY (INHALATION)
Status: CANCELLED | OUTPATIENT
Start: 2019-04-03

## 2019-04-03 RX ORDER — HEPARIN SODIUM (PORCINE) LOCK FLUSH IV SOLN 100 UNIT/ML 100 UNIT/ML
5 SOLUTION INTRAVENOUS
Status: CANCELLED | OUTPATIENT
Start: 2019-04-03

## 2019-04-03 RX ORDER — DOXORUBICIN HYDROCHLORIDE 2 MG/ML
60 INJECTION, SOLUTION INTRAVENOUS ONCE
Status: COMPLETED | OUTPATIENT
Start: 2019-04-03 | End: 2019-04-03

## 2019-04-03 RX ORDER — EPINEPHRINE 1 MG/ML
0.3 INJECTION, SOLUTION, CONCENTRATE INTRAVENOUS EVERY 5 MIN PRN
Status: CANCELLED | OUTPATIENT
Start: 2019-04-03

## 2019-04-03 RX ORDER — EPINEPHRINE 0.3 MG/.3ML
0.3 INJECTION SUBCUTANEOUS EVERY 5 MIN PRN
Status: CANCELLED | OUTPATIENT
Start: 2019-04-03

## 2019-04-03 RX ORDER — DOXORUBICIN HYDROCHLORIDE 2 MG/ML
60 INJECTION, SOLUTION INTRAVENOUS ONCE
Status: CANCELLED | OUTPATIENT
Start: 2019-04-03

## 2019-04-03 RX ORDER — ALBUTEROL SULFATE 90 UG/1
1-2 AEROSOL, METERED RESPIRATORY (INHALATION)
Status: CANCELLED
Start: 2019-04-03

## 2019-04-03 RX ORDER — HEPARIN SODIUM (PORCINE) LOCK FLUSH IV SOLN 100 UNIT/ML 100 UNIT/ML
5 SOLUTION INTRAVENOUS
Status: DISCONTINUED | OUTPATIENT
Start: 2019-04-03 | End: 2019-04-03 | Stop reason: HOSPADM

## 2019-04-03 RX ORDER — PALONOSETRON 0.05 MG/ML
0.25 INJECTION, SOLUTION INTRAVENOUS ONCE
Status: COMPLETED | OUTPATIENT
Start: 2019-04-03 | End: 2019-04-03

## 2019-04-03 RX ORDER — MEPERIDINE HYDROCHLORIDE 25 MG/ML
25 INJECTION INTRAMUSCULAR; INTRAVENOUS; SUBCUTANEOUS EVERY 30 MIN PRN
Status: CANCELLED | OUTPATIENT
Start: 2019-04-03

## 2019-04-03 RX ORDER — SODIUM CHLORIDE 9 MG/ML
1000 INJECTION, SOLUTION INTRAVENOUS CONTINUOUS PRN
Status: CANCELLED
Start: 2019-04-03

## 2019-04-03 RX ADMIN — DEXAMETHASONE SODIUM PHOSPHATE: 10 INJECTION, SOLUTION INTRAMUSCULAR; INTRAVENOUS at 10:06

## 2019-04-03 RX ADMIN — PEGFILGRASTIM 6 MG: KIT SUBCUTANEOUS at 10:49

## 2019-04-03 RX ADMIN — PALONOSETRON HYDROCHLORIDE 0.25 MG: 0.25 INJECTION INTRAVENOUS at 10:06

## 2019-04-03 RX ADMIN — SODIUM CHLORIDE 250 ML: 9 INJECTION, SOLUTION INTRAVENOUS at 09:48

## 2019-04-03 RX ADMIN — DOXORUBICIN HYDROCHLORIDE 127 MG: 2 INJECTION, SOLUTION INTRAVENOUS at 10:32

## 2019-04-03 RX ADMIN — Medication 5 ML: at 11:21

## 2019-04-03 RX ADMIN — CYCLOPHOSPHAMIDE 1265 MG: 1 INJECTION, POWDER, FOR SOLUTION INTRAVENOUS; ORAL at 10:44

## 2019-04-03 RX ADMIN — FAMOTIDINE 20 MG: 20 INJECTION, SOLUTION INTRAVENOUS at 09:48

## 2019-04-03 ASSESSMENT — PAIN SCALES - GENERAL: PAINLEVEL: NO PAIN (0)

## 2019-04-03 ASSESSMENT — MIFFLIN-ST. JEOR: SCORE: 1539.18

## 2019-04-03 NOTE — PROGRESS NOTES
"Oncology Rooming Note    April 3, 2019 9:18 AM   Monika Nam is a 58 year old female who presents for:    Chief Complaint   Patient presents with     Oncology Clinic Visit     Labs & Chemo today with cycle 3 for Infiltrating ductal carcinoma of central portion of left breast in female (     Initial Vitals: /86 (BP Location: Right arm, Patient Position: Sitting, Cuff Size: Adult Regular)   Pulse 96   Temp 98.8  F (37.1  C) (Tympanic)   Resp 16   Ht 1.727 m (5' 7.99\")   Wt 91.6 kg (201 lb 14.4 oz)   LMP 04/02/2010   SpO2 98%   BMI 30.71 kg/m   Estimated body mass index is 30.71 kg/m  as calculated from the following:    Height as of this encounter: 1.727 m (5' 7.99\").    Weight as of this encounter: 91.6 kg (201 lb 14.4 oz). Body surface area is 2.1 meters squared.  No Pain (0) Comment: Data Unavailable   Patient's last menstrual period was 04/02/2010.  Allergies reviewed: Yes  Medications reviewed: Yes    Medications: Medication refills not needed today.  Pharmacy name entered into [employer]: [facility] - [place], [place] - [address] AT [facility] OF [address] [place] [address]    Clinical concerns: Labs & Chemo today with cycle 3 for Infiltrating ductal carcinoma of central portion of left breast in female.     No questions or concerns today.       Monika Curtis, SAW              "

## 2019-04-03 NOTE — PATIENT INSTRUCTIONS
Proceed with cycle #4 of Adriamycin and Cytoxan today.  Schedule first cycle of Taxol in 2 weeks.  Patient will receive dexamethasone 5 tablets the night before and the morning of first Taxol infusion.  Follow-up in 3 weeks

## 2019-04-03 NOTE — PROGRESS NOTES
Hematology/ Oncology Follow-up Visit:  Apr 3, 2019    Reason for Visit:   Chief Complaint   Patient presents with     Oncology Clinic Visit     Labs & Chemo today with cycle 3 for Infiltrating ductal carcinoma of central portion of left breast in female (       Oncologic History:    Infiltrating ductal carcinoma of central portion of left breast in female (H)  Monika Nam presented following her annual mammogram with new focal asymmetry at 12-1 o'clock position of the left breast around 10.7 cm from the nipple.  It measured 1.4 cm.  Subsequently the patient went on to have breast ultrasound on member 30th 2018 showing 1.6 cm in maximum dimension hypoechoic mass.  Subsequently ultrasound breast biopsy was done on December 17, 2018 showing invasive ductal carcinoma grade 3 out of 3 angiolymphatic invasion was not identified.  Ductal carcinoma in situ was not identified . estrogen receptor was negative, progesterone receptor was negative and HER-2/mercedes receptor was negative.        1/2019 left lumpectomy found 1.8 cm IDC, grade III, LN-, margin is clear, pT1cN0    The patient was started on dose dense chemotherapy with Adriamycin and Cytoxan followed by Taxol      Interval History:  She returns today for follow-up.  She will be receiving cycle #4 of Adriamycin and Cytoxan today.  She has been tolerating chemotherapy well with mild nausea.  She denies any fever or chills.  She denies any shortness of breath or cough or wheezing.  She denies any diarrhea.    Review Of Systems:  Constitutional: Negative for fever, chills, and night sweats.  Skin: negative.  Eyes: negative.  Ears/Nose/Throat: negative.  Respiratory: No shortness of breath, dyspnea on exertion, cough, or hemoptysis.  Cardiovascular: negative.  Gastrointestinal: negative.  Genitourinary: negative.  Musculoskeletal: negative.  Neurologic: negative.  Psychiatric: negative.  Hematologic/Lymphatic/Immunologic: negative.  Endocrine: negative.    All other ROS  negative unless mentioned in interval history.    Past medical, social, surgical, and family histories reviewed.    Allergies:  Allergies as of 04/03/2019 - Reviewed 04/03/2019   Allergen Reaction Noted     Sulfa drugs Swelling 06/09/2005       Current Medications:  Current Outpatient Medications   Medication Sig Dispense Refill     acetaminophen (TYLENOL) 500 MG tablet Take 500-1,000 mg by mouth every 6 hours as needed for mild pain or pain       albuterol (PROAIR HFA/PROVENTIL HFA/VENTOLIN HFA) 108 (90 Base) MCG/ACT inhaler Inhale 2 puffs into the lungs every 4 hours as needed for shortness of breath / dyspnea or wheezing 1 Inhaler 11     dexamethasone (DECADRON) 4 MG tablet Take 5 tablets the night before chemotherapy.  5 tablets the morning before chemotherapy.. 10 tablet 0     FLUoxetine (PROZAC) 20 MG capsule Take 20 mg daily. (Patient taking differently: Take 20 mg by mouth every evening ) 30 capsule 11     loratadine (CLARITIN) 10 MG tablet Take 10 mg by mouth daily.         LORazepam (ATIVAN) 0.5 MG tablet Take 1 tablet (0.5 mg) by mouth every 4 hours as needed (Anxiety, Nausea/Vomiting or Sleep) 30 tablet 5     MULTIVITAMIN TABS   OR 1 TABLET BY MOUTH DAILY       ranitidine (ZANTAC) 150 MG tablet Take 1 tablet (150 mg) by mouth daily       aspirin 81 MG tablet Take 1 tablet (81 mg) by mouth daily       augmented betamethasone dipropionate (DIPROLENE-AF) 0.05 % cream APPLY EXTERNALLY TO THE AFFECTED AREA TWICE DAILY 50 g 6     calcium-vitamin D (CALCIUM 600-D) 600-400 MG-UNIT per tablet Take 1 tablet by mouth daily       ondansetron (ZOFRAN) 4 MG tablet Take 1 tablet (4 mg) by mouth every 8 hours as needed for nausea (Patient not taking: Reported on 4/3/2019) 20 tablet 1     prochlorperazine (COMPAZINE) 10 MG tablet Take 0.5 tablets (5 mg) by mouth every 6 hours as needed (Nausea/Vomiting) (Patient not taking: Reported on 3/6/2019) 30 tablet 5        Physical Exam:  /86 (BP Location: Right arm,  "Patient Position: Sitting, Cuff Size: Adult Regular)   Pulse 96   Temp 98.8  F (37.1  C) (Tympanic)   Resp 16   Ht 1.727 m (5' 7.99\")   Wt 91.6 kg (201 lb 14.4 oz)   LMP 04/02/2010   SpO2 98%   BMI 30.71 kg/m    Wt Readings from Last 12 Encounters:   04/03/19 91.6 kg (201 lb 14.4 oz)   03/20/19 92.6 kg (204 lb 3.2 oz)   03/20/19 91.8 kg (202 lb 6.4 oz)   03/06/19 92.2 kg (203 lb 3.2 oz)   02/14/19 93.2 kg (205 lb 6.4 oz)   01/28/19 92.1 kg (203 lb)   01/23/19 92.1 kg (203 lb)   01/07/19 92.1 kg (203 lb)   12/31/18 93 kg (205 lb)   12/26/18 93 kg (205 lb)   11/16/18 93.9 kg (207 lb)   11/15/17 97.5 kg (215 lb)     ECOG performance status: 1  GENERAL APPEARANCE: Healthy, alert and in no acute distress.  HEENT: Sclerae anicteric. PERRLA. Oropharynx without ulcers, lesions, or thrush.  NECK: Supple. No asymmetry or masses.  LYMPHATICS: No palpable cervical, supraclavicular, axillary, or inguinal lymphadenopathy.  RESP: Lungs clear to auscultation bilaterally without rales, rhonchi or wheezes.  CARDIOVASCULAR: Regular rate and rhythm. Normal S1, S2; no S3 or S4. No murmur, gallop, or rub.  ABDOMEN: Soft, nontender. Bowel sounds normal. No palpable organomegaly or masses.  MUSCULOSKELETAL: Extremities without gross deformities noted. No edema of bilateral lower extremities.  SKIN: No suspicious lesions or rashes.  NEURO: Alert and oriented x 3. Cranial nerves II-XII grossly intact.  PSYCHIATRIC: Mentation and affect appear normal.    Laboratory/Imaging Studies:  Infusion Therapy Visit on 04/03/2019   Component Date Value Ref Range Status     WBC 04/03/2019 5.3  4.0 - 11.0 10e9/L Final     RBC Count 04/03/2019 3.49* 3.8 - 5.2 10e12/L Final     Hemoglobin 04/03/2019 10.7* 11.7 - 15.7 g/dL Final     Hematocrit 04/03/2019 32.2* 35.0 - 47.0 % Final     MCV 04/03/2019 92  78 - 100 fl Final     MCH 04/03/2019 30.7  26.5 - 33.0 pg Final     MCHC 04/03/2019 33.2  31.5 - 36.5 g/dL Final     RDW 04/03/2019 16.1* 10.0 - 15.0 " % Final     Platelet Count 04/03/2019 164  150 - 450 10e9/L Final     Diff Method 04/03/2019 Manual Differential   Final     % Neutrophils 04/03/2019 65.0  % Final     % Lymphocytes 04/03/2019 13.0  % Final     % Monocytes 04/03/2019 12.0  % Final     % Eosinophils 04/03/2019 0.0  % Final     % Basophils 04/03/2019 0.0  % Final     % Metamyelocytes 04/03/2019 2.0  % Final     % Myelocytes 04/03/2019 8.0  % Final     Absolute Neutrophil 04/03/2019 3.4  1.6 - 8.3 10e9/L Final     Absolute Lymphocytes 04/03/2019 0.7* 0.8 - 5.3 10e9/L Final     Absolute Monocytes 04/03/2019 0.6  0.0 - 1.3 10e9/L Final     Absolute Eosinophils 04/03/2019 0.0  0.0 - 0.7 10e9/L Final     Absolute Basophils 04/03/2019 0.0  0.0 - 0.2 10e9/L Final     Absolute Metamyelocytes 04/03/2019 0.1* 0 10e9/L Final     Absolute Myelocytes 04/03/2019 0.4* 0 10e9/L Final     Anisocytosis 04/03/2019 Slight   Final     Polychromasia 04/03/2019 Slight   Final     Platelet Estimate 04/03/2019 Confirming automated cell count   Final            Assessment and plan:    (C50.112) Infiltrating ductal carcinoma of central portion of left breast in female (H)  (primary encounter diagnosis)  We will proceed today with cycle #4 of Adriamycin and Cytoxan.  She will be starting Taxol treatment in 2 weeks time.  We reviewed with the patient potential side effects of Taxol.  She would be receiving Taxol weekly for 12 infusions.  Patient will be a candidate for radiation therapy following chemotherapy.  Patient is not a candidate for endocrine therapy as the patient is estrogen receptor and progesterone receptor negative.  I gave the patient an overview about further follow-up following treatment of breast cancer.  We also talked about survivorship planning as well.    (K21.9) Gastroesophageal reflux disease without esophagitis  Patient currently on Zantac 150 milligrams twice daily.    (J45.20) Mild intermittent asthma without complication  Patient symptoms has been well  controlled.  She is using albuterol inhaler as needed.    (F06.4) Anxiety disorder due to medical condition  Patient currently on Prozac 20 mg orally daily.    The patient is ready to learn, no apparent learning barriers were identified.  Diagnosis and treatment plans were explained to the patient. The patient expressed understanding of the content. The patient asked appropriate questions. The patient questions were answered to her satisfaction.    Chart documentation with Dragon Voice recognition Software. Although reviewed after completion, some words and grammatical errors may remain.

## 2019-04-03 NOTE — PATIENT INSTRUCTIONS
Your On-body Neulasta Injector was applied to your right side of your abdomen at 1050am.  At approximately 150PM on Thursday 4/4/2019, your On-body Injector will beep to let you know your dose delivery will begin in 2 minutes.  Your medication will be delivered over the next 45 minutes.  You can remove your Injector at 250pm tomorrow.  Please make sure your Injector has a solid green light or has turned off prior to removing the device.  Please contact your provider at [phone] with questions or concerns.

## 2019-04-03 NOTE — PROGRESS NOTES
Infusion Nursing Note:  Monika Nam presents today for Adraimycin/Cutoxan C4D1.    Patient seen by provider today: Yes: Dr. Gonzales   present during visit today: Not Applicable.    Note: N/A.    Intravenous Access:  Implanted Port.    Treatment Conditions:  Lab Results   Component Value Date    HGB 10.7 04/03/2019     Lab Results   Component Value Date    WBC 5.3 04/03/2019      Lab Results   Component Value Date    ANEU 3.4 04/03/2019     Lab Results   Component Value Date     04/03/2019   ECHO/MUGA completed on 1/7/2019 -70%  Results reviewed, labs MET treatment parameters, ok to proceed with treatment.    Post Infusion Assessment:  Patient tolerated infusion without incident.  Blood return noted during administration every 2-3 cc.  Site patent and intact, free from redness, edema or discomfort.  No evidence of extravasations.  Access discontinued per protocol.     ONPRO  Was placed on patient's: right side of abdomen.    Was placed at 1050 AM    ONPRO injector device Lot number: [id number]    Patient education included: what patient can expect after application, what colored lights mean on the device, when to remove device, when and where to call with questions or issues, all patients questions answered and that Neulasta administration will occur at 1350 tomorrow.    Patient tolerated administration well.    Discharge Plan:   Discharge instructions reviewed with: Patient.  Patient and/or family verbalized understanding of discharge instructions and all questions answered.  AVS to patient via [employer].  Patient will return 4/17/2019 for next appointment.   Patient discharged in stable condition accompanied by: self and .  Departure Mode: Ambulatory.    Noelle Kline RN

## 2019-04-03 NOTE — LETTER
"    4/3/2019         RE: Monika Nam  [address] [facility] [id number]        Dear Colleague,    Thank you for referring your patient, Monika Nam, to the [facility]. Please see a copy of my visit note below.    Oncology Rooming Note    April 3, 2019 9:18 AM   Monika Nam is a 58 year old female who presents for:    Chief Complaint   Patient presents with     Oncology Clinic Visit     Labs & Chemo today with cycle 3 for Infiltrating ductal carcinoma of central portion of left breast in female (     Initial Vitals: /86 (BP Location: Right arm, Patient Position: Sitting, Cuff Size: Adult Regular)   Pulse 96   Temp 98.8  F (37.1  C) (Tympanic)   Resp 16   Ht 1.727 m (5' 7.99\")   Wt 91.6 kg (201 lb 14.4 oz)   LMP 04/02/2010   SpO2 98%   BMI 30.71 kg/m    Estimated body mass index is 30.71 kg/m  as calculated from the following:    Height as of this encounter: 1.727 m (5' 7.99\").    Weight as of this encounter: 91.6 kg (201 lb 14.4 oz). Body surface area is 2.1 meters squared.  No Pain (0) Comment: Data Unavailable   Patient's last menstrual period was 04/02/2010.  Allergies reviewed: Yes  Medications reviewed: Yes    Medications: Medication refills not needed today.  Pharm[facility] entered into [employer] - [address] AT [address]    Clinical concerns: Labs & Chemo today with cycle 3 for Infiltrating ductal carcinoma of central portion of left breast in female.     No questions or concerns today.       Monika Curtis CMA                Hematology/ Oncology Follow-up Visit:  Apr 3, 2019    Reason for Visit:   Chief Complaint   Patient presents with     Oncology Clinic Visit     Labs & Chemo today with cycle 3 for Infiltrating ductal carcinoma of central portion of left breast in female (       Oncologic History:    Infiltrating ductal carcinoma of central portion of left breast in female (H)  Monika" Cyril presented following her annual mammogram with new focal asymmetry at 12-1 o'clock position of the left breast around 10.7 cm from the nipple.  It measured 1.4 cm.  Subsequently the patient went on to have breast ultrasound on member 30th 2018 showing 1.6 cm in maximum dimension hypoechoic mass.  Subsequently ultrasound breast biopsy was done on December 17, 2018 showing invasive ductal carcinoma grade 3 out of 3 angiolymphatic invasion was not identified.  Ductal carcinoma in situ was not identified . estrogen receptor was negative, progesterone receptor was negative and HER-2/mercedes receptor was negative.        1/2019 left lumpectomy found 1.8 cm IDC, grade III, LN-, margin is clear, pT1cN0    The patient was started on dose dense chemotherapy with Adriamycin and Cytoxan followed by Taxol      Interval History:  She returns today for follow-up.  She will be receiving cycle #4 of Adriamycin and Cytoxan today.  She has been tolerating chemotherapy well with mild nausea.  She denies any fever or chills.  She denies any shortness of breath or cough or wheezing.  She denies any diarrhea.    Review Of Systems:  Constitutional: Negative for fever, chills, and night sweats.  Skin: negative.  Eyes: negative.  Ears/Nose/Throat: negative.  Respiratory: No shortness of breath, dyspnea on exertion, cough, or hemoptysis.  Cardiovascular: negative.  Gastrointestinal: negative.  Genitourinary: negative.  Musculoskeletal: negative.  Neurologic: negative.  Psychiatric: negative.  Hematologic/Lymphatic/Immunologic: negative.  Endocrine: negative.    All other ROS negative unless mentioned in interval history.    Past medical, social, surgical, and family histories reviewed.    Allergies:  Allergies as of 04/03/2019 - Reviewed 04/03/2019   Allergen Reaction Noted     Sulfa drugs Swelling 06/09/2005       Current Medications:  Current Outpatient Medications   Medication Sig Dispense Refill     acetaminophen (TYLENOL) 500 MG  "tablet Take 500-1,000 mg by mouth every 6 hours as needed for mild pain or pain       albuterol (PROAIR HFA/PROVENTIL HFA/VENTOLIN HFA) 108 (90 Base) MCG/ACT inhaler Inhale 2 puffs into the lungs every 4 hours as needed for shortness of breath / dyspnea or wheezing 1 Inhaler 11     dexamethasone (DECADRON) 4 MG tablet Take 5 tablets the night before chemotherapy.  5 tablets the morning before chemotherapy.. 10 tablet 0     FLUoxetine (PROZAC) 20 MG capsule Take 20 mg daily. (Patient taking differently: Take 20 mg by mouth every evening ) 30 capsule 11     loratadine (CLARITIN) 10 MG tablet Take 10 mg by mouth daily.         LORazepam (ATIVAN) 0.5 MG tablet Take 1 tablet (0.5 mg) by mouth every 4 hours as needed (Anxiety, Nausea/Vomiting or Sleep) 30 tablet 5     MULTIVITAMIN TABS   OR 1 TABLET BY MOUTH DAILY       ranitidine (ZANTAC) 150 MG tablet Take 1 tablet (150 mg) by mouth daily       aspirin 81 MG tablet Take 1 tablet (81 mg) by mouth daily       augmented betamethasone dipropionate (DIPROLENE-AF) 0.05 % cream APPLY EXTERNALLY TO THE AFFECTED AREA TWICE DAILY 50 g 6     calcium-vitamin D (CALCIUM 600-D) 600-400 MG-UNIT per tablet Take 1 tablet by mouth daily       ondansetron (ZOFRAN) 4 MG tablet Take 1 tablet (4 mg) by mouth every 8 hours as needed for nausea (Patient not taking: Reported on 4/3/2019) 20 tablet 1     prochlorperazine (COMPAZINE) 10 MG tablet Take 0.5 tablets (5 mg) by mouth every 6 hours as needed (Nausea/Vomiting) (Patient not taking: Reported on 3/6/2019) 30 tablet 5        Physical Exam:  /86 (BP Location: Right arm, Patient Position: Sitting, Cuff Size: Adult Regular)   Pulse 96   Temp 98.8  F (37.1  C) (Tympanic)   Resp 16   Ht 1.727 m (5' 7.99\")   Wt 91.6 kg (201 lb 14.4 oz)   LMP 04/02/2010   SpO2 98%   BMI 30.71 kg/m     Wt Readings from Last 12 Encounters:   04/03/19 91.6 kg (201 lb 14.4 oz)   03/20/19 92.6 kg (204 lb 3.2 oz)   03/20/19 91.8 kg (202 lb 6.4 oz) "   03/06/19 92.2 kg (203 lb 3.2 oz)   02/14/19 93.2 kg (205 lb 6.4 oz)   01/28/19 92.1 kg (203 lb)   01/23/19 92.1 kg (203 lb)   01/07/19 92.1 kg (203 lb)   12/31/18 93 kg (205 lb)   12/26/18 93 kg (205 lb)   11/16/18 93.9 kg (207 lb)   11/15/17 97.5 kg (215 lb)     ECOG performance status: 1  GENERAL APPEARANCE: Healthy, alert and in no acute distress.  HEENT: Sclerae anicteric. PERRLA. Oropharynx without ulcers, lesions, or thrush.  NECK: Supple. No asymmetry or masses.  LYMPHATICS: No palpable cervical, supraclavicular, axillary, or inguinal lymphadenopathy.  RESP: Lungs clear to auscultation bilaterally without rales, rhonchi or wheezes.  CARDIOVASCULAR: Regular rate and rhythm. Normal S1, S2; no S3 or S4. No murmur, gallop, or rub.  ABDOMEN: Soft, nontender. Bowel sounds normal. No palpable organomegaly or masses.  MUSCULOSKELETAL: Extremities without gross deformities noted. No edema of bilateral lower extremities.  SKIN: No suspicious lesions or rashes.  NEURO: Alert and oriented x 3. Cranial nerves II-XII grossly intact.  PSYCHIATRIC: Mentation and affect appear normal.    Laboratory/Imaging Studies:  Infusion Therapy Visit on 04/03/2019   Component Date Value Ref Range Status     WBC 04/03/2019 5.3  4.0 - 11.0 10e9/L Final     RBC Count 04/03/2019 3.49* 3.8 - 5.2 10e12/L Final     Hemoglobin 04/03/2019 10.7* 11.7 - 15.7 g/dL Final     Hematocrit 04/03/2019 32.2* 35.0 - 47.0 % Final     MCV 04/03/2019 92  78 - 100 fl Final     MCH 04/03/2019 30.7  26.5 - 33.0 pg Final     MCHC 04/03/2019 33.2  31.5 - 36.5 g/dL Final     RDW 04/03/2019 16.1* 10.0 - 15.0 % Final     Platelet Count 04/03/2019 164  150 - 450 10e9/L Final     Diff Method 04/03/2019 Manual Differential   Final     % Neutrophils 04/03/2019 65.0  % Final     % Lymphocytes 04/03/2019 13.0  % Final     % Monocytes 04/03/2019 12.0  % Final     % Eosinophils 04/03/2019 0.0  % Final     % Basophils 04/03/2019 0.0  % Final     % Metamyelocytes 04/03/2019  2.0  % Final     % Myelocytes 04/03/2019 8.0  % Final     Absolute Neutrophil 04/03/2019 3.4  1.6 - 8.3 10e9/L Final     Absolute Lymphocytes 04/03/2019 0.7* 0.8 - 5.3 10e9/L Final     Absolute Monocytes 04/03/2019 0.6  0.0 - 1.3 10e9/L Final     Absolute Eosinophils 04/03/2019 0.0  0.0 - 0.7 10e9/L Final     Absolute Basophils 04/03/2019 0.0  0.0 - 0.2 10e9/L Final     Absolute Metamyelocytes 04/03/2019 0.1* 0 10e9/L Final     Absolute Myelocytes 04/03/2019 0.4* 0 10e9/L Final     Anisocytosis 04/03/2019 Slight   Final     Polychromasia 04/03/2019 Slight   Final     Platelet Estimate 04/03/2019 Confirming automated cell count   Final            Assessment and plan:    (C50.112) Infiltrating ductal carcinoma of central portion of left breast in female (H)  (primary encounter diagnosis)  We will proceed today with cycle #4 of Adriamycin and Cytoxan.  She will be starting Taxol treatment in 2 weeks time.  We reviewed with the patient potential side effects of Taxol.  She would be receiving Taxol weekly for 12 infusions.  Patient will be a candidate for radiation therapy following chemotherapy.  Patient is not a candidate for endocrine therapy as the patient is estrogen receptor and progesterone receptor negative.  I gave the patient an overview about further follow-up following treatment of breast cancer.  We also talked about survivorship planning as well.    (K21.9) Gastroesophageal reflux disease without esophagitis  Patient currently on Zantac 150 milligrams twice daily.    (J45.20) Mild intermittent asthma without complication  Patient symptoms has been well controlled.  She is using albuterol inhaler as needed.    (F06.4) Anxiety disorder due to medical condition  Patient currently on Prozac 20 mg orally daily.    The patient is ready to learn, no apparent learning barriers were identified.  Diagnosis and treatment plans were explained to the patient. The patient expressed understanding of the content. The  patient asked appropriate questions. The patient questions were answered to her satisfaction.    Chart documentation with Dragon Voice recognition Software. Although reviewed after completion, some words and grammatical errors may remain.    Again, thank you for allowing me to participate in the care of your patient.        Sincerely,        Madison Gonzales MD

## 2019-04-08 PROBLEM — Z17.1: Status: ACTIVE | Noted: 2019-04-08

## 2019-04-08 PROBLEM — C50.912: Status: ACTIVE | Noted: 2019-04-08

## 2019-04-09 NOTE — PROGRESS NOTES
4/10/2019    Referring Provider: Madison Gonzales MD    Presenting Information:   I met with Monika Nam today for genetic counseling at the Cancer Risk Management Program at the [facility] to discuss her personal and family history of breast cancer.  She is here by herself today to review this history, cancer screening recommendations, and available genetic testing options.    Personal History:  Monika is a 58 year old female.  She was diagnosed with triple-negative infiltrating ductal breast cancer at 58. She underwent a lumpectomy in January and is being treated with chemotherapy. She also has a past history of basal cell carcinoma of the nasal bridge in 2015. This was resected.      She had her first menstrual period at age 12, her first child at age 23, and is postmenopausal as of age 53.  Monika has her ovaries, fallopian tubes and uterus in place. She reports that she has not used hormone replacement therapy. She had a colonoscopy in 2008 that was normal. Follow up was recommended in 10 years. She had a colonoscopy scheduled, but this was canceled due to her cancer treatments.    Family History: (Please see scanned pedigree for detailed family history information)    Monika's mother passed away at 47 due to Adal's disease. Monika reports being evaluated for this in the past, and does not have Adal's disease. She is aware this runs in families. Her mother passed away from complications of copper buildup. Her maternal uncle passed away in his 30s; he had a history of seizures. Looking back, Monika reports that it is now assumed he had undiagnosed Adal's disease and had copper buildup in his brain.     Monika has 7 paternal aunts and uncles, none of whom have had cancer.     Paternal grandmother had breast cancer in her 90s.     Her maternal ethnicity is Yoruba. Her paternal ethnicity is French.  There is no known Ashkenazi Anabaptist ancestry on either side of her family.      Discussion:    Monika's personal and family history of breast cancer is suggestive of a hereditary cancer syndrome. It is possible that a cancer susceptibility gene is present due to her history of triple-negative breast cancer. Due to this history, we reviewed BRCA1 and BRCA2.    Mutations in either BRCA1 or BRCA2 cause Hereditary Breast and Ovarian Cancer syndrome (HBOC).  Women with HBOC have an increased risk for breast and ovarian cancer.  There is also an increased risk for prostate and breast cancer among males with a BRCA1/BRCA2 mutation.  Males and females with BRCA1/BRCA2 mutations also face a slightly increased risk for pancreatic cancer.      We discussed the natural history and genetics of breast cancer. A detailed handout regarding BRCA1/2 and other breast and gynecologic cancer risk genes and the information we discussed was provided to Monika at the end of our appointment today and can be found in the after visit summary. Topics included: inheritance pattern, cancer risks, cancer screening recommendations, and also risks, benefits and limitations of testing.    Based on her personal and family history, Monika meets current National Comprehensive Cancer Network (NCCN) criteria for genetic testing of BRCA1/2.      We discussed that there are additional genes that could cause increased risk for breast cancer. As many of these genes present with overlapping features in a family and accurate cancer risk cannot always be established based upon the pedigree analysis alone, it would be reasonable for Monika to consider panel genetic testing to analyze multiple genes at once.    We reviewed genetic testing options for hereditary breast and gynecologic cancers: actionable high/moderate breast and gynecologic cancer risk custom panel (CustomNext-Cancer, 19 genes, a combination of GynPlus and BRCAplus + NBN, STK11, and NF1) and expanded high and moderate risk panel (OvaNext, 25 genes).  Monika expressed an  interest in learning as much information as possible from the testing. She opted for the OvaNext panel.  Genetic testing is available for 25 genes associated with hereditary gynecologic, breast, and related cancers: OvaNext (KEVYN, BARD1, BRCA1, BRCA2, BRIP1, CDH1, CHEK2, DICER1, EPCAM, MLH1, MRE11A, MSH2, MSH6, MUTYH, NBN, NF1, PALB2, PMS2, PTEN, RAD50, RAD51C, RAD51D, SMARCA4, STK11, and TP53).  We discussed that some of the genes in the OvaNext panel are associated with specific hereditary cancer syndromes and published management guidelines: Hereditary Breast and Ovarian Cancer syndrome (BRCA1, BRCA2), Oshea syndrome (MLH1, MSH2, MSH6, PMS2, EPCAM), Hereditary Diffuse Gastric Cancer (CDH1), Cowden syndrome (PTEN), Li Fraumeni syndrome (TP53), Peutz-Jeghers syndrome (STK11), MUTYH Associated Polyposis (MUTYH), and Neurofibromatosis type 1 (NF1).    Risk-reducing salpingo-oophorectomy can be considered in women with mutations in BRIP1, RAD51C, or RAD51D. Breast and/or other cancer risk management guidelines are available for KEVYN, CHEK2, PALB2, NF1, and NBN.  The remaining genes (BARD1, DICER1, MRE11A, RAD50, and SMARCA4) are associated with increased cancer risk and may allow us to make medical recommendations when mutations are identified.    Consent was obtained and genetic testing for OvaNext was sent to [facility]. Turn around time: approximately 4-5 weeks.    Medical Management: For Monika, we reviewed that the information from genetic testing may determine:    additional cancer screening for which Monika may qualify (i.e. mammogram and breast MRI, more frequent colonoscopies, more frequent dermatologic exams, etc.),    options for risk reducing surgeries Monika could consider (i.e. bilateral mastectomy, surgery to remove her ovaries and/or uterus, etc.),      and targeted chemotherapies under certain circumstances.     These recommendations and possible targeted chemotherapies will be discussed  in detail once genetic testing is completed.     Plan:  1) Today Monika elected to proceed with OvaNext.  2) This information should be available in 4-5 weeks.  3) I will call Monika to discuss the results.    Face to face time: 35 minutes    Alison Roper MS, [facility]  Licensed Genetic Counselor  P. [phone]  F. [phone]

## 2019-04-10 ENCOUNTER — PRE VISIT (OUTPATIENT)
Dept: ONCOLOGY | Facility: CLINIC | Age: 59
End: 2019-04-10

## 2019-04-10 ENCOUNTER — ONCOLOGY VISIT (OUTPATIENT)
Dept: ONCOLOGY | Facility: CLINIC | Age: 59
End: 2019-04-10
Attending: GENETIC COUNSELOR, MS
Payer: COMMERCIAL

## 2019-04-10 ENCOUNTER — HOSPITAL ENCOUNTER (OUTPATIENT)
Dept: LAB | Facility: CLINIC | Age: 59
Discharge: HOME | End: 2019-04-10
Attending: GENETIC COUNSELOR, MS | Admitting: COLON & RECTAL SURGERY
Payer: COMMERCIAL

## 2019-04-10 DIAGNOSIS — Z80.3 FAMILY HISTORY OF MALIGNANT NEOPLASM OF BREAST: ICD-10-CM

## 2019-04-10 DIAGNOSIS — C50.912 ESTROGEN RECEPTOR NEGATIVE CARCINOMA OF BREAST, LEFT (H): Primary | ICD-10-CM

## 2019-04-10 DIAGNOSIS — Z17.1 ESTROGEN RECEPTOR NEGATIVE CARCINOMA OF BREAST, LEFT (H): Primary | ICD-10-CM

## 2019-04-10 DIAGNOSIS — Z17.1 ESTROGEN RECEPTOR NEGATIVE CARCINOMA OF BREAST, LEFT (H): ICD-10-CM

## 2019-04-10 DIAGNOSIS — C50.912 ESTROGEN RECEPTOR NEGATIVE CARCINOMA OF BREAST, LEFT (H): ICD-10-CM

## 2019-04-10 LAB — MISCELLANEOUS TEST: NORMAL

## 2019-04-10 PROCEDURE — 36415 COLL VENOUS BLD VENIPUNCTURE: CPT | Performed by: COLON & RECTAL SURGERY

## 2019-04-10 PROCEDURE — 96040 ZZH GENETIC COUNSELING, EACH 30 MINUTES: CPT | Performed by: GENETIC COUNSELOR, MS

## 2019-04-10 NOTE — LETTER
Cancer Risk Management  Program Locations    [facility]  [facility]  [facility]  Mailing Address  Cancer Risk Management Program  [facility]  [address]  [place] [address]    New patient appointments  [phone]  April 10, 2019    Monika Nam  [address] [id number]      Dear Monika,    It was a pleasure meeting with you at the [facility] on 4/10/2019.  Here is a copy of the progress note from your recent genetic counseling visit to the Cancer Risk Management Program.  If you have any additional questions, please feel free to call.      Referring Provider: Madison Gonzales MD    Presenting Information:   I met with Monika Cyril today for genetic counseling at the Cancer Risk Management Program at the [facility] to discuss her personal and family history of breast cancer.  She is here by herself today to review this history, cancer screening recommendations, and available genetic testing options.    Personal History:  Monika is a 58 year old female.  She was diagnosed with triple-negative infiltrating ductal breast cancer at 58. She underwent a lumpectomy in January and is being treated with chemotherapy. She also has a past history of basal cell carcinoma of the nasal bridge in 2015. This was resected.      She had her first menstrual period at age 12, her first child at age 23, and is postmenopausal as of age 53.  Monika has her ovaries, fallopian tubes and uterus in place. She reports that she has not used hormone replacement therapy. She had a colonoscopy in 2008 that was normal. Follow up was recommended in 10 years. She had a colonoscopy scheduled, but this was canceled due to her cancer treatments.    Family History: (Please see scanned pedigree for detailed family  history information)    Monika's mother passed away at 47 due to Adal's disease. Monika reports being evaluated for this in the past, and does not have Adal's disease. She is aware this runs in families. Her mother passed away from complications of copper buildup. Her maternal uncle passed away in his 30s; he had a history of seizures. Looking back, Monika reports that it is now assumed he had undiagnosed Adal's disease and had copper buildup in his brain.     Monika has 7 paternal aunts and uncles, none of whom have had cancer.     Paternal grandmother had breast cancer in her 90s.     Her maternal ethnicity is Estonian. Her paternal ethnicity is Turkmen.  There is no known Ashkenazi Nondenominational ancestry on either side of her family.     Discussion:    Monika's personal and family history of breast cancer is suggestive of a hereditary cancer syndrome. It is possible that a cancer susceptibility gene is present due to her history of triple-negative breast cancer. Due to this history, we reviewed BRCA1 and BRCA2.    Mutations in either BRCA1 or BRCA2 cause Hereditary Breast and Ovarian Cancer syndrome (HBOC).  Women with HBOC have an increased risk for breast and ovarian cancer.  There is also an increased risk for prostate and breast cancer among males with a BRCA1/BRCA2 mutation.  Males and females with BRCA1/BRCA2 mutations also face a slightly increased risk for pancreatic cancer.      We discussed the natural history and genetics of breast cancer. A detailed handout regarding BRCA1/2 and other breast and gynecologic cancer risk genes and the information we discussed was provided to Monika at the end of our appointment today and can be found in the after visit summary. Topics included: inheritance pattern, cancer risks, cancer screening recommendations, and also risks, benefits and limitations of testing.    Based on her personal and family history, Monika meets current National Comprehensive Cancer Network (NCCN)  criteria for genetic testing of BRCA1/2.      We discussed that there are additional genes that could cause increased risk for breast cancer. As many of these genes present with overlapping features in a family and accurate cancer risk cannot always be established based upon the pedigree analysis alone, it would be reasonable for Monika to consider panel genetic testing to analyze multiple genes at once.    We reviewed genetic testing options for hereditary breast and gynecologic cancers: actionable high/moderate breast and gynecologic cancer risk custom panel (CustomNext-Cancer, 19 genes, a combination of GynPlus and BRCAplus + NBN, STK11, and NF1) and expanded high and moderate risk panel (OvaNext, 25 genes).  Monika expressed an interest in learning as much information as possible from the testing. She opted for the OvaNext panel.  Genetic testing is available for 25 genes associated with hereditary gynecologic, breast, and related cancers: OvaNext (KEVYN, BARD1, BRCA1, BRCA2, BRIP1, CDH1, CHEK2, DICER1, EPCAM, MLH1, MRE11A, MSH2, MSH6, MUTYH, NBN, NF1, PALB2, PMS2, PTEN, RAD50, RAD51C, RAD51D, SMARCA4, STK11, and TP53).  We discussed that some of the genes in the OvaNext panel are associated with specific hereditary cancer syndromes and published management guidelines: Hereditary Breast and Ovarian Cancer syndrome (BRCA1, BRCA2), Oshea syndrome (MLH1, MSH2, MSH6, PMS2, EPCAM), Hereditary Diffuse Gastric Cancer (CDH1), Cowden syndrome (PTEN), Li Fraumeni syndrome (TP53), Peutz-Jeghers syndrome (STK11), MUTYH Associated Polyposis (MUTYH), and Neurofibromatosis type 1 (NF1).    Risk-reducing salpingo-oophorectomy can be considered in women with mutations in BRIP1, RAD51C, or RAD51D. Breast and/or other cancer risk management guidelines are available for KEVYN, CHEK2, PALB2, NF1, and NBN.  The remaining genes (BARD1, DICER1, MRE11A, RAD50, and SMARCA4) are associated with increased cancer risk and may allow us to make  medical recommendations when mutations are identified.    Consent was obtained and genetic testing for OvaNext was sent to [facility]. Turn around time: approximately 4-5 weeks.    Medical Management: For Monika, we reviewed that the information from genetic testing may determine:    additional cancer screening for which Monika may qualify (i.e. mammogram and breast MRI, more frequent colonoscopies, more frequent dermatologic exams, etc.),    options for risk reducing surgeries Monika could consider (i.e. bilateral mastectomy, surgery to remove her ovaries and/or uterus, etc.),      and targeted chemotherapies under certain circumstances.     These recommendations and possible targeted chemotherapies will be discussed in detail once genetic testing is completed.     Plan:  1) Today Monika elected to proceed with OvaNext.  2) This information should be available in 4-5 weeks.  3) I will call Monika to discuss the results.    Face to face time: 35 minutes    Alison Roper MS, formerly [facility]  Licensed Genetic Counselor  P. [phone]  F. [phone]

## 2019-04-10 NOTE — PATIENT INSTRUCTIONS
Assessing Cancer Risk  Only about 5-10% of cancers are thought to be due to an inherited cancer susceptibility gene.    These families often have:    Several people with the same or related types of cancer    Cancers diagnosed at a young age (before age 50)    Individuals with more than one primary cancer    Multiple generations of the family affected with cancer    Some people may be candidates for genetic testing of more than one gene.  For these families, genetic testing using a cancer panel may be offered.  These panels will test different genes known to increase the risk for breast, ovarian, uterine, and/or other cancers. All of the genes discussed below have published clinical management guidelines for individuals who are found to carry a mutation. The purpose of this handout is to serve as a brief summary of the genes analyzed by the panels used to inquire about hereditary breast and gynecologic cancer:  KEVYN, BRCA1, BRCA2, BRIP1, CDH1, CHEK2, MLH1, MSH2, MSH6, PMS2, EPCAM, PTEN, PALB2, RAD51C, RAD51D, and TP53.  ______________________________________________________________________________  Hereditary Breast and Ovarian Cancer Syndrome   (BRCA1 and BRCA2)  A single mutation in one of the copies of BRCA1 or BRCA2 increases the risk for breast and ovarian cancer, among others.  The risk for pancreatic cancer and melanoma may also be slightly increased in some families.  The chart below shows the chance that someone with a BRCA mutation would develop cancer in his or her lifetime1,2,3,4.        A person s ethnic background is also important to consider, as individuals of Ashkenazi Yazidism ancestry have a higher chance of having a BRCA gene mutation.  There are three BRCA mutations that occur more frequently in this population.    Oshea Syndrome   (MLH1, MSH2, MSH6, PMS2, and EPCAM)  Currently five genes are known to cause Oshea Syndrome: MLH1, MSH2, MSH6, PMS2, and EPCAM.  A single mutation in one of the  Oshea Syndrome genes increases the risk for colon, endometrial, ovarian, and stomach cancers.  Other cancers that occur less commonly in Oshea Syndrome include urinary tract, skin, and brain cancers.  The chart below shows the chance that a person with Oshea syndrome would develop cancer in his or her lifetime5.      *Cancer risk varies depending on Oshea syndrome gene found    Cowden Syndrome   (PTEN)  Cowden syndrome is a hereditary condition that increases the risk for breast, thyroid, endometrial, colon, and kidney cancer.  Cowden syndrome is caused by a mutation in the PTEN gene.  A single mutation in one of the copies of PTEN causes Cowden syndrome and increases cancer risk.  The chart below shows the chance that someone with a PTEN mutation would develop cancer in their lifetime6,7.  Other benign features seen in some individuals with Cowden syndrome include benign skin lesions (facial papules, keratoses, lipomas), learning disability, autism, thyroid nodules, colon polyps, and larger head size.      *One recent study found breast cancer risk to be increased to 85%    Li-Fraumeni Syndrome   (TP53)  Li-Fraumeni Syndrome (LFS) is a cancer predisposition syndrome caused by a mutation in the TP53 gene. A single mutation in one of the copies of TP53 increases the risk for multiple cancers. Individuals with LFS are at an increased risk for developing cancer at a young age. The lifetime risk for development of a LFS-associated cancer is 50% by age 30 and 90% by age 60.   Core Cancers: Sarcomas, Breast, Brain, Lung, Leukemias/Lymphomas, Adrenocortical carcinomas  Other Cancers: Gastrointestinal, Thyroid, Skin, Genitourinary    Hereditary Diffuse Gastric Cancer   (CDH1)  Currently, one gene is known to cause hereditary diffuse gastric cancer (HDGC): CDH1.  Individuals with HDGC are at increased risk for diffuse gastric cancer and lobular breast cancer. Of people diagnosed with HDGC, 30-50% have a mutation in the CDH1  gene.  This suggests there are likely other genes that may cause HDGC that have not been identified yet.      Lifetime Cancer Risks    General Population HDGC    Diffuse Gastric  <1% ~80%   Breast 12% 39-52%         Additional Genes  KEVYN  KEVYN is a moderate-risk breast cancer gene. Women who have a mutation in KEVYN can have between a 2-4 fold increased risk for breast cancer compared to the general population8. KEVYN mutations have also been associated with increased risk for pancreatic cancer, however an estimate of this cancer risk is not well understood9. Individuals who inherit two KEVYN mutations have a condition called ataxia-telangiectasia (AT).  This rare autosomal recessive condition affects the nervous system and immune system, and is associated with progressive cerebellar ataxia beginning in childhood.  Individuals with ataxia-telangiectasia often have a weakened immune system and have an increased risk for childhood cancers.    PALB2  Mutations in PALB2 have been shown to increase the risk of breast cancer up to 33-58% in some families; where individuals fall within this risk range is dependent upon family droioef52. PALB2 mutations have also been associated with increased risk for pancreatic cancer, although this risk has not been quantified yet.  Individuals who inherit two PALB2 mutations--one from their mother and one from their father--have a condition called Fanconi Anemia.  This rare autosomal recessive condition is associated with short stature, developmental delay, bone marrow failure, and increased risk for childhood cancers.    CHEK2   CHEK2 is a moderate-risk breast cancer gene.  Women who have a mutation in CHEK2 have around a 2-fold increased risk for breast cancer compared to the general population, and this risk may be higher depending upon family history.11,12,13 Mutations in CHEK2 have also been shown to increase the risk of a number of other cancers, including colon and prostate, however  these cancer risks are currently not well understood.    BRIP1, RAD51C and RAD51D  Mutations in BRIP1, RAD51C, and RAD51D have been shown to increase the risk of ovarian cancer and possibly female breast cancer as well14,15 .       Lifetime Cancer Risk    General Population BRIP1 RAD51C RAD51D   Ovarian 1-2% ~5-8% ~5-9% ~7-15%           Inheritance  All of the cancer syndromes reviewed above are inherited in an autosomal dominant pattern.  This means that if a parent has a mutation, each of his or her children will have a 50% chance of inheriting that same mutation.  Therefore, each child--male or female--would have a 50% chance of being at increased risk for developing cancer.      Image obtained from Genetics Home Reference, 2013     Mutations in some genes can occur de nadia, which means that a person s mutation occurred for the first time in them and was not inherited from a parent.  Now that they have the mutation, however, it can be passed on to future generations.    Genetic Testing  Genetic testing involves a blood test and will look at the genetic information in the KEVYN, BRCA1, BRCA2, BRIP1, CDH1, CHEK2, MLH1, MSH2, MSH6, PMS2, EPCAM, PTEN, PALB2, RAD51C, RAD51D, and TP53 genes for any harmful mutations that are associated with increased cancer risk.  If possible, it is recommended that the person(s) who has had cancer be tested before other family members.  That person will give us the most useful information about whether or not a specific gene is associated with the cancer in the family.    Results  There are three possible results of genetic testing:    Positive--a harmful mutation was identified in one or more of the genes    Negative--no mutation was identified in any of the genes on this panel    Variant of unknown significance--a variation in one of the genes was identified, but it is unclear how this impacts cancer risk in the family    Advantages and Disadvantages   There are advantages and  disadvantages to genetic testing.    Advantages    May clarify your cancer risk    Can help you make medical decisions    May explain the cancers in your family    May give useful information to your family members (if you share your results)    Disadvantages    Possible negative emotional impact of learning about inherited cancer risk    Uncertainty in interpreting a negative test result in some situations    Possible genetic discrimination concerns (see below)    Genetic Information Nondiscrimination Act (MAVIS)  MAVIS is a federal law that protects individuals from health insurance or employment discrimination based on a genetic test result alone.  Although rare, there are currently no legal discrimination protections in terms of life insurance, long term care, or disability insurances.  Visit the [place] [employer] [place] website to learn more.    Reducing Cancer Risk  All of the genes described above have nationally recognized cancer screening guidelines that would be recommended for individuals who test positive.  In addition to increased cancer screening, surgeries may be offered or recommended to reduce cancer risk.  Recommendations are based upon an individual s genetic test result as well as their personal and family history of cancer.    Questions to Think About Regarding Genetic Testing:    What effect will the test result have on me and my relationship with my family members if I have an inherited gene mutation?  If I don t have a gene mutation?    Should I share my test results, and how will my family react to this news, which may also affect them?    Are my children ready to learn new information that may one day affect their own health?    Hereditary Cancer Resources    FORCE: Facing Our Risk of Cancer Empowered facingourrisk.org   Bright Pink bebrightpink.org   Li-Fraumeni Syndrome Association lfsassociation.org   PTEN World PTENworld.com   No stomach for cancer, Inc.  nostomachforcancer.org   Stomach cancer relief network Scrnet.org   Collaborative Group of the Americas on Inherited Colorectal Cancer (CGA) cgaicc.com    Cancer Care cancercare.org   American Cancer Society (ACS) cancer.org   National Cancer [place] (NCI) cancer.gov     Please call us if you have any questions or concerns.   Cancer Risk Management Program [phone] ([phone])  ? Alison Roper, MS, [facility]  [phone]  ? Tosha Calderon, MS, [facility]  [phone]  ? Yasmeen English, MS, [facility]  [phone]  ? Natividad Arita, MS, [facility]  [phone]  ? Loida Kev, MS, [facility] [phone]    References  1. Pia LI, Herlinda PDP, Rebeka S, Harjeet SHABAZZ, Eliecer JE, Anirudh JL, Julio Cesar N, Jefe H, Yesy O, Nickolas A, Tramaine B, Dee P, Rex S, Taryn DM, Dennis N, Daniel E, Marlene H, Rc E, Lubinski J, Gronwald J, Carson B, Tulinius H, Thorlacius S, Eerola H, Laila H, Janet K, Heidi OP. Average risks of breast and ovarian cancer associated with BRCA1 or BRCA2 mutations detected in case series unselected for family history: a combined analysis of 222 studies. Am J Hum Liane. 2003;72:1117-30.  2. Oscar N, Eli M, Ayanna G.  BRCA1 and BRCA2 Hereditary Breast and Ovarian Cancer. Gene Reviews online. 2013.  3. Rl YC, Semaj S, Myron G, Orona S. Breast cancer risk among male BRCA1 and BRCA2 mutation carriers. J Natl Cancer Inst. 2007;99:1811-4.  4. Tommie WADE, Tameka I, Leoncio J, Stewart E, Annemarie ER, Truman F. Risk of breast cancer in male BRCA2 carriers. J Med Liane. 2010;47:710-1.  5. National Comprehensive Cancer Network. Clinical practice guidelines in oncology, colorectal cancer screening. Available online (registration required). 2015.  6. Noam JUNIOR, Kee J, Mohan J, Bernice LA, Silvia MS, Eng C. Lifetime cancer risks in individuals with germline PTEN mutations. Clin Cancer Res. 2012;18:400-7.  7. [place] HERR. Cowden Syndrome: A Critical Review of the Clinical Literature. J Liane .  2009:18:13-27.  8. Mary A, Domingo D, Hyun S, Lakshmi P, Valencia T, Ronny M, Kei B, Roberta H, Sosa R, Don K, Love L, Tommie DG, Taryn D, Claude DF, Adama MR, The Breast Cancer Susceptibility Collaboration () & Levi ADRIAN. KEVYN mutations that cause ataxia-telangiectasia are breast cancer susceptibility alleles. Nature Genetics. 2006;38:873-875  9. Endy N , Ludin Y, Lucinda J, Annabelle L, Jimmy GM , Aparna ML, Gallinger S, Luo AG, Syngal S, Erica ML, Shefali J , Carlos R, Pippa SZ, Jason JR, Enrique VE, Macy M, Volevar B, Mat N, Smiley RH, Opal KW, and Mohit AP. KEVYN mutations in patients with hereditary pancreatic cancer. Cancer Discover. 2012;2:41-46  10. Pia GARCIA, et al. Breast-Cancer Risk in Families with Mutations in PALB2. NEJM. 2014; 371(6):497-506.  11. CHEK2 Breast Cancer Case-Control Consortium. CHEK2*1100delC and susceptibility to breast cancer: A collaborative analysis involving 10,860 breast cancer cases and 9,065 controls from 10 studies. Am J Hum Liane, 74 (2004), pp. 4566-8717  12. Attila T, Cathy S, Jason K, et al. Spectrum of Mutations in BRCA1, BRCA2, CHEK2, and TP53 in Families at High Risk of Breast Cancer. CHHAYA. 2006;295(12):6565-5317.   13. Shaye C, Rosanne D, Jaspreet A, et al. Risk of breast cancer in women with a CHEK2 mutation with and without a family history of breast cancer. J Clin Oncol. 2011;29:2113-9820.  14. Logan CARRILLO, Marie E, Annelise SJ, et al. Contribution of germline mutations in the RAD51B, RAD51C, and RAD51D genes to ovarian cancer in the population. J Clin Oncol. 2015;33(26):5771-8432. Doi:10.1200/JCO.2015.61.2408.  15. Alisa T, Nilesh SORENSON, Romero P, et al. Mutations in BRIP1 confer high risk of ovarian cancer. Layla Liane. 2011;43(11):5998-5805. doi:10.1038/ng.955.

## 2019-04-16 ENCOUNTER — PATIENT OUTREACH (OUTPATIENT)
Dept: ONCOLOGY | Facility: CLINIC | Age: 59
End: 2019-04-16

## 2019-04-16 DIAGNOSIS — C50.912 ESTROGEN RECEPTOR NEGATIVE CARCINOMA OF BREAST, LEFT (H): ICD-10-CM

## 2019-04-16 DIAGNOSIS — Z17.1 ESTROGEN RECEPTOR NEGATIVE CARCINOMA OF BREAST, LEFT (H): ICD-10-CM

## 2019-04-16 DIAGNOSIS — C50.112 INFILTRATING DUCTAL CARCINOMA OF CENTRAL PORTION OF LEFT BREAST IN FEMALE (H): ICD-10-CM

## 2019-04-16 RX ORDER — DIPHENHYDRAMINE HCL 25 MG
50 CAPSULE ORAL ONCE
Status: CANCELLED
Start: 2019-04-17

## 2019-04-16 RX ORDER — METHYLPREDNISOLONE SODIUM SUCCINATE 125 MG/2ML
125 INJECTION, POWDER, LYOPHILIZED, FOR SOLUTION INTRAMUSCULAR; INTRAVENOUS
Status: CANCELLED
Start: 2019-04-17

## 2019-04-16 RX ORDER — SODIUM CHLORIDE 9 MG/ML
1000 INJECTION, SOLUTION INTRAVENOUS CONTINUOUS PRN
Status: CANCELLED
Start: 2019-04-17

## 2019-04-16 RX ORDER — EPINEPHRINE 1 MG/ML
0.3 INJECTION, SOLUTION, CONCENTRATE INTRAVENOUS EVERY 5 MIN PRN
Status: CANCELLED | OUTPATIENT
Start: 2019-04-17

## 2019-04-16 RX ORDER — LORAZEPAM 2 MG/ML
0.5 INJECTION INTRAMUSCULAR EVERY 4 HOURS PRN
Status: CANCELLED
Start: 2019-04-17

## 2019-04-16 RX ORDER — MEPERIDINE HYDROCHLORIDE 25 MG/ML
25 INJECTION INTRAMUSCULAR; INTRAVENOUS; SUBCUTANEOUS EVERY 30 MIN PRN
Status: CANCELLED | OUTPATIENT
Start: 2019-04-17

## 2019-04-16 RX ORDER — ALBUTEROL SULFATE 90 UG/1
1-2 AEROSOL, METERED RESPIRATORY (INHALATION)
Status: CANCELLED
Start: 2019-04-17

## 2019-04-16 RX ORDER — DIPHENHYDRAMINE HYDROCHLORIDE 50 MG/ML
50 INJECTION INTRAMUSCULAR; INTRAVENOUS
Status: CANCELLED
Start: 2019-04-17

## 2019-04-16 RX ORDER — HEPARIN SODIUM (PORCINE) LOCK FLUSH IV SOLN 100 UNIT/ML 100 UNIT/ML
5 SOLUTION INTRAVENOUS
Status: CANCELLED | OUTPATIENT
Start: 2019-04-17

## 2019-04-16 RX ORDER — ALBUTEROL SULFATE 0.83 MG/ML
2.5 SOLUTION RESPIRATORY (INHALATION)
Status: CANCELLED | OUTPATIENT
Start: 2019-04-17

## 2019-04-16 RX ORDER — EPINEPHRINE 0.3 MG/.3ML
0.3 INJECTION SUBCUTANEOUS EVERY 5 MIN PRN
Status: CANCELLED | OUTPATIENT
Start: 2019-04-17

## 2019-04-16 NOTE — PROGRESS NOTES
Called to remind patient to take her dexamethasone 5 tablets tonight 4/16/19 and the morning of treatment. Patient understood.  Yuliet Posada RN

## 2019-04-17 ENCOUNTER — HOSPITAL ENCOUNTER (OUTPATIENT)
Facility: CLINIC | Age: 59
Discharge: HOME | End: 2019-04-17
Attending: INTERNAL MEDICINE | Admitting: INTERNAL MEDICINE
Payer: COMMERCIAL

## 2019-04-17 ENCOUNTER — INFUSION THERAPY VISIT (OUTPATIENT)
Dept: INFUSION THERAPY | Facility: CLINIC | Age: 59
End: 2019-04-17
Attending: INTERNAL MEDICINE
Payer: COMMERCIAL

## 2019-04-17 VITALS — SYSTOLIC BLOOD PRESSURE: 176 MMHG | DIASTOLIC BLOOD PRESSURE: 88 MMHG | HEART RATE: 81 BPM | TEMPERATURE: 97.9 F

## 2019-04-17 DIAGNOSIS — Z17.1 ESTROGEN RECEPTOR NEGATIVE CARCINOMA OF BREAST, LEFT (H): Primary | ICD-10-CM

## 2019-04-17 DIAGNOSIS — C50.112 INFILTRATING DUCTAL CARCINOMA OF CENTRAL PORTION OF LEFT BREAST IN FEMALE (H): ICD-10-CM

## 2019-04-17 DIAGNOSIS — C50.912 ESTROGEN RECEPTOR NEGATIVE CARCINOMA OF BREAST, LEFT (H): Primary | ICD-10-CM

## 2019-04-17 LAB
ALBUMIN SERPL-MCNC: 3.5 G/DL (ref 3.4–5)
ALP SERPL-CCNC: 98 U/L (ref 40–150)
ALT SERPL W P-5'-P-CCNC: 15 U/L (ref 0–50)
AST SERPL W P-5'-P-CCNC: 9 U/L (ref 0–45)
BASOPHILS # BLD AUTO: 0 10E9/L (ref 0–0.2)
BASOPHILS NFR BLD AUTO: 0.4 %
BILIRUB DIRECT SERPL-MCNC: 0.1 MG/DL (ref 0–0.2)
BILIRUB SERPL-MCNC: 0.4 MG/DL (ref 0.2–1.3)
DIFFERENTIAL METHOD BLD: ABNORMAL
EOSINOPHIL # BLD AUTO: 0 10E9/L (ref 0–0.7)
EOSINOPHIL NFR BLD AUTO: 0 %
ERYTHROCYTE [DISTWIDTH] IN BLOOD BY AUTOMATED COUNT: 17.7 % (ref 10–15)
HCT VFR BLD AUTO: 29.1 % (ref 35–47)
HGB BLD-MCNC: 9.4 G/DL (ref 11.7–15.7)
IMM GRANULOCYTES # BLD: 0.4 10E9/L (ref 0–0.4)
IMM GRANULOCYTES NFR BLD: 3.6 %
LYMPHOCYTES # BLD AUTO: 0.3 10E9/L (ref 0.8–5.3)
LYMPHOCYTES NFR BLD AUTO: 2.6 %
MCH RBC QN AUTO: 30.7 PG (ref 26.5–33)
MCHC RBC AUTO-ENTMCNC: 32.3 G/DL (ref 31.5–36.5)
MCV RBC AUTO: 95 FL (ref 78–100)
MONOCYTES # BLD AUTO: 0.4 10E9/L (ref 0–1.3)
MONOCYTES NFR BLD AUTO: 3.7 %
NEUTROPHILS # BLD AUTO: 8.8 10E9/L (ref 1.6–8.3)
NEUTROPHILS NFR BLD AUTO: 89.7 %
NRBC # BLD AUTO: 0.1 10*3/UL
NRBC BLD AUTO-RTO: 1 /100
PLATELET # BLD AUTO: 186 10E9/L (ref 150–450)
PROT SERPL-MCNC: 7 G/DL (ref 6.8–8.8)
RBC # BLD AUTO: 3.06 10E12/L (ref 3.8–5.2)
WBC # BLD AUTO: 9.8 10E9/L (ref 4–11)

## 2019-04-17 PROCEDURE — 96367 TX/PROPH/DG ADDL SEQ IV INF: CPT

## 2019-04-17 PROCEDURE — 85025 COMPLETE CBC W/AUTO DIFF WBC: CPT | Performed by: INTERNAL MEDICINE

## 2019-04-17 PROCEDURE — 25800030 ZZH RX IP 258 OP 636: Performed by: INTERNAL MEDICINE

## 2019-04-17 PROCEDURE — 25000128 H RX IP 250 OP 636: Performed by: INTERNAL MEDICINE

## 2019-04-17 PROCEDURE — 80076 HEPATIC FUNCTION PANEL: CPT | Performed by: INTERNAL MEDICINE

## 2019-04-17 PROCEDURE — 96413 CHEMO IV INFUSION 1 HR: CPT

## 2019-04-17 PROCEDURE — 96375 TX/PRO/DX INJ NEW DRUG ADDON: CPT

## 2019-04-17 RX ORDER — HEPARIN SODIUM (PORCINE) LOCK FLUSH IV SOLN 100 UNIT/ML 100 UNIT/ML
5 SOLUTION INTRAVENOUS
Status: DISCONTINUED | OUTPATIENT
Start: 2019-04-17 | End: 2019-04-17 | Stop reason: HOSPADM

## 2019-04-17 RX ADMIN — SODIUM CHLORIDE 250 ML: 9 INJECTION, SOLUTION INTRAVENOUS at 12:00

## 2019-04-17 RX ADMIN — DEXAMETHASONE SODIUM PHOSPHATE 20 MG: 10 INJECTION, SOLUTION INTRAMUSCULAR; INTRAVENOUS at 12:00

## 2019-04-17 RX ADMIN — DIPHENHYDRAMINE HYDROCHLORIDE 50 MG: 50 INJECTION, SOLUTION INTRAMUSCULAR; INTRAVENOUS at 12:28

## 2019-04-17 RX ADMIN — Medication 5 ML: at 14:01

## 2019-04-17 RX ADMIN — PACLITAXEL 169 MG: 6 INJECTION, SOLUTION INTRAVENOUS at 12:47

## 2019-04-17 RX ADMIN — FAMOTIDINE 20 MG: 20 INJECTION, SOLUTION INTRAVENOUS at 12:16

## 2019-04-17 NOTE — PROGRESS NOTES
Infusion Nursing Note:  Monika Nam presents today for C5D1 Taxol.    Patient seen by provider today: No   present during visit today: Not Applicable.    Note: Pt took Decadron 20 mg last night as well as this morning per recommendations before first Taxol infusion . Pt did well and did not have any adverse reactions to the Taxol.  Informed pt she does not have to take Decadron before next Taxol infusion per Dr. Gonzales since she did not have any reactions.  Pt aware she does not need to take Decadron on days 2-4 either. Pt verbalized understanding of information and has no further questions or concerns at this time.      Intravenous Access:  Labs drawn without difficulty.  Implanted Port.    Treatment Conditions:  Lab Results   Component Value Date    HGB 9.4 04/17/2019     Lab Results   Component Value Date    WBC 9.8 04/17/2019      Lab Results   Component Value Date    ANEU 8.8 04/17/2019     Lab Results   Component Value Date     04/17/2019      Lab Results   Component Value Date     03/20/2019                   Lab Results   Component Value Date    POTASSIUM 3.9 03/20/2019           No results found for: MAG         Lab Results   Component Value Date    CR 0.69 03/20/2019                   Lab Results   Component Value Date    DANI 8.4 03/20/2019                Lab Results   Component Value Date    BILITOTAL 0.4 04/17/2019           Lab Results   Component Value Date    ALBUMIN 3.5 04/17/2019                    Lab Results   Component Value Date    ALT 15 04/17/2019           Lab Results   Component Value Date    AST 9 04/17/2019       Results reviewed, labs MET treatment parameters, ok to proceed with treatment.      Post Infusion Assessment:  Patient tolerated infusion without incident.  Blood return noted pre and post infusion.  Site patent and intact, free from redness, edema or discomfort.  No evidence of extravasations.  Access discontinued per protocol.       Discharge Plan:    Patient discharged in stable condition accompanied by: .  Departure Mode: Ambulatory.  Pt to return on 4/24/19 at 11:00 am for C6D1 followed by clinic appointment with Dr. Gonzales at 11:40 am.     Rosana Yarbrough RN

## 2019-04-19 ENCOUNTER — TELEPHONE (OUTPATIENT)
Dept: ONCOLOGY | Facility: CLINIC | Age: 59
End: 2019-04-19

## 2019-04-19 ENCOUNTER — INFUSION THERAPY VISIT (OUTPATIENT)
Dept: INFUSION THERAPY | Facility: CLINIC | Age: 59
End: 2019-04-19
Attending: INTERNAL MEDICINE
Payer: COMMERCIAL

## 2019-04-19 DIAGNOSIS — C50.112 INFILTRATING DUCTAL CARCINOMA OF CENTRAL PORTION OF LEFT BREAST IN FEMALE (H): Primary | ICD-10-CM

## 2019-04-19 DIAGNOSIS — C50.912 ESTROGEN RECEPTOR NEGATIVE CARCINOMA OF BREAST, LEFT (H): ICD-10-CM

## 2019-04-19 DIAGNOSIS — F17.200 SMOKER: ICD-10-CM

## 2019-04-19 DIAGNOSIS — Z17.1 ESTROGEN RECEPTOR NEGATIVE CARCINOMA OF BREAST, LEFT (H): ICD-10-CM

## 2019-04-19 PROCEDURE — 36591 DRAW BLOOD OFF VENOUS DEVICE: CPT

## 2019-04-19 PROCEDURE — 25000128 H RX IP 250 OP 636

## 2019-04-19 RX ORDER — HEPARIN SODIUM (PORCINE) LOCK FLUSH IV SOLN 100 UNIT/ML 100 UNIT/ML
SOLUTION INTRAVENOUS
Status: COMPLETED
Start: 2019-04-19 | End: 2019-04-19

## 2019-04-19 RX ADMIN — Medication 500 UNITS: at 13:18

## 2019-04-19 NOTE — TELEPHONE ENCOUNTER
Telephone Triage:    Pt is a 58 year old female, 2 day(s) post chemotherapy infusion of C5 Taxol on 04.17.19. Calling to report she feels she is starting to get a sinus infection.    Diagnosis:   Encounter Diagnoses   Name Primary?     Infiltrating ductal carcinoma of central portion of left breast in female (H) Yes     Smoker      Estrogen receptor negative carcinoma of breast, left (H)      Primary care provider is: Servando Valladares    Oncology provider is:  Dr. MACRINA Gonzales    Chief complaint: sinus troubles.    Any other side effects noted from treatment?  Respiratory:  Reports congestion with runny nose started approx. 2 days ago. Denies SOB, dyspnea, chest pain or tightness. Has nonproductive cough.  Pain: 3  on pain scale with 0 being no pain and 10 being intolerable. Pain is felt in her face along her cheekbones and forehead.  Other: Denies fever or chills. Is a current, everyday smoker.    Assessment: Please see above.  Recommendations: Per RN Supportive guidelines, advised patient have CBC with differential drawn today as she is 2 days post chemo infusion and we'd like to ensure an infection isn't brewing. Also, advised warm cloths over the sinus cavities, tylenol use per instructions on the bottle for the headache and sinus ache, as well as Afrin OTC nasal spray OR Sudafed OTC per instructions on the box. Do not use both. Encouraged smoking cessation.  Advised patient we would call her with her lab results once they are received and to be see in urgent care over the weekend in develops signs of an infection such as fever, chills, SOB, dyspnea, or if symptoms worsen. We will call her Monday 04.22.19 for a status check.    Patient instructed to call with any questions or concerns.  Patient states understanding and is in agreement with this plan.    Approximately 7 minutes spent on telephone with patient reviewing assessment and symptom(s) and providing symptom management.    Sayda Peace RN, BSN,  OCN  Oncology Hematology   Breast Cancer Navigator  [facility]  [email or web address]  [phone]

## 2019-04-22 DIAGNOSIS — C50.112 INFILTRATING DUCTAL CARCINOMA OF CENTRAL PORTION OF LEFT BREAST IN FEMALE (H): ICD-10-CM

## 2019-04-22 DIAGNOSIS — C50.912 ESTROGEN RECEPTOR NEGATIVE CARCINOMA OF BREAST, LEFT (H): ICD-10-CM

## 2019-04-22 DIAGNOSIS — Z17.1 ESTROGEN RECEPTOR NEGATIVE CARCINOMA OF BREAST, LEFT (H): ICD-10-CM

## 2019-04-22 RX ORDER — DIPHENHYDRAMINE HCL 50 MG
50 CAPSULE ORAL ONCE
Status: CANCELLED
Start: 2019-04-24

## 2019-04-22 RX ORDER — EPINEPHRINE 0.3 MG/.3ML
0.3 INJECTION SUBCUTANEOUS EVERY 5 MIN PRN
Status: CANCELLED | OUTPATIENT
Start: 2019-04-24

## 2019-04-22 RX ORDER — HEPARIN SODIUM (PORCINE) LOCK FLUSH IV SOLN 100 UNIT/ML 100 UNIT/ML
5 SOLUTION INTRAVENOUS
Status: CANCELLED | OUTPATIENT
Start: 2019-04-24

## 2019-04-22 RX ORDER — METHYLPREDNISOLONE SODIUM SUCCINATE 125 MG/2ML
125 INJECTION, POWDER, LYOPHILIZED, FOR SOLUTION INTRAMUSCULAR; INTRAVENOUS
Status: CANCELLED
Start: 2019-04-24

## 2019-04-22 RX ORDER — DIPHENHYDRAMINE HYDROCHLORIDE 50 MG/ML
50 INJECTION INTRAMUSCULAR; INTRAVENOUS
Status: CANCELLED
Start: 2019-04-24

## 2019-04-22 RX ORDER — MEPERIDINE HYDROCHLORIDE 25 MG/ML
25 INJECTION INTRAMUSCULAR; INTRAVENOUS; SUBCUTANEOUS EVERY 30 MIN PRN
Status: CANCELLED | OUTPATIENT
Start: 2019-04-24

## 2019-04-22 RX ORDER — SODIUM CHLORIDE 9 MG/ML
1000 INJECTION, SOLUTION INTRAVENOUS CONTINUOUS PRN
Status: CANCELLED
Start: 2019-04-24

## 2019-04-22 RX ORDER — ALBUTEROL SULFATE 90 UG/1
1-2 AEROSOL, METERED RESPIRATORY (INHALATION)
Status: CANCELLED
Start: 2019-04-24

## 2019-04-22 RX ORDER — LORAZEPAM 2 MG/ML
0.5 INJECTION INTRAMUSCULAR EVERY 4 HOURS PRN
Status: CANCELLED
Start: 2019-04-24

## 2019-04-22 RX ORDER — ALBUTEROL SULFATE 0.83 MG/ML
2.5 SOLUTION RESPIRATORY (INHALATION)
Status: CANCELLED | OUTPATIENT
Start: 2019-04-24

## 2019-04-22 RX ORDER — EPINEPHRINE 1 MG/ML
0.3 INJECTION, SOLUTION, CONCENTRATE INTRAVENOUS EVERY 5 MIN PRN
Status: CANCELLED | OUTPATIENT
Start: 2019-04-24

## 2019-04-22 NOTE — TELEPHONE ENCOUNTER
Patient reports that she is doing much better. She has less pain in her sinus area. She denies shortness of breath, congestion, pressure/pain or temp. She has been using sudafed and it seems to be helping. She will call if symptoms worsen.  Yuliet Posada RN

## 2019-04-24 ENCOUNTER — HOSPITAL ENCOUNTER (OUTPATIENT)
Facility: CLINIC | Age: 59
Discharge: HOME | End: 2019-04-24
Attending: INTERNAL MEDICINE | Admitting: INTERNAL MEDICINE
Payer: COMMERCIAL

## 2019-04-24 ENCOUNTER — ONCOLOGY VISIT (OUTPATIENT)
Dept: ONCOLOGY | Facility: CLINIC | Age: 59
End: 2019-04-24
Attending: INTERNAL MEDICINE
Payer: COMMERCIAL

## 2019-04-24 ENCOUNTER — INFUSION THERAPY VISIT (OUTPATIENT)
Dept: INFUSION THERAPY | Facility: CLINIC | Age: 59
End: 2019-04-24
Attending: INTERNAL MEDICINE
Payer: COMMERCIAL

## 2019-04-24 VITALS
DIASTOLIC BLOOD PRESSURE: 71 MMHG | OXYGEN SATURATION: 100 % | SYSTOLIC BLOOD PRESSURE: 131 MMHG | WEIGHT: 198 LBS | RESPIRATION RATE: 18 BRPM | HEIGHT: 69 IN | BODY MASS INDEX: 29.33 KG/M2 | HEART RATE: 123 BPM | TEMPERATURE: 99 F

## 2019-04-24 VITALS — DIASTOLIC BLOOD PRESSURE: 84 MMHG | SYSTOLIC BLOOD PRESSURE: 132 MMHG | HEART RATE: 95 BPM

## 2019-04-24 DIAGNOSIS — C50.112 INFILTRATING DUCTAL CARCINOMA OF CENTRAL PORTION OF LEFT BREAST IN FEMALE (H): ICD-10-CM

## 2019-04-24 DIAGNOSIS — F34.1 CHRONIC DEPRESSIVE PERSONALITY DISORDER: ICD-10-CM

## 2019-04-24 DIAGNOSIS — Z17.1 ESTROGEN RECEPTOR NEGATIVE CARCINOMA OF BREAST, LEFT (H): Primary | ICD-10-CM

## 2019-04-24 DIAGNOSIS — C50.112 INFILTRATING DUCTAL CARCINOMA OF CENTRAL PORTION OF LEFT BREAST IN FEMALE (H): Primary | ICD-10-CM

## 2019-04-24 DIAGNOSIS — K21.9 GASTROESOPHAGEAL REFLUX DISEASE WITHOUT ESOPHAGITIS: ICD-10-CM

## 2019-04-24 DIAGNOSIS — C50.912 ESTROGEN RECEPTOR NEGATIVE CARCINOMA OF BREAST, LEFT (H): Primary | ICD-10-CM

## 2019-04-24 DIAGNOSIS — F06.4 ANXIETY DISORDER DUE TO MEDICAL CONDITION: ICD-10-CM

## 2019-04-24 LAB
BASOPHILS # BLD AUTO: 0.1 10E9/L (ref 0–0.2)
BASOPHILS NFR BLD AUTO: 2.1 %
DIFFERENTIAL METHOD BLD: ABNORMAL
EOSINOPHIL # BLD AUTO: 0.1 10E9/L (ref 0–0.7)
EOSINOPHIL NFR BLD AUTO: 1.4 %
ERYTHROCYTE [DISTWIDTH] IN BLOOD BY AUTOMATED COUNT: 17.5 % (ref 10–15)
HCT VFR BLD AUTO: 28 % (ref 35–47)
HGB BLD-MCNC: 9.3 G/DL (ref 11.7–15.7)
IMM GRANULOCYTES # BLD: 0 10E9/L (ref 0–0.4)
IMM GRANULOCYTES NFR BLD: 0.7 %
LYMPHOCYTES # BLD AUTO: 0.5 10E9/L (ref 0.8–5.3)
LYMPHOCYTES NFR BLD AUTO: 12 %
MCH RBC QN AUTO: 32 PG (ref 26.5–33)
MCHC RBC AUTO-ENTMCNC: 33.2 G/DL (ref 31.5–36.5)
MCV RBC AUTO: 96 FL (ref 78–100)
MONOCYTES # BLD AUTO: 0.4 10E9/L (ref 0–1.3)
MONOCYTES NFR BLD AUTO: 9.2 %
NEUTROPHILS # BLD AUTO: 3.3 10E9/L (ref 1.6–8.3)
NEUTROPHILS NFR BLD AUTO: 74.6 %
NRBC # BLD AUTO: 0 10*3/UL
NRBC BLD AUTO-RTO: 0 /100
PLATELET # BLD AUTO: 238 10E9/L (ref 150–450)
RBC # BLD AUTO: 2.91 10E12/L (ref 3.8–5.2)
WBC # BLD AUTO: 4.4 10E9/L (ref 4–11)

## 2019-04-24 PROCEDURE — G0463 HOSPITAL OUTPT CLINIC VISIT: HCPCS | Mod: 25

## 2019-04-24 PROCEDURE — 25000128 H RX IP 250 OP 636: Performed by: INTERNAL MEDICINE

## 2019-04-24 PROCEDURE — 96367 TX/PROPH/DG ADDL SEQ IV INF: CPT

## 2019-04-24 PROCEDURE — 25800030 ZZH RX IP 258 OP 636: Performed by: INTERNAL MEDICINE

## 2019-04-24 PROCEDURE — 96413 CHEMO IV INFUSION 1 HR: CPT

## 2019-04-24 PROCEDURE — 96375 TX/PRO/DX INJ NEW DRUG ADDON: CPT

## 2019-04-24 PROCEDURE — 85025 COMPLETE CBC W/AUTO DIFF WBC: CPT | Performed by: INTERNAL MEDICINE

## 2019-04-24 PROCEDURE — 99214 OFFICE O/P EST MOD 30 MIN: CPT | Performed by: INTERNAL MEDICINE

## 2019-04-24 RX ORDER — HEPARIN SODIUM (PORCINE) LOCK FLUSH IV SOLN 100 UNIT/ML 100 UNIT/ML
5 SOLUTION INTRAVENOUS
Status: DISCONTINUED | OUTPATIENT
Start: 2019-04-24 | End: 2019-04-24 | Stop reason: HOSPADM

## 2019-04-24 RX ORDER — PSEUDOEPHEDRINE HCL 30 MG
30 TABLET ORAL EVERY 12 HOURS PRN
COMMUNITY
End: 2022-08-15

## 2019-04-24 RX ADMIN — PACLITAXEL 169 MG: 6 INJECTION, SOLUTION INTRAVENOUS at 12:38

## 2019-04-24 RX ADMIN — FAMOTIDINE 20 MG: 20 INJECTION, SOLUTION INTRAVENOUS at 11:52

## 2019-04-24 RX ADMIN — DIPHENHYDRAMINE HYDROCHLORIDE 50 MG: 50 INJECTION, SOLUTION INTRAMUSCULAR; INTRAVENOUS at 12:24

## 2019-04-24 RX ADMIN — DEXAMETHASONE SODIUM PHOSPHATE 20 MG: 10 INJECTION, SOLUTION INTRAMUSCULAR; INTRAVENOUS at 12:07

## 2019-04-24 RX ADMIN — SODIUM CHLORIDE 250 ML: 9 INJECTION, SOLUTION INTRAVENOUS at 11:52

## 2019-04-24 RX ADMIN — Medication 5 ML: at 13:49

## 2019-04-24 ASSESSMENT — PAIN SCALES - GENERAL: PAINLEVEL: NO PAIN (0)

## 2019-04-24 ASSESSMENT — MIFFLIN-ST. JEOR: SCORE: 1537.5

## 2019-04-24 NOTE — LETTER
"    4/24/2019         RE: Monika Nam  [address] [facility] [id number]        Dear Colleague,    Thank you for referring your patient, Monika Nam, to the [facility]. Please see a copy of my visit note below.    Oncology Rooming Note    April 24, 2019 11:24 AM   Monika Nam is a 58 year old female who presents for:    Chief Complaint   Patient presents with     Oncology Clinic Visit     Follow up Infiltrating ductal carcinoma of central portion of left breast. Follow up with cycle 3.       Initial Vitals: /71 (BP Location: Right arm, Patient Position: Sitting, Cuff Size: Adult Large)   Pulse 123   Temp 99  F (37.2  C) (Tympanic)   Resp 18   Ht 1.753 m (5' 9\")   Wt 89.8 kg (198 lb)   LMP 04/02/2010   SpO2 100%   Breastfeeding? No   BMI 29.24 kg/m    Estimated body mass index is 29.24 kg/m  as calculated from the following:    Height as of this encounter: 1.753 m (5' 9\").    Weight as of this encounter: 89.8 kg (198 lb). Body surface area is 2.09 meters squared.  No Pain (0) Comment: Data Unavailable   Patient's last menstrual period was 04/02/2010.  Allergies reviewed: Yes  Medications reviewed: Yes    Medications: Medication refills not needed today.  Pharmacy name entered into [employer]: [facility] [address] [place] [address] AVE AT [address]    Clinical concerns: C/o tingling in her fingertips bilaterally. Follow up Infiltrating ductal carcinoma of central portion of left breast. Follow up with cycle 3.        Sana Vazquez CMA              Hematology/ Oncology Follow-up Visit:  Apr 24, 2019    Reason for Visit:   Chief Complaint   Patient presents with     Oncology Clinic Visit     Follow up Infiltrating ductal carcinoma of central portion of left breast. Follow up with cycle 3.         Oncologic History:    Infiltrating ductal carcinoma of central portion of left breast in female (H)  Monika Nam presented following " her annual mammogram with new focal asymmetry at 12-1 o'clock position of the left breast around 10.7 cm from the nipple.  It measured 1.4 cm.  Subsequently the patient went on to have breast ultrasound on member 30th 2018 showing 1.6 cm in maximum dimension hypoechoic mass.  Subsequently ultrasound breast biopsy was done on December 17, 2018 showing invasive ductal carcinoma grade 3 out of 3 angiolymphatic invasion was not identified.  Ductal carcinoma in situ was not identified . estrogen receptor was negative, progesterone receptor was negative and HER-2/mercedes receptor was negative.        1/2019 left lumpectomy found 1.8 cm IDC, grade III, LN-, margin is clear, pT1cN0    The patient was started on dose dense chemotherapy with Adriamycin and Cytoxan followed by Taxol      Interval History:  Patient is here today for follow-up.  She is currently on weekly Taxol treatment.  She has been tolerating treatment very well without significant side effects patient denies any nausea or vomiting or diarrhea.  She denies any fever or chills.  She denies any shortness of breath or cough or wheezing.    Review Of Systems:  Constitutional: Negative for fever, chills, and night sweats.  Skin: negative.  Eyes: negative.  Ears/Nose/Throat: negative.  Respiratory: No shortness of breath, dyspnea on exertion, cough, or hemoptysis.  Cardiovascular: negative.  Gastrointestinal: negative.  Genitourinary: negative.  Musculoskeletal: negative.  Neurologic: negative.  Psychiatric: negative.  Hematologic/Lymphatic/Immunologic: negative.  Endocrine: negative.    All other ROS negative unless mentioned in interval history.    Past medical, social, surgical, and family histories reviewed.    Allergies:  Allergies as of 04/24/2019 - Reviewed 04/24/2019   Allergen Reaction Noted     Sulfa drugs Swelling 06/09/2005       Current Medications:  Current Outpatient Medications   Medication Sig Dispense Refill     acetaminophen (TYLENOL) 500 MG tablet  "Take 500-1,000 mg by mouth every 6 hours as needed for mild pain or pain       augmented betamethasone dipropionate (DIPROLENE-AF) 0.05 % cream APPLY EXTERNALLY TO THE AFFECTED AREA TWICE DAILY 50 g 6     calcium-vitamin D (CALCIUM 600-D) 600-400 MG-UNIT per tablet Take 1 tablet by mouth daily       FLUoxetine (PROZAC) 20 MG capsule Take 20 mg daily. (Patient taking differently: Take 20 mg by mouth every evening ) 30 capsule 11     loratadine (CLARITIN) 10 MG tablet Take 10 mg by mouth daily.         MULTIVITAMIN TABS   OR 1 TABLET BY MOUTH DAILY       pseudoePHEDrine (SUDAFED) 30 MG tablet Take 30 mg by mouth every 12 hours as needed for congestion       ranitidine (ZANTAC) 150 MG tablet Take 1 tablet (150 mg) by mouth daily       albuterol (PROAIR HFA/PROVENTIL HFA/VENTOLIN HFA) 108 (90 Base) MCG/ACT inhaler Inhale 2 puffs into the lungs every 4 hours as needed for shortness of breath / dyspnea or wheezing (Patient not taking: Reported on 4/24/2019) 1 Inhaler 11     aspirin 81 MG tablet Take 1 tablet (81 mg) by mouth daily       dexamethasone (DECADRON) 4 MG tablet Take 5 tablets the night before chemotherapy.  5 tablets the morning before chemotherapy.. (Patient not taking: Reported on 4/24/2019.) 10 tablet 0     LORazepam (ATIVAN) 0.5 MG tablet Take 1 tablet (0.5 mg) by mouth every 4 hours as needed (Anxiety, Nausea/Vomiting or Sleep) (Patient not taking: Reported on 4/24/2019) 30 tablet 5     ondansetron (ZOFRAN) 4 MG tablet Take 1 tablet (4 mg) by mouth every 8 hours as needed for nausea (Patient not taking: Reported on 4/24/2019) 20 tablet 1     prochlorperazine (COMPAZINE) 10 MG tablet Take 0.5 tablets (5 mg) by mouth every 6 hours as needed (Nausea/Vomiting) (Patient not taking: Reported on 4/24/2019) 30 tablet 5        Physical Exam:  /71 (BP Location: Right arm, Patient Position: Sitting, Cuff Size: Adult Large)   Pulse 123   Temp 99  F (37.2  C) (Tympanic)   Resp 18   Ht 1.753 m (5' 9\")   Wt " 89.8 kg (198 lb)   LMP 04/02/2010   SpO2 100%   Breastfeeding? No   BMI 29.24 kg/m     Wt Readings from Last 12 Encounters:   04/24/19 89.8 kg (198 lb)   04/03/19 91.6 kg (201 lb 14.4 oz)   03/20/19 92.6 kg (204 lb 3.2 oz)   03/20/19 91.8 kg (202 lb 6.4 oz)   03/06/19 92.2 kg (203 lb 3.2 oz)   02/14/19 93.2 kg (205 lb 6.4 oz)   01/28/19 92.1 kg (203 lb)   01/23/19 92.1 kg (203 lb)   01/07/19 92.1 kg (203 lb)   12/31/18 93 kg (205 lb)   12/26/18 93 kg (205 lb)   11/16/18 93.9 kg (207 lb)     ECOG performance status: 0  GENERAL APPEARANCE: Healthy, alert and in no acute distress.  HEENT: Sclerae anicteric. PERRLA. Oropharynx without ulcers, lesions, or thrush.  NECK: Supple. No asymmetry or masses.  LYMPHATICS: No palpable cervical, supraclavicular, axillary, or inguinal lymphadenopathy.  RESP: Lungs clear to auscultation bilaterally without rales, rhonchi or wheezes.  CARDIOVASCULAR: Regular rate and rhythm. Normal S1, S2; no S3 or S4. No murmur, gallop, or rub.  ABDOMEN: Soft, nontender. Bowel sounds normal. No palpable organomegaly or masses.  MUSCULOSKELETAL: Extremities without gross deformities noted. No edema of bilateral lower extremities.  SKIN: No suspicious lesions or rashes.  NEURO: Alert and oriented x 3. Cranial nerves II-XII grossly intact.  PSYCHIATRIC: Mentation and affect appear normal.    Laboratory/Imaging Studies:  Infusion Therapy Visit on 04/24/2019   Component Date Value Ref Range Status     WBC 04/24/2019 4.4  4.0 - 11.0 10e9/L Final     RBC Count 04/24/2019 2.91* 3.8 - 5.2 10e12/L Final     Hemoglobin 04/24/2019 9.3* 11.7 - 15.7 g/dL Final     Hematocrit 04/24/2019 28.0* 35.0 - 47.0 % Final     MCV 04/24/2019 96  78 - 100 fl Final     MCH 04/24/2019 32.0  26.5 - 33.0 pg Final     MCHC 04/24/2019 33.2  31.5 - 36.5 g/dL Final     RDW 04/24/2019 17.5* 10.0 - 15.0 % Final     Platelet Count 04/24/2019 238  150 - 450 10e9/L Final     Diff Method 04/24/2019 Automated Method   Final     %  Neutrophils 04/24/2019 74.6  % Final     % Lymphocytes 04/24/2019 12.0  % Final     % Monocytes 04/24/2019 9.2  % Final     % Eosinophils 04/24/2019 1.4  % Final     % Basophils 04/24/2019 2.1  % Final     % Immature Granulocytes 04/24/2019 0.7  % Final     Nucleated RBCs 04/24/2019 0  0 /100 Final     Absolute Neutrophil 04/24/2019 3.3  1.6 - 8.3 10e9/L Final     Absolute Lymphocytes 04/24/2019 0.5* 0.8 - 5.3 10e9/L Final     Absolute Monocytes 04/24/2019 0.4  0.0 - 1.3 10e9/L Final     Absolute Eosinophils 04/24/2019 0.1  0.0 - 0.7 10e9/L Final     Absolute Basophils 04/24/2019 0.1  0.0 - 0.2 10e9/L Final     Abs Immature Granulocytes 04/24/2019 0.0  0 - 0.4 10e9/L Final     Absolute Nucleated RBC 04/24/2019 0.0   Final   Infusion Therapy Visit on 04/17/2019   Component Date Value Ref Range Status     WBC 04/17/2019 9.8  4.0 - 11.0 10e9/L Final     RBC Count 04/17/2019 3.06* 3.8 - 5.2 10e12/L Final     Hemoglobin 04/17/2019 9.4* 11.7 - 15.7 g/dL Final     Hematocrit 04/17/2019 29.1* 35.0 - 47.0 % Final     MCV 04/17/2019 95  78 - 100 fl Final     MCH 04/17/2019 30.7  26.5 - 33.0 pg Final     MCHC 04/17/2019 32.3  31.5 - 36.5 g/dL Final     RDW 04/17/2019 17.7* 10.0 - 15.0 % Final     Platelet Count 04/17/2019 186  150 - 450 10e9/L Final     Diff Method 04/17/2019 Automated Method   Final     % Neutrophils 04/17/2019 89.7  % Final     % Lymphocytes 04/17/2019 2.6  % Final     % Monocytes 04/17/2019 3.7  % Final     % Eosinophils 04/17/2019 0.0  % Final     % Basophils 04/17/2019 0.4  % Final     % Immature Granulocytes 04/17/2019 3.6  % Final     Nucleated RBCs 04/17/2019 1* 0 /100 Final     Absolute Neutrophil 04/17/2019 8.8* 1.6 - 8.3 10e9/L Final     Absolute Lymphocytes 04/17/2019 0.3* 0.8 - 5.3 10e9/L Final     Absolute Monocytes 04/17/2019 0.4  0.0 - 1.3 10e9/L Final     Absolute Eosinophils 04/17/2019 0.0  0.0 - 0.7 10e9/L Final     Absolute Basophils 04/17/2019 0.0  0.0 - 0.2 10e9/L Final     Abs Immature  Granulocytes 04/17/2019 0.4  0 - 0.4 10e9/L Final     Absolute Nucleated RBC 04/17/2019 0.1   Final     Bilirubin Direct 04/17/2019 0.1  0.0 - 0.2 mg/dL Final     Bilirubin Total 04/17/2019 0.4  0.2 - 1.3 mg/dL Final     Albumin 04/17/2019 3.5  3.4 - 5.0 g/dL Final     Protein Total 04/17/2019 7.0  6.8 - 8.8 g/dL Final     Alkaline Phosphatase 04/17/2019 98  40 - 150 U/L Final     ALT 04/17/2019 15  0 - 50 U/L Final     AST 04/17/2019 9  0 - 45 U/L Final            Assessment and plan:    (C50.112) Infiltrating ductal carcinoma of central portion of left breast in female (H)  (primary encounter diagnosis)  Patient will continue on weekly Taxol according to the treatment plan.  Patient has been tolerating treatment without significant side effects.  Today we talked about management of neuropathy.  The patient's symptoms are minimal for now.  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    (K21.9) Gastroesophageal reflux disease without esophagitis  Patient currently on Ranitidine 150 mg daily.    The patient is ready to learn, no apparent learning barriers were identified.  Diagnosis and treatment plans were explained to the patient. The patient expressed understanding of the content. The patient asked appropriate questions. The patient questions were answered to her satisfaction.    Chart documentation with Dragon Voice recognition Software. Although reviewed after completion, some words and grammatical errors may remain.    Again, thank you for allowing me to participate in the care of your patient.        Sincerely,        Madison Gonzales MD

## 2019-04-24 NOTE — PROGRESS NOTES
"Oncology Rooming Note    April 24, 2019 11:24 AM   Monika Nam is a 58 year old female who presents for:    Chief Complaint   Patient presents with     Oncology Clinic Visit     Follow up Infiltrating ductal carcinoma of central portion of left breast. Follow up with cycle 3.       Initial Vitals: /71 (BP Location: Right arm, Patient Position: Sitting, Cuff Size: Adult Large)   Pulse 123   Temp 99  F (37.2  C) (Tympanic)   Resp 18   Ht 1.753 m (5' 9\")   Wt 89.8 kg (198 lb)   LMP 04/02/2010   SpO2 100%   Breastfeeding? No   BMI 29.24 kg/m   Estimated body mass index is 29.24 kg/m  as calculated from the following:    Height as of this encounter: 1.753 m (5' 9\").    Weight as of this encounter: 89.8 kg (198 lb). Body surface area is 2.09 meters squared.  No Pain (0) Comment: Data Unavailable   Patient's last menstrual period was 04/02/2010.  Allergies reviewed: Yes  Medications reviewed: Yes    Medications: Medication refills not needed today.  Pharmacy name entered into [employer]: [id number] - [place], [place] - [address] AT [address]    Clinical concerns: C/o tingling in her fingertips bilaterally. Follow up Infiltrating ductal carcinoma of central portion of left breast. Follow up with cycle 3.        Sana Vazquez CMA            "

## 2019-04-24 NOTE — PROGRESS NOTES
Hematology/ Oncology Follow-up Visit:  Apr 24, 2019    Reason for Visit:   Chief Complaint   Patient presents with     Oncology Clinic Visit     Follow up Infiltrating ductal carcinoma of central portion of left breast. Follow up with cycle 3.         Oncologic History:    Infiltrating ductal carcinoma of central portion of left breast in female (H)  Monika Nam presented following her annual mammogram with new focal asymmetry at 12-1 o'clock position of the left breast around 10.7 cm from the nipple.  It measured 1.4 cm.  Subsequently the patient went on to have breast ultrasound on member 30th 2018 showing 1.6 cm in maximum dimension hypoechoic mass.  Subsequently ultrasound breast biopsy was done on December 17, 2018 showing invasive ductal carcinoma grade 3 out of 3 angiolymphatic invasion was not identified.  Ductal carcinoma in situ was not identified . estrogen receptor was negative, progesterone receptor was negative and HER-2/mercedes receptor was negative.        1/2019 left lumpectomy found 1.8 cm IDC, grade III, LN-, margin is clear, pT1cN0    The patient was started on dose dense chemotherapy with Adriamycin and Cytoxan followed by Taxol      Interval History:  Patient is here today for follow-up.  She is currently on weekly Taxol treatment.  She has been tolerating treatment very well without significant side effects patient denies any nausea or vomiting or diarrhea.  She denies any fever or chills.  She denies any shortness of breath or cough or wheezing.    Review Of Systems:  Constitutional: Negative for fever, chills, and night sweats.  Skin: negative.  Eyes: negative.  Ears/Nose/Throat: negative.  Respiratory: No shortness of breath, dyspnea on exertion, cough, or hemoptysis.  Cardiovascular: negative.  Gastrointestinal: negative.  Genitourinary: negative.  Musculoskeletal: negative.  Neurologic: negative.  Psychiatric: negative.  Hematologic/Lymphatic/Immunologic: negative.  Endocrine:  negative.    All other ROS negative unless mentioned in interval history.    Past medical, social, surgical, and family histories reviewed.    Allergies:  Allergies as of 04/24/2019 - Reviewed 04/24/2019   Allergen Reaction Noted     Sulfa drugs Swelling 06/09/2005       Current Medications:  Current Outpatient Medications   Medication Sig Dispense Refill     acetaminophen (TYLENOL) 500 MG tablet Take 500-1,000 mg by mouth every 6 hours as needed for mild pain or pain       augmented betamethasone dipropionate (DIPROLENE-AF) 0.05 % cream APPLY EXTERNALLY TO THE AFFECTED AREA TWICE DAILY 50 g 6     calcium-vitamin D (CALCIUM 600-D) 600-400 MG-UNIT per tablet Take 1 tablet by mouth daily       FLUoxetine (PROZAC) 20 MG capsule Take 20 mg daily. (Patient taking differently: Take 20 mg by mouth every evening ) 30 capsule 11     loratadine (CLARITIN) 10 MG tablet Take 10 mg by mouth daily.         MULTIVITAMIN TABS   OR 1 TABLET BY MOUTH DAILY       pseudoePHEDrine (SUDAFED) 30 MG tablet Take 30 mg by mouth every 12 hours as needed for congestion       ranitidine (ZANTAC) 150 MG tablet Take 1 tablet (150 mg) by mouth daily       albuterol (PROAIR HFA/PROVENTIL HFA/VENTOLIN HFA) 108 (90 Base) MCG/ACT inhaler Inhale 2 puffs into the lungs every 4 hours as needed for shortness of breath / dyspnea or wheezing (Patient not taking: Reported on 4/24/2019) 1 Inhaler 11     aspirin 81 MG tablet Take 1 tablet (81 mg) by mouth daily       dexamethasone (DECADRON) 4 MG tablet Take 5 tablets the night before chemotherapy.  5 tablets the morning before chemotherapy.. (Patient not taking: Reported on 4/24/2019.) 10 tablet 0     LORazepam (ATIVAN) 0.5 MG tablet Take 1 tablet (0.5 mg) by mouth every 4 hours as needed (Anxiety, Nausea/Vomiting or Sleep) (Patient not taking: Reported on 4/24/2019) 30 tablet 5     ondansetron (ZOFRAN) 4 MG tablet Take 1 tablet (4 mg) by mouth every 8 hours as needed for nausea (Patient not taking: Reported  "on 4/24/2019) 20 tablet 1     prochlorperazine (COMPAZINE) 10 MG tablet Take 0.5 tablets (5 mg) by mouth every 6 hours as needed (Nausea/Vomiting) (Patient not taking: Reported on 4/24/2019) 30 tablet 5        Physical Exam:  /71 (BP Location: Right arm, Patient Position: Sitting, Cuff Size: Adult Large)   Pulse 123   Temp 99  F (37.2  C) (Tympanic)   Resp 18   Ht 1.753 m (5' 9\")   Wt 89.8 kg (198 lb)   LMP 04/02/2010   SpO2 100%   Breastfeeding? No   BMI 29.24 kg/m     Wt Readings from Last 12 Encounters:   04/24/19 89.8 kg (198 lb)   04/03/19 91.6 kg (201 lb 14.4 oz)   03/20/19 92.6 kg (204 lb 3.2 oz)   03/20/19 91.8 kg (202 lb 6.4 oz)   03/06/19 92.2 kg (203 lb 3.2 oz)   02/14/19 93.2 kg (205 lb 6.4 oz)   01/28/19 92.1 kg (203 lb)   01/23/19 92.1 kg (203 lb)   01/07/19 92.1 kg (203 lb)   12/31/18 93 kg (205 lb)   12/26/18 93 kg (205 lb)   11/16/18 93.9 kg (207 lb)     ECOG performance status: 0  GENERAL APPEARANCE: Healthy, alert and in no acute distress.  HEENT: Sclerae anicteric. PERRLA. Oropharynx without ulcers, lesions, or thrush.  NECK: Supple. No asymmetry or masses.  LYMPHATICS: No palpable cervical, supraclavicular, axillary, or inguinal lymphadenopathy.  RESP: Lungs clear to auscultation bilaterally without rales, rhonchi or wheezes.  CARDIOVASCULAR: Regular rate and rhythm. Normal S1, S2; no S3 or S4. No murmur, gallop, or rub.  ABDOMEN: Soft, nontender. Bowel sounds normal. No palpable organomegaly or masses.  MUSCULOSKELETAL: Extremities without gross deformities noted. No edema of bilateral lower extremities.  SKIN: No suspicious lesions or rashes.  NEURO: Alert and oriented x 3. Cranial nerves II-XII grossly intact.  PSYCHIATRIC: Mentation and affect appear normal.    Laboratory/Imaging Studies:  Infusion Therapy Visit on 04/24/2019   Component Date Value Ref Range Status     WBC 04/24/2019 4.4  4.0 - 11.0 10e9/L Final     RBC Count 04/24/2019 2.91* 3.8 - 5.2 10e12/L Final     " Hemoglobin 04/24/2019 9.3* 11.7 - 15.7 g/dL Final     Hematocrit 04/24/2019 28.0* 35.0 - 47.0 % Final     MCV 04/24/2019 96  78 - 100 fl Final     MCH 04/24/2019 32.0  26.5 - 33.0 pg Final     MCHC 04/24/2019 33.2  31.5 - 36.5 g/dL Final     RDW 04/24/2019 17.5* 10.0 - 15.0 % Final     Platelet Count 04/24/2019 238  150 - 450 10e9/L Final     Diff Method 04/24/2019 Automated Method   Final     % Neutrophils 04/24/2019 74.6  % Final     % Lymphocytes 04/24/2019 12.0  % Final     % Monocytes 04/24/2019 9.2  % Final     % Eosinophils 04/24/2019 1.4  % Final     % Basophils 04/24/2019 2.1  % Final     % Immature Granulocytes 04/24/2019 0.7  % Final     Nucleated RBCs 04/24/2019 0  0 /100 Final     Absolute Neutrophil 04/24/2019 3.3  1.6 - 8.3 10e9/L Final     Absolute Lymphocytes 04/24/2019 0.5* 0.8 - 5.3 10e9/L Final     Absolute Monocytes 04/24/2019 0.4  0.0 - 1.3 10e9/L Final     Absolute Eosinophils 04/24/2019 0.1  0.0 - 0.7 10e9/L Final     Absolute Basophils 04/24/2019 0.1  0.0 - 0.2 10e9/L Final     Abs Immature Granulocytes 04/24/2019 0.0  0 - 0.4 10e9/L Final     Absolute Nucleated RBC 04/24/2019 0.0   Final   Infusion Therapy Visit on 04/17/2019   Component Date Value Ref Range Status     WBC 04/17/2019 9.8  4.0 - 11.0 10e9/L Final     RBC Count 04/17/2019 3.06* 3.8 - 5.2 10e12/L Final     Hemoglobin 04/17/2019 9.4* 11.7 - 15.7 g/dL Final     Hematocrit 04/17/2019 29.1* 35.0 - 47.0 % Final     MCV 04/17/2019 95  78 - 100 fl Final     MCH 04/17/2019 30.7  26.5 - 33.0 pg Final     MCHC 04/17/2019 32.3  31.5 - 36.5 g/dL Final     RDW 04/17/2019 17.7* 10.0 - 15.0 % Final     Platelet Count 04/17/2019 186  150 - 450 10e9/L Final     Diff Method 04/17/2019 Automated Method   Final     % Neutrophils 04/17/2019 89.7  % Final     % Lymphocytes 04/17/2019 2.6  % Final     % Monocytes 04/17/2019 3.7  % Final     % Eosinophils 04/17/2019 0.0  % Final     % Basophils 04/17/2019 0.4  % Final     % Immature Granulocytes  04/17/2019 3.6  % Final     Nucleated RBCs 04/17/2019 1* 0 /100 Final     Absolute Neutrophil 04/17/2019 8.8* 1.6 - 8.3 10e9/L Final     Absolute Lymphocytes 04/17/2019 0.3* 0.8 - 5.3 10e9/L Final     Absolute Monocytes 04/17/2019 0.4  0.0 - 1.3 10e9/L Final     Absolute Eosinophils 04/17/2019 0.0  0.0 - 0.7 10e9/L Final     Absolute Basophils 04/17/2019 0.0  0.0 - 0.2 10e9/L Final     Abs Immature Granulocytes 04/17/2019 0.4  0 - 0.4 10e9/L Final     Absolute Nucleated RBC 04/17/2019 0.1   Final     Bilirubin Direct 04/17/2019 0.1  0.0 - 0.2 mg/dL Final     Bilirubin Total 04/17/2019 0.4  0.2 - 1.3 mg/dL Final     Albumin 04/17/2019 3.5  3.4 - 5.0 g/dL Final     Protein Total 04/17/2019 7.0  6.8 - 8.8 g/dL Final     Alkaline Phosphatase 04/17/2019 98  40 - 150 U/L Final     ALT 04/17/2019 15  0 - 50 U/L Final     AST 04/17/2019 9  0 - 45 U/L Final            Assessment and plan:    (C50.112) Infiltrating ductal carcinoma of central portion of left breast in female (H)  (primary encounter diagnosis)  Patient will continue on weekly Taxol according to the treatment plan.  Patient has been tolerating treatment without significant side effects.  Today we talked about management of neuropathy.  The patient's symptoms are minimal for now.  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    (K21.9) Gastroesophageal reflux disease without esophagitis  Patient currently on Ranitidine 150 mg daily.    The patient is ready to learn, no apparent learning barriers were identified.  Diagnosis and treatment plans were explained to the patient. The patient expressed understanding of the content. The patient asked appropriate questions. The patient questions were answered to her satisfaction.    Chart documentation with Dragon Voice recognition Software. Although reviewed after  completion, some words and grammatical errors may remain.

## 2019-04-24 NOTE — PROGRESS NOTES
Infusion Nursing Note:  Monika Nam presents today for C6D1 Taxol.    Patient seen by provider today: Yes: Dr. Gonzales   present during visit today: Not Applicable.    Note: N/A.    Intravenous Access:  Implanted Port.    Treatment Conditions:  Results reviewed, labs MET treatment parameters, ok to proceed with treatment.      Post Infusion Assessment:  Patient tolerated infusion without incident.  Blood return noted pre and post infusion.  Site patent and intact, free from redness, edema or discomfort.  No evidence of extravasations.  Access discontinued per protocol.       Discharge Plan:   Patient discharged in stable condition accompanied by: self.  Departure Mode: Ambulatory.    Anat Velasquez RN

## 2019-04-25 ENCOUNTER — PATIENT OUTREACH (OUTPATIENT)
Dept: ONCOLOGY | Facility: CLINIC | Age: 59
End: 2019-04-25

## 2019-04-25 NOTE — PROGRESS NOTES
Patient had questions about chemo and vacation, and about [employer].  Message left for patient. Direct line provided to call back.  Yuliet Posada RN

## 2019-04-29 ENCOUNTER — PATIENT OUTREACH (OUTPATIENT)
Dept: ONCOLOGY | Facility: CLINIC | Age: 59
End: 2019-04-29

## 2019-04-29 ENCOUNTER — TELEPHONE (OUTPATIENT)
Dept: ONCOLOGY | Facility: CLINIC | Age: 59
End: 2019-04-29

## 2019-04-29 DIAGNOSIS — C50.912 ESTROGEN RECEPTOR NEGATIVE CARCINOMA OF BREAST, LEFT (H): Primary | ICD-10-CM

## 2019-04-29 DIAGNOSIS — Z80.3 FAMILY HISTORY OF MALIGNANT NEOPLASM OF BREAST: ICD-10-CM

## 2019-04-29 DIAGNOSIS — Z17.1 ESTROGEN RECEPTOR NEGATIVE CARCINOMA OF BREAST, LEFT (H): Primary | ICD-10-CM

## 2019-04-29 RX ORDER — EPINEPHRINE 0.3 MG/.3ML
0.3 INJECTION SUBCUTANEOUS EVERY 5 MIN PRN
Status: CANCELLED | OUTPATIENT
Start: 2019-05-01

## 2019-04-29 RX ORDER — ALBUTEROL SULFATE 0.83 MG/ML
2.5 SOLUTION RESPIRATORY (INHALATION)
Status: CANCELLED | OUTPATIENT
Start: 2019-05-01

## 2019-04-29 RX ORDER — ALBUTEROL SULFATE 90 UG/1
1-2 AEROSOL, METERED RESPIRATORY (INHALATION)
Status: CANCELLED
Start: 2019-05-01

## 2019-04-29 RX ORDER — MEPERIDINE HYDROCHLORIDE 25 MG/ML
25 INJECTION INTRAMUSCULAR; INTRAVENOUS; SUBCUTANEOUS EVERY 30 MIN PRN
Status: CANCELLED | OUTPATIENT
Start: 2019-05-01

## 2019-04-29 RX ORDER — DIPHENHYDRAMINE HYDROCHLORIDE 50 MG/ML
50 INJECTION INTRAMUSCULAR; INTRAVENOUS
Status: CANCELLED
Start: 2019-05-01

## 2019-04-29 RX ORDER — SODIUM CHLORIDE 9 MG/ML
1000 INJECTION, SOLUTION INTRAVENOUS CONTINUOUS PRN
Status: CANCELLED
Start: 2019-05-01

## 2019-04-29 RX ORDER — HEPARIN SODIUM (PORCINE) LOCK FLUSH IV SOLN 100 UNIT/ML 100 UNIT/ML
5 SOLUTION INTRAVENOUS
Status: CANCELLED | OUTPATIENT
Start: 2019-05-01

## 2019-04-29 RX ORDER — LORAZEPAM 2 MG/ML
0.5 INJECTION INTRAMUSCULAR EVERY 4 HOURS PRN
Status: CANCELLED
Start: 2019-05-01

## 2019-04-29 RX ORDER — METHYLPREDNISOLONE SODIUM SUCCINATE 125 MG/2ML
125 INJECTION, POWDER, LYOPHILIZED, FOR SOLUTION INTRAMUSCULAR; INTRAVENOUS
Status: CANCELLED
Start: 2019-05-01

## 2019-04-29 RX ORDER — DIPHENHYDRAMINE HCL 50 MG
50 CAPSULE ORAL ONCE
Status: CANCELLED
Start: 2019-05-01

## 2019-04-29 RX ORDER — EPINEPHRINE 1 MG/ML
0.3 INJECTION, SOLUTION, CONCENTRATE INTRAVENOUS EVERY 5 MIN PRN
Status: CANCELLED | OUTPATIENT
Start: 2019-05-01

## 2019-04-29 NOTE — TELEPHONE ENCOUNTER
4/29/2019    Monika called back. Explained plan for re-draw tomorrow. She was in agreement and had no further questions.    Alison Roper MS, [facility]  Licensed Genetic Counselor  P. [phone]  F. [phone]

## 2019-04-29 NOTE — PROGRESS NOTES
Received a fax from the [employer] requesting medical records for patient. This request was faxed to our Medical Records Department.  Yuliet Posada RN

## 2019-04-29 NOTE — PROGRESS NOTES
4/29/2019    Received documentation noting that Monika's sample was insufficient for testing, and that additional blood was needed. Noticed that she has labs planned for tomorrow. Ordering additional labwork to obtain more sample for pending genetic testing. Notified Treva at [facility] of this information. Attempted to call patient and left a message for her to call back. Sent patient a iDiDiD message as well.    Alison Roper MS, [facility]  Licensed Genetic Counselor  P. [phone]  F. [phone]

## 2019-04-30 ENCOUNTER — HOSPITAL ENCOUNTER (OUTPATIENT)
Facility: CLINIC | Age: 59
Discharge: HOME | End: 2019-04-30
Attending: INTERNAL MEDICINE | Admitting: INTERNAL MEDICINE
Payer: COMMERCIAL

## 2019-04-30 ENCOUNTER — INFUSION THERAPY VISIT (OUTPATIENT)
Dept: INFUSION THERAPY | Facility: CLINIC | Age: 59
End: 2019-04-30
Attending: INTERNAL MEDICINE
Payer: COMMERCIAL

## 2019-04-30 VITALS — WEIGHT: 200 LBS | BODY MASS INDEX: 29.53 KG/M2

## 2019-04-30 DIAGNOSIS — C50.112 INFILTRATING DUCTAL CARCINOMA OF CENTRAL PORTION OF LEFT BREAST IN FEMALE (H): ICD-10-CM

## 2019-04-30 DIAGNOSIS — C50.912 ESTROGEN RECEPTOR NEGATIVE CARCINOMA OF BREAST, LEFT (H): Primary | ICD-10-CM

## 2019-04-30 DIAGNOSIS — Z80.3 FAMILY HISTORY OF MALIGNANT NEOPLASM OF BREAST: ICD-10-CM

## 2019-04-30 DIAGNOSIS — Z17.1 ESTROGEN RECEPTOR NEGATIVE CARCINOMA OF BREAST, LEFT (H): Primary | ICD-10-CM

## 2019-04-30 LAB
BASOPHILS # BLD AUTO: 0.1 10E9/L (ref 0–0.2)
BASOPHILS NFR BLD AUTO: 2.6 %
DIFFERENTIAL METHOD BLD: ABNORMAL
EOSINOPHIL # BLD AUTO: 0.2 10E9/L (ref 0–0.7)
EOSINOPHIL NFR BLD AUTO: 4.1 %
ERYTHROCYTE [DISTWIDTH] IN BLOOD BY AUTOMATED COUNT: 18.5 % (ref 10–15)
HCT VFR BLD AUTO: 28.3 % (ref 35–47)
HGB BLD-MCNC: 9.2 G/DL (ref 11.7–15.7)
IMM GRANULOCYTES # BLD: 0 10E9/L (ref 0–0.4)
IMM GRANULOCYTES NFR BLD: 0.8 %
LYMPHOCYTES # BLD AUTO: 0.6 10E9/L (ref 0.8–5.3)
LYMPHOCYTES NFR BLD AUTO: 15.7 %
MCH RBC QN AUTO: 32.1 PG (ref 26.5–33)
MCHC RBC AUTO-ENTMCNC: 32.5 G/DL (ref 31.5–36.5)
MCV RBC AUTO: 99 FL (ref 78–100)
MONOCYTES # BLD AUTO: 0.4 10E9/L (ref 0–1.3)
MONOCYTES NFR BLD AUTO: 10.5 %
NEUTROPHILS # BLD AUTO: 2.6 10E9/L (ref 1.6–8.3)
NEUTROPHILS NFR BLD AUTO: 66.3 %
NRBC # BLD AUTO: 0 10*3/UL
NRBC BLD AUTO-RTO: 0 /100
PLATELET # BLD AUTO: 215 10E9/L (ref 150–450)
RBC # BLD AUTO: 2.87 10E12/L (ref 3.8–5.2)
WBC # BLD AUTO: 3.9 10E9/L (ref 4–11)

## 2019-04-30 PROCEDURE — 85025 COMPLETE CBC W/AUTO DIFF WBC: CPT | Performed by: INTERNAL MEDICINE

## 2019-04-30 PROCEDURE — 36592 COLLECT BLOOD FROM PICC: CPT

## 2019-04-30 PROCEDURE — 25000128 H RX IP 250 OP 636: Performed by: INTERNAL MEDICINE

## 2019-04-30 PROCEDURE — 36591 DRAW BLOOD OFF VENOUS DEVICE: CPT

## 2019-04-30 RX ORDER — HEPARIN SODIUM,PORCINE 10 UNIT/ML
5-10 VIAL (ML) INTRAVENOUS
Status: DISCONTINUED | OUTPATIENT
Start: 2019-04-30 | End: 2019-04-30 | Stop reason: HOSPADM

## 2019-04-30 RX ORDER — HEPARIN SODIUM (PORCINE) LOCK FLUSH IV SOLN 100 UNIT/ML 100 UNIT/ML
SOLUTION INTRAVENOUS
Status: DISCONTINUED
Start: 2019-04-30 | End: 2019-04-30 | Stop reason: HOSPADM

## 2019-04-30 RX ORDER — HEPARIN SODIUM (PORCINE) LOCK FLUSH IV SOLN 100 UNIT/ML 100 UNIT/ML
5 SOLUTION INTRAVENOUS
Status: DISCONTINUED | OUTPATIENT
Start: 2019-04-30 | End: 2019-04-30 | Stop reason: HOSPADM

## 2019-04-30 RX ADMIN — Medication 5 ML: at 12:44

## 2019-04-30 NOTE — PROGRESS NOTES
Infusion Nursing Note:  Monika Nam presents today for PAC labs and a port flush.   Patient seen by provider today: No   present during visit today: Not Applicable.    Note: Port labs drawn per [place] protocol. Port flushed per [place] protocol.    Intravenous Access:  Labs drawn without difficulty.  Implanted Port.    Treatment Conditions:  Lab Results   Component Value Date    HGB 9.2 04/30/2019     Lab Results   Component Value Date    WBC 3.9 04/30/2019      Lab Results   Component Value Date    ANEU 2.6 04/30/2019     Lab Results   Component Value Date     04/30/2019          Post Infusion Assessment:  Blood return noted pre and post infusion.  Site patent and intact, free from redness, edema or discomfort.  No evidence of extravasations.  Access discontinued per protocol.       Discharge Plan:   Patient and/or family verbalized understanding of discharge instructions and all questions answered.  Patient discharged in stable condition accompanied by: self.  Departure Mode: Ambulatory.    Jessica Faye RN

## 2019-05-01 ENCOUNTER — INFUSION THERAPY VISIT (OUTPATIENT)
Dept: INFUSION THERAPY | Facility: CLINIC | Age: 59
End: 2019-05-01
Attending: INTERNAL MEDICINE
Payer: COMMERCIAL

## 2019-05-01 VITALS
SYSTOLIC BLOOD PRESSURE: 132 MMHG | HEART RATE: 86 BPM | RESPIRATION RATE: 18 BRPM | DIASTOLIC BLOOD PRESSURE: 84 MMHG | TEMPERATURE: 97 F

## 2019-05-01 DIAGNOSIS — C50.912 ESTROGEN RECEPTOR NEGATIVE CARCINOMA OF BREAST, LEFT (H): Primary | ICD-10-CM

## 2019-05-01 DIAGNOSIS — Z17.1 ESTROGEN RECEPTOR NEGATIVE CARCINOMA OF BREAST, LEFT (H): Primary | ICD-10-CM

## 2019-05-01 DIAGNOSIS — C50.112 INFILTRATING DUCTAL CARCINOMA OF CENTRAL PORTION OF LEFT BREAST IN FEMALE (H): ICD-10-CM

## 2019-05-01 PROCEDURE — 96367 TX/PROPH/DG ADDL SEQ IV INF: CPT

## 2019-05-01 PROCEDURE — 96375 TX/PRO/DX INJ NEW DRUG ADDON: CPT

## 2019-05-01 PROCEDURE — 96413 CHEMO IV INFUSION 1 HR: CPT

## 2019-05-01 PROCEDURE — 25800030 ZZH RX IP 258 OP 636: Performed by: INTERNAL MEDICINE

## 2019-05-01 PROCEDURE — 25000128 H RX IP 250 OP 636: Performed by: INTERNAL MEDICINE

## 2019-05-01 RX ORDER — HEPARIN SODIUM (PORCINE) LOCK FLUSH IV SOLN 100 UNIT/ML 100 UNIT/ML
5 SOLUTION INTRAVENOUS
Status: DISCONTINUED | OUTPATIENT
Start: 2019-05-01 | End: 2019-05-01 | Stop reason: HOSPADM

## 2019-05-01 RX ADMIN — SODIUM CHLORIDE 250 ML: 9 INJECTION, SOLUTION INTRAVENOUS at 11:11

## 2019-05-01 RX ADMIN — DEXAMETHASONE SODIUM PHOSPHATE 20 MG: 10 INJECTION, SOLUTION INTRAMUSCULAR; INTRAVENOUS at 11:11

## 2019-05-01 RX ADMIN — DIPHENHYDRAMINE HYDROCHLORIDE 50 MG: 50 INJECTION, SOLUTION INTRAMUSCULAR; INTRAVENOUS at 11:27

## 2019-05-01 RX ADMIN — Medication 5 ML: at 13:04

## 2019-05-01 RX ADMIN — FAMOTIDINE 20 MG: 20 INJECTION, SOLUTION INTRAVENOUS at 11:40

## 2019-05-01 RX ADMIN — PACLITAXEL 169 MG: 6 INJECTION, SOLUTION INTRAVENOUS at 11:54

## 2019-05-01 ASSESSMENT — PAIN SCALES - GENERAL: PAINLEVEL: NO PAIN (0)

## 2019-05-01 NOTE — PROGRESS NOTES
Infusion Nursing Note:  Monika Nam presents today for Taxol C7D1.    Patient seen by provider today: No   present during visit today: Not Applicable.    Note: N/A.    Intravenous Access:  Implanted Port.    Treatment Conditions:  Lab Results   Component Value Date    HGB 9.2 04/30/2019     Lab Results   Component Value Date    WBC 3.9 04/30/2019      Lab Results   Component Value Date    ANEU 2.6 04/30/2019     Lab Results   Component Value Date     04/30/2019   Results reviewed, labs MET treatment parameters, ok to proceed with treatment.    Post Infusion Assessment:  Patient tolerated infusion without incident.  Blood return noted pre and post infusion.  Site patent and intact, free from redness, edema or discomfort.  No evidence of extravasations.  Access discontinued per protocol.     Discharge Plan:   Discharge instructions reviewed with: Patient.  Patient and/or family verbalized understanding of discharge instructions and all questions answered.  AVS to patient via [place].  Patient will return 5/7/2019 for next appointment.   Patient discharged in stable condition accompanied by: self and .  Departure Mode: Ambulatory.    Noelle Kline RN

## 2019-05-02 ENCOUNTER — PATIENT OUTREACH (OUTPATIENT)
Dept: ONCOLOGY | Facility: CLINIC | Age: 59
End: 2019-05-02

## 2019-05-02 NOTE — PROGRESS NOTES
Patient was needing an update of dates extended to July 30, 2019 for her SHAISTA. Dates were updated and faxed to the [employer]. Confirmation was received. Also emailed copy to patient.  Yuliet Posada RN

## 2019-05-03 LAB — MISCELLANEOUS TEST: NORMAL

## 2019-05-07 ENCOUNTER — HOSPITAL ENCOUNTER (OUTPATIENT)
Facility: CLINIC | Age: 59
Discharge: HOME | End: 2019-05-07
Attending: INTERNAL MEDICINE | Admitting: INTERNAL MEDICINE
Payer: COMMERCIAL

## 2019-05-07 ENCOUNTER — INFUSION THERAPY VISIT (OUTPATIENT)
Dept: INFUSION THERAPY | Facility: CLINIC | Age: 59
End: 2019-05-07
Attending: INTERNAL MEDICINE
Payer: COMMERCIAL

## 2019-05-07 VITALS — WEIGHT: 200.2 LBS | BODY MASS INDEX: 29.56 KG/M2

## 2019-05-07 DIAGNOSIS — Z17.1 ESTROGEN RECEPTOR NEGATIVE CARCINOMA OF BREAST, LEFT (H): Primary | ICD-10-CM

## 2019-05-07 DIAGNOSIS — Z17.1 ESTROGEN RECEPTOR NEGATIVE CARCINOMA OF BREAST, LEFT (H): ICD-10-CM

## 2019-05-07 DIAGNOSIS — C50.912 ESTROGEN RECEPTOR NEGATIVE CARCINOMA OF BREAST, LEFT (H): Primary | ICD-10-CM

## 2019-05-07 DIAGNOSIS — C50.912 ESTROGEN RECEPTOR NEGATIVE CARCINOMA OF BREAST, LEFT (H): ICD-10-CM

## 2019-05-07 DIAGNOSIS — C50.112 INFILTRATING DUCTAL CARCINOMA OF CENTRAL PORTION OF LEFT BREAST IN FEMALE (H): ICD-10-CM

## 2019-05-07 LAB
BASOPHILS # BLD AUTO: 0.1 10E9/L (ref 0–0.2)
BASOPHILS NFR BLD AUTO: 1.8 %
DIFFERENTIAL METHOD BLD: ABNORMAL
EOSINOPHIL # BLD AUTO: 0.2 10E9/L (ref 0–0.7)
EOSINOPHIL NFR BLD AUTO: 3.8 %
ERYTHROCYTE [DISTWIDTH] IN BLOOD BY AUTOMATED COUNT: 18.3 % (ref 10–15)
HCT VFR BLD AUTO: 28.7 % (ref 35–47)
HGB BLD-MCNC: 9.3 G/DL (ref 11.7–15.7)
IMM GRANULOCYTES # BLD: 0 10E9/L (ref 0–0.4)
IMM GRANULOCYTES NFR BLD: 0.8 %
LYMPHOCYTES # BLD AUTO: 0.7 10E9/L (ref 0.8–5.3)
LYMPHOCYTES NFR BLD AUTO: 17.5 %
MCH RBC QN AUTO: 32.7 PG (ref 26.5–33)
MCHC RBC AUTO-ENTMCNC: 32.4 G/DL (ref 31.5–36.5)
MCV RBC AUTO: 101 FL (ref 78–100)
MONOCYTES # BLD AUTO: 0.4 10E9/L (ref 0–1.3)
MONOCYTES NFR BLD AUTO: 9.6 %
NEUTROPHILS # BLD AUTO: 2.6 10E9/L (ref 1.6–8.3)
NEUTROPHILS NFR BLD AUTO: 66.5 %
NRBC # BLD AUTO: 0 10*3/UL
NRBC BLD AUTO-RTO: 0 /100
PLATELET # BLD AUTO: 253 10E9/L (ref 150–450)
RBC # BLD AUTO: 2.84 10E12/L (ref 3.8–5.2)
WBC # BLD AUTO: 3.9 10E9/L (ref 4–11)

## 2019-05-07 PROCEDURE — 85025 COMPLETE CBC W/AUTO DIFF WBC: CPT | Performed by: INTERNAL MEDICINE

## 2019-05-07 PROCEDURE — 36591 DRAW BLOOD OFF VENOUS DEVICE: CPT

## 2019-05-07 PROCEDURE — 25000128 H RX IP 250 OP 636: Performed by: INTERNAL MEDICINE

## 2019-05-07 RX ORDER — SODIUM CHLORIDE 9 MG/ML
1000 INJECTION, SOLUTION INTRAVENOUS CONTINUOUS PRN
Status: CANCELLED
Start: 2019-05-08

## 2019-05-07 RX ORDER — ALBUTEROL SULFATE 90 UG/1
1-2 AEROSOL, METERED RESPIRATORY (INHALATION)
Status: CANCELLED
Start: 2019-05-08

## 2019-05-07 RX ORDER — EPINEPHRINE 0.3 MG/.3ML
0.3 INJECTION SUBCUTANEOUS EVERY 5 MIN PRN
Status: CANCELLED | OUTPATIENT
Start: 2019-05-08

## 2019-05-07 RX ORDER — MEPERIDINE HYDROCHLORIDE 25 MG/ML
25 INJECTION INTRAMUSCULAR; INTRAVENOUS; SUBCUTANEOUS EVERY 30 MIN PRN
Status: CANCELLED | OUTPATIENT
Start: 2019-05-08

## 2019-05-07 RX ORDER — DIPHENHYDRAMINE HCL 25 MG
50 CAPSULE ORAL ONCE
Status: CANCELLED
Start: 2019-05-08

## 2019-05-07 RX ORDER — EPINEPHRINE 1 MG/ML
0.3 INJECTION, SOLUTION, CONCENTRATE INTRAVENOUS EVERY 5 MIN PRN
Status: CANCELLED | OUTPATIENT
Start: 2019-05-08

## 2019-05-07 RX ORDER — ALBUTEROL SULFATE 0.83 MG/ML
2.5 SOLUTION RESPIRATORY (INHALATION)
Status: CANCELLED | OUTPATIENT
Start: 2019-05-08

## 2019-05-07 RX ORDER — DIPHENHYDRAMINE HYDROCHLORIDE 50 MG/ML
50 INJECTION INTRAMUSCULAR; INTRAVENOUS
Status: CANCELLED
Start: 2019-05-08

## 2019-05-07 RX ORDER — LORAZEPAM 2 MG/ML
0.5 INJECTION INTRAMUSCULAR EVERY 4 HOURS PRN
Status: CANCELLED
Start: 2019-05-08

## 2019-05-07 RX ORDER — METHYLPREDNISOLONE SODIUM SUCCINATE 125 MG/2ML
125 INJECTION, POWDER, LYOPHILIZED, FOR SOLUTION INTRAMUSCULAR; INTRAVENOUS
Status: CANCELLED
Start: 2019-05-08

## 2019-05-07 RX ORDER — HEPARIN SODIUM (PORCINE) LOCK FLUSH IV SOLN 100 UNIT/ML 100 UNIT/ML
SOLUTION INTRAVENOUS
Status: DISCONTINUED
Start: 2019-05-07 | End: 2019-05-07 | Stop reason: HOSPADM

## 2019-05-07 RX ORDER — HEPARIN SODIUM (PORCINE) LOCK FLUSH IV SOLN 100 UNIT/ML 100 UNIT/ML
5 SOLUTION INTRAVENOUS ONCE
Status: COMPLETED | OUTPATIENT
Start: 2019-05-07 | End: 2019-05-07

## 2019-05-07 RX ORDER — HEPARIN SODIUM (PORCINE) LOCK FLUSH IV SOLN 100 UNIT/ML 100 UNIT/ML
5 SOLUTION INTRAVENOUS
Status: CANCELLED | OUTPATIENT
Start: 2019-05-08

## 2019-05-07 RX ADMIN — Medication 5 ML: at 12:32

## 2019-05-07 NOTE — PROGRESS NOTES
Infusion Nursing Note:  Monika CARRANZA Nam presents today for Port labs.    Patient seen by provider today: No   present during visit today: Not Applicable.    Note: N/A.    Intravenous Access:  Implanted Port.    Treatment Conditions:  Lab Results   Component Value Date    HGB 9.3 05/07/2019     Lab Results   Component Value Date    WBC 3.9 05/07/2019      Lab Results   Component Value Date    ANEU 2.6 05/07/2019     Lab Results   Component Value Date     05/07/2019   Results reviewed, labs MET treatment parameters, ok to proceed with treatment.    Post Infusion Assessment:  Blood return noted pre and post infusion.  Site patent and intact, free from redness, edema or discomfort.  No evidence of extravasations.  Access discontinued per protocol.     Discharge Plan:   Discharge instructions reviewed with: Patient.  Patient and/or family verbalized understanding of discharge instructions and all questions answered.  AVS to patient via [employer].  Patient will return 5/8/2019 for next appointment.   Patient discharged in stable condition accompanied by: self.  Departure Mode: Ambulatory.    Noelle Kline RN

## 2019-05-08 ENCOUNTER — PATIENT OUTREACH (OUTPATIENT)
Dept: ONCOLOGY | Facility: CLINIC | Age: 59
End: 2019-05-08

## 2019-05-08 ENCOUNTER — INFUSION THERAPY VISIT (OUTPATIENT)
Dept: INFUSION THERAPY | Facility: CLINIC | Age: 59
End: 2019-05-08
Attending: INTERNAL MEDICINE
Payer: COMMERCIAL

## 2019-05-08 VITALS — TEMPERATURE: 97.4 F | DIASTOLIC BLOOD PRESSURE: 82 MMHG | HEART RATE: 97 BPM | SYSTOLIC BLOOD PRESSURE: 152 MMHG

## 2019-05-08 DIAGNOSIS — C50.112 INFILTRATING DUCTAL CARCINOMA OF CENTRAL PORTION OF LEFT BREAST IN FEMALE (H): ICD-10-CM

## 2019-05-08 DIAGNOSIS — C50.912 ESTROGEN RECEPTOR NEGATIVE CARCINOMA OF BREAST, LEFT (H): Primary | ICD-10-CM

## 2019-05-08 DIAGNOSIS — Z17.1 ESTROGEN RECEPTOR NEGATIVE CARCINOMA OF BREAST, LEFT (H): Primary | ICD-10-CM

## 2019-05-08 PROCEDURE — 96375 TX/PRO/DX INJ NEW DRUG ADDON: CPT

## 2019-05-08 PROCEDURE — 96367 TX/PROPH/DG ADDL SEQ IV INF: CPT

## 2019-05-08 PROCEDURE — 25000128 H RX IP 250 OP 636: Performed by: INTERNAL MEDICINE

## 2019-05-08 PROCEDURE — 25800030 ZZH RX IP 258 OP 636: Performed by: INTERNAL MEDICINE

## 2019-05-08 PROCEDURE — 96413 CHEMO IV INFUSION 1 HR: CPT

## 2019-05-08 RX ORDER — HEPARIN SODIUM (PORCINE) LOCK FLUSH IV SOLN 100 UNIT/ML 100 UNIT/ML
5 SOLUTION INTRAVENOUS
Status: DISCONTINUED | OUTPATIENT
Start: 2019-05-08 | End: 2019-05-08 | Stop reason: HOSPADM

## 2019-05-08 RX ADMIN — DEXAMETHASONE SODIUM PHOSPHATE 20 MG: 10 INJECTION, SOLUTION INTRAMUSCULAR; INTRAVENOUS at 11:29

## 2019-05-08 RX ADMIN — FAMOTIDINE 20 MG: 20 INJECTION, SOLUTION INTRAVENOUS at 11:43

## 2019-05-08 RX ADMIN — PACLITAXEL 169 MG: 6 INJECTION, SOLUTION INTRAVENOUS at 11:56

## 2019-05-08 RX ADMIN — Medication 5 ML: at 13:11

## 2019-05-08 RX ADMIN — DIPHENHYDRAMINE HYDROCHLORIDE 50 MG: 50 INJECTION, SOLUTION INTRAMUSCULAR; INTRAVENOUS at 11:12

## 2019-05-08 RX ADMIN — SODIUM CHLORIDE 250 ML: 9 INJECTION, SOLUTION INTRAVENOUS at 11:12

## 2019-05-08 NOTE — PROGRESS NOTES
Infusion Nursing Note:  Monika Nam presents today for C8D1 Taxol.    Patient seen by provider today: No   present during visit today: Not Applicable.    Note: N/A.    Intravenous Access:  Implanted Port.    Treatment Conditions:  Lab Results   Component Value Date    HGB 9.3 05/07/2019     Lab Results   Component Value Date    WBC 3.9 05/07/2019      Lab Results   Component Value Date    ANEU 2.6 05/07/2019     Lab Results   Component Value Date     05/07/2019      Lab Results   Component Value Date     03/20/2019                   Lab Results   Component Value Date    POTASSIUM 3.9 03/20/2019           No results found for: MAG         Lab Results   Component Value Date    CR 0.69 03/20/2019                   Lab Results   Component Value Date    DANI 8.4 03/20/2019                Lab Results   Component Value Date    BILITOTAL 0.4 04/17/2019           Lab Results   Component Value Date    ALBUMIN 3.5 04/17/2019                    Lab Results   Component Value Date    ALT 15 04/17/2019           Lab Results   Component Value Date    AST 9 04/17/2019       Results reviewed, labs MET treatment parameters, ok to proceed with treatment.      Post Infusion Assessment:  Patient tolerated Taxol infusion without incident.  Blood return noted pre and post infusion.  Site patent and intact, free from redness, edema or discomfort.  No evidence of extravasations.  Access discontinued per protocol.       Discharge Plan:   Patient discharged in stable condition accompanied by: .  Departure Mode: Ambulatory.  Pt to return on 5/14/19 at 12:30 pm for PAC labs for C9D1 Taxol.    Rosana Yarbrough RN

## 2019-05-08 NOTE — PROGRESS NOTES
Received a message from Dileep Grant from the [email or web address] ([phone], ext [phone]) He is requesting more information. He would like medical records from Jan 1, 2019 pertaining to her diagnosis with the oncology clinic. He is also requesting another form be filled out by the provider. Form was received by fax. Patient also dropped off a copy today. Fax was sent on 4/29/19 to medical records. Spoke with Melissa in medical records at [phone] option [phone]. They need patient to sign a BEAU to the [employer] before they will send over records.   Left message on patient's answering machine to call back clinic. Direct line provided.     Yuliet Posada RN

## 2019-05-09 NOTE — PROGRESS NOTES
Pt returned call this morning regarding BEAU. Pt will go to medical records tomorrow morning to sign BEAU form that is needed to send records.     John Barrera RN on 5/9/2019 at 9:58 AM

## 2019-05-10 ENCOUNTER — TELEPHONE (OUTPATIENT)
Dept: ONCOLOGY | Facility: CLINIC | Age: 59
End: 2019-05-10

## 2019-05-10 NOTE — TELEPHONE ENCOUNTER
5/10/2019    I called Monika today to discuss her genetic test results, but was unable to reach her.  I left a non-detailed voicemail with my name and phone number.    Alison Roper MS, [facility]  Licensed Genetic Counselor  P. [phone]  F. [phone]

## 2019-05-14 ENCOUNTER — INFUSION THERAPY VISIT (OUTPATIENT)
Dept: INFUSION THERAPY | Facility: CLINIC | Age: 59
End: 2019-05-14
Attending: INTERNAL MEDICINE
Payer: COMMERCIAL

## 2019-05-14 ENCOUNTER — PATIENT OUTREACH (OUTPATIENT)
Dept: ONCOLOGY | Facility: CLINIC | Age: 59
End: 2019-05-14

## 2019-05-14 ENCOUNTER — HOSPITAL ENCOUNTER (OUTPATIENT)
Facility: CLINIC | Age: 59
Discharge: HOME | End: 2019-05-14
Attending: INTERNAL MEDICINE | Admitting: INTERNAL MEDICINE
Payer: COMMERCIAL

## 2019-05-14 VITALS
SYSTOLIC BLOOD PRESSURE: 142 MMHG | WEIGHT: 196.6 LBS | TEMPERATURE: 97.5 F | BODY MASS INDEX: 29.03 KG/M2 | DIASTOLIC BLOOD PRESSURE: 79 MMHG | HEART RATE: 84 BPM

## 2019-05-14 DIAGNOSIS — C50.112 INFILTRATING DUCTAL CARCINOMA OF CENTRAL PORTION OF LEFT BREAST IN FEMALE (H): ICD-10-CM

## 2019-05-14 DIAGNOSIS — C50.912 ESTROGEN RECEPTOR NEGATIVE CARCINOMA OF BREAST, LEFT (H): Primary | ICD-10-CM

## 2019-05-14 DIAGNOSIS — Z17.1 ESTROGEN RECEPTOR NEGATIVE CARCINOMA OF BREAST, LEFT (H): Primary | ICD-10-CM

## 2019-05-14 LAB
ALBUMIN SERPL-MCNC: 3.7 G/DL (ref 3.4–5)
ALP SERPL-CCNC: 79 U/L (ref 40–150)
ALT SERPL W P-5'-P-CCNC: 31 U/L (ref 0–50)
AST SERPL W P-5'-P-CCNC: 20 U/L (ref 0–45)
BASOPHILS # BLD AUTO: 0.1 10E9/L (ref 0–0.2)
BASOPHILS NFR BLD AUTO: 1.8 %
BILIRUB DIRECT SERPL-MCNC: 0.2 MG/DL (ref 0–0.2)
BILIRUB SERPL-MCNC: 0.8 MG/DL (ref 0.2–1.3)
DIFFERENTIAL METHOD BLD: ABNORMAL
EOSINOPHIL # BLD AUTO: 0.1 10E9/L (ref 0–0.7)
EOSINOPHIL NFR BLD AUTO: 3 %
ERYTHROCYTE [DISTWIDTH] IN BLOOD BY AUTOMATED COUNT: 17.1 % (ref 10–15)
HCT VFR BLD AUTO: 29.3 % (ref 35–47)
HGB BLD-MCNC: 9.6 G/DL (ref 11.7–15.7)
IMM GRANULOCYTES # BLD: 0 10E9/L (ref 0–0.4)
IMM GRANULOCYTES NFR BLD: 0.8 %
LYMPHOCYTES # BLD AUTO: 0.7 10E9/L (ref 0.8–5.3)
LYMPHOCYTES NFR BLD AUTO: 17.5 %
MCH RBC QN AUTO: 33.8 PG (ref 26.5–33)
MCHC RBC AUTO-ENTMCNC: 32.8 G/DL (ref 31.5–36.5)
MCV RBC AUTO: 103 FL (ref 78–100)
MONOCYTES # BLD AUTO: 0.3 10E9/L (ref 0–1.3)
MONOCYTES NFR BLD AUTO: 6.8 %
NEUTROPHILS # BLD AUTO: 2.8 10E9/L (ref 1.6–8.3)
NEUTROPHILS NFR BLD AUTO: 70.1 %
NRBC # BLD AUTO: 0 10*3/UL
NRBC BLD AUTO-RTO: 0 /100
PLATELET # BLD AUTO: 197 10E9/L (ref 150–450)
PROT SERPL-MCNC: 6.7 G/DL (ref 6.8–8.8)
RBC # BLD AUTO: 2.84 10E12/L (ref 3.8–5.2)
WBC # BLD AUTO: 4 10E9/L (ref 4–11)

## 2019-05-14 PROCEDURE — 85025 COMPLETE CBC W/AUTO DIFF WBC: CPT | Performed by: INTERNAL MEDICINE

## 2019-05-14 PROCEDURE — 36591 DRAW BLOOD OFF VENOUS DEVICE: CPT

## 2019-05-14 PROCEDURE — 80076 HEPATIC FUNCTION PANEL: CPT | Performed by: INTERNAL MEDICINE

## 2019-05-14 PROCEDURE — 25000128 H RX IP 250 OP 636: Performed by: INTERNAL MEDICINE

## 2019-05-14 RX ORDER — LORAZEPAM 2 MG/ML
0.5 INJECTION INTRAMUSCULAR EVERY 4 HOURS PRN
Status: CANCELLED
Start: 2019-05-15

## 2019-05-14 RX ORDER — HEPARIN SODIUM (PORCINE) LOCK FLUSH IV SOLN 100 UNIT/ML 100 UNIT/ML
5 SOLUTION INTRAVENOUS
Status: CANCELLED | OUTPATIENT
Start: 2019-05-15

## 2019-05-14 RX ORDER — METHYLPREDNISOLONE SODIUM SUCCINATE 125 MG/2ML
125 INJECTION, POWDER, LYOPHILIZED, FOR SOLUTION INTRAMUSCULAR; INTRAVENOUS
Status: CANCELLED
Start: 2019-05-15

## 2019-05-14 RX ORDER — ALBUTEROL SULFATE 90 UG/1
1-2 AEROSOL, METERED RESPIRATORY (INHALATION)
Status: CANCELLED
Start: 2019-05-15

## 2019-05-14 RX ORDER — EPINEPHRINE 0.3 MG/.3ML
0.3 INJECTION SUBCUTANEOUS EVERY 5 MIN PRN
Status: CANCELLED | OUTPATIENT
Start: 2019-05-15

## 2019-05-14 RX ORDER — DIPHENHYDRAMINE HYDROCHLORIDE 50 MG/ML
50 INJECTION INTRAMUSCULAR; INTRAVENOUS
Status: CANCELLED
Start: 2019-05-15

## 2019-05-14 RX ORDER — HEPARIN SODIUM (PORCINE) LOCK FLUSH IV SOLN 100 UNIT/ML 100 UNIT/ML
5 SOLUTION INTRAVENOUS
Status: DISCONTINUED | OUTPATIENT
Start: 2019-05-14 | End: 2019-05-14 | Stop reason: HOSPADM

## 2019-05-14 RX ORDER — DIPHENHYDRAMINE HCL 50 MG
50 CAPSULE ORAL ONCE
Status: CANCELLED
Start: 2019-05-15

## 2019-05-14 RX ORDER — SODIUM CHLORIDE 9 MG/ML
1000 INJECTION, SOLUTION INTRAVENOUS CONTINUOUS PRN
Status: CANCELLED
Start: 2019-05-15

## 2019-05-14 RX ORDER — EPINEPHRINE 1 MG/ML
0.3 INJECTION, SOLUTION, CONCENTRATE INTRAVENOUS EVERY 5 MIN PRN
Status: CANCELLED | OUTPATIENT
Start: 2019-05-15

## 2019-05-14 RX ORDER — MEPERIDINE HYDROCHLORIDE 25 MG/ML
25 INJECTION INTRAMUSCULAR; INTRAVENOUS; SUBCUTANEOUS EVERY 30 MIN PRN
Status: CANCELLED | OUTPATIENT
Start: 2019-05-15

## 2019-05-14 RX ORDER — HEPARIN SODIUM (PORCINE) LOCK FLUSH IV SOLN 100 UNIT/ML 100 UNIT/ML
SOLUTION INTRAVENOUS
Status: DISCONTINUED
Start: 2019-05-14 | End: 2019-05-14 | Stop reason: HOSPADM

## 2019-05-14 RX ORDER — ALBUTEROL SULFATE 0.83 MG/ML
2.5 SOLUTION RESPIRATORY (INHALATION)
Status: CANCELLED | OUTPATIENT
Start: 2019-05-15

## 2019-05-14 RX ADMIN — Medication 5 ML: at 13:05

## 2019-05-14 NOTE — PROGRESS NOTES
Patient stopped into clinic requesting a note for her employer. [facility] papers were filled out previously and were recently adjusted with work restrictions on 5/2/19. Her employer is requiring a letter in addition to the paperwork. Letter written with explanation of restricte hours of work per week.  Will have Dr. Gonzales sign. Patient will  letter 5/15/19.  Yuliet Posada RN

## 2019-05-14 NOTE — PROGRESS NOTES
Infusion Nursing Note:  Monika Nam presents today for port labs and flush.    Patient seen by provider today: No   present during visit today: Not Applicable.    Note: Labs drawn via her port. Port flushed per [place] protocol. Pt. To return tomorrow for chemotherapy.    Intravenous Access:  Labs drawn without difficulty.  Implanted Port.    Treatment Conditions:  Lab Results   Component Value Date    HGB 9.6 05/14/2019     Lab Results   Component Value Date    WBC 4.0 05/14/2019      Lab Results   Component Value Date    ANEU 2.8 05/14/2019     Lab Results   Component Value Date     05/14/2019      Lab Results   Component Value Date     03/20/2019                   Lab Results   Component Value Date    POTASSIUM 3.9 03/20/2019           No results found for: MAG         Lab Results   Component Value Date    CR 0.69 03/20/2019                   Lab Results   Component Value Date    DANI 8.4 03/20/2019                Lab Results   Component Value Date    BILITOTAL 0.8 05/14/2019           Lab Results   Component Value Date    ALBUMIN 3.7 05/14/2019                    Lab Results   Component Value Date    ALT 31 05/14/2019           Lab Results   Component Value Date    AST 20 05/14/2019       Results reviewed, labs MET treatment parameters, ok to proceed with treatment.      Post Infusion Assessment:  Blood return noted pre and post infusion.  Site patent and intact, free from redness, edema or discomfort.  No evidence of extravasations.  Access discontinued per protocol.       Discharge Plan:   Patient and/or family verbalized understanding of discharge instructions and all questions answered.  Patient discharged in stable condition accompanied by: self.  Departure Mode: Ambulatory.    Jessica Faye RN

## 2019-05-14 NOTE — LETTER
[facility]  [address]  [facility] [address]  Phone: [phone]      May 14, 2019      RE: Monika Nam  [address] [facility] [place] [address]        To whom it may concern:    Monika Nam is under my professional care. The employee is ABLE to return to work May 20, 2019.    When the patient returns to work, the following restrictions apply until July 31, 2019:  Work hours will need to be reduced to 16-32 hours per week. This is due to continuing appointments, treatment, and recovery time from treatments.      Sincerely,            Dr. Madison Gonzales

## 2019-05-15 ENCOUNTER — INFUSION THERAPY VISIT (OUTPATIENT)
Dept: INFUSION THERAPY | Facility: CLINIC | Age: 59
End: 2019-05-15
Attending: INTERNAL MEDICINE
Payer: COMMERCIAL

## 2019-05-15 VITALS — SYSTOLIC BLOOD PRESSURE: 158 MMHG | DIASTOLIC BLOOD PRESSURE: 90 MMHG | TEMPERATURE: 96.9 F | HEART RATE: 86 BPM

## 2019-05-15 DIAGNOSIS — Z17.1 ESTROGEN RECEPTOR NEGATIVE CARCINOMA OF BREAST, LEFT (H): Primary | ICD-10-CM

## 2019-05-15 DIAGNOSIS — C50.912 ESTROGEN RECEPTOR NEGATIVE CARCINOMA OF BREAST, LEFT (H): Primary | ICD-10-CM

## 2019-05-15 DIAGNOSIS — C50.112 INFILTRATING DUCTAL CARCINOMA OF CENTRAL PORTION OF LEFT BREAST IN FEMALE (H): ICD-10-CM

## 2019-05-15 PROCEDURE — 25800030 ZZH RX IP 258 OP 636: Performed by: INTERNAL MEDICINE

## 2019-05-15 PROCEDURE — 25000128 H RX IP 250 OP 636: Performed by: INTERNAL MEDICINE

## 2019-05-15 PROCEDURE — 96367 TX/PROPH/DG ADDL SEQ IV INF: CPT

## 2019-05-15 PROCEDURE — 96375 TX/PRO/DX INJ NEW DRUG ADDON: CPT

## 2019-05-15 PROCEDURE — 96413 CHEMO IV INFUSION 1 HR: CPT

## 2019-05-15 RX ORDER — HEPARIN SODIUM (PORCINE) LOCK FLUSH IV SOLN 100 UNIT/ML 100 UNIT/ML
5 SOLUTION INTRAVENOUS
Status: DISCONTINUED | OUTPATIENT
Start: 2019-05-15 | End: 2019-05-15 | Stop reason: HOSPADM

## 2019-05-15 RX ADMIN — PACLITAXEL 169 MG: 6 INJECTION, SOLUTION INTRAVENOUS at 12:07

## 2019-05-15 RX ADMIN — DEXAMETHASONE SODIUM PHOSPHATE 20 MG: 10 INJECTION, SOLUTION INTRAMUSCULAR; INTRAVENOUS at 11:50

## 2019-05-15 RX ADMIN — SODIUM CHLORIDE 250 ML: 9 INJECTION, SOLUTION INTRAVENOUS at 11:16

## 2019-05-15 RX ADMIN — FAMOTIDINE 20 MG: 20 INJECTION, SOLUTION INTRAVENOUS at 11:36

## 2019-05-15 RX ADMIN — Medication 5 ML: at 13:23

## 2019-05-15 RX ADMIN — DIPHENHYDRAMINE HYDROCHLORIDE 50 MG: 50 INJECTION, SOLUTION INTRAMUSCULAR; INTRAVENOUS at 11:16

## 2019-05-15 NOTE — PROGRESS NOTES
Infusion Nursing Note:  Monika Nam presents today for IV Taxol.    Patient seen by provider today: No   present during visit today: Not Applicable.    Note: IV premeds and IV Taxol infused via her port per [place] protocol. Port flushed per [place] protocol. Pt. To return on 5/21 for labs.    Intravenous Access:  No Intravenous access/labs at this visit.    Treatment Conditions:  Not Applicable.      Post Infusion Assessment:  Patient tolerated infusion without incident.  Blood return noted pre and post infusion.  Site patent and intact, free from redness, edema or discomfort.  No evidence of extravasations.  Access discontinued per protocol.       Discharge Plan:   Patient and/or family verbalized understanding of discharge instructions and all questions answered.  Patient discharged in stable condition accompanied by: self.  Departure Mode: Ambulatory.    Jessica Faye RN

## 2019-05-16 NOTE — TELEPHONE ENCOUNTER
5/16/2019    I called Monika today to discuss her genetic test results, but was unable to reach her. My phone number was provided.     Alison Roper MS, [facility]  Licensed Genetic Counselor  P. [phone]  F. [phone]

## 2019-05-17 LAB
LAB SCANNED RESULT: NORMAL
LOCATION PERFORMED: NORMAL
RESULT: NORMAL
SEND OUTS MISC TEST CODE: 8830
SEND OUTS MISC TEST SPECIMEN: NORMAL
TEST NAME: NORMAL

## 2019-05-21 ENCOUNTER — INFUSION THERAPY VISIT (OUTPATIENT)
Dept: INFUSION THERAPY | Facility: CLINIC | Age: 59
End: 2019-05-21
Attending: INTERNAL MEDICINE
Payer: COMMERCIAL

## 2019-05-21 ENCOUNTER — HOSPITAL ENCOUNTER (OUTPATIENT)
Facility: CLINIC | Age: 59
Discharge: HOME | End: 2019-05-21
Attending: INTERNAL MEDICINE | Admitting: INTERNAL MEDICINE
Payer: COMMERCIAL

## 2019-05-21 VITALS — WEIGHT: 201 LBS | BODY MASS INDEX: 29.68 KG/M2

## 2019-05-21 DIAGNOSIS — C50.912 ESTROGEN RECEPTOR NEGATIVE CARCINOMA OF BREAST, LEFT (H): Primary | ICD-10-CM

## 2019-05-21 DIAGNOSIS — Z17.1 ESTROGEN RECEPTOR NEGATIVE CARCINOMA OF BREAST, LEFT (H): Primary | ICD-10-CM

## 2019-05-21 DIAGNOSIS — C50.112 INFILTRATING DUCTAL CARCINOMA OF CENTRAL PORTION OF LEFT BREAST IN FEMALE (H): ICD-10-CM

## 2019-05-21 LAB
BASOPHILS # BLD AUTO: 0.1 10E9/L (ref 0–0.2)
BASOPHILS NFR BLD AUTO: 1.8 %
DIFFERENTIAL METHOD BLD: ABNORMAL
EOSINOPHIL # BLD AUTO: 0.1 10E9/L (ref 0–0.7)
EOSINOPHIL NFR BLD AUTO: 4 %
ERYTHROCYTE [DISTWIDTH] IN BLOOD BY AUTOMATED COUNT: 16 % (ref 10–15)
HCT VFR BLD AUTO: 27.5 % (ref 35–47)
HGB BLD-MCNC: 8.8 G/DL (ref 11.7–15.7)
IMM GRANULOCYTES # BLD: 0 10E9/L (ref 0–0.4)
IMM GRANULOCYTES NFR BLD: 0.4 %
LYMPHOCYTES # BLD AUTO: 0.7 10E9/L (ref 0.8–5.3)
LYMPHOCYTES NFR BLD AUTO: 24 %
MCH RBC QN AUTO: 33.7 PG (ref 26.5–33)
MCHC RBC AUTO-ENTMCNC: 32 G/DL (ref 31.5–36.5)
MCV RBC AUTO: 105 FL (ref 78–100)
MONOCYTES # BLD AUTO: 0.3 10E9/L (ref 0–1.3)
MONOCYTES NFR BLD AUTO: 10.9 %
NEUTROPHILS # BLD AUTO: 1.6 10E9/L (ref 1.6–8.3)
NEUTROPHILS NFR BLD AUTO: 58.9 %
NRBC # BLD AUTO: 0 10*3/UL
NRBC BLD AUTO-RTO: 0 /100
PLATELET # BLD AUTO: 185 10E9/L (ref 150–450)
RBC # BLD AUTO: 2.61 10E12/L (ref 3.8–5.2)
WBC # BLD AUTO: 2.8 10E9/L (ref 4–11)

## 2019-05-21 PROCEDURE — 25000128 H RX IP 250 OP 636: Performed by: INTERNAL MEDICINE

## 2019-05-21 PROCEDURE — 85025 COMPLETE CBC W/AUTO DIFF WBC: CPT | Performed by: INTERNAL MEDICINE

## 2019-05-21 PROCEDURE — 36591 DRAW BLOOD OFF VENOUS DEVICE: CPT

## 2019-05-21 RX ORDER — LORAZEPAM 2 MG/ML
0.5 INJECTION INTRAMUSCULAR EVERY 4 HOURS PRN
Status: CANCELLED
Start: 2019-06-11

## 2019-05-21 RX ORDER — HEPARIN SODIUM (PORCINE) LOCK FLUSH IV SOLN 100 UNIT/ML 100 UNIT/ML
5 SOLUTION INTRAVENOUS
Status: CANCELLED | OUTPATIENT
Start: 2019-05-29

## 2019-05-21 RX ORDER — EPINEPHRINE 0.3 MG/.3ML
0.3 INJECTION SUBCUTANEOUS EVERY 5 MIN PRN
Status: CANCELLED | OUTPATIENT
Start: 2019-05-22

## 2019-05-21 RX ORDER — ALBUTEROL SULFATE 90 UG/1
1-2 AEROSOL, METERED RESPIRATORY (INHALATION)
Status: CANCELLED
Start: 2019-05-29

## 2019-05-21 RX ORDER — METHYLPREDNISOLONE SODIUM SUCCINATE 125 MG/2ML
125 INJECTION, POWDER, LYOPHILIZED, FOR SOLUTION INTRAMUSCULAR; INTRAVENOUS
Status: CANCELLED
Start: 2019-05-22

## 2019-05-21 RX ORDER — DIPHENHYDRAMINE HYDROCHLORIDE 50 MG/ML
50 INJECTION INTRAMUSCULAR; INTRAVENOUS
Status: CANCELLED
Start: 2019-05-29

## 2019-05-21 RX ORDER — ALBUTEROL SULFATE 0.83 MG/ML
2.5 SOLUTION RESPIRATORY (INHALATION)
Status: CANCELLED | OUTPATIENT
Start: 2019-05-29

## 2019-05-21 RX ORDER — EPINEPHRINE 0.3 MG/.3ML
0.3 INJECTION SUBCUTANEOUS EVERY 5 MIN PRN
Status: CANCELLED | OUTPATIENT
Start: 2019-06-11

## 2019-05-21 RX ORDER — HEPARIN SODIUM (PORCINE) LOCK FLUSH IV SOLN 100 UNIT/ML 100 UNIT/ML
5 SOLUTION INTRAVENOUS
Status: CANCELLED | OUTPATIENT
Start: 2019-05-22

## 2019-05-21 RX ORDER — DIPHENHYDRAMINE HYDROCHLORIDE 50 MG/ML
50 INJECTION INTRAMUSCULAR; INTRAVENOUS
Status: CANCELLED
Start: 2019-06-11

## 2019-05-21 RX ORDER — SODIUM CHLORIDE 9 MG/ML
1000 INJECTION, SOLUTION INTRAVENOUS CONTINUOUS PRN
Status: CANCELLED
Start: 2019-06-11

## 2019-05-21 RX ORDER — DIPHENHYDRAMINE HCL 50 MG
50 CAPSULE ORAL ONCE
Status: CANCELLED
Start: 2019-05-22

## 2019-05-21 RX ORDER — METHYLPREDNISOLONE SODIUM SUCCINATE 125 MG/2ML
125 INJECTION, POWDER, LYOPHILIZED, FOR SOLUTION INTRAMUSCULAR; INTRAVENOUS
Status: CANCELLED
Start: 2019-05-29

## 2019-05-21 RX ORDER — MEPERIDINE HYDROCHLORIDE 25 MG/ML
25 INJECTION INTRAMUSCULAR; INTRAVENOUS; SUBCUTANEOUS EVERY 30 MIN PRN
Status: CANCELLED | OUTPATIENT
Start: 2019-05-29

## 2019-05-21 RX ORDER — ALBUTEROL SULFATE 0.83 MG/ML
2.5 SOLUTION RESPIRATORY (INHALATION)
Status: CANCELLED | OUTPATIENT
Start: 2019-05-22

## 2019-05-21 RX ORDER — HEPARIN SODIUM (PORCINE) LOCK FLUSH IV SOLN 100 UNIT/ML 100 UNIT/ML
5 SOLUTION INTRAVENOUS
Status: CANCELLED | OUTPATIENT
Start: 2019-06-11

## 2019-05-21 RX ORDER — SODIUM CHLORIDE 9 MG/ML
1000 INJECTION, SOLUTION INTRAVENOUS CONTINUOUS PRN
Status: CANCELLED
Start: 2019-05-29

## 2019-05-21 RX ORDER — METHYLPREDNISOLONE SODIUM SUCCINATE 125 MG/2ML
125 INJECTION, POWDER, LYOPHILIZED, FOR SOLUTION INTRAMUSCULAR; INTRAVENOUS
Status: CANCELLED
Start: 2019-06-11

## 2019-05-21 RX ORDER — ALBUTEROL SULFATE 90 UG/1
1-2 AEROSOL, METERED RESPIRATORY (INHALATION)
Status: CANCELLED
Start: 2019-06-11

## 2019-05-21 RX ORDER — DIPHENHYDRAMINE HYDROCHLORIDE 50 MG/ML
50 INJECTION INTRAMUSCULAR; INTRAVENOUS
Status: CANCELLED
Start: 2019-05-22

## 2019-05-21 RX ORDER — EPINEPHRINE 1 MG/ML
0.3 INJECTION, SOLUTION, CONCENTRATE INTRAVENOUS EVERY 5 MIN PRN
Status: CANCELLED | OUTPATIENT
Start: 2019-06-11

## 2019-05-21 RX ORDER — DIPHENHYDRAMINE HCL 50 MG
50 CAPSULE ORAL ONCE
Status: CANCELLED
Start: 2019-06-11

## 2019-05-21 RX ORDER — EPINEPHRINE 0.3 MG/.3ML
0.3 INJECTION SUBCUTANEOUS EVERY 5 MIN PRN
Status: CANCELLED | OUTPATIENT
Start: 2019-05-29

## 2019-05-21 RX ORDER — ALBUTEROL SULFATE 90 UG/1
1-2 AEROSOL, METERED RESPIRATORY (INHALATION)
Status: CANCELLED
Start: 2019-05-22

## 2019-05-21 RX ORDER — EPINEPHRINE 1 MG/ML
0.3 INJECTION, SOLUTION, CONCENTRATE INTRAVENOUS EVERY 5 MIN PRN
Status: CANCELLED | OUTPATIENT
Start: 2019-05-22

## 2019-05-21 RX ORDER — MEPERIDINE HYDROCHLORIDE 25 MG/ML
25 INJECTION INTRAMUSCULAR; INTRAVENOUS; SUBCUTANEOUS EVERY 30 MIN PRN
Status: CANCELLED | OUTPATIENT
Start: 2019-06-11

## 2019-05-21 RX ORDER — EPINEPHRINE 1 MG/ML
0.3 INJECTION, SOLUTION, CONCENTRATE INTRAVENOUS EVERY 5 MIN PRN
Status: CANCELLED | OUTPATIENT
Start: 2019-05-29

## 2019-05-21 RX ORDER — MEPERIDINE HYDROCHLORIDE 25 MG/ML
25 INJECTION INTRAMUSCULAR; INTRAVENOUS; SUBCUTANEOUS EVERY 30 MIN PRN
Status: CANCELLED | OUTPATIENT
Start: 2019-05-22

## 2019-05-21 RX ORDER — DIPHENHYDRAMINE HCL 50 MG
50 CAPSULE ORAL ONCE
Status: CANCELLED
Start: 2019-05-29

## 2019-05-21 RX ORDER — ALBUTEROL SULFATE 0.83 MG/ML
2.5 SOLUTION RESPIRATORY (INHALATION)
Status: CANCELLED | OUTPATIENT
Start: 2019-06-11

## 2019-05-21 RX ORDER — SODIUM CHLORIDE 9 MG/ML
1000 INJECTION, SOLUTION INTRAVENOUS CONTINUOUS PRN
Status: CANCELLED
Start: 2019-05-22

## 2019-05-21 RX ORDER — LORAZEPAM 2 MG/ML
0.5 INJECTION INTRAMUSCULAR EVERY 4 HOURS PRN
Status: CANCELLED
Start: 2019-05-29

## 2019-05-21 RX ORDER — HEPARIN SODIUM (PORCINE) LOCK FLUSH IV SOLN 100 UNIT/ML 100 UNIT/ML
5 SOLUTION INTRAVENOUS
Status: DISCONTINUED | OUTPATIENT
Start: 2019-05-21 | End: 2019-05-21 | Stop reason: HOSPADM

## 2019-05-21 RX ORDER — LORAZEPAM 2 MG/ML
0.5 INJECTION INTRAMUSCULAR EVERY 4 HOURS PRN
Status: CANCELLED
Start: 2019-05-22

## 2019-05-21 RX ADMIN — Medication 5 ML: at 12:57

## 2019-05-22 ENCOUNTER — ONCOLOGY VISIT (OUTPATIENT)
Dept: ONCOLOGY | Facility: CLINIC | Age: 59
End: 2019-05-22
Attending: INTERNAL MEDICINE
Payer: COMMERCIAL

## 2019-05-22 ENCOUNTER — INFUSION THERAPY VISIT (OUTPATIENT)
Dept: INFUSION THERAPY | Facility: CLINIC | Age: 59
End: 2019-05-22
Attending: INTERNAL MEDICINE
Payer: COMMERCIAL

## 2019-05-22 VITALS
DIASTOLIC BLOOD PRESSURE: 79 MMHG | OXYGEN SATURATION: 100 % | HEART RATE: 107 BPM | WEIGHT: 199.3 LBS | RESPIRATION RATE: 16 BRPM | HEIGHT: 69 IN | TEMPERATURE: 97.5 F | BODY MASS INDEX: 29.52 KG/M2 | SYSTOLIC BLOOD PRESSURE: 133 MMHG

## 2019-05-22 VITALS — HEART RATE: 87 BPM | DIASTOLIC BLOOD PRESSURE: 95 MMHG | SYSTOLIC BLOOD PRESSURE: 158 MMHG

## 2019-05-22 DIAGNOSIS — J45.20 MILD INTERMITTENT ASTHMA WITHOUT COMPLICATION: ICD-10-CM

## 2019-05-22 DIAGNOSIS — F17.200 TOBACCO USE DISORDER: Primary | ICD-10-CM

## 2019-05-22 DIAGNOSIS — Z17.1 ESTROGEN RECEPTOR NEGATIVE CARCINOMA OF BREAST, LEFT (H): Primary | ICD-10-CM

## 2019-05-22 DIAGNOSIS — C50.112 INFILTRATING DUCTAL CARCINOMA OF CENTRAL PORTION OF LEFT BREAST IN FEMALE (H): ICD-10-CM

## 2019-05-22 DIAGNOSIS — C50.912 ESTROGEN RECEPTOR NEGATIVE CARCINOMA OF BREAST, LEFT (H): Primary | ICD-10-CM

## 2019-05-22 DIAGNOSIS — K21.9 GASTROESOPHAGEAL REFLUX DISEASE WITHOUT ESOPHAGITIS: ICD-10-CM

## 2019-05-22 DIAGNOSIS — T45.1X5A CHEMOTHERAPY INDUCED NAUSEA AND VOMITING: ICD-10-CM

## 2019-05-22 DIAGNOSIS — R11.2 CHEMOTHERAPY INDUCED NAUSEA AND VOMITING: ICD-10-CM

## 2019-05-22 PROCEDURE — 25000128 H RX IP 250 OP 636: Performed by: INTERNAL MEDICINE

## 2019-05-22 PROCEDURE — 25000132 ZZH RX MED GY IP 250 OP 250 PS 637: Performed by: INTERNAL MEDICINE

## 2019-05-22 PROCEDURE — 25800030 ZZH RX IP 258 OP 636: Performed by: INTERNAL MEDICINE

## 2019-05-22 PROCEDURE — 96375 TX/PRO/DX INJ NEW DRUG ADDON: CPT

## 2019-05-22 PROCEDURE — G0463 HOSPITAL OUTPT CLINIC VISIT: HCPCS | Mod: 25

## 2019-05-22 PROCEDURE — 99214 OFFICE O/P EST MOD 30 MIN: CPT | Performed by: INTERNAL MEDICINE

## 2019-05-22 PROCEDURE — 96413 CHEMO IV INFUSION 1 HR: CPT

## 2019-05-22 RX ORDER — DIPHENHYDRAMINE HCL 50 MG
50 CAPSULE ORAL ONCE
Status: COMPLETED | OUTPATIENT
Start: 2019-05-22 | End: 2019-05-22

## 2019-05-22 RX ORDER — LORAZEPAM 0.5 MG/1
0.5 TABLET ORAL EVERY 4 HOURS PRN
Qty: 30 TABLET | Refills: 5 | Status: SHIPPED | OUTPATIENT
Start: 2019-05-22 | End: 2019-08-23

## 2019-05-22 RX ORDER — ONDANSETRON 4 MG/1
4 TABLET, FILM COATED ORAL EVERY 8 HOURS PRN
Qty: 20 TABLET | Refills: 1 | Status: SHIPPED | OUTPATIENT
Start: 2019-05-22 | End: 2019-10-16

## 2019-05-22 RX ORDER — HEPARIN SODIUM (PORCINE) LOCK FLUSH IV SOLN 100 UNIT/ML 100 UNIT/ML
5 SOLUTION INTRAVENOUS
Status: DISCONTINUED | OUTPATIENT
Start: 2019-05-22 | End: 2019-05-22 | Stop reason: HOSPADM

## 2019-05-22 RX ADMIN — PACLITAXEL 169 MG: 6 INJECTION, SOLUTION INTRAVENOUS at 13:45

## 2019-05-22 RX ADMIN — DEXAMETHASONE SODIUM PHOSPHATE 20 MG: 10 INJECTION, SOLUTION INTRAMUSCULAR; INTRAVENOUS at 13:29

## 2019-05-22 RX ADMIN — FAMOTIDINE 20 MG: 20 INJECTION, SOLUTION INTRAVENOUS at 13:13

## 2019-05-22 RX ADMIN — SODIUM CHLORIDE 250 ML: 9 INJECTION, SOLUTION INTRAVENOUS at 13:10

## 2019-05-22 RX ADMIN — Medication 5 ML: at 15:01

## 2019-05-22 RX ADMIN — DIPHENHYDRAMINE HYDROCHLORIDE 50 MG: 50 CAPSULE ORAL at 13:09

## 2019-05-22 ASSESSMENT — MIFFLIN-ST. JEOR: SCORE: 1543.65

## 2019-05-22 ASSESSMENT — PAIN SCALES - GENERAL: PAINLEVEL: NO PAIN (0)

## 2019-05-22 NOTE — PROGRESS NOTES
"Oncology Rooming Note    May 22, 2019 12:48 PM   Monika Nam is a 58 year old female who presents for:    Chief Complaint   Patient presents with     Oncology Clinic Visit     Labs & Chemo today Infiltrating ductal carcinoma of central portion of left breast in female, review Labs      Initial Vitals: /79 (BP Location: Right arm, Patient Position: Sitting, Cuff Size: Adult Large)   Pulse 107   Temp 97.5  F (36.4  C) (Tympanic)   Resp 16   Ht 1.753 m (5' 9.02\")   Wt 90.4 kg (199 lb 4.8 oz)   LMP 04/02/2010   SpO2 100%   BMI 29.42 kg/m   Estimated body mass index is 29.42 kg/m  as calculated from the following:    Height as of this encounter: 1.753 m (5' 9.02\").    Weight as of this encounter: 90.4 kg (199 lb 4.8 oz). Body surface area is 2.1 meters squared.  No Pain (0) Comment: Data Unavailable   Patient's last menstrual period was 04/02/2010.  Allergies reviewed: Yes  Medications reviewed: Yes    Medications: MEDICATION REFILLS NEEDED TODAY. Provider was notified.  Pharmacy name entered into [facility]: [facility] [place], [place] - [address] AT [facility] OF [address] [place] [address]    Clinical concerns:  Labs & Chemo today Infiltrating ductal carcinoma of central portion of left breast in female, review Labs.      Monika Curtis CMA              "

## 2019-05-22 NOTE — PROGRESS NOTES
Hematology/ Oncology Follow-up Visit:  May 22, 2019    Reason for Visit:   Chief Complaint   Patient presents with     Oncology Clinic Visit     Labs & Chemo today Infiltrating ductal carcinoma of central portion of left breast in female, review Labs        Oncologic History:  Infiltrating ductal carcinoma of central portion of left breast in female (H)  Monika Nam presented following her annual mammogram with new focal asymmetry at 12-1 o'clock position of the left breast around 10.7 cm from the nipple.  It measured 1.4 cm.  Subsequently the patient went on to have breast ultrasound on member 30th 2018 showing 1.6 cm in maximum dimension hypoechoic mass.  Subsequently ultrasound breast biopsy was done on December 17, 2018 showing invasive ductal carcinoma grade 3 out of 3 angiolymphatic invasion was not identified.  Ductal carcinoma in situ was not identified . estrogen receptor was negative, progesterone receptor was negative and HER-2/mercedes receptor was negative.        1/2019 left lumpectomy found 1.8 cm IDC, grade III, LN-, margin is clear, pT1cN0    The patient was started on dose dense chemotherapy with Adriamycin and Cytoxan followed by Taxol      Interval History:  Patient returning today for follow-up.  She has been tolerating Taxol treatment without significant side effects.  She denies any nausea or vomiting or diarrhea.  She denies any fever or chills.  She denies any shortness of breath or cough or wheezing.  She has mild numbness and tingling in both hands and feet.    Review Of Systems:  Constitutional: Negative for fever, chills, and night sweats.  Skin: negative.  Eyes: negative.  Ears/Nose/Throat: negative.  Respiratory: No shortness of breath, dyspnea on exertion, cough, or hemoptysis.  Cardiovascular: negative.  Gastrointestinal: negative.  Genitourinary: negative.  Musculoskeletal: negative.  Neurologic: negative.  Psychiatric: negative.  Hematologic/Lymphatic/Immunologic:  negative.  Endocrine: negative.    All other ROS negative unless mentioned in interval history.    Past medical, social, surgical, and family histories reviewed.    Allergies:  Allergies as of 05/22/2019 - Reviewed 04/24/2019   Allergen Reaction Noted     Sulfa drugs Swelling 06/09/2005       Current Medications:  Current Outpatient Medications   Medication Sig Dispense Refill     acetaminophen (TYLENOL) 500 MG tablet Take 500-1,000 mg by mouth every 6 hours as needed for mild pain or pain       albuterol (PROAIR HFA/PROVENTIL HFA/VENTOLIN HFA) 108 (90 Base) MCG/ACT inhaler Inhale 2 puffs into the lungs every 4 hours as needed for shortness of breath / dyspnea or wheezing 1 Inhaler 11     aspirin 81 MG tablet Take 1 tablet (81 mg) by mouth daily       augmented betamethasone dipropionate (DIPROLENE-AF) 0.05 % cream APPLY EXTERNALLY TO THE AFFECTED AREA TWICE DAILY 50 g 6     calcium-vitamin D (CALCIUM 600-D) 600-400 MG-UNIT per tablet Take 1 tablet by mouth daily       dexamethasone (DECADRON) 4 MG tablet Take 5 tablets the night before chemotherapy.  5 tablets the morning before chemotherapy.. 10 tablet 0     FLUoxetine (PROZAC) 20 MG capsule Take 20 mg daily. (Patient taking differently: Take 20 mg by mouth every evening ) 30 capsule 11     loratadine (CLARITIN) 10 MG tablet Take 10 mg by mouth daily.         LORazepam (ATIVAN) 0.5 MG tablet Take 1 tablet (0.5 mg) by mouth every 4 hours as needed (Anxiety, Nausea/Vomiting or Sleep) 30 tablet 5     MULTIVITAMIN TABS   OR 1 TABLET BY MOUTH DAILY       ondansetron (ZOFRAN) 4 MG tablet Take 1 tablet (4 mg) by mouth every 8 hours as needed for nausea 20 tablet 1     pseudoePHEDrine (SUDAFED) 30 MG tablet Take 30 mg by mouth every 12 hours as needed for congestion       ranitidine (ZANTAC) 150 MG tablet Take 1 tablet (150 mg) by mouth daily       prochlorperazine (COMPAZINE) 10 MG tablet Take 0.5 tablets (5 mg) by mouth every 6 hours as needed (Nausea/Vomiting) (Patient  "not taking: Reported on 5/22/2019) 30 tablet 5        Physical Exam:  /79 (BP Location: Right arm, Patient Position: Sitting, Cuff Size: Adult Large)   Pulse 107   Temp 97.5  F (36.4  C) (Tympanic)   Resp 16   Ht 1.753 m (5' 9.02\")   Wt 90.4 kg (199 lb 4.8 oz)   LMP 04/02/2010   SpO2 100%   BMI 29.42 kg/m    Wt Readings from Last 12 Encounters:   05/22/19 90.4 kg (199 lb 4.8 oz)   05/21/19 91.2 kg (201 lb)   05/14/19 89.2 kg (196 lb 9.6 oz)   05/07/19 90.8 kg (200 lb 3.2 oz)   04/30/19 90.7 kg (200 lb)   04/24/19 89.8 kg (198 lb)   04/03/19 91.6 kg (201 lb 14.4 oz)   03/20/19 92.6 kg (204 lb 3.2 oz)   03/20/19 91.8 kg (202 lb 6.4 oz)   03/06/19 92.2 kg (203 lb 3.2 oz)   02/14/19 93.2 kg (205 lb 6.4 oz)   01/28/19 92.1 kg (203 lb)     ECOG performance status: 1  GENERAL APPEARANCE: Healthy, alert and in no acute distress.  HEENT: Sclerae anicteric. PERRLA. Oropharynx without ulcers, lesions, or thrush.  NECK: Supple. No asymmetry or masses.  LYMPHATICS: No palpable cervical, supraclavicular, axillary, or inguinal lymphadenopathy.  RESP: Lungs clear to auscultation bilaterally without rales, rhonchi or wheezes.  CARDIOVASCULAR: Regular rate and rhythm. Normal S1, S2; no S3 or S4. No murmur, gallop, or rub.  ABDOMEN: Soft, nontender. Bowel sounds normal. No palpable organomegaly or masses.  MUSCULOSKELETAL: Extremities without gross deformities noted. No edema of bilateral lower extremities.  SKIN: No suspicious lesions or rashes.  NEURO: Alert and oriented x 3. Cranial nerves II-XII grossly intact.  PSYCHIATRIC: Mentation and affect appear normal.    Laboratory/Imaging Studies:  Infusion Therapy Visit on 05/21/2019   Component Date Value Ref Range Status     WBC 05/21/2019 2.8* 4.0 - 11.0 10e9/L Final     RBC Count 05/21/2019 2.61* 3.8 - 5.2 10e12/L Final     Hemoglobin 05/21/2019 8.8* 11.7 - 15.7 g/dL Final     Hematocrit 05/21/2019 27.5* 35.0 - 47.0 % Final     MCV 05/21/2019 105* 78 - 100 fl Final     " MCH 05/21/2019 33.7* 26.5 - 33.0 pg Final     MCHC 05/21/2019 32.0  31.5 - 36.5 g/dL Final     RDW 05/21/2019 16.0* 10.0 - 15.0 % Final     Platelet Count 05/21/2019 185  150 - 450 10e9/L Final     Diff Method 05/21/2019 Automated Method   Final     % Neutrophils 05/21/2019 58.9  % Final     % Lymphocytes 05/21/2019 24.0  % Final     % Monocytes 05/21/2019 10.9  % Final     % Eosinophils 05/21/2019 4.0  % Final     % Basophils 05/21/2019 1.8  % Final     % Immature Granulocytes 05/21/2019 0.4  % Final     Nucleated RBCs 05/21/2019 0  0 /100 Final     Absolute Neutrophil 05/21/2019 1.6  1.6 - 8.3 10e9/L Final     Absolute Lymphocytes 05/21/2019 0.7* 0.8 - 5.3 10e9/L Final     Absolute Monocytes 05/21/2019 0.3  0.0 - 1.3 10e9/L Final     Absolute Eosinophils 05/21/2019 0.1  0.0 - 0.7 10e9/L Final     Absolute Basophils 05/21/2019 0.1  0.0 - 0.2 10e9/L Final     Abs Immature Granulocytes 05/21/2019 0.0  0 - 0.4 10e9/L Final     Absolute Nucleated RBC 05/21/2019 0.0   Final   Infusion Therapy Visit on 05/14/2019   Component Date Value Ref Range Status     WBC 05/14/2019 4.0  4.0 - 11.0 10e9/L Final     RBC Count 05/14/2019 2.84* 3.8 - 5.2 10e12/L Final     Hemoglobin 05/14/2019 9.6* 11.7 - 15.7 g/dL Final     Hematocrit 05/14/2019 29.3* 35.0 - 47.0 % Final     MCV 05/14/2019 103* 78 - 100 fl Final     MCH 05/14/2019 33.8* 26.5 - 33.0 pg Final     MCHC 05/14/2019 32.8  31.5 - 36.5 g/dL Final     RDW 05/14/2019 17.1* 10.0 - 15.0 % Final     Platelet Count 05/14/2019 197  150 - 450 10e9/L Final     Diff Method 05/14/2019 Automated Method   Final     % Neutrophils 05/14/2019 70.1  % Final     % Lymphocytes 05/14/2019 17.5  % Final     % Monocytes 05/14/2019 6.8  % Final     % Eosinophils 05/14/2019 3.0  % Final     % Basophils 05/14/2019 1.8  % Final     % Immature Granulocytes 05/14/2019 0.8  % Final     Nucleated RBCs 05/14/2019 0  0 /100 Final     Absolute Neutrophil 05/14/2019 2.8  1.6 - 8.3 10e9/L Final     Absolute  Lymphocytes 05/14/2019 0.7* 0.8 - 5.3 10e9/L Final     Absolute Monocytes 05/14/2019 0.3  0.0 - 1.3 10e9/L Final     Absolute Eosinophils 05/14/2019 0.1  0.0 - 0.7 10e9/L Final     Absolute Basophils 05/14/2019 0.1  0.0 - 0.2 10e9/L Final     Abs Immature Granulocytes 05/14/2019 0.0  0 - 0.4 10e9/L Final     Absolute Nucleated RBC 05/14/2019 0.0   Final     Bilirubin Direct 05/14/2019 0.2  0.0 - 0.2 mg/dL Final     Bilirubin Total 05/14/2019 0.8  0.2 - 1.3 mg/dL Final     Albumin 05/14/2019 3.7  3.4 - 5.0 g/dL Final     Protein Total 05/14/2019 6.7* 6.8 - 8.8 g/dL Final     Alkaline Phosphatase 05/14/2019 79  40 - 150 U/L Final     ALT 05/14/2019 31  0 - 50 U/L Final     AST 05/14/2019 20  0 - 45 U/L Final          Assessment and plan:    (C50.112) Infiltrating ductal carcinoma of central portion of left breast in female (H)  Patient has been tolerating Taxol well without significant side effects.  We will proceed with treatment according to treatment plan.  I will see the patient again in 4 weeks or sooner if there are new developments or concerns.    (J45.20) Mild intermittent asthma without complication  Patient is currently asymptomatic.  She is on albuterol inhaler as needed.    (R11.2,  T45.1X5A) Chemotherapy induced nausea and vomiting  He is currently well controlled with the current regimen    (F17.200) Tobacco use disorder    Strongly emphasized the importance of quitting smoking.    The patient is ready to learn, no apparent learning barriers were identified.  Diagnosis and treatment plans were explained to the patient. The patient expressed understanding of the content. The patient asked appropriate questions. The patient questions were answered to her satisfaction.    Chart documentation with Dragon Voice recognition Software. Although reviewed after completion, some words and grammatical errors may remain.

## 2019-05-22 NOTE — LETTER
"    5/22/2019         RE: Monika Nam  [address] [facility] [address]        Dear Colleague,    Thank you for referring your patient, Monika Nam, to the [facility]. Please see a copy of my visit note below.    Oncology Rooming Note    May 22, 2019 12:48 PM   Monika Nam is a 58 year old female who presents for:    Chief Complaint   Patient presents with     Oncology Clinic Visit     Labs & Chemo today Infiltrating ductal carcinoma of central portion of left breast in female, review Labs      Initial Vitals: /79 (BP Location: Right arm, Patient Position: Sitting, Cuff Size: Adult Large)   Pulse 107   Temp 97.5  F (36.4  C) (Tympanic)   Resp 16   Ht 1.753 m (5' 9.02\")   Wt 90.4 kg (199 lb 4.8 oz)   LMP 04/02/2010   SpO2 100%   BMI 29.42 kg/m    Estimated body mass index is 29.42 kg/m  as calculated from the following:    Height as of this encounter: 1.753 m (5' 9.02\").    Weight as of this encounter: 90.4 kg (199 lb 4.8 oz). Body surface area is 2.1 meters squared.  No Pain (0) Comment: Data Unavailable   Patient's last menstrual period was 04/02/2010.  Allergies reviewed: Yes  Medications reviewed: Yes    Medications: MEDICATION REFILLS NEEDED TODAY. Provider was notified.  Pharmacy name entered into [employer] [facility]: [facility] - [address] AT [address]    Clinical concerns:  Labs & Chemo today Infiltrating ductal carcinoma of central portion of left breast in female, review Labs.      Monika Curtis CMA                Hematology/ Oncology Follow-up Visit:  May 22, 2019    Reason for Visit:   Chief Complaint   Patient presents with     Oncology Clinic Visit     Labs & Chemo today Infiltrating ductal carcinoma of central portion of left breast in female, review Labs        Oncologic History:  Infiltrating ductal carcinoma of central portion of left breast in female (H)  Monika Nam presented following her " annual mammogram with new focal asymmetry at 12-1 o'clock position of the left breast around 10.7 cm from the nipple.  It measured 1.4 cm.  Subsequently the patient went on to have breast ultrasound on member 30th 2018 showing 1.6 cm in maximum dimension hypoechoic mass.  Subsequently ultrasound breast biopsy was done on December 17, 2018 showing invasive ductal carcinoma grade 3 out of 3 angiolymphatic invasion was not identified.  Ductal carcinoma in situ was not identified . estrogen receptor was negative, progesterone receptor was negative and HER-2/mercedes receptor was negative.        1/2019 left lumpectomy found 1.8 cm IDC, grade III, LN-, margin is clear, pT1cN0    The patient was started on dose dense chemotherapy with Adriamycin and Cytoxan followed by Taxol      Interval History:  Patient returning today for follow-up.  She has been tolerating Taxol treatment without significant side effects.  She denies any nausea or vomiting or diarrhea.  She denies any fever or chills.  She denies any shortness of breath or cough or wheezing.  She has mild numbness and tingling in both hands and feet.    Review Of Systems:  Constitutional: Negative for fever, chills, and night sweats.  Skin: negative.  Eyes: negative.  Ears/Nose/Throat: negative.  Respiratory: No shortness of breath, dyspnea on exertion, cough, or hemoptysis.  Cardiovascular: negative.  Gastrointestinal: negative.  Genitourinary: negative.  Musculoskeletal: negative.  Neurologic: negative.  Psychiatric: negative.  Hematologic/Lymphatic/Immunologic: negative.  Endocrine: negative.    All other ROS negative unless mentioned in interval history.    Past medical, social, surgical, and family histories reviewed.    Allergies:  Allergies as of 05/22/2019 - Reviewed 04/24/2019   Allergen Reaction Noted     Sulfa drugs Swelling 06/09/2005       Current Medications:  Current Outpatient Medications   Medication Sig Dispense Refill     acetaminophen (TYLENOL) 500 MG  "tablet Take 500-1,000 mg by mouth every 6 hours as needed for mild pain or pain       albuterol (PROAIR HFA/PROVENTIL HFA/VENTOLIN HFA) 108 (90 Base) MCG/ACT inhaler Inhale 2 puffs into the lungs every 4 hours as needed for shortness of breath / dyspnea or wheezing 1 Inhaler 11     aspirin 81 MG tablet Take 1 tablet (81 mg) by mouth daily       augmented betamethasone dipropionate (DIPROLENE-AF) 0.05 % cream APPLY EXTERNALLY TO THE AFFECTED AREA TWICE DAILY 50 g 6     calcium-vitamin D (CALCIUM 600-D) 600-400 MG-UNIT per tablet Take 1 tablet by mouth daily       dexamethasone (DECADRON) 4 MG tablet Take 5 tablets the night before chemotherapy.  5 tablets the morning before chemotherapy.. 10 tablet 0     FLUoxetine (PROZAC) 20 MG capsule Take 20 mg daily. (Patient taking differently: Take 20 mg by mouth every evening ) 30 capsule 11     loratadine (CLARITIN) 10 MG tablet Take 10 mg by mouth daily.         LORazepam (ATIVAN) 0.5 MG tablet Take 1 tablet (0.5 mg) by mouth every 4 hours as needed (Anxiety, Nausea/Vomiting or Sleep) 30 tablet 5     MULTIVITAMIN TABS   OR 1 TABLET BY MOUTH DAILY       ondansetron (ZOFRAN) 4 MG tablet Take 1 tablet (4 mg) by mouth every 8 hours as needed for nausea 20 tablet 1     pseudoePHEDrine (SUDAFED) 30 MG tablet Take 30 mg by mouth every 12 hours as needed for congestion       ranitidine (ZANTAC) 150 MG tablet Take 1 tablet (150 mg) by mouth daily       prochlorperazine (COMPAZINE) 10 MG tablet Take 0.5 tablets (5 mg) by mouth every 6 hours as needed (Nausea/Vomiting) (Patient not taking: Reported on 5/22/2019) 30 tablet 5        Physical Exam:  /79 (BP Location: Right arm, Patient Position: Sitting, Cuff Size: Adult Large)   Pulse 107   Temp 97.5  F (36.4  C) (Tympanic)   Resp 16   Ht 1.753 m (5' 9.02\")   Wt 90.4 kg (199 lb 4.8 oz)   LMP 04/02/2010   SpO2 100%   BMI 29.42 kg/m     Wt Readings from Last 12 Encounters:   05/22/19 90.4 kg (199 lb 4.8 oz)   05/21/19 91.2 " kg (201 lb)   05/14/19 89.2 kg (196 lb 9.6 oz)   05/07/19 90.8 kg (200 lb 3.2 oz)   04/30/19 90.7 kg (200 lb)   04/24/19 89.8 kg (198 lb)   04/03/19 91.6 kg (201 lb 14.4 oz)   03/20/19 92.6 kg (204 lb 3.2 oz)   03/20/19 91.8 kg (202 lb 6.4 oz)   03/06/19 92.2 kg (203 lb 3.2 oz)   02/14/19 93.2 kg (205 lb 6.4 oz)   01/28/19 92.1 kg (203 lb)     ECOG performance status: 1  GENERAL APPEARANCE: Healthy, alert and in no acute distress.  HEENT: Sclerae anicteric. PERRLA. Oropharynx without ulcers, lesions, or thrush.  NECK: Supple. No asymmetry or masses.  LYMPHATICS: No palpable cervical, supraclavicular, axillary, or inguinal lymphadenopathy.  RESP: Lungs clear to auscultation bilaterally without rales, rhonchi or wheezes.  CARDIOVASCULAR: Regular rate and rhythm. Normal S1, S2; no S3 or S4. No murmur, gallop, or rub.  ABDOMEN: Soft, nontender. Bowel sounds normal. No palpable organomegaly or masses.  MUSCULOSKELETAL: Extremities without gross deformities noted. No edema of bilateral lower extremities.  SKIN: No suspicious lesions or rashes.  NEURO: Alert and oriented x 3. Cranial nerves II-XII grossly intact.  PSYCHIATRIC: Mentation and affect appear normal.    Laboratory/Imaging Studies:  Infusion Therapy Visit on 05/21/2019   Component Date Value Ref Range Status     WBC 05/21/2019 2.8* 4.0 - 11.0 10e9/L Final     RBC Count 05/21/2019 2.61* 3.8 - 5.2 10e12/L Final     Hemoglobin 05/21/2019 8.8* 11.7 - 15.7 g/dL Final     Hematocrit 05/21/2019 27.5* 35.0 - 47.0 % Final     MCV 05/21/2019 105* 78 - 100 fl Final     MCH 05/21/2019 33.7* 26.5 - 33.0 pg Final     MCHC 05/21/2019 32.0  31.5 - 36.5 g/dL Final     RDW 05/21/2019 16.0* 10.0 - 15.0 % Final     Platelet Count 05/21/2019 185  150 - 450 10e9/L Final     Diff Method 05/21/2019 Automated Method   Final     % Neutrophils 05/21/2019 58.9  % Final     % Lymphocytes 05/21/2019 24.0  % Final     % Monocytes 05/21/2019 10.9  % Final     % Eosinophils 05/21/2019 4.0  %  Final     % Basophils 05/21/2019 1.8  % Final     % Immature Granulocytes 05/21/2019 0.4  % Final     Nucleated RBCs 05/21/2019 0  0 /100 Final     Absolute Neutrophil 05/21/2019 1.6  1.6 - 8.3 10e9/L Final     Absolute Lymphocytes 05/21/2019 0.7* 0.8 - 5.3 10e9/L Final     Absolute Monocytes 05/21/2019 0.3  0.0 - 1.3 10e9/L Final     Absolute Eosinophils 05/21/2019 0.1  0.0 - 0.7 10e9/L Final     Absolute Basophils 05/21/2019 0.1  0.0 - 0.2 10e9/L Final     Abs Immature Granulocytes 05/21/2019 0.0  0 - 0.4 10e9/L Final     Absolute Nucleated RBC 05/21/2019 0.0   Final   Infusion Therapy Visit on 05/14/2019   Component Date Value Ref Range Status     WBC 05/14/2019 4.0  4.0 - 11.0 10e9/L Final     RBC Count 05/14/2019 2.84* 3.8 - 5.2 10e12/L Final     Hemoglobin 05/14/2019 9.6* 11.7 - 15.7 g/dL Final     Hematocrit 05/14/2019 29.3* 35.0 - 47.0 % Final     MCV 05/14/2019 103* 78 - 100 fl Final     MCH 05/14/2019 33.8* 26.5 - 33.0 pg Final     MCHC 05/14/2019 32.8  31.5 - 36.5 g/dL Final     RDW 05/14/2019 17.1* 10.0 - 15.0 % Final     Platelet Count 05/14/2019 197  150 - 450 10e9/L Final     Diff Method 05/14/2019 Automated Method   Final     % Neutrophils 05/14/2019 70.1  % Final     % Lymphocytes 05/14/2019 17.5  % Final     % Monocytes 05/14/2019 6.8  % Final     % Eosinophils 05/14/2019 3.0  % Final     % Basophils 05/14/2019 1.8  % Final     % Immature Granulocytes 05/14/2019 0.8  % Final     Nucleated RBCs 05/14/2019 0  0 /100 Final     Absolute Neutrophil 05/14/2019 2.8  1.6 - 8.3 10e9/L Final     Absolute Lymphocytes 05/14/2019 0.7* 0.8 - 5.3 10e9/L Final     Absolute Monocytes 05/14/2019 0.3  0.0 - 1.3 10e9/L Final     Absolute Eosinophils 05/14/2019 0.1  0.0 - 0.7 10e9/L Final     Absolute Basophils 05/14/2019 0.1  0.0 - 0.2 10e9/L Final     Abs Immature Granulocytes 05/14/2019 0.0  0 - 0.4 10e9/L Final     Absolute Nucleated RBC 05/14/2019 0.0   Final     Bilirubin Direct 05/14/2019 0.2  0.0 - 0.2 mg/dL  Final     Bilirubin Total 05/14/2019 0.8  0.2 - 1.3 mg/dL Final     Albumin 05/14/2019 3.7  3.4 - 5.0 g/dL Final     Protein Total 05/14/2019 6.7* 6.8 - 8.8 g/dL Final     Alkaline Phosphatase 05/14/2019 79  40 - 150 U/L Final     ALT 05/14/2019 31  0 - 50 U/L Final     AST 05/14/2019 20  0 - 45 U/L Final          Assessment and plan:    (C50.112) Infiltrating ductal carcinoma of central portion of left breast in female (H)  Patient has been tolerating Taxol well without significant side effects.  We will proceed with treatment according to treatment plan.  I will see the patient again in 4 weeks or sooner if there are new developments or concerns.    (J45.20) Mild intermittent asthma without complication  Patient is currently asymptomatic.  She is on albuterol inhaler as needed.    (R11.2,  T45.1X5A) Chemotherapy induced nausea and vomiting  He is currently well controlled with the current regimen    (F17.200) Tobacco use disorder    Strongly emphasized the importance of quitting smoking.    The patient is ready to learn, no apparent learning barriers were identified.  Diagnosis and treatment plans were explained to the patient. The patient expressed understanding of the content. The patient asked appropriate questions. The patient questions were answered to her satisfaction.    Chart documentation with Dragon Voice recognition Software. Although reviewed after completion, some words and grammatical errors may remain.    Again, thank you for allowing me to participate in the care of your patient.        Sincerely,        Madison Gonzales MD

## 2019-05-22 NOTE — PROGRESS NOTES
Infusion Nursing Note:  Monika Nam presents today for Taxol.    Patient seen by provider today: No   present during visit today: Not Applicable.    Note: N/A.    Intravenous Access:  Implanted Port.    Treatment Conditions:  Lab Results   Component Value Date    HGB 8.8 05/21/2019     Lab Results   Component Value Date    WBC 2.8 05/21/2019      Lab Results   Component Value Date    ANEU 1.6 05/21/2019     Lab Results   Component Value Date     05/21/2019      Results reviewed, labs MET treatment parameters, ok to proceed with treatment.      Post Infusion Assessment:  Patient tolerated infusion without incident.  Blood return noted pre and post infusion.  Site patent and intact, free from redness, edema or discomfort.  No evidence of extravasations.  Access discontinued per protocol.       Discharge Plan:   Patient discharged in stable condition accompanied by:   Departure Mode: Ambulatory.    Shirin Riddle RN

## 2019-05-24 ENCOUNTER — DOCUMENTATION ONLY (OUTPATIENT)
Dept: ONCOLOGY | Facility: CLINIC | Age: 59
End: 2019-05-24

## 2019-05-24 NOTE — PROGRESS NOTES
"5/24/2019    I attempted to contact Monika twice by phone on 5/10 and 5/16, but unfortunately I have not heard back from her.    I will be sending the results of her genetic testing to her by mail, as outlined below (see letters tab):      Referring Provider: Madison Gonzales MD    Presenting Information:  I initially met with Monika at the [facility] on 4/10/2019. Her blood was drawn for [employer] testing from [facility]. This testing was done because of her personal and family history of breast cancer.    Genetic Testing Result: NEGATIVE  Monika is negative for mutations in the KEVYN, BARD1, BRCA1, BRCA2, BRIP1, CDH1, CHEK2, DICER1, EPCAM, MLH1, MRE11A, MSH2, MSH6, MUTYH, NBN, NF1, PALB2, PMS2, PTEN, RAD50, RAD51C, RAD51D, SMARCA4, STK11, and TP53 genes. No mutations were found in any of the 25 genes analyzed. This test involved sequencing and deletion/duplication analysis of all genes with the exceptions of EPCAM (deletions/duplications only).    Testing did not detect an identifiable mutation associated with Hereditary Breast and Ovarian Cancer syndrome (BRCA1, BRCA2), Oshea syndrome (MLH1, MSH2, MSH6, PMS2, EPCAM), Hereditary Diffuse Gastric Cancer (CDH1), Cowden syndrome (PTEN), Li Fraumeni syndrome (TP53), Peutz-Jeghers syndrome (STK11), MUTYH Associated Polyposis (MUTYH), or Neurofibromatosis type 1 (NF1).    A copy of the test report can be found in the Laboratory tab, dated 4/10/2019, and named \"[facility]\". The report is scanned in as a linked document.    Interpretation:  There are several different interpretations of this negative test result.    1. One explanation may be that there is a different gene or combination of genes and environment that are associated with the cancers in Mnoika and/or her relatives.   2. There is also a small possibility that there is a mutation in one of these genes and we could not find it with our current testing methods.       Screening:  Based on this " negative test result, it is important for Monika and her relatives to refer back to the family history for appropriate cancer screening.      Monika should continue to follow the treatment and follow-up recommendations of her physicians regarding her diagnosis of breast cancer.    Monika s close female relatives remain at increased risk for breast cancer given their family history. Breast cancer screening is generally recommended to begin approximately 10 years younger than the earliest age of breast cancer diagnosis in the family, or at age 40, whichever comes first. Breast screening options should be discussed with an individual's primary care provider and a genetic counselor, to determine at what age to begin screening, what screening is appropriate, and if additional screening (such as breast MRI) is necessary based on personal/family history factors.      Other population cancer screening options, such as those recommended by the American Cancer Society and the National Comprehensive Cancer Network (NCCN), are also appropriate for Monika and her family. These screening recommendations may change if there are changes to Monika's personal and/or family history. Final screening recommendations should be made by each individual's managing physician.    Inheritance:  Monika did not pass on an identifiable mutation in these genes to her daughter based on this test result. Mutations in these genes do not skip generations.      Summary:  We do not have an explanation for Monika's breast cancer. Because of that, it is important that she continue with cancer screening based on her personal and family history as discussed above. Genetic testing is rapidly advancing, and new cancer susceptibility genes will most likely be identified in the future. Therefore, I encourage Monika to contact me annually or if there are changes in her personal or family history. This may change how we assess her cancer risk, screening, and the  testing we would offer.    Plan:  1.  I am mailing Monika a copy of her test results.  2. She should follow up with Dr. Gonzales.  3. She should contact me annually, or sooner if her family history changes.    If Monika has any further questions, I encourage her to contact me at [phone].    Alison Roper MS, [facility]  Licensed Genetic Counselor  P. [phone]  F. [phone]

## 2019-05-24 NOTE — Clinical Note
Please print and send a copy of this letter to the patient.    Please enclose test report: Send outs misc test [[id number]] (Order [id number])   dated 4/10/19 - print the scanned document and include with letter.

## 2019-05-24 NOTE — LETTER
Cancer Risk Management  Program [facility]  [facility]  [facility]  [facility]  Mailing Address  Cancer Risk Management Program  [facility]  [address]  [place] [address]    New patient appointments  [phone]  May 24, 2019    Monika Nam  [address] [address] [facility] [address]      Dear Monika,    I hope this letter finds you well. I unfortunately have been unable to reach you by phone to discuss your genetic test results. A summary of your results is listed below, and a copy of your test results are also enclosed. Please feel free to contact me with any questions.       Genetic Testing Result: NEGATIVE  Monika is negative for mutations in the KEVYN, BARD1, BRCA1, BRCA2, BRIP1, CDH1, CHEK2, DICER1, EPCAM, MLH1, MRE11A, MSH2, MSH6, MUTYH, NBN, NF1, PALB2, PMS2, PTEN, RAD50, RAD51C, RAD51D, SMARCA4, STK11, and TP53 genes. No mutations were found in any of the 25 genes analyzed. This test involved sequencing and deletion/duplication analysis of all genes with the exceptions of EPCAM (deletions/duplications only).     Testing did not detect an identifiable mutation associated with Hereditary Breast and Ovarian Cancer syndrome (BRCA1, BRCA2), Oshea syndrome (MLH1, MSH2, MSH6, PMS2, EPCAM), Hereditary Diffuse Gastric Cancer (CDH1), Cowden syndrome (PTEN), Li Fraumeni syndrome (TP53), Peutz-Jeghers syndrome (STK11), MUTYH Associated Polyposis (MUTYH), or Neurofibromatosis type 1 (NF1).     Interpretation:  There are several different interpretations of this negative test result.    1. One explanation may be that there is a different gene or combination of genes and environment that are associated with the cancers in Monika and/or her relatives.   2. There is also a small possibility that there is a mutation in one of  these genes and we could not find it with our current testing methods.          Screening:  Based on this negative test result, it is important for Monika and her relatives to refer back to the family history for appropriate cancer screening.    ? Monika should continue to follow the treatment and follow-up recommendations of her physicians regarding her diagnosis of breast cancer.  ? Monika s close female relatives remain at increased risk for breast cancer given their family history. Breast cancer screening is generally recommended to begin approximately 10 years younger than the earliest age of breast cancer diagnosis in the family, or at age 40, whichever comes first. Breast screening options should be discussed with an individual's primary care provider and a genetic counselor, to determine at what age to begin screening, what screening is appropriate, and if additional screening (such as breast MRI) is necessary based on personal/family history factors.    ? Other population cancer screening options, such as those recommended by the American Cancer Society and the National Comprehensive Cancer Network (NCCN), are also appropriate for Monika and her family. These screening recommendations may change if there are changes to Monika's personal and/or family history. Final screening recommendations should be made by each individual's managing physician.     Inheritance:  Monika did not pass on an identifiable mutation in these genes to her daughter based on this test result. Mutations in these genes do not skip generations.       Summary:  We do not have an explanation for Monika's breast cancer. Because of that, it is important that she continue with cancer screening based on her personal and family history as discussed above. Genetic testing is rapidly advancing, and new cancer susceptibility genes will most likely be identified in the future. Therefore, I encourage Monika to contact me annually or if there are  changes in her personal or family history. This may change how we assess her cancer risk, screening, and the testing we would offer.     Plan:  1.  I am mailing Monika a copy of her test results.  2. She should follow up with Dr. Gonzales.  3. She should contact me annually, or sooner if her family history changes.     If Monika has any further questions, I encourage her to contact me at [phone].     Alison Roper MS, [facility]  Licensed Genetic Counselor  P. [phone]  F. [phone]    Enclosure: Send outs misc test [[id number]] (Order [id number])

## 2019-05-29 ENCOUNTER — HOSPITAL ENCOUNTER (OUTPATIENT)
Facility: CLINIC | Age: 59
Discharge: HOME | End: 2019-05-29
Attending: INTERNAL MEDICINE | Admitting: INTERNAL MEDICINE
Payer: COMMERCIAL

## 2019-05-29 ENCOUNTER — INFUSION THERAPY VISIT (OUTPATIENT)
Dept: INFUSION THERAPY | Facility: CLINIC | Age: 59
End: 2019-05-29
Attending: INTERNAL MEDICINE
Payer: COMMERCIAL

## 2019-05-29 VITALS
TEMPERATURE: 96.7 F | BODY MASS INDEX: 29.23 KG/M2 | SYSTOLIC BLOOD PRESSURE: 142 MMHG | WEIGHT: 198 LBS | DIASTOLIC BLOOD PRESSURE: 78 MMHG | HEART RATE: 88 BPM

## 2019-05-29 DIAGNOSIS — C50.912 ESTROGEN RECEPTOR NEGATIVE CARCINOMA OF BREAST, LEFT (H): Primary | ICD-10-CM

## 2019-05-29 DIAGNOSIS — Z17.1 ESTROGEN RECEPTOR NEGATIVE CARCINOMA OF BREAST, LEFT (H): Primary | ICD-10-CM

## 2019-05-29 DIAGNOSIS — C50.112 INFILTRATING DUCTAL CARCINOMA OF CENTRAL PORTION OF LEFT BREAST IN FEMALE (H): ICD-10-CM

## 2019-05-29 LAB
BASOPHILS # BLD AUTO: 0.1 10E9/L (ref 0–0.2)
BASOPHILS NFR BLD AUTO: 2.1 %
DIFFERENTIAL METHOD BLD: ABNORMAL
EOSINOPHIL # BLD AUTO: 0.1 10E9/L (ref 0–0.7)
EOSINOPHIL NFR BLD AUTO: 4.2 %
ERYTHROCYTE [DISTWIDTH] IN BLOOD BY AUTOMATED COUNT: 14.7 % (ref 10–15)
HCT VFR BLD AUTO: 30.3 % (ref 35–47)
HGB BLD-MCNC: 9.7 G/DL (ref 11.7–15.7)
IMM GRANULOCYTES # BLD: 0 10E9/L (ref 0–0.4)
IMM GRANULOCYTES NFR BLD: 1.1 %
LYMPHOCYTES # BLD AUTO: 0.6 10E9/L (ref 0.8–5.3)
LYMPHOCYTES NFR BLD AUTO: 19.6 %
MCH RBC QN AUTO: 33.7 PG (ref 26.5–33)
MCHC RBC AUTO-ENTMCNC: 32 G/DL (ref 31.5–36.5)
MCV RBC AUTO: 105 FL (ref 78–100)
MONOCYTES # BLD AUTO: 0.3 10E9/L (ref 0–1.3)
MONOCYTES NFR BLD AUTO: 10.5 %
NEUTROPHILS # BLD AUTO: 1.8 10E9/L (ref 1.6–8.3)
NEUTROPHILS NFR BLD AUTO: 62.5 %
NRBC # BLD AUTO: 0 10*3/UL
NRBC BLD AUTO-RTO: 0 /100
PLATELET # BLD AUTO: 198 10E9/L (ref 150–450)
RBC # BLD AUTO: 2.88 10E12/L (ref 3.8–5.2)
WBC # BLD AUTO: 2.9 10E9/L (ref 4–11)

## 2019-05-29 PROCEDURE — 25000132 ZZH RX MED GY IP 250 OP 250 PS 637: Performed by: INTERNAL MEDICINE

## 2019-05-29 PROCEDURE — 96413 CHEMO IV INFUSION 1 HR: CPT

## 2019-05-29 PROCEDURE — 25000128 H RX IP 250 OP 636: Performed by: INTERNAL MEDICINE

## 2019-05-29 PROCEDURE — 85025 COMPLETE CBC W/AUTO DIFF WBC: CPT | Performed by: INTERNAL MEDICINE

## 2019-05-29 PROCEDURE — 96375 TX/PRO/DX INJ NEW DRUG ADDON: CPT

## 2019-05-29 PROCEDURE — 25800030 ZZH RX IP 258 OP 636: Performed by: INTERNAL MEDICINE

## 2019-05-29 RX ORDER — HEPARIN SODIUM (PORCINE) LOCK FLUSH IV SOLN 100 UNIT/ML 100 UNIT/ML
5 SOLUTION INTRAVENOUS
Status: DISCONTINUED | OUTPATIENT
Start: 2019-05-29 | End: 2019-05-29 | Stop reason: HOSPADM

## 2019-05-29 RX ORDER — HEPARIN SODIUM (PORCINE) LOCK FLUSH IV SOLN 100 UNIT/ML 100 UNIT/ML
SOLUTION INTRAVENOUS
Status: DISCONTINUED
Start: 2019-05-29 | End: 2019-05-29 | Stop reason: HOSPADM

## 2019-05-29 RX ORDER — DIPHENHYDRAMINE HCL 50 MG
50 CAPSULE ORAL ONCE
Status: COMPLETED | OUTPATIENT
Start: 2019-05-29 | End: 2019-05-29

## 2019-05-29 RX ADMIN — DEXAMETHASONE SODIUM PHOSPHATE 20 MG: 10 INJECTION, SOLUTION INTRAMUSCULAR; INTRAVENOUS at 10:59

## 2019-05-29 RX ADMIN — FAMOTIDINE 20 MG: 20 INJECTION, SOLUTION INTRAVENOUS at 11:15

## 2019-05-29 RX ADMIN — PACLITAXEL 169 MG: 6 INJECTION, SOLUTION INTRAVENOUS at 11:25

## 2019-05-29 RX ADMIN — SODIUM CHLORIDE 250 ML: 9 INJECTION, SOLUTION INTRAVENOUS at 10:59

## 2019-05-29 RX ADMIN — Medication 5 ML: at 12:36

## 2019-05-29 RX ADMIN — DIPHENHYDRAMINE HYDROCHLORIDE 50 MG: 50 CAPSULE ORAL at 11:02

## 2019-05-29 NOTE — PROGRESS NOTES
Infusion Nursing Note:  Monika Nam presents today for PAC labs and chemotherapy.    Patient seen by provider today: No   present during visit today: Not Applicable.    Note: Labs WNL's. IV premeds given. IV Taxol given over 60 minutes via her port without complications. Port flushed per [place] protocol. Pt. To return on 6/11 for labs.     Intravenous Access:  Labs drawn without difficulty.  Implanted Port.    Treatment Conditions:  Lab Results   Component Value Date    HGB 9.7 05/29/2019     Lab Results   Component Value Date    WBC 2.9 05/29/2019      Lab Results   Component Value Date    ANEU 1.8 05/29/2019     Lab Results   Component Value Date     05/29/2019      Lab Results   Component Value Date     03/20/2019                   Lab Results   Component Value Date    POTASSIUM 3.9 03/20/2019           No results found for: MAG         Lab Results   Component Value Date    CR 0.69 03/20/2019                   Lab Results   Component Value Date    DANI 8.4 03/20/2019                Lab Results   Component Value Date    BILITOTAL 0.8 05/14/2019           Lab Results   Component Value Date    ALBUMIN 3.7 05/14/2019                    Lab Results   Component Value Date    ALT 31 05/14/2019           Lab Results   Component Value Date    AST 20 05/14/2019       Results reviewed, labs MET treatment parameters, ok to proceed with treatment.      Post Infusion Assessment:  Patient tolerated infusion without incident.  Blood return noted pre and post infusion.  Site patent and intact, free from redness, edema or discomfort.  No evidence of extravasations.  Access discontinued per protocol.       Discharge Plan:   Patient and/or family verbalized understanding of discharge instructions and all questions answered.  Patient discharged in stable condition accompanied by: .  Departure Mode: Ambulatory.    Jessica Faye RN

## 2019-06-09 ENCOUNTER — NURSE TRIAGE (OUTPATIENT)
Dept: NURSING | Facility: CLINIC | Age: 59
End: 2019-06-09

## 2019-06-09 NOTE — TELEPHONE ENCOUNTER
"Initial caller is  stating that wife is having \"terrible pain in her arms\"  Patient then speaks and states it is getting better now\" ( lasted less than 5 minutes)  States she is on chemo and last treatment 05/29; no  Neupogen given  States she  has been having some leg cramps but never arm cramps  sStates she is  Not eating or drinking much but  Is not  nauseated or vomiting   Caller advised to eat and drink well now including taking  fruits and vegetable and  Salt   Caller advised to call back if cramping recurs and  then go to ED for evaluation  of electrolyte status   Caller understands andwill melchor Barragan RN  FNA     Kaylah Barragan RN  FNA'    Additional Information    Negative: Shock suspected (e.g., cold/pale/clammy skin, too weak to stand, low BP, rapid pulse)    Negative: [1] Similar pain previously AND [2] it was from \"heart attack\"    Negative: [1] Similar pain previously AND [2] it was from \"angina\" AND [3] not relieved by nitroglycerin    Negative: Sounds like a life-threatening emergency to the triager    Negative: Followed an arm injury    Negative: Chest pain    Negative: Pain, redness, or swelling at intravenous (IV) site or along course of vein    Negative: Wound looks infected    Negative: Elbow pain is main symptom    Negative: Wrist pain is main symptom    Negative: Difficulty breathing or unusual sweating (e.g., sweating without exertion)    Negative: [1] Age > 40 AND [2] associated chest or jaw pain AND [3] pain lasts > 5 minutes    Negative: [1] Age > 40 AND [2] no obvious cause AND [3] pain even when not moving the arm    (Exception: pain is clearly made worse by moving arm or bending neck)    Negative: [1] SEVERE pain AND [2] not improved 2 hours after pain medicine    Negative: [1] Red area or streak AND [2] fever    Negative: [1] Swollen joint AND [2] fever    Negative: Patient sounds very sick or weak to the triager    Negative: [1] Red area or streak AND [2] large (> " "2 in. or 5 cm)    Negative: Entire arm is swollen    Negative: [1] Cast on wrist or arm AND [2] now increased pain    Negative: Weakness (i.e., loss of strength) in hand or fingers     (Exception: not truly weak; hand feels weak because of pain)    Negative: [1] Arm pains with exertion (e.g., walking) AND [2] pain goes away on resting AND [3] not present now    Negative: [1] Painful rash AND [2] multiple small blisters grouped together (i.e., dermatomal distribution or \"band\" or \"stripe\")    Negative: Looks like a boil, infected sore, deep ulcer or other infected rash (spreading redness, pus)    Negative: [1] Localized rash is very painful AND [2] no fever    Negative: Localized pain, redness or hard lump along vein    Negative: Numbness (i.e., loss of sensation) in hand or fingers    Negative: Caused by strained muscle    Negative: Caused by overuse from recent vigorous activity (e.g., sports, lifting, physical work)    Negative: Caused by phantom arm pain    Negative: Arm pain    Negative: [1] MILD pain (e.g., does not interfere with normal activities) AND [2] present > 7 days    Negative: Arm pain is a chronic symptom (recurrent or ongoing AND present > 4 weeks)    Commented on: All Negative - Home Care     Brief muscle crampsin arms ; on chemo    Protocols used: ARM PAIN-A-AH      "

## 2019-06-11 ENCOUNTER — HOSPITAL ENCOUNTER (OUTPATIENT)
Facility: CLINIC | Age: 59
Discharge: HOME | End: 2019-06-11
Attending: INTERNAL MEDICINE | Admitting: INTERNAL MEDICINE
Payer: COMMERCIAL

## 2019-06-11 ENCOUNTER — INFUSION THERAPY VISIT (OUTPATIENT)
Dept: INFUSION THERAPY | Facility: CLINIC | Age: 59
End: 2019-06-11
Attending: INTERNAL MEDICINE
Payer: COMMERCIAL

## 2019-06-11 VITALS — BODY MASS INDEX: 29.23 KG/M2 | WEIGHT: 198 LBS

## 2019-06-11 DIAGNOSIS — C50.912 ESTROGEN RECEPTOR NEGATIVE CARCINOMA OF BREAST, LEFT (H): Primary | ICD-10-CM

## 2019-06-11 DIAGNOSIS — Z17.1 ESTROGEN RECEPTOR NEGATIVE CARCINOMA OF BREAST, LEFT (H): Primary | ICD-10-CM

## 2019-06-11 DIAGNOSIS — C50.112 INFILTRATING DUCTAL CARCINOMA OF CENTRAL PORTION OF LEFT BREAST IN FEMALE (H): ICD-10-CM

## 2019-06-11 DIAGNOSIS — C50.912 ESTROGEN RECEPTOR NEGATIVE CARCINOMA OF BREAST, LEFT (H): ICD-10-CM

## 2019-06-11 DIAGNOSIS — Z17.1 ESTROGEN RECEPTOR NEGATIVE CARCINOMA OF BREAST, LEFT (H): ICD-10-CM

## 2019-06-11 LAB
BASOPHILS # BLD AUTO: 0.1 10E9/L (ref 0–0.2)
BASOPHILS NFR BLD AUTO: 0.8 %
DIFFERENTIAL METHOD BLD: ABNORMAL
EOSINOPHIL # BLD AUTO: 0.1 10E9/L (ref 0–0.7)
EOSINOPHIL NFR BLD AUTO: 1.2 %
ERYTHROCYTE [DISTWIDTH] IN BLOOD BY AUTOMATED COUNT: 13.5 % (ref 10–15)
HCT VFR BLD AUTO: 33.1 % (ref 35–47)
HGB BLD-MCNC: 10.5 G/DL (ref 11.7–15.7)
IMM GRANULOCYTES # BLD: 0 10E9/L (ref 0–0.4)
IMM GRANULOCYTES NFR BLD: 0.5 %
LYMPHOCYTES # BLD AUTO: 0.7 10E9/L (ref 0.8–5.3)
LYMPHOCYTES NFR BLD AUTO: 10.9 %
MCH RBC QN AUTO: 32.7 PG (ref 26.5–33)
MCHC RBC AUTO-ENTMCNC: 31.7 G/DL (ref 31.5–36.5)
MCV RBC AUTO: 103 FL (ref 78–100)
MONOCYTES # BLD AUTO: 0.5 10E9/L (ref 0–1.3)
MONOCYTES NFR BLD AUTO: 9 %
NEUTROPHILS # BLD AUTO: 4.7 10E9/L (ref 1.6–8.3)
NEUTROPHILS NFR BLD AUTO: 77.6 %
NRBC # BLD AUTO: 0 10*3/UL
NRBC BLD AUTO-RTO: 0 /100
PLATELET # BLD AUTO: 211 10E9/L (ref 150–450)
RBC # BLD AUTO: 3.21 10E12/L (ref 3.8–5.2)
WBC # BLD AUTO: 6 10E9/L (ref 4–11)

## 2019-06-11 PROCEDURE — 36591 DRAW BLOOD OFF VENOUS DEVICE: CPT

## 2019-06-11 PROCEDURE — 85025 COMPLETE CBC W/AUTO DIFF WBC: CPT | Performed by: INTERNAL MEDICINE

## 2019-06-11 PROCEDURE — 25000128 H RX IP 250 OP 636: Performed by: INTERNAL MEDICINE

## 2019-06-11 RX ORDER — METHYLPREDNISOLONE SODIUM SUCCINATE 125 MG/2ML
125 INJECTION, POWDER, LYOPHILIZED, FOR SOLUTION INTRAMUSCULAR; INTRAVENOUS
Status: CANCELLED
Start: 2019-06-18

## 2019-06-11 RX ORDER — EPINEPHRINE 1 MG/ML
0.3 INJECTION, SOLUTION, CONCENTRATE INTRAVENOUS EVERY 5 MIN PRN
Status: CANCELLED | OUTPATIENT
Start: 2019-06-25

## 2019-06-11 RX ORDER — DIPHENHYDRAMINE HCL 50 MG
50 CAPSULE ORAL ONCE
Status: CANCELLED
Start: 2019-06-25

## 2019-06-11 RX ORDER — EPINEPHRINE 0.3 MG/.3ML
0.3 INJECTION SUBCUTANEOUS EVERY 5 MIN PRN
Status: CANCELLED | OUTPATIENT
Start: 2019-06-18

## 2019-06-11 RX ORDER — DIPHENHYDRAMINE HYDROCHLORIDE 50 MG/ML
50 INJECTION INTRAMUSCULAR; INTRAVENOUS
Status: CANCELLED
Start: 2019-06-25

## 2019-06-11 RX ORDER — ALBUTEROL SULFATE 0.83 MG/ML
2.5 SOLUTION RESPIRATORY (INHALATION)
Status: CANCELLED | OUTPATIENT
Start: 2019-06-25

## 2019-06-11 RX ORDER — HEPARIN SODIUM (PORCINE) LOCK FLUSH IV SOLN 100 UNIT/ML 100 UNIT/ML
5 SOLUTION INTRAVENOUS
Status: CANCELLED | OUTPATIENT
Start: 2019-06-11

## 2019-06-11 RX ORDER — ALBUTEROL SULFATE 90 UG/1
1-2 AEROSOL, METERED RESPIRATORY (INHALATION)
Status: CANCELLED
Start: 2019-06-25

## 2019-06-11 RX ORDER — HEPARIN SODIUM (PORCINE) LOCK FLUSH IV SOLN 100 UNIT/ML 100 UNIT/ML
5 SOLUTION INTRAVENOUS
Status: CANCELLED | OUTPATIENT
Start: 2019-06-25

## 2019-06-11 RX ORDER — EPINEPHRINE 1 MG/ML
0.3 INJECTION, SOLUTION, CONCENTRATE INTRAVENOUS EVERY 5 MIN PRN
Status: CANCELLED | OUTPATIENT
Start: 2019-06-18

## 2019-06-11 RX ORDER — ALBUTEROL SULFATE 0.83 MG/ML
2.5 SOLUTION RESPIRATORY (INHALATION)
Status: CANCELLED | OUTPATIENT
Start: 2019-06-18

## 2019-06-11 RX ORDER — SODIUM CHLORIDE 9 MG/ML
1000 INJECTION, SOLUTION INTRAVENOUS CONTINUOUS PRN
Status: CANCELLED
Start: 2019-06-25

## 2019-06-11 RX ORDER — HEPARIN SODIUM (PORCINE) LOCK FLUSH IV SOLN 100 UNIT/ML 100 UNIT/ML
5 SOLUTION INTRAVENOUS
Status: CANCELLED | OUTPATIENT
Start: 2019-06-18

## 2019-06-11 RX ORDER — METHYLPREDNISOLONE SODIUM SUCCINATE 125 MG/2ML
125 INJECTION, POWDER, LYOPHILIZED, FOR SOLUTION INTRAMUSCULAR; INTRAVENOUS
Status: CANCELLED
Start: 2019-06-25

## 2019-06-11 RX ORDER — DIPHENHYDRAMINE HYDROCHLORIDE 50 MG/ML
50 INJECTION INTRAMUSCULAR; INTRAVENOUS
Status: CANCELLED
Start: 2019-06-18

## 2019-06-11 RX ORDER — LORAZEPAM 2 MG/ML
0.5 INJECTION INTRAMUSCULAR EVERY 4 HOURS PRN
Status: CANCELLED
Start: 2019-06-25

## 2019-06-11 RX ORDER — MEPERIDINE HYDROCHLORIDE 25 MG/ML
25 INJECTION INTRAMUSCULAR; INTRAVENOUS; SUBCUTANEOUS EVERY 30 MIN PRN
Status: CANCELLED | OUTPATIENT
Start: 2019-06-25

## 2019-06-11 RX ORDER — LORAZEPAM 2 MG/ML
0.5 INJECTION INTRAMUSCULAR EVERY 4 HOURS PRN
Status: CANCELLED
Start: 2019-06-18

## 2019-06-11 RX ORDER — SODIUM CHLORIDE 9 MG/ML
1000 INJECTION, SOLUTION INTRAVENOUS CONTINUOUS PRN
Status: CANCELLED
Start: 2019-06-18

## 2019-06-11 RX ORDER — DIPHENHYDRAMINE HCL 50 MG
50 CAPSULE ORAL ONCE
Status: CANCELLED
Start: 2019-06-18

## 2019-06-11 RX ORDER — HEPARIN SODIUM (PORCINE) LOCK FLUSH IV SOLN 100 UNIT/ML 100 UNIT/ML
5 SOLUTION INTRAVENOUS
Status: DISCONTINUED | OUTPATIENT
Start: 2019-06-11 | End: 2019-06-19 | Stop reason: HOSPADM

## 2019-06-11 RX ORDER — MEPERIDINE HYDROCHLORIDE 25 MG/ML
25 INJECTION INTRAMUSCULAR; INTRAVENOUS; SUBCUTANEOUS EVERY 30 MIN PRN
Status: CANCELLED | OUTPATIENT
Start: 2019-06-18

## 2019-06-11 RX ORDER — ALBUTEROL SULFATE 90 UG/1
1-2 AEROSOL, METERED RESPIRATORY (INHALATION)
Status: CANCELLED
Start: 2019-06-18

## 2019-06-11 RX ORDER — EPINEPHRINE 0.3 MG/.3ML
0.3 INJECTION SUBCUTANEOUS EVERY 5 MIN PRN
Status: CANCELLED | OUTPATIENT
Start: 2019-06-25

## 2019-06-11 RX ADMIN — Medication 5 ML: at 16:09

## 2019-06-12 ENCOUNTER — INFUSION THERAPY VISIT (OUTPATIENT)
Dept: INFUSION THERAPY | Facility: CLINIC | Age: 59
End: 2019-06-12
Attending: INTERNAL MEDICINE
Payer: COMMERCIAL

## 2019-06-12 VITALS
RESPIRATION RATE: 16 BRPM | TEMPERATURE: 98 F | HEART RATE: 90 BPM | DIASTOLIC BLOOD PRESSURE: 70 MMHG | SYSTOLIC BLOOD PRESSURE: 115 MMHG

## 2019-06-12 DIAGNOSIS — Z17.1 ESTROGEN RECEPTOR NEGATIVE CARCINOMA OF BREAST, LEFT (H): Primary | ICD-10-CM

## 2019-06-12 DIAGNOSIS — C50.912 ESTROGEN RECEPTOR NEGATIVE CARCINOMA OF BREAST, LEFT (H): Primary | ICD-10-CM

## 2019-06-12 DIAGNOSIS — C50.112 INFILTRATING DUCTAL CARCINOMA OF CENTRAL PORTION OF LEFT BREAST IN FEMALE (H): ICD-10-CM

## 2019-06-12 PROCEDURE — 96413 CHEMO IV INFUSION 1 HR: CPT

## 2019-06-12 PROCEDURE — 25000128 H RX IP 250 OP 636: Performed by: INTERNAL MEDICINE

## 2019-06-12 PROCEDURE — 25000132 ZZH RX MED GY IP 250 OP 250 PS 637: Performed by: INTERNAL MEDICINE

## 2019-06-12 PROCEDURE — 25000128 H RX IP 250 OP 636

## 2019-06-12 PROCEDURE — 25800030 ZZH RX IP 258 OP 636: Performed by: INTERNAL MEDICINE

## 2019-06-12 PROCEDURE — 96375 TX/PRO/DX INJ NEW DRUG ADDON: CPT

## 2019-06-12 RX ORDER — DIPHENHYDRAMINE HCL 50 MG
50 CAPSULE ORAL ONCE
Status: COMPLETED | OUTPATIENT
Start: 2019-06-12 | End: 2019-06-12

## 2019-06-12 RX ORDER — HEPARIN SODIUM (PORCINE) LOCK FLUSH IV SOLN 100 UNIT/ML 100 UNIT/ML
SOLUTION INTRAVENOUS
Status: COMPLETED
Start: 2019-06-12 | End: 2019-06-12

## 2019-06-12 RX ORDER — HEPARIN SODIUM (PORCINE) LOCK FLUSH IV SOLN 100 UNIT/ML 100 UNIT/ML
5 SOLUTION INTRAVENOUS
Status: CANCELLED | OUTPATIENT
Start: 2019-06-12

## 2019-06-12 RX ADMIN — DEXAMETHASONE SODIUM PHOSPHATE 20 MG: 10 INJECTION, SOLUTION INTRAMUSCULAR; INTRAVENOUS at 14:35

## 2019-06-12 RX ADMIN — PACLITAXEL 169 MG: 6 INJECTION, SOLUTION INTRAVENOUS at 14:57

## 2019-06-12 RX ADMIN — SODIUM CHLORIDE 250 ML: 9 INJECTION, SOLUTION INTRAVENOUS at 14:35

## 2019-06-12 RX ADMIN — DIPHENHYDRAMINE HYDROCHLORIDE 50 MG: 50 CAPSULE ORAL at 14:39

## 2019-06-12 RX ADMIN — Medication 500 UNITS: at 16:06

## 2019-06-12 RX ADMIN — FAMOTIDINE 20 MG: 20 INJECTION, SOLUTION INTRAVENOUS at 14:46

## 2019-06-12 NOTE — PROGRESS NOTES
Infusion Nursing Note:  Monika Nam presents today for Taxol .    Patient seen by provider today: No   present during visit today: Not Applicable.    Note: N/A.    Intravenous Access:  Implanted Port.    Treatment Conditions:  Results reviewed, labs MET treatment parameters, ok to proceed with treatment.      Post Infusion Assessment:  Patient tolerated infusion without incident.       Discharge Plan:   Patient discharged in stable condition accompanied by: self.    Bridger Pacheco RN

## 2019-06-18 ENCOUNTER — HOSPITAL ENCOUNTER (OUTPATIENT)
Facility: CLINIC | Age: 59
Discharge: HOME | End: 2019-06-18
Attending: INTERNAL MEDICINE | Admitting: INTERNAL MEDICINE
Payer: COMMERCIAL

## 2019-06-18 ENCOUNTER — INFUSION THERAPY VISIT (OUTPATIENT)
Dept: INFUSION THERAPY | Facility: CLINIC | Age: 59
End: 2019-06-18
Attending: INTERNAL MEDICINE
Payer: COMMERCIAL

## 2019-06-18 VITALS — BODY MASS INDEX: 28.58 KG/M2 | WEIGHT: 193.6 LBS

## 2019-06-18 DIAGNOSIS — Z17.1 ESTROGEN RECEPTOR NEGATIVE CARCINOMA OF BREAST, LEFT (H): Primary | ICD-10-CM

## 2019-06-18 DIAGNOSIS — C50.112 INFILTRATING DUCTAL CARCINOMA OF CENTRAL PORTION OF LEFT BREAST IN FEMALE (H): ICD-10-CM

## 2019-06-18 DIAGNOSIS — C50.912 ESTROGEN RECEPTOR NEGATIVE CARCINOMA OF BREAST, LEFT (H): Primary | ICD-10-CM

## 2019-06-18 LAB
ALBUMIN SERPL-MCNC: 3.6 G/DL (ref 3.4–5)
ALP SERPL-CCNC: 70 U/L (ref 40–150)
ALT SERPL W P-5'-P-CCNC: 22 U/L (ref 0–50)
AST SERPL W P-5'-P-CCNC: 13 U/L (ref 0–45)
BASOPHILS # BLD AUTO: 0.1 10E9/L (ref 0–0.2)
BASOPHILS NFR BLD AUTO: 1.8 %
BILIRUB DIRECT SERPL-MCNC: 0.1 MG/DL (ref 0–0.2)
BILIRUB SERPL-MCNC: 0.4 MG/DL (ref 0.2–1.3)
DIFFERENTIAL METHOD BLD: ABNORMAL
EOSINOPHIL # BLD AUTO: 0.1 10E9/L (ref 0–0.7)
EOSINOPHIL NFR BLD AUTO: 3.4 %
ERYTHROCYTE [DISTWIDTH] IN BLOOD BY AUTOMATED COUNT: 13.1 % (ref 10–15)
HCT VFR BLD AUTO: 31.7 % (ref 35–47)
HGB BLD-MCNC: 10.1 G/DL (ref 11.7–15.7)
IMM GRANULOCYTES # BLD: 0 10E9/L (ref 0–0.4)
IMM GRANULOCYTES NFR BLD: 0.5 %
LYMPHOCYTES # BLD AUTO: 0.8 10E9/L (ref 0.8–5.3)
LYMPHOCYTES NFR BLD AUTO: 20.3 %
MCH RBC QN AUTO: 32.2 PG (ref 26.5–33)
MCHC RBC AUTO-ENTMCNC: 31.9 G/DL (ref 31.5–36.5)
MCV RBC AUTO: 101 FL (ref 78–100)
MONOCYTES # BLD AUTO: 0.3 10E9/L (ref 0–1.3)
MONOCYTES NFR BLD AUTO: 7.6 %
NEUTROPHILS # BLD AUTO: 2.6 10E9/L (ref 1.6–8.3)
NEUTROPHILS NFR BLD AUTO: 66.4 %
NRBC # BLD AUTO: 0 10*3/UL
NRBC BLD AUTO-RTO: 0 /100
PLATELET # BLD AUTO: 187 10E9/L (ref 150–450)
PROT SERPL-MCNC: 6.4 G/DL (ref 6.8–8.8)
RBC # BLD AUTO: 3.14 10E12/L (ref 3.8–5.2)
WBC # BLD AUTO: 3.8 10E9/L (ref 4–11)

## 2019-06-18 PROCEDURE — 85025 COMPLETE CBC W/AUTO DIFF WBC: CPT | Performed by: INTERNAL MEDICINE

## 2019-06-18 PROCEDURE — 36592 COLLECT BLOOD FROM PICC: CPT

## 2019-06-18 PROCEDURE — 36591 DRAW BLOOD OFF VENOUS DEVICE: CPT

## 2019-06-18 PROCEDURE — 80076 HEPATIC FUNCTION PANEL: CPT | Performed by: INTERNAL MEDICINE

## 2019-06-18 PROCEDURE — 25000128 H RX IP 250 OP 636

## 2019-06-18 RX ORDER — HEPARIN SODIUM (PORCINE) LOCK FLUSH IV SOLN 100 UNIT/ML 100 UNIT/ML
SOLUTION INTRAVENOUS
Status: COMPLETED
Start: 2019-06-18 | End: 2019-06-18

## 2019-06-18 RX ADMIN — Medication 500 UNITS: at 16:09

## 2019-06-19 ENCOUNTER — ONCOLOGY VISIT (OUTPATIENT)
Dept: ONCOLOGY | Facility: CLINIC | Age: 59
End: 2019-06-19
Attending: INTERNAL MEDICINE
Payer: COMMERCIAL

## 2019-06-19 ENCOUNTER — INFUSION THERAPY VISIT (OUTPATIENT)
Dept: INFUSION THERAPY | Facility: CLINIC | Age: 59
End: 2019-06-19
Attending: INTERNAL MEDICINE
Payer: COMMERCIAL

## 2019-06-19 VITALS — SYSTOLIC BLOOD PRESSURE: 144 MMHG | DIASTOLIC BLOOD PRESSURE: 98 MMHG | HEART RATE: 80 BPM

## 2019-06-19 VITALS
OXYGEN SATURATION: 99 % | DIASTOLIC BLOOD PRESSURE: 92 MMHG | TEMPERATURE: 98.3 F | RESPIRATION RATE: 20 BRPM | HEART RATE: 112 BPM | HEIGHT: 69 IN | BODY MASS INDEX: 29.46 KG/M2 | SYSTOLIC BLOOD PRESSURE: 136 MMHG | WEIGHT: 198.9 LBS

## 2019-06-19 DIAGNOSIS — K21.9 GASTROESOPHAGEAL REFLUX DISEASE WITHOUT ESOPHAGITIS: ICD-10-CM

## 2019-06-19 DIAGNOSIS — F34.1 CHRONIC DEPRESSIVE PERSONALITY DISORDER: ICD-10-CM

## 2019-06-19 DIAGNOSIS — C50.112 INFILTRATING DUCTAL CARCINOMA OF CENTRAL PORTION OF LEFT BREAST IN FEMALE (H): Primary | ICD-10-CM

## 2019-06-19 DIAGNOSIS — C50.112 INFILTRATING DUCTAL CARCINOMA OF CENTRAL PORTION OF LEFT BREAST IN FEMALE (H): ICD-10-CM

## 2019-06-19 DIAGNOSIS — Z17.1 ESTROGEN RECEPTOR NEGATIVE CARCINOMA OF BREAST, LEFT (H): Primary | ICD-10-CM

## 2019-06-19 DIAGNOSIS — C50.912 ESTROGEN RECEPTOR NEGATIVE CARCINOMA OF BREAST, LEFT (H): Primary | ICD-10-CM

## 2019-06-19 PROCEDURE — 99214 OFFICE O/P EST MOD 30 MIN: CPT | Performed by: INTERNAL MEDICINE

## 2019-06-19 PROCEDURE — 25000128 H RX IP 250 OP 636: Performed by: INTERNAL MEDICINE

## 2019-06-19 PROCEDURE — 96367 TX/PROPH/DG ADDL SEQ IV INF: CPT

## 2019-06-19 PROCEDURE — 25000132 ZZH RX MED GY IP 250 OP 250 PS 637: Performed by: INTERNAL MEDICINE

## 2019-06-19 PROCEDURE — 25800030 ZZH RX IP 258 OP 636: Performed by: INTERNAL MEDICINE

## 2019-06-19 PROCEDURE — 96413 CHEMO IV INFUSION 1 HR: CPT

## 2019-06-19 RX ORDER — DIPHENHYDRAMINE HCL 50 MG
50 CAPSULE ORAL ONCE
Status: COMPLETED | OUTPATIENT
Start: 2019-06-19 | End: 2019-06-19

## 2019-06-19 RX ORDER — HEPARIN SODIUM (PORCINE) LOCK FLUSH IV SOLN 100 UNIT/ML 100 UNIT/ML
5 SOLUTION INTRAVENOUS
Status: DISCONTINUED | OUTPATIENT
Start: 2019-06-19 | End: 2019-06-19 | Stop reason: HOSPADM

## 2019-06-19 RX ADMIN — DIPHENHYDRAMINE HYDROCHLORIDE 50 MG: 50 CAPSULE ORAL at 14:17

## 2019-06-19 RX ADMIN — DEXAMETHASONE SODIUM PHOSPHATE 20 MG: 10 INJECTION, SOLUTION INTRAMUSCULAR; INTRAVENOUS at 14:16

## 2019-06-19 RX ADMIN — PACLITAXEL 169 MG: 6 INJECTION, SOLUTION INTRAVENOUS at 14:48

## 2019-06-19 RX ADMIN — Medication 5 ML: at 15:54

## 2019-06-19 RX ADMIN — FAMOTIDINE 20 MG: 20 INJECTION, SOLUTION INTRAVENOUS at 14:32

## 2019-06-19 RX ADMIN — SODIUM CHLORIDE 250 ML: 9 INJECTION, SOLUTION INTRAVENOUS at 14:17

## 2019-06-19 ASSESSMENT — MIFFLIN-ST. JEOR: SCORE: 1541.83

## 2019-06-19 ASSESSMENT — PAIN SCALES - GENERAL: PAINLEVEL: NO PAIN (0)

## 2019-06-19 NOTE — PROGRESS NOTES
Infusion Nursing Note:  Monika Nam presents today for Taxol C13D!.    Patient seen by provider today: Yes: Dr. Gonzales   present during visit today: Not Applicable.    Note: N/A.    Intravenous Access:  Implanted Port.    Treatment Conditions:  Lab Results   Component Value Date    HGB 10.1 06/18/2019     Lab Results   Component Value Date    WBC 3.8 06/18/2019      Lab Results   Component Value Date    ANEU 2.6 06/18/2019     Lab Results   Component Value Date     06/18/2019      Results reviewed, labs MEET treatment parameters.    Post Infusion Assessment:  Patient tolerated infusion without incident.  Blood return noted pre and post infusion.  Site patent and intact, free from redness, edema or discomfort.  No evidence of extravasations.  Access discontinued per protocol.     Discharge Plan:   Discharge instructions reviewed with: Patient.  Patient and/or family verbalized understanding of discharge instructions and all questions answered.  AVS to patient via [place].  Patient will return 6/25/2019 for next appointment.   Patient discharged in stable condition accompanied by: self.  Departure Mode: Ambulatory.    Noelle Kline RN

## 2019-06-19 NOTE — LETTER
"    6/19/2019         RE: Monika Nam  [id number] [facility] [id number]        Dear Colleague,    Thank you for referring your patient, Monika Nam, to the [facility]. Please see a copy of my visit note below.    Oncology Rooming Note    June 19, 2019 1:38 PM   Monika Nam is a 58 year old female who presents for:    Chief Complaint   Patient presents with     Oncology Clinic Visit     Recheck Infiltrating ductal carcinoma of central portion of left breast in female, Labs & Chemo today     Initial Vitals: BP (!) 136/92 (BP Location: Right arm, Patient Position: Sitting, Cuff Size: Adult Large)   Pulse 112   Temp 98.3  F (36.8  C) (Tympanic)   Resp 20   Ht 1.753 m (5' 9.02\")   Wt 90.2 kg (198 lb 14.4 oz)   LMP 04/02/2010   SpO2 99%   BMI 29.36 kg/m    Estimated body mass index is 29.36 kg/m  as calculated from the following:    Height as of this encounter: 1.753 m (5' 9.02\").    Weight as of this encounter: 90.2 kg (198 lb 14.4 oz). Body surface area is 2.1 meters squared.  No Pain (0) Comment: Data Unavailable   Patient's last menstrual period was 04/02/2010.  Allergies reviewed: Yes  Medications reviewed: Yes    Medications: Medication refills not needed today.  Pharm[facility] entered into [facility]: [facility] [place]note 2] - [address] AT [address]    Clinical concerns: Recheck Infiltrating ductal carcinoma of central portion of left breast in female, Labs & Chemo today.     Patient reports Muscle spasms in upper extremities a week and a half ago.   Finger nails falling off.       Monika Curtis CMA                Hematology/ Oncology Follow-up Visit:  Jun 19, 2019    Reason for Visit:   Chief Complaint   Patient presents with     Oncology Clinic Visit     Recheck Infiltrating ductal carcinoma of central portion of left breast in female, Labs & Chemo today       Oncologic History:    Infiltrating ductal carcinoma of central " portion of left breast in female (H)  Monika Nam presented following her annual mammogram with new focal asymmetry at 12-1 o'clock position of the left breast around 10.7 cm from the nipple.  It measured 1.4 cm.  Subsequently the patient went on to have breast ultrasound on member 30th 2018 showing 1.6 cm in maximum dimension hypoechoic mass.  Subsequently ultrasound breast biopsy was done on December 17, 2018 showing invasive ductal carcinoma grade 3 out of 3 angiolymphatic invasion was not identified.  Ductal carcinoma in situ was not identified . estrogen receptor was negative, progesterone receptor was negative and HER-2/mercedes receptor was negative.        1/2019 left lumpectomy found 1.8 cm IDC, grade III, LN-, margin is clear, pT1cN0    The patient was started on dose dense chemotherapy with Adriamycin and Cytoxan followed by Taxol      Interval History:  The patient is here today for follow-up.  She has been getting chemotherapy well.  She has some nail changes.  He denies any nausea or vomiting or diarrhea.  She denies any fever or chills.    Review Of Systems:  Constitutional: Negative for fever, chills, and night sweats.  Skin: negative.  Eyes: negative.  Ears/Nose/Throat: negative.  Respiratory: No shortness of breath, dyspnea on exertion, cough, or hemoptysis.  Cardiovascular: negative.  Gastrointestinal: negative.  Genitourinary: negative.  Musculoskeletal: negative.  Neurologic: negative.  Psychiatric: negative.  Hematologic/Lymphatic/Immunologic: negative.  Endocrine: negative.    All other ROS negative unless mentioned in interval history.    Past medical, social, surgical, and family histories reviewed.    Allergies:  Allergies as of 06/19/2019 - Reviewed 06/19/2019   Allergen Reaction Noted     Sulfa drugs Swelling 06/09/2005       Current Medications:  Current Outpatient Medications   Medication Sig Dispense Refill     acetaminophen (TYLENOL) 500 MG tablet Take 500-1,000 mg by mouth every 6  "hours as needed for mild pain or pain       albuterol (PROAIR HFA/PROVENTIL HFA/VENTOLIN HFA) 108 (90 Base) MCG/ACT inhaler Inhale 2 puffs into the lungs every 4 hours as needed for shortness of breath / dyspnea or wheezing 1 Inhaler 11     aspirin 81 MG tablet Take 1 tablet (81 mg) by mouth daily       augmented betamethasone dipropionate (DIPROLENE-AF) 0.05 % cream APPLY EXTERNALLY TO THE AFFECTED AREA TWICE DAILY 50 g 6     calcium-vitamin D (CALCIUM 600-D) 600-400 MG-UNIT per tablet Take 1 tablet by mouth daily       FLUoxetine (PROZAC) 20 MG capsule Take 20 mg daily. (Patient taking differently: Take 20 mg by mouth every evening ) 30 capsule 11     loratadine (CLARITIN) 10 MG tablet Take 10 mg by mouth daily.         LORazepam (ATIVAN) 0.5 MG tablet Take 1 tablet (0.5 mg) by mouth every 4 hours as needed (Anxiety, Nausea/Vomiting or Sleep) 30 tablet 5     MULTIVITAMIN TABS   OR 1 TABLET BY MOUTH DAILY       ondansetron (ZOFRAN) 4 MG tablet Take 1 tablet (4 mg) by mouth every 8 hours as needed for nausea 20 tablet 1     prochlorperazine (COMPAZINE) 10 MG tablet Take 0.5 tablets (5 mg) by mouth every 6 hours as needed (Nausea/Vomiting) 30 tablet 5     pseudoePHEDrine (SUDAFED) 30 MG tablet Take 30 mg by mouth every 12 hours as needed for congestion       ranitidine (ZANTAC) 150 MG tablet Take 1 tablet (150 mg) by mouth daily          Physical Exam:  BP (!) 136/92 (BP Location: Right arm, Patient Position: Sitting, Cuff Size: Adult Large)   Pulse 112   Temp 98.3  F (36.8  C) (Tympanic)   Resp 20   Ht 1.753 m (5' 9.02\")   Wt 90.2 kg (198 lb 14.4 oz)   LMP 04/02/2010   SpO2 99%   BMI 29.36 kg/m     Wt Readings from Last 12 Encounters:   06/19/19 90.2 kg (198 lb 14.4 oz)   06/18/19 87.8 kg (193 lb 9.6 oz)   06/11/19 89.8 kg (198 lb)   05/29/19 89.8 kg (198 lb)   05/22/19 90.4 kg (199 lb 4.8 oz)   05/21/19 91.2 kg (201 lb)   05/14/19 89.2 kg (196 lb 9.6 oz)   05/07/19 90.8 kg (200 lb 3.2 oz)   04/30/19 90.7 kg " (200 lb)   04/24/19 89.8 kg (198 lb)   04/03/19 91.6 kg (201 lb 14.4 oz)   03/20/19 92.6 kg (204 lb 3.2 oz)     ECOG performance status: 1  GENERAL APPEARANCE: Healthy, alert and in no acute distress.  HEENT: Sclerae anicteric. PERRLA. Oropharynx without ulcers, lesions, or thrush.  NECK: Supple. No asymmetry or masses.  LYMPHATICS: No palpable cervical, supraclavicular, axillary, or inguinal lymphadenopathy.  RESP: Lungs clear to auscultation bilaterally without rales, rhonchi or wheezes.  CARDIOVASCULAR: Regular rate and rhythm. Normal S1, S2; no S3 or S4. No murmur, gallop, or rub.  ABDOMEN: Soft, nontender. Bowel sounds normal. No palpable organomegaly or masses.  MUSCULOSKELETAL: Extremities without gross deformities noted. No edema of bilateral lower extremities.  SKIN: No suspicious lesions or rashes.  NEURO: Alert and oriented x 3. Cranial nerves II-XII grossly intact.  PSYCHIATRIC: Mentation and affect appear normal.    Laboratory/Imaging Studies:  Infusion Therapy Visit on 06/18/2019   Component Date Value Ref Range Status     WBC 06/18/2019 3.8* 4.0 - 11.0 10e9/L Final     RBC Count 06/18/2019 3.14* 3.8 - 5.2 10e12/L Final     Hemoglobin 06/18/2019 10.1* 11.7 - 15.7 g/dL Final     Hematocrit 06/18/2019 31.7* 35.0 - 47.0 % Final     MCV 06/18/2019 101* 78 - 100 fl Final     MCH 06/18/2019 32.2  26.5 - 33.0 pg Final     MCHC 06/18/2019 31.9  31.5 - 36.5 g/dL Final     RDW 06/18/2019 13.1  10.0 - 15.0 % Final     Platelet Count 06/18/2019 187  150 - 450 10e9/L Final     Diff Method 06/18/2019 Automated Method   Final     % Neutrophils 06/18/2019 66.4  % Final     % Lymphocytes 06/18/2019 20.3  % Final     % Monocytes 06/18/2019 7.6  % Final     % Eosinophils 06/18/2019 3.4  % Final     % Basophils 06/18/2019 1.8  % Final     % Immature Granulocytes 06/18/2019 0.5  % Final     Nucleated RBCs 06/18/2019 0  0 /100 Final     Absolute Neutrophil 06/18/2019 2.6  1.6 - 8.3 10e9/L Final     Absolute Lymphocytes  06/18/2019 0.8  0.8 - 5.3 10e9/L Final     Absolute Monocytes 06/18/2019 0.3  0.0 - 1.3 10e9/L Final     Absolute Eosinophils 06/18/2019 0.1  0.0 - 0.7 10e9/L Final     Absolute Basophils 06/18/2019 0.1  0.0 - 0.2 10e9/L Final     Abs Immature Granulocytes 06/18/2019 0.0  0 - 0.4 10e9/L Final     Absolute Nucleated RBC 06/18/2019 0.0   Final     Bilirubin Direct 06/18/2019 0.1  0.0 - 0.2 mg/dL Final     Bilirubin Total 06/18/2019 0.4  0.2 - 1.3 mg/dL Final     Albumin 06/18/2019 3.6  3.4 - 5.0 g/dL Final     Protein Total 06/18/2019 6.4* 6.8 - 8.8 g/dL Final     Alkaline Phosphatase 06/18/2019 70  40 - 150 U/L Final     ALT 06/18/2019 22  0 - 50 U/L Final     AST 06/18/2019 13  0 - 45 U/L Final   Infusion Therapy Visit on 06/11/2019   Component Date Value Ref Range Status     WBC 06/11/2019 6.0  4.0 - 11.0 10e9/L Final     RBC Count 06/11/2019 3.21* 3.8 - 5.2 10e12/L Final     Hemoglobin 06/11/2019 10.5* 11.7 - 15.7 g/dL Final     Hematocrit 06/11/2019 33.1* 35.0 - 47.0 % Final     MCV 06/11/2019 103* 78 - 100 fl Final     MCH 06/11/2019 32.7  26.5 - 33.0 pg Final     MCHC 06/11/2019 31.7  31.5 - 36.5 g/dL Final     RDW 06/11/2019 13.5  10.0 - 15.0 % Final     Platelet Count 06/11/2019 211  150 - 450 10e9/L Final     Diff Method 06/11/2019 Automated Method   Final     % Neutrophils 06/11/2019 77.6  % Final     % Lymphocytes 06/11/2019 10.9  % Final     % Monocytes 06/11/2019 9.0  % Final     % Eosinophils 06/11/2019 1.2  % Final     % Basophils 06/11/2019 0.8  % Final     % Immature Granulocytes 06/11/2019 0.5  % Final     Nucleated RBCs 06/11/2019 0  0 /100 Final     Absolute Neutrophil 06/11/2019 4.7  1.6 - 8.3 10e9/L Final     Absolute Lymphocytes 06/11/2019 0.7* 0.8 - 5.3 10e9/L Final     Absolute Monocytes 06/11/2019 0.5  0.0 - 1.3 10e9/L Final     Absolute Eosinophils 06/11/2019 0.1  0.0 - 0.7 10e9/L Final     Absolute Basophils 06/11/2019 0.1  0.0 - 0.2 10e9/L Final     Abs Immature Granulocytes 06/11/2019  0.0  0 - 0.4 10e9/L Final     Absolute Nucleated RBC 06/11/2019 0.0   Final            Assessment and plan:    (C50.112) Infiltrating ductal carcinoma of central portion of left breast in female (H)  (primary encounter diagnosis)  The has been tolerating chemotherapy well.  She will continue on Taxol according to treatment plan.  I will see the patient again in 4 weeks or sooner if there are new developments or concerns.    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      The patient is ready to learn, no apparent learning barriers were identified.  Diagnosis and treatment plans were explained to the patient. The patient expressed understanding of the content. The patient asked appropriate questions. The patient questions were answered to her satisfaction.    Chart documentation with Dragon Voice recognition Software. Although reviewed after completion, some words and grammatical errors may remain.      Again, thank you for allowing me to participate in the care of your patient.        Sincerely,        Madison Gonzales MD

## 2019-06-19 NOTE — PROGRESS NOTES
"Oncology Rooming Note    June 19, 2019 1:38 PM   Monika Nam is a 58 year old female who presents for:    Chief Complaint   Patient presents with     Oncology Clinic Visit     Recheck Infiltrating ductal carcinoma of central portion of left breast in female, Labs & Chemo today     Initial Vitals: BP (!) 136/92 (BP Location: Right arm, Patient Position: Sitting, Cuff Size: Adult Large)   Pulse 112   Temp 98.3  F (36.8  C) (Tympanic)   Resp 20   Ht 1.753 m (5' 9.02\")   Wt 90.2 kg (198 lb 14.4 oz)   LMP 04/02/2010   SpO2 99%   BMI 29.36 kg/m   Estimated body mass index is 29.36 kg/m  as calculated from the following:    Height as of this encounter: 1.753 m (5' 9.02\").    Weight as of this encounter: 90.2 kg (198 lb 14.4 oz). Body surface area is 2.1 meters squared.  No Pain (0) Comment: Data Unavailable   Patient's last menstrual period was 04/02/2010.  Allergies reviewed: Yes  Medications reviewed: Yes    Medications: Medication refills not needed today.  Pharmacy name entered into [employer]: [facility] - [place], [place] - [address] AT [facility] OF [address] [place] [address]    Clinical concerns: Recheck Infiltrating ductal carcinoma of central portion of left breast in female, Labs & Chemo today.     Patient reports Muscle spasms in upper extremities a week and a half ago.   Finger nails falling off.       Monika Curtis, SAW              "

## 2019-06-21 NOTE — PROGRESS NOTES
Hematology/ Oncology Follow-up Visit:  Jun 19, 2019    Reason for Visit:   Chief Complaint   Patient presents with     Oncology Clinic Visit     Recheck Infiltrating ductal carcinoma of central portion of left breast in female, Labs & Chemo today       Oncologic History:    Infiltrating ductal carcinoma of central portion of left breast in female (H)  Monika Nam presented following her annual mammogram with new focal asymmetry at 12-1 o'clock position of the left breast around 10.7 cm from the nipple.  It measured 1.4 cm.  Subsequently the patient went on to have breast ultrasound on member 30th 2018 showing 1.6 cm in maximum dimension hypoechoic mass.  Subsequently ultrasound breast biopsy was done on December 17, 2018 showing invasive ductal carcinoma grade 3 out of 3 angiolymphatic invasion was not identified.  Ductal carcinoma in situ was not identified . estrogen receptor was negative, progesterone receptor was negative and HER-2/mercedes receptor was negative.        1/2019 left lumpectomy found 1.8 cm IDC, grade III, LN-, margin is clear, pT1cN0    The patient was started on dose dense chemotherapy with Adriamycin and Cytoxan followed by Taxol      Interval History:  The patient is here today for follow-up.  She has been getting chemotherapy well.  She has some nail changes.  He denies any nausea or vomiting or diarrhea.  She denies any fever or chills.    Review Of Systems:  Constitutional: Negative for fever, chills, and night sweats.  Skin: negative.  Eyes: negative.  Ears/Nose/Throat: negative.  Respiratory: No shortness of breath, dyspnea on exertion, cough, or hemoptysis.  Cardiovascular: negative.  Gastrointestinal: negative.  Genitourinary: negative.  Musculoskeletal: negative.  Neurologic: negative.  Psychiatric: negative.  Hematologic/Lymphatic/Immunologic: negative.  Endocrine: negative.    All other ROS negative unless mentioned in interval history.    Past medical, social, surgical, and family  "histories reviewed.    Allergies:  Allergies as of 06/19/2019 - Reviewed 06/19/2019   Allergen Reaction Noted     Sulfa drugs Swelling 06/09/2005       Current Medications:  Current Outpatient Medications   Medication Sig Dispense Refill     acetaminophen (TYLENOL) 500 MG tablet Take 500-1,000 mg by mouth every 6 hours as needed for mild pain or pain       albuterol (PROAIR HFA/PROVENTIL HFA/VENTOLIN HFA) 108 (90 Base) MCG/ACT inhaler Inhale 2 puffs into the lungs every 4 hours as needed for shortness of breath / dyspnea or wheezing 1 Inhaler 11     aspirin 81 MG tablet Take 1 tablet (81 mg) by mouth daily       augmented betamethasone dipropionate (DIPROLENE-AF) 0.05 % cream APPLY EXTERNALLY TO THE AFFECTED AREA TWICE DAILY 50 g 6     calcium-vitamin D (CALCIUM 600-D) 600-400 MG-UNIT per tablet Take 1 tablet by mouth daily       FLUoxetine (PROZAC) 20 MG capsule Take 20 mg daily. (Patient taking differently: Take 20 mg by mouth every evening ) 30 capsule 11     loratadine (CLARITIN) 10 MG tablet Take 10 mg by mouth daily.         LORazepam (ATIVAN) 0.5 MG tablet Take 1 tablet (0.5 mg) by mouth every 4 hours as needed (Anxiety, Nausea/Vomiting or Sleep) 30 tablet 5     MULTIVITAMIN TABS   OR 1 TABLET BY MOUTH DAILY       ondansetron (ZOFRAN) 4 MG tablet Take 1 tablet (4 mg) by mouth every 8 hours as needed for nausea 20 tablet 1     prochlorperazine (COMPAZINE) 10 MG tablet Take 0.5 tablets (5 mg) by mouth every 6 hours as needed (Nausea/Vomiting) 30 tablet 5     pseudoePHEDrine (SUDAFED) 30 MG tablet Take 30 mg by mouth every 12 hours as needed for congestion       ranitidine (ZANTAC) 150 MG tablet Take 1 tablet (150 mg) by mouth daily          Physical Exam:  BP (!) 136/92 (BP Location: Right arm, Patient Position: Sitting, Cuff Size: Adult Large)   Pulse 112   Temp 98.3  F (36.8  C) (Tympanic)   Resp 20   Ht 1.753 m (5' 9.02\")   Wt 90.2 kg (198 lb 14.4 oz)   LMP 04/02/2010   SpO2 99%   BMI 29.36 kg/m  "   Wt Readings from Last 12 Encounters:   06/19/19 90.2 kg (198 lb 14.4 oz)   06/18/19 87.8 kg (193 lb 9.6 oz)   06/11/19 89.8 kg (198 lb)   05/29/19 89.8 kg (198 lb)   05/22/19 90.4 kg (199 lb 4.8 oz)   05/21/19 91.2 kg (201 lb)   05/14/19 89.2 kg (196 lb 9.6 oz)   05/07/19 90.8 kg (200 lb 3.2 oz)   04/30/19 90.7 kg (200 lb)   04/24/19 89.8 kg (198 lb)   04/03/19 91.6 kg (201 lb 14.4 oz)   03/20/19 92.6 kg (204 lb 3.2 oz)     ECOG performance status: 1  GENERAL APPEARANCE: Healthy, alert and in no acute distress.  HEENT: Sclerae anicteric. PERRLA. Oropharynx without ulcers, lesions, or thrush.  NECK: Supple. No asymmetry or masses.  LYMPHATICS: No palpable cervical, supraclavicular, axillary, or inguinal lymphadenopathy.  RESP: Lungs clear to auscultation bilaterally without rales, rhonchi or wheezes.  CARDIOVASCULAR: Regular rate and rhythm. Normal S1, S2; no S3 or S4. No murmur, gallop, or rub.  ABDOMEN: Soft, nontender. Bowel sounds normal. No palpable organomegaly or masses.  MUSCULOSKELETAL: Extremities without gross deformities noted. No edema of bilateral lower extremities.  SKIN: No suspicious lesions or rashes.  NEURO: Alert and oriented x 3. Cranial nerves II-XII grossly intact.  PSYCHIATRIC: Mentation and affect appear normal.    Laboratory/Imaging Studies:  Infusion Therapy Visit on 06/18/2019   Component Date Value Ref Range Status     WBC 06/18/2019 3.8* 4.0 - 11.0 10e9/L Final     RBC Count 06/18/2019 3.14* 3.8 - 5.2 10e12/L Final     Hemoglobin 06/18/2019 10.1* 11.7 - 15.7 g/dL Final     Hematocrit 06/18/2019 31.7* 35.0 - 47.0 % Final     MCV 06/18/2019 101* 78 - 100 fl Final     MCH 06/18/2019 32.2  26.5 - 33.0 pg Final     MCHC 06/18/2019 31.9  31.5 - 36.5 g/dL Final     RDW 06/18/2019 13.1  10.0 - 15.0 % Final     Platelet Count 06/18/2019 187  150 - 450 10e9/L Final     Diff Method 06/18/2019 Automated Method   Final     % Neutrophils 06/18/2019 66.4  % Final     % Lymphocytes 06/18/2019 20.3  %  Final     % Monocytes 06/18/2019 7.6  % Final     % Eosinophils 06/18/2019 3.4  % Final     % Basophils 06/18/2019 1.8  % Final     % Immature Granulocytes 06/18/2019 0.5  % Final     Nucleated RBCs 06/18/2019 0  0 /100 Final     Absolute Neutrophil 06/18/2019 2.6  1.6 - 8.3 10e9/L Final     Absolute Lymphocytes 06/18/2019 0.8  0.8 - 5.3 10e9/L Final     Absolute Monocytes 06/18/2019 0.3  0.0 - 1.3 10e9/L Final     Absolute Eosinophils 06/18/2019 0.1  0.0 - 0.7 10e9/L Final     Absolute Basophils 06/18/2019 0.1  0.0 - 0.2 10e9/L Final     Abs Immature Granulocytes 06/18/2019 0.0  0 - 0.4 10e9/L Final     Absolute Nucleated RBC 06/18/2019 0.0   Final     Bilirubin Direct 06/18/2019 0.1  0.0 - 0.2 mg/dL Final     Bilirubin Total 06/18/2019 0.4  0.2 - 1.3 mg/dL Final     Albumin 06/18/2019 3.6  3.4 - 5.0 g/dL Final     Protein Total 06/18/2019 6.4* 6.8 - 8.8 g/dL Final     Alkaline Phosphatase 06/18/2019 70  40 - 150 U/L Final     ALT 06/18/2019 22  0 - 50 U/L Final     AST 06/18/2019 13  0 - 45 U/L Final   Infusion Therapy Visit on 06/11/2019   Component Date Value Ref Range Status     WBC 06/11/2019 6.0  4.0 - 11.0 10e9/L Final     RBC Count 06/11/2019 3.21* 3.8 - 5.2 10e12/L Final     Hemoglobin 06/11/2019 10.5* 11.7 - 15.7 g/dL Final     Hematocrit 06/11/2019 33.1* 35.0 - 47.0 % Final     MCV 06/11/2019 103* 78 - 100 fl Final     MCH 06/11/2019 32.7  26.5 - 33.0 pg Final     MCHC 06/11/2019 31.7  31.5 - 36.5 g/dL Final     RDW 06/11/2019 13.5  10.0 - 15.0 % Final     Platelet Count 06/11/2019 211  150 - 450 10e9/L Final     Diff Method 06/11/2019 Automated Method   Final     % Neutrophils 06/11/2019 77.6  % Final     % Lymphocytes 06/11/2019 10.9  % Final     % Monocytes 06/11/2019 9.0  % Final     % Eosinophils 06/11/2019 1.2  % Final     % Basophils 06/11/2019 0.8  % Final     % Immature Granulocytes 06/11/2019 0.5  % Final     Nucleated RBCs 06/11/2019 0  0 /100 Final     Absolute Neutrophil 06/11/2019 4.7  1.6  - 8.3 10e9/L Final     Absolute Lymphocytes 06/11/2019 0.7* 0.8 - 5.3 10e9/L Final     Absolute Monocytes 06/11/2019 0.5  0.0 - 1.3 10e9/L Final     Absolute Eosinophils 06/11/2019 0.1  0.0 - 0.7 10e9/L Final     Absolute Basophils 06/11/2019 0.1  0.0 - 0.2 10e9/L Final     Abs Immature Granulocytes 06/11/2019 0.0  0 - 0.4 10e9/L Final     Absolute Nucleated RBC 06/11/2019 0.0   Final            Assessment and plan:    (C50.112) Infiltrating ductal carcinoma of central portion of left breast in female (H)  (primary encounter diagnosis)  The has been tolerating chemotherapy well.  She will continue on Taxol according to treatment plan.  I will see the patient again in 4 weeks or sooner if there are new developments or concerns.    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      The patient is ready to learn, no apparent learning barriers were identified.  Diagnosis and treatment plans were explained to the patient. The patient expressed understanding of the content. The patient asked appropriate questions. The patient questions were answered to her satisfaction.    Chart documentation with Dragon Voice recognition Software. Although reviewed after completion, some words and grammatical errors may remain.

## 2019-06-25 ENCOUNTER — INFUSION THERAPY VISIT (OUTPATIENT)
Dept: INFUSION THERAPY | Facility: CLINIC | Age: 59
End: 2019-06-25
Attending: INTERNAL MEDICINE
Payer: COMMERCIAL

## 2019-06-25 ENCOUNTER — HOSPITAL ENCOUNTER (OUTPATIENT)
Facility: CLINIC | Age: 59
Discharge: HOME | End: 2019-06-25
Attending: INTERNAL MEDICINE | Admitting: INTERNAL MEDICINE
Payer: COMMERCIAL

## 2019-06-25 VITALS — TEMPERATURE: 97.6 F | HEART RATE: 78 BPM | DIASTOLIC BLOOD PRESSURE: 88 MMHG | SYSTOLIC BLOOD PRESSURE: 146 MMHG

## 2019-06-25 DIAGNOSIS — C50.112 INFILTRATING DUCTAL CARCINOMA OF CENTRAL PORTION OF LEFT BREAST IN FEMALE (H): ICD-10-CM

## 2019-06-25 DIAGNOSIS — C50.912 ESTROGEN RECEPTOR NEGATIVE CARCINOMA OF BREAST, LEFT (H): Primary | ICD-10-CM

## 2019-06-25 DIAGNOSIS — Z17.1 ESTROGEN RECEPTOR NEGATIVE CARCINOMA OF BREAST, LEFT (H): Primary | ICD-10-CM

## 2019-06-25 LAB
BASOPHILS # BLD AUTO: 0 10E9/L (ref 0–0.2)
BASOPHILS NFR BLD AUTO: 1 %
DIFFERENTIAL METHOD BLD: ABNORMAL
EOSINOPHIL # BLD AUTO: 0.1 10E9/L (ref 0–0.7)
EOSINOPHIL NFR BLD AUTO: 3.6 %
ERYTHROCYTE [DISTWIDTH] IN BLOOD BY AUTOMATED COUNT: 13.5 % (ref 10–15)
HCT VFR BLD AUTO: 33.4 % (ref 35–47)
HGB BLD-MCNC: 10.7 G/DL (ref 11.7–15.7)
IMM GRANULOCYTES # BLD: 0 10E9/L (ref 0–0.4)
IMM GRANULOCYTES NFR BLD: 0.5 %
LYMPHOCYTES # BLD AUTO: 0.8 10E9/L (ref 0.8–5.3)
LYMPHOCYTES NFR BLD AUTO: 20.2 %
MCH RBC QN AUTO: 32 PG (ref 26.5–33)
MCHC RBC AUTO-ENTMCNC: 32 G/DL (ref 31.5–36.5)
MCV RBC AUTO: 100 FL (ref 78–100)
MONOCYTES # BLD AUTO: 0.3 10E9/L (ref 0–1.3)
MONOCYTES NFR BLD AUTO: 8 %
NEUTROPHILS # BLD AUTO: 2.6 10E9/L (ref 1.6–8.3)
NEUTROPHILS NFR BLD AUTO: 66.7 %
NRBC # BLD AUTO: 0 10*3/UL
NRBC BLD AUTO-RTO: 0 /100
PLATELET # BLD AUTO: 183 10E9/L (ref 150–450)
RBC # BLD AUTO: 3.34 10E12/L (ref 3.8–5.2)
WBC # BLD AUTO: 3.9 10E9/L (ref 4–11)

## 2019-06-25 PROCEDURE — 36591 DRAW BLOOD OFF VENOUS DEVICE: CPT

## 2019-06-25 PROCEDURE — 25000128 H RX IP 250 OP 636: Performed by: INTERNAL MEDICINE

## 2019-06-25 PROCEDURE — 85025 COMPLETE CBC W/AUTO DIFF WBC: CPT | Performed by: INTERNAL MEDICINE

## 2019-06-25 RX ORDER — HEPARIN SODIUM (PORCINE) LOCK FLUSH IV SOLN 100 UNIT/ML 100 UNIT/ML
5 SOLUTION INTRAVENOUS
Status: DISCONTINUED | OUTPATIENT
Start: 2019-06-25 | End: 2019-06-25 | Stop reason: HOSPADM

## 2019-06-25 RX ADMIN — Medication 5 ML: at 16:23

## 2019-06-25 NOTE — PROGRESS NOTES
Infusion Nursing Note:  Monika Nam presents today for port labs and a port flush.   Patient seen by provider today: No   present during visit today: Not Applicable.    Note: Port labs drawn for chemotherapy tomorrow. Port flushed per [facility] protocol. Pt. To return tomorrow for chemotherapy if her counts are ok.  Intravenous Access:  Labs drawn without difficulty.  Implanted Port.    Treatment Conditions:  Lab Results   Component Value Date    HGB 10.7 06/25/2019     Lab Results   Component Value Date    WBC 3.9 06/25/2019      Lab Results   Component Value Date    ANEU 2.6 06/25/2019     Lab Results   Component Value Date     06/25/2019          Post Infusion Assessment:  Blood return noted pre and post infusion.  Site patent and intact, free from redness, edema or discomfort.  No evidence of extravasations.  Access discontinued per protocol.       Discharge Plan:   Patient and/or family verbalized understanding of discharge instructions and all questions answered.  Patient discharged in stable condition accompanied by: self.  Departure Mode: Ambulatory.    Jessica Faye RN

## 2019-06-26 ENCOUNTER — INFUSION THERAPY VISIT (OUTPATIENT)
Dept: INFUSION THERAPY | Facility: CLINIC | Age: 59
End: 2019-06-26
Attending: INTERNAL MEDICINE
Payer: COMMERCIAL

## 2019-06-26 VITALS — TEMPERATURE: 97.9 F | SYSTOLIC BLOOD PRESSURE: 148 MMHG | DIASTOLIC BLOOD PRESSURE: 93 MMHG | HEART RATE: 80 BPM

## 2019-06-26 DIAGNOSIS — C50.912 ESTROGEN RECEPTOR NEGATIVE CARCINOMA OF BREAST, LEFT (H): Primary | ICD-10-CM

## 2019-06-26 DIAGNOSIS — C50.112 INFILTRATING DUCTAL CARCINOMA OF CENTRAL PORTION OF LEFT BREAST IN FEMALE (H): ICD-10-CM

## 2019-06-26 DIAGNOSIS — Z17.1 ESTROGEN RECEPTOR NEGATIVE CARCINOMA OF BREAST, LEFT (H): Primary | ICD-10-CM

## 2019-06-26 PROCEDURE — 25800030 ZZH RX IP 258 OP 636: Performed by: INTERNAL MEDICINE

## 2019-06-26 PROCEDURE — 25000132 ZZH RX MED GY IP 250 OP 250 PS 637: Performed by: INTERNAL MEDICINE

## 2019-06-26 PROCEDURE — 96413 CHEMO IV INFUSION 1 HR: CPT

## 2019-06-26 PROCEDURE — 25000128 H RX IP 250 OP 636: Performed by: INTERNAL MEDICINE

## 2019-06-26 PROCEDURE — 96375 TX/PRO/DX INJ NEW DRUG ADDON: CPT

## 2019-06-26 RX ORDER — HEPARIN SODIUM (PORCINE) LOCK FLUSH IV SOLN 100 UNIT/ML 100 UNIT/ML
5 SOLUTION INTRAVENOUS
Status: DISCONTINUED | OUTPATIENT
Start: 2019-06-26 | End: 2019-06-26 | Stop reason: HOSPADM

## 2019-06-26 RX ORDER — DIPHENHYDRAMINE HCL 50 MG
50 CAPSULE ORAL ONCE
Status: COMPLETED | OUTPATIENT
Start: 2019-06-26 | End: 2019-06-26

## 2019-06-26 RX ADMIN — PACLITAXEL 169 MG: 6 INJECTION, SOLUTION INTRAVENOUS at 15:20

## 2019-06-26 RX ADMIN — DIPHENHYDRAMINE HYDROCHLORIDE 50 MG: 50 CAPSULE ORAL at 14:48

## 2019-06-26 RX ADMIN — SODIUM CHLORIDE 250 ML: 9 INJECTION, SOLUTION INTRAVENOUS at 14:50

## 2019-06-26 RX ADMIN — Medication 5 ML: at 16:28

## 2019-06-26 RX ADMIN — FAMOTIDINE 20 MG: 20 INJECTION, SOLUTION INTRAVENOUS at 14:51

## 2019-06-26 RX ADMIN — DEXAMETHASONE SODIUM PHOSPHATE 20 MG: 10 INJECTION, SOLUTION INTRAMUSCULAR; INTRAVENOUS at 15:06

## 2019-06-26 NOTE — PROGRESS NOTES
Infusion Nursing Note:  Monika Nam presents today for C14D1 Taxol.    Patient seen by provider today: No   present during visit today: Not Applicable.    Note: N/A.    Intravenous Access:  Implanted Port.    Treatment Conditions:  Results reviewed, labs MET treatment parameters, ok to proceed with treatment.      Post Infusion Assessment:  Patient tolerated infusion without incident.  Blood return noted pre and post infusion.  Site patent and intact, free from redness, edema or discomfort.  No evidence of extravasations.  Access discontinued per protocol.       Discharge Plan:   Patient discharged in stable condition accompanied by: .  Departure Mode: Ambulatory.    Anat Velasquez RN

## 2019-07-02 ENCOUNTER — INFUSION THERAPY VISIT (OUTPATIENT)
Dept: INFUSION THERAPY | Facility: CLINIC | Age: 59
End: 2019-07-02
Attending: INTERNAL MEDICINE
Payer: COMMERCIAL

## 2019-07-02 ENCOUNTER — HOSPITAL ENCOUNTER (OUTPATIENT)
Facility: CLINIC | Age: 59
Discharge: HOME | End: 2019-07-02
Attending: INTERNAL MEDICINE | Admitting: INTERNAL MEDICINE
Payer: COMMERCIAL

## 2019-07-02 VITALS — BODY MASS INDEX: 28.93 KG/M2 | WEIGHT: 196 LBS

## 2019-07-02 DIAGNOSIS — C50.912 ESTROGEN RECEPTOR NEGATIVE CARCINOMA OF BREAST, LEFT (H): Primary | ICD-10-CM

## 2019-07-02 DIAGNOSIS — Z17.1 ESTROGEN RECEPTOR NEGATIVE CARCINOMA OF BREAST, LEFT (H): Primary | ICD-10-CM

## 2019-07-02 DIAGNOSIS — C50.112 INFILTRATING DUCTAL CARCINOMA OF CENTRAL PORTION OF LEFT BREAST IN FEMALE (H): ICD-10-CM

## 2019-07-02 LAB
BASOPHILS # BLD AUTO: 0 10E9/L (ref 0–0.2)
BASOPHILS NFR BLD AUTO: 0 %
DIFFERENTIAL METHOD BLD: ABNORMAL
EOSINOPHIL # BLD AUTO: 0.1 10E9/L (ref 0–0.7)
EOSINOPHIL NFR BLD AUTO: 3 %
ERYTHROCYTE [DISTWIDTH] IN BLOOD BY AUTOMATED COUNT: 14.2 % (ref 10–15)
HCT VFR BLD AUTO: 32.2 % (ref 35–47)
HGB BLD-MCNC: 10.6 G/DL (ref 11.7–15.7)
LYMPHOCYTES # BLD AUTO: 0.6 10E9/L (ref 0.8–5.3)
LYMPHOCYTES NFR BLD AUTO: 28 %
MCH RBC QN AUTO: 32.4 PG (ref 26.5–33)
MCHC RBC AUTO-ENTMCNC: 32.9 G/DL (ref 31.5–36.5)
MCV RBC AUTO: 99 FL (ref 78–100)
MONOCYTES # BLD AUTO: 0.1 10E9/L (ref 0–1.3)
MONOCYTES NFR BLD AUTO: 6 %
NEUTROPHILS # BLD AUTO: 1.4 10E9/L (ref 1.6–8.3)
NEUTROPHILS NFR BLD AUTO: 63 %
PLATELET # BLD AUTO: 166 10E9/L (ref 150–450)
PLATELET # BLD EST: ABNORMAL 10*3/UL
RBC # BLD AUTO: 3.27 10E12/L (ref 3.8–5.2)
RBC MORPH BLD: NORMAL
WBC # BLD AUTO: 2.3 10E9/L (ref 4–11)

## 2019-07-02 PROCEDURE — 25000128 H RX IP 250 OP 636: Performed by: INTERNAL MEDICINE

## 2019-07-02 PROCEDURE — 85025 COMPLETE CBC W/AUTO DIFF WBC: CPT | Performed by: INTERNAL MEDICINE

## 2019-07-02 PROCEDURE — 36591 DRAW BLOOD OFF VENOUS DEVICE: CPT

## 2019-07-02 RX ORDER — HEPARIN SODIUM (PORCINE) LOCK FLUSH IV SOLN 100 UNIT/ML 100 UNIT/ML
5 SOLUTION INTRAVENOUS
Status: CANCELLED | OUTPATIENT
Start: 2019-07-02

## 2019-07-02 RX ORDER — HEPARIN SODIUM (PORCINE) LOCK FLUSH IV SOLN 100 UNIT/ML 100 UNIT/ML
5 SOLUTION INTRAVENOUS
Status: CANCELLED | OUTPATIENT
Start: 2019-07-09

## 2019-07-02 RX ORDER — METHYLPREDNISOLONE SODIUM SUCCINATE 125 MG/2ML
125 INJECTION, POWDER, LYOPHILIZED, FOR SOLUTION INTRAMUSCULAR; INTRAVENOUS
Status: CANCELLED
Start: 2019-07-09

## 2019-07-02 RX ORDER — DIPHENHYDRAMINE HCL 50 MG
50 CAPSULE ORAL ONCE
Status: CANCELLED
Start: 2019-07-02

## 2019-07-02 RX ORDER — MEPERIDINE HYDROCHLORIDE 25 MG/ML
25 INJECTION INTRAMUSCULAR; INTRAVENOUS; SUBCUTANEOUS EVERY 30 MIN PRN
Status: CANCELLED | OUTPATIENT
Start: 2019-07-02

## 2019-07-02 RX ORDER — EPINEPHRINE 0.3 MG/.3ML
0.3 INJECTION SUBCUTANEOUS EVERY 5 MIN PRN
Status: CANCELLED | OUTPATIENT
Start: 2019-07-02

## 2019-07-02 RX ORDER — EPINEPHRINE 1 MG/ML
0.3 INJECTION, SOLUTION, CONCENTRATE INTRAVENOUS EVERY 5 MIN PRN
Status: CANCELLED | OUTPATIENT
Start: 2019-07-02

## 2019-07-02 RX ORDER — ALBUTEROL SULFATE 0.83 MG/ML
2.5 SOLUTION RESPIRATORY (INHALATION)
Status: CANCELLED | OUTPATIENT
Start: 2019-07-09

## 2019-07-02 RX ORDER — ALBUTEROL SULFATE 90 UG/1
1-2 AEROSOL, METERED RESPIRATORY (INHALATION)
Status: CANCELLED
Start: 2019-07-02

## 2019-07-02 RX ORDER — DIPHENHYDRAMINE HYDROCHLORIDE 50 MG/ML
50 INJECTION INTRAMUSCULAR; INTRAVENOUS
Status: CANCELLED
Start: 2019-07-09

## 2019-07-02 RX ORDER — EPINEPHRINE 1 MG/ML
0.3 INJECTION, SOLUTION, CONCENTRATE INTRAVENOUS EVERY 5 MIN PRN
Status: CANCELLED | OUTPATIENT
Start: 2019-07-09

## 2019-07-02 RX ORDER — SODIUM CHLORIDE 9 MG/ML
1000 INJECTION, SOLUTION INTRAVENOUS CONTINUOUS PRN
Status: CANCELLED
Start: 2019-07-02

## 2019-07-02 RX ORDER — ALBUTEROL SULFATE 90 UG/1
1-2 AEROSOL, METERED RESPIRATORY (INHALATION)
Status: CANCELLED
Start: 2019-07-09

## 2019-07-02 RX ORDER — DIPHENHYDRAMINE HYDROCHLORIDE 50 MG/ML
50 INJECTION INTRAMUSCULAR; INTRAVENOUS
Status: CANCELLED
Start: 2019-07-02

## 2019-07-02 RX ORDER — LORAZEPAM 2 MG/ML
0.5 INJECTION INTRAMUSCULAR EVERY 4 HOURS PRN
Status: CANCELLED
Start: 2019-07-02

## 2019-07-02 RX ORDER — DIPHENHYDRAMINE HCL 50 MG
50 CAPSULE ORAL ONCE
Status: CANCELLED
Start: 2019-07-09

## 2019-07-02 RX ORDER — SODIUM CHLORIDE 9 MG/ML
1000 INJECTION, SOLUTION INTRAVENOUS CONTINUOUS PRN
Status: CANCELLED
Start: 2019-07-09

## 2019-07-02 RX ORDER — HEPARIN SODIUM (PORCINE) LOCK FLUSH IV SOLN 100 UNIT/ML 100 UNIT/ML
5 SOLUTION INTRAVENOUS
Status: DISCONTINUED | OUTPATIENT
Start: 2019-07-02 | End: 2019-07-02 | Stop reason: HOSPADM

## 2019-07-02 RX ORDER — MEPERIDINE HYDROCHLORIDE 25 MG/ML
25 INJECTION INTRAMUSCULAR; INTRAVENOUS; SUBCUTANEOUS EVERY 30 MIN PRN
Status: CANCELLED | OUTPATIENT
Start: 2019-07-09

## 2019-07-02 RX ORDER — ALBUTEROL SULFATE 0.83 MG/ML
2.5 SOLUTION RESPIRATORY (INHALATION)
Status: CANCELLED | OUTPATIENT
Start: 2019-07-02

## 2019-07-02 RX ORDER — LORAZEPAM 2 MG/ML
0.5 INJECTION INTRAMUSCULAR EVERY 4 HOURS PRN
Status: CANCELLED
Start: 2019-07-09

## 2019-07-02 RX ORDER — EPINEPHRINE 0.3 MG/.3ML
0.3 INJECTION SUBCUTANEOUS EVERY 5 MIN PRN
Status: CANCELLED | OUTPATIENT
Start: 2019-07-09

## 2019-07-02 RX ORDER — METHYLPREDNISOLONE SODIUM SUCCINATE 125 MG/2ML
125 INJECTION, POWDER, LYOPHILIZED, FOR SOLUTION INTRAMUSCULAR; INTRAVENOUS
Status: CANCELLED
Start: 2019-07-02

## 2019-07-02 RX ADMIN — Medication 5 ML: at 16:09

## 2019-07-02 NOTE — PROGRESS NOTES
Labs drawn via port. Pt has an appt to have chemo tomorrow.  She offers no complaints today.  Valentine Gongora RN

## 2019-07-03 ENCOUNTER — INFUSION THERAPY VISIT (OUTPATIENT)
Dept: INFUSION THERAPY | Facility: CLINIC | Age: 59
End: 2019-07-03
Attending: INTERNAL MEDICINE
Payer: COMMERCIAL

## 2019-07-03 DIAGNOSIS — C50.112 INFILTRATING DUCTAL CARCINOMA OF CENTRAL PORTION OF LEFT BREAST IN FEMALE (H): ICD-10-CM

## 2019-07-03 DIAGNOSIS — C50.912 ESTROGEN RECEPTOR NEGATIVE CARCINOMA OF BREAST, LEFT (H): Primary | ICD-10-CM

## 2019-07-03 DIAGNOSIS — Z17.1 ESTROGEN RECEPTOR NEGATIVE CARCINOMA OF BREAST, LEFT (H): Primary | ICD-10-CM

## 2019-07-03 PROCEDURE — 25800030 ZZH RX IP 258 OP 636: Performed by: INTERNAL MEDICINE

## 2019-07-03 PROCEDURE — 25000128 H RX IP 250 OP 636: Performed by: INTERNAL MEDICINE

## 2019-07-03 PROCEDURE — 25000132 ZZH RX MED GY IP 250 OP 250 PS 637: Performed by: INTERNAL MEDICINE

## 2019-07-03 PROCEDURE — 96375 TX/PRO/DX INJ NEW DRUG ADDON: CPT

## 2019-07-03 PROCEDURE — 96413 CHEMO IV INFUSION 1 HR: CPT

## 2019-07-03 RX ORDER — HEPARIN SODIUM (PORCINE) LOCK FLUSH IV SOLN 100 UNIT/ML 100 UNIT/ML
5 SOLUTION INTRAVENOUS
Status: DISCONTINUED | OUTPATIENT
Start: 2019-07-03 | End: 2019-07-03 | Stop reason: HOSPADM

## 2019-07-03 RX ORDER — DIPHENHYDRAMINE HCL 50 MG
50 CAPSULE ORAL ONCE
Status: COMPLETED | OUTPATIENT
Start: 2019-07-03 | End: 2019-07-03

## 2019-07-03 RX ADMIN — PACLITAXEL 169 MG: 6 INJECTION, SOLUTION INTRAVENOUS at 14:19

## 2019-07-03 RX ADMIN — DIPHENHYDRAMINE HYDROCHLORIDE 50 MG: 50 CAPSULE ORAL at 13:48

## 2019-07-03 RX ADMIN — FAMOTIDINE 20 MG: 20 INJECTION, SOLUTION INTRAVENOUS at 14:06

## 2019-07-03 RX ADMIN — DEXAMETHASONE SODIUM PHOSPHATE 20 MG: 10 INJECTION, SOLUTION INTRAMUSCULAR; INTRAVENOUS at 13:48

## 2019-07-03 RX ADMIN — SODIUM CHLORIDE 250 ML: 9 INJECTION, SOLUTION INTRAVENOUS at 13:47

## 2019-07-03 NOTE — PROGRESS NOTES
Infusion Nursing Note:  Monika Nam presents today for Taxol.    Patient seen by provider today: No   present during visit today: Not Applicable.    Note: N/A.    Intravenous Access:  Implanted Port.    Treatment Conditions:  Results reviewed, labs MET treatment parameters, ok to proceed with treatment.      Post Infusion Assessment:  Patient tolerated infusion without incident.  Blood return noted pre and post infusion.  Access discontinued per protocol.       Discharge Plan:   Patient discharged in stable condition accompanied by: self.  Departure Mode: Ambulatory.  Pt has next chemo dose scheduled for 7/10.  Valentine Gongora RN

## 2019-07-09 ENCOUNTER — INFUSION THERAPY VISIT (OUTPATIENT)
Dept: INFUSION THERAPY | Facility: CLINIC | Age: 59
End: 2019-07-09
Attending: INTERNAL MEDICINE
Payer: COMMERCIAL

## 2019-07-09 ENCOUNTER — HOSPITAL ENCOUNTER (OUTPATIENT)
Facility: CLINIC | Age: 59
Discharge: HOME | End: 2019-07-09
Attending: INTERNAL MEDICINE | Admitting: INTERNAL MEDICINE
Payer: COMMERCIAL

## 2019-07-09 VITALS — BODY MASS INDEX: 29.23 KG/M2 | WEIGHT: 198 LBS

## 2019-07-09 DIAGNOSIS — Z17.1 ESTROGEN RECEPTOR NEGATIVE CARCINOMA OF BREAST, LEFT (H): Primary | ICD-10-CM

## 2019-07-09 DIAGNOSIS — C50.112 INFILTRATING DUCTAL CARCINOMA OF CENTRAL PORTION OF LEFT BREAST IN FEMALE (H): ICD-10-CM

## 2019-07-09 DIAGNOSIS — C50.912 ESTROGEN RECEPTOR NEGATIVE CARCINOMA OF BREAST, LEFT (H): Primary | ICD-10-CM

## 2019-07-09 LAB
BASOPHILS # BLD AUTO: 0.1 10E9/L (ref 0–0.2)
BASOPHILS NFR BLD AUTO: 1.8 %
DIFFERENTIAL METHOD BLD: ABNORMAL
EOSINOPHIL # BLD AUTO: 0.1 10E9/L (ref 0–0.7)
EOSINOPHIL NFR BLD AUTO: 1.8 %
ERYTHROCYTE [DISTWIDTH] IN BLOOD BY AUTOMATED COUNT: 14.8 % (ref 10–15)
HCT VFR BLD AUTO: 31 % (ref 35–47)
HGB BLD-MCNC: 10.3 G/DL (ref 11.7–15.7)
IMM GRANULOCYTES # BLD: 0 10E9/L (ref 0–0.4)
IMM GRANULOCYTES NFR BLD: 0.7 %
LYMPHOCYTES # BLD AUTO: 0.7 10E9/L (ref 0.8–5.3)
LYMPHOCYTES NFR BLD AUTO: 23.4 %
MCH RBC QN AUTO: 32.6 PG (ref 26.5–33)
MCHC RBC AUTO-ENTMCNC: 33.2 G/DL (ref 31.5–36.5)
MCV RBC AUTO: 98 FL (ref 78–100)
MONOCYTES # BLD AUTO: 0.3 10E9/L (ref 0–1.3)
MONOCYTES NFR BLD AUTO: 10.1 %
NEUTROPHILS # BLD AUTO: 1.7 10E9/L (ref 1.6–8.3)
NEUTROPHILS NFR BLD AUTO: 62.2 %
NRBC # BLD AUTO: 0 10*3/UL
NRBC BLD AUTO-RTO: 0 /100
PLATELET # BLD AUTO: 176 10E9/L (ref 150–450)
RBC # BLD AUTO: 3.16 10E12/L (ref 3.8–5.2)
WBC # BLD AUTO: 2.8 10E9/L (ref 4–11)

## 2019-07-09 PROCEDURE — 36591 DRAW BLOOD OFF VENOUS DEVICE: CPT

## 2019-07-09 PROCEDURE — 25000128 H RX IP 250 OP 636

## 2019-07-09 PROCEDURE — 85025 COMPLETE CBC W/AUTO DIFF WBC: CPT | Performed by: INTERNAL MEDICINE

## 2019-07-09 RX ORDER — HEPARIN SODIUM (PORCINE) LOCK FLUSH IV SOLN 100 UNIT/ML 100 UNIT/ML
5 SOLUTION INTRAVENOUS ONCE
Status: COMPLETED | OUTPATIENT
Start: 2019-07-09 | End: 2019-07-09

## 2019-07-09 RX ORDER — HEPARIN SODIUM (PORCINE) LOCK FLUSH IV SOLN 100 UNIT/ML 100 UNIT/ML
SOLUTION INTRAVENOUS
Status: COMPLETED
Start: 2019-07-09 | End: 2019-07-09

## 2019-07-09 RX ADMIN — HEPARIN SODIUM (PORCINE) LOCK FLUSH IV SOLN 100 UNIT/ML 5 ML: 100 SOLUTION at 16:11

## 2019-07-09 RX ADMIN — Medication 5 ML: at 16:11

## 2019-07-09 NOTE — PROGRESS NOTES
Infusion Nursing Note:  Monika Nam presents today for Port labs.    Patient seen by provider today: No   present during visit today: Not Applicable.    Note: N/A.    Intravenous Access:  Implanted Port.    Treatment Conditions:  Not Applicable.    Post Infusion Assessment:  Blood return noted.  Site patent and intact, free from redness, edema or discomfort.  No evidence of extravasations.  Access discontinued per protocol.     Discharge Plan:   Discharge instructions reviewed with: Patient.  Patient and/or family verbalized understanding of discharge instructions and all questions answered.  AVS to patient via [facility].  Patient will return 7/10/2019 for next appointment.   Patient discharged in stable condition accompanied by: self.  Departure Mode: Ambulatory.    Noelle Kline RN

## 2019-07-10 ENCOUNTER — INFUSION THERAPY VISIT (OUTPATIENT)
Dept: INFUSION THERAPY | Facility: CLINIC | Age: 59
End: 2019-07-10
Attending: INTERNAL MEDICINE
Payer: COMMERCIAL

## 2019-07-10 VITALS — SYSTOLIC BLOOD PRESSURE: 145 MMHG | DIASTOLIC BLOOD PRESSURE: 90 MMHG | HEART RATE: 80 BPM | TEMPERATURE: 97.4 F

## 2019-07-10 DIAGNOSIS — C50.112 INFILTRATING DUCTAL CARCINOMA OF CENTRAL PORTION OF LEFT BREAST IN FEMALE (H): ICD-10-CM

## 2019-07-10 DIAGNOSIS — C50.912 ESTROGEN RECEPTOR NEGATIVE CARCINOMA OF BREAST, LEFT (H): Primary | ICD-10-CM

## 2019-07-10 DIAGNOSIS — Z17.1 ESTROGEN RECEPTOR NEGATIVE CARCINOMA OF BREAST, LEFT (H): Primary | ICD-10-CM

## 2019-07-10 PROCEDURE — 25000128 H RX IP 250 OP 636: Performed by: INTERNAL MEDICINE

## 2019-07-10 PROCEDURE — 25800030 ZZH RX IP 258 OP 636: Performed by: INTERNAL MEDICINE

## 2019-07-10 PROCEDURE — 96375 TX/PRO/DX INJ NEW DRUG ADDON: CPT

## 2019-07-10 PROCEDURE — 96413 CHEMO IV INFUSION 1 HR: CPT

## 2019-07-10 PROCEDURE — 25000132 ZZH RX MED GY IP 250 OP 250 PS 637: Performed by: INTERNAL MEDICINE

## 2019-07-10 RX ORDER — DIPHENHYDRAMINE HCL 50 MG
50 CAPSULE ORAL ONCE
Status: COMPLETED | OUTPATIENT
Start: 2019-07-10 | End: 2019-07-10

## 2019-07-10 RX ORDER — HEPARIN SODIUM (PORCINE) LOCK FLUSH IV SOLN 100 UNIT/ML 100 UNIT/ML
5 SOLUTION INTRAVENOUS
Status: DISCONTINUED | OUTPATIENT
Start: 2019-07-10 | End: 2019-07-10 | Stop reason: HOSPADM

## 2019-07-10 RX ADMIN — Medication 5 ML: at 16:32

## 2019-07-10 RX ADMIN — DIPHENHYDRAMINE HYDROCHLORIDE 50 MG: 50 CAPSULE ORAL at 14:55

## 2019-07-10 RX ADMIN — DEXAMETHASONE SODIUM PHOSPHATE 20 MG: 10 INJECTION, SOLUTION INTRAMUSCULAR; INTRAVENOUS at 15:07

## 2019-07-10 RX ADMIN — FAMOTIDINE 20 MG: 20 INJECTION, SOLUTION INTRAVENOUS at 14:54

## 2019-07-10 RX ADMIN — PACLITAXEL 169 MG: 6 INJECTION, SOLUTION INTRAVENOUS at 15:21

## 2019-07-10 RX ADMIN — SODIUM CHLORIDE 250 ML: 9 INJECTION, SOLUTION INTRAVENOUS at 14:53

## 2019-07-10 NOTE — PROGRESS NOTES
Infusion Nursing Note:   Monika Nam presents today for C16D1 Taxol.    Patient seen by provider today: No   present during visit today: Not Applicable.    Note: N/A.    Intravenous Access:  Implanted Port.    Treatment Conditions:  Results reviewed, labs MET treatment parameters, ok to proceed with treatment.      Post Infusion Assessment:  Patient tolerated infusion without incident.  Blood return noted pre and post infusion.  Site patent and intact, free from redness, edema or discomfort.  No evidence of extravasations.  Access discontinued per protocol.       Discharge Plan:   Patient discharged in stable condition accompanied by: .  Departure Mode: Ambulatory.    Anat Velasquez RN

## 2019-07-16 ENCOUNTER — PATIENT OUTREACH (OUTPATIENT)
Dept: ONCOLOGY | Facility: CLINIC | Age: 59
End: 2019-07-16

## 2019-07-16 NOTE — PROGRESS NOTES
Forms filled out for intermittent FMLA. Will have Dr. Gonzales review and sign. Will fax when signed. Patient will need to be notified and will need to send original to scanning.  Yuliet Posada RN

## 2019-07-17 ENCOUNTER — ONCOLOGY VISIT (OUTPATIENT)
Dept: ONCOLOGY | Facility: CLINIC | Age: 59
End: 2019-07-17
Attending: INTERNAL MEDICINE
Payer: COMMERCIAL

## 2019-07-17 VITALS
HEIGHT: 69 IN | WEIGHT: 193.2 LBS | TEMPERATURE: 98.4 F | OXYGEN SATURATION: 98 % | SYSTOLIC BLOOD PRESSURE: 151 MMHG | DIASTOLIC BLOOD PRESSURE: 86 MMHG | HEART RATE: 100 BPM | BODY MASS INDEX: 28.61 KG/M2

## 2019-07-17 DIAGNOSIS — T45.1X5A CHEMOTHERAPY INDUCED NAUSEA AND VOMITING: ICD-10-CM

## 2019-07-17 DIAGNOSIS — R11.2 CHEMOTHERAPY INDUCED NAUSEA AND VOMITING: ICD-10-CM

## 2019-07-17 DIAGNOSIS — K21.9 GASTROESOPHAGEAL REFLUX DISEASE WITHOUT ESOPHAGITIS: ICD-10-CM

## 2019-07-17 DIAGNOSIS — F06.4 ANXIETY DISORDER DUE TO MEDICAL CONDITION: ICD-10-CM

## 2019-07-17 DIAGNOSIS — J45.20 MILD INTERMITTENT ASTHMA WITHOUT COMPLICATION: ICD-10-CM

## 2019-07-17 DIAGNOSIS — C50.112 INFILTRATING DUCTAL CARCINOMA OF CENTRAL PORTION OF LEFT BREAST IN FEMALE (H): Primary | ICD-10-CM

## 2019-07-17 PROCEDURE — G0463 HOSPITAL OUTPT CLINIC VISIT: HCPCS

## 2019-07-17 PROCEDURE — 99214 OFFICE O/P EST MOD 30 MIN: CPT | Performed by: INTERNAL MEDICINE

## 2019-07-17 ASSESSMENT — MIFFLIN-ST. JEOR: SCORE: 1510.98

## 2019-07-17 NOTE — LETTER
"    7/17/2019         RE: Monika Nam  [address] [facility] [id number]        Dear Colleague,    Thank you for referring your patient, Monika Nam, to the [facility]. Please see a copy of my visit note below.    Oncology Rooming Note    July 17, 2019 3:07 PM   Monika Nam is a 59 year old female who presents for:    Chief Complaint   Patient presents with     Oncology Clinic Visit     1 month recheck Infiltrating ductal carcinoma of central portion of left breast in female,      Initial Vitals: /86 (BP Location: Right arm, Patient Position: Sitting, Cuff Size: Adult Large)   Pulse 100   Temp 98.4  F (36.9  C) (Tympanic)   Ht 1.753 m (5' 9.02\")   Wt 87.6 kg (193 lb 3.2 oz)   LMP 04/02/2010   SpO2 98%   BMI 28.52 kg/m    Estimated body mass index is 28.52 kg/m  as calculated from the following:    Height as of this encounter: 1.753 m (5' 9.02\").    Weight as of this encounter: 87.6 kg (193 lb 3.2 oz). Body surface area is 2.07 meters squared.  Data Unavailable Comment: Data Unavailable   Patient's last menstrual period was 04/02/2010.  Allergies reviewed: Yes  Medications reviewed: Yes    Medications: Medication refills not needed today.  Pharmacy name entered into [employer]: [facility] [address] [place] [address] AT [address]    Clinical concerns: 1 month recheck Infiltrating ductal carcinoma of central portion of left breast in female,       SAW Maldonado    Hematology/ Oncology Follow-up Visit:  Jul 17, 2019    Reason for Visit:   Chief Complaint   Patient presents with     Oncology Clinic Visit     1 month recheck Infiltrating ductal carcinoma of central portion of left breast in female,        Oncologic History:  Infiltrating ductal carcinoma of central portion of left breast in female (H)  Monika Nam presented following her annual mammogram with new focal asymmetry at 12-1 o'clock position of the " left breast around 10.7 cm from the nipple.  It measured 1.4 cm.  Subsequently the patient went on to have breast ultrasound on member 30th 2018 showing 1.6 cm in maximum dimension hypoechoic mass.  Subsequently ultrasound breast biopsy was done on December 17, 2018 showing invasive ductal carcinoma grade 3 out of 3 angiolymphatic invasion was not identified.  Ductal carcinoma in situ was not identified . estrogen receptor was negative, progesterone receptor was negative and HER-2/mercedes receptor was negative.        1/2019 left lumpectomy found 1.8 cm IDC, grade III, LN-, margin is clear, pT1cN0    The patient was started on dose dense chemotherapy with Adriamycin and Cytoxan followed by Taxol      Interval History:  Patient returning today for follow-up.  She is concluded treatment with Taxol therapy.  She tolerated treatment well without significant side effects.  She is here today to discuss future treatment plan    Review Of Systems:  Constitutional: Negative for fever, chills, and night sweats.  Skin: negative.  Eyes: negative.  Ears/Nose/Throat: negative.  Respiratory: No shortness of breath, dyspnea on exertion, cough, or hemoptysis.  Cardiovascular: negative.  Gastrointestinal: negative.  Genitourinary: negative.  Musculoskeletal: negative.  Neurologic: negative.  Psychiatric: negative.  Hematologic/Lymphatic/Immunologic: negative.  Endocrine: negative.    All other ROS negative unless mentioned in interval history.    Past medical, social, surgical, and family histories reviewed.    Allergies:  Allergies as of 07/17/2019 - Reviewed 07/10/2019   Allergen Reaction Noted     Sulfa drugs Swelling 06/09/2005       Current Medications:  Current Outpatient Medications   Medication Sig Dispense Refill     acetaminophen (TYLENOL) 500 MG tablet Take 500-1,000 mg by mouth every 6 hours as needed for mild pain or pain       albuterol (PROAIR HFA/PROVENTIL HFA/VENTOLIN HFA) 108 (90 Base) MCG/ACT inhaler Inhale 2 puffs  "into the lungs every 4 hours as needed for shortness of breath / dyspnea or wheezing 1 Inhaler 11     aspirin 81 MG tablet Take 1 tablet (81 mg) by mouth daily       augmented betamethasone dipropionate (DIPROLENE-AF) 0.05 % cream APPLY EXTERNALLY TO THE AFFECTED AREA TWICE DAILY (Patient taking differently: Apply topically daily APPLY EXTERNALLY TO THE AFFECTED AREA TWICE DAILY) 50 g 6     calcium-vitamin D (CALCIUM 600-D) 600-400 MG-UNIT per tablet Take 1 tablet by mouth daily       FLUoxetine (PROZAC) 20 MG capsule Take 20 mg daily. (Patient taking differently: Take 20 mg by mouth every evening ) 30 capsule 11     loratadine (CLARITIN) 10 MG tablet Take 10 mg by mouth daily.         LORazepam (ATIVAN) 0.5 MG tablet Take 1 tablet (0.5 mg) by mouth every 4 hours as needed (Anxiety, Nausea/Vomiting or Sleep) 30 tablet 5     MULTIVITAMIN TABS   OR 1 TABLET BY MOUTH DAILY       ondansetron (ZOFRAN) 4 MG tablet Take 1 tablet (4 mg) by mouth every 8 hours as needed for nausea 20 tablet 1     prochlorperazine (COMPAZINE) 10 MG tablet Take 0.5 tablets (5 mg) by mouth every 6 hours as needed (Nausea/Vomiting) 30 tablet 5     pseudoePHEDrine (SUDAFED) 30 MG tablet Take 30 mg by mouth every 12 hours as needed for congestion       Pyridoxine HCl (VITAMIN B6 PO) Take 1 tablet by mouth daily       ranitidine (ZANTAC) 150 MG tablet Take 1 tablet (150 mg) by mouth daily          Physical Exam:  /86 (BP Location: Right arm, Patient Position: Sitting, Cuff Size: Adult Large)   Pulse 100   Temp 98.4  F (36.9  C) (Tympanic)   Ht 1.753 m (5' 9.02\")   Wt 87.6 kg (193 lb 3.2 oz)   LMP 04/02/2010   SpO2 98%   BMI 28.52 kg/m     Wt Readings from Last 12 Encounters:   07/23/19 87.5 kg (193 lb)   07/17/19 87.6 kg (193 lb 3.2 oz)   07/09/19 89.8 kg (198 lb)   07/02/19 88.9 kg (196 lb)   06/19/19 90.2 kg (198 lb 14.4 oz)   06/18/19 87.8 kg (193 lb 9.6 oz)   06/11/19 89.8 kg (198 lb)   05/29/19 89.8 kg (198 lb)   05/22/19 90.4 kg " (199 lb 4.8 oz)   05/21/19 91.2 kg (201 lb)   05/14/19 89.2 kg (196 lb 9.6 oz)   05/07/19 90.8 kg (200 lb 3.2 oz)     ECOG performance status:0  GENERAL APPEARANCE: Healthy, alert and in no acute distress.  HEENT: Sclerae anicteric. PERRLA. Oropharynx without ulcers, lesions, or thrush.  NECK: Supple. No asymmetry or masses.  LYMPHATICS: No palpable cervical, supraclavicular, axillary, or inguinal lymphadenopathy.  RESP: Lungs clear to auscultation bilaterally without rales, rhonchi or wheezes.  CARDIOVASCULAR: Regular rate and rhythm. Normal S1, S2; no S3 or S4. No murmur, gallop, or rub.  ABDOMEN: Soft, nontender. Bowel sounds normal. No palpable organomegaly or masses.  MUSCULOSKELETAL: Extremities without gross deformities noted. No edema of bilateral lower extremities.  SKIN: No suspicious lesions or rashes.  NEURO: Alert and oriented x 3. Cranial nerves II-XII grossly intact.  PSYCHIATRIC: Mentation and affect appear normal.    Laboratory/Imaging Studies:  Infusion Therapy Visit on 07/09/2019   Component Date Value Ref Range Status     WBC 07/09/2019 2.8* 4.0 - 11.0 10e9/L Final     RBC Count 07/09/2019 3.16* 3.8 - 5.2 10e12/L Final     Hemoglobin 07/09/2019 10.3* 11.7 - 15.7 g/dL Final     Hematocrit 07/09/2019 31.0* 35.0 - 47.0 % Final     MCV 07/09/2019 98  78 - 100 fl Final     MCH 07/09/2019 32.6  26.5 - 33.0 pg Final     MCHC 07/09/2019 33.2  31.5 - 36.5 g/dL Final     RDW 07/09/2019 14.8  10.0 - 15.0 % Final     Platelet Count 07/09/2019 176  150 - 450 10e9/L Final     Diff Method 07/09/2019 Automated Method   Final     % Neutrophils 07/09/2019 62.2  % Final     % Lymphocytes 07/09/2019 23.4  % Final     % Monocytes 07/09/2019 10.1  % Final     % Eosinophils 07/09/2019 1.8  % Final     % Basophils 07/09/2019 1.8  % Final     % Immature Granulocytes 07/09/2019 0.7  % Final     Nucleated RBCs 07/09/2019 0  0 /100 Final     Absolute Neutrophil 07/09/2019 1.7  1.6 - 8.3 10e9/L Final     Absolute Lymphocytes  07/09/2019 0.7* 0.8 - 5.3 10e9/L Final     Absolute Monocytes 07/09/2019 0.3  0.0 - 1.3 10e9/L Final     Absolute Eosinophils 07/09/2019 0.1  0.0 - 0.7 10e9/L Final     Absolute Basophils 07/09/2019 0.1  0.0 - 0.2 10e9/L Final     Abs Immature Granulocytes 07/09/2019 0.0  0 - 0.4 10e9/L Final     Absolute Nucleated RBC 07/09/2019 0.0   Final        Recent Results (from the past 744 hour(s))   XR Chest 2 Views    Narrative    CHEST TWO VIEWS 7/23/2019 4:45 PM     HISTORY: Fever; chemo.    COMPARISON: 1/23/2019    FINDINGS: Right-sided subclavian Port-A-Cath unchanged. Heart size and  pulmonary vascularity are within normal limits. The lungs are clear.  No pneumothorax or pleural effusion.       Impression    IMPRESSION: No radiographic evidence of acute chest abnormality.     ALEIDA CORONA MD       Assessment and plan:    (C50.112) Infiltrating ductal carcinoma of central portion of left breast in female (H)  (primary encounter diagnosis)  I reviewed with patient today management of early stage breast cancer.  Patient concluded adjuvant chemotherapy.  Patient will proceed with breast radiation therapy we will arrange for radiation oncology evaluation.  We reviewed today follow-up plan.I reviewed with the patient today also follow-up of breast cancer. I told her that evidence from randomized trials indicates that periodic follow-up with bone scans, liver sonography, chest x-rays, and blood tests of liver function does not improve survival or quality of life when compared with routine physical examinations. Even when these tests permit earlier detection of recurrent disease, patient survival is unaffected. On the basis of these data, acceptable follow-up can be limited to physical examination and annual mammography for asymptomatic patients who complete treatment. According to NCCN guidelines, follow-up of breast cancer should include history and physical examination with emphasis on breast examination every 4-6  months for 5 years then annually thereafter. Emphasis is also on continuing with annual mammography. Evidence also suggests that active lifestyle and achieving and maintaining ideal body weight with a BMI of 20-25 may lead to optimal breast cancer outcomes.  I will see the patient again in 3 months time or sooner if there are new developments or concerns  (F06.4) Anxiety disorder due to medical condition  Patient currently on Prozac 20 mg orally daily.    (K21.9) Gastroesophageal reflux disease without esophagitis  Patient currently on ranitidine 150 mg daily    (J45.20) Mild intermittent asthma without complication  Patient symptoms has been stable.  She is using albuterol inhaler as needed    The patient is ready to learn, no apparent learning barriers were identified.  Diagnosis and treatment plans were explained to the patient. The patient expressed understanding of the content. The patient asked appropriate questions. The patient questions were answered to her satisfaction.    Chart documentation with Dragon Voice recognition Software. Although reviewed after completion, some words and grammatical errors may remain.    Again, thank you for allowing me to participate in the care of your patient.        Sincerely,        Madison Gonzales MD

## 2019-07-17 NOTE — PROGRESS NOTES
"Oncology Rooming Note    July 17, 2019 3:07 PM   Monika Nam is a 59 year old female who presents for:    Chief Complaint   Patient presents with     Oncology Clinic Visit     1 month recheck Infiltrating ductal carcinoma of central portion of left breast in female,      Initial Vitals: /86 (BP Location: Right arm, Patient Position: Sitting, Cuff Size: Adult Large)   Pulse 100   Temp 98.4  F (36.9  C) (Tympanic)   Ht 1.753 m (5' 9.02\")   Wt 87.6 kg (193 lb 3.2 oz)   LMP 04/02/2010   SpO2 98%   BMI 28.52 kg/m   Estimated body mass index is 28.52 kg/m  as calculated from the following:    Height as of this encounter: 1.753 m (5' 9.02\").    Weight as of this encounter: 87.6 kg (193 lb 3.2 oz). Body surface area is 2.07 meters squared.  Data Unavailable Comment: Data Unavailable   Patient's last menstrual period was 04/02/2010.  Allergies reviewed: Yes  Medications reviewed: Yes    Medications: Medication refills not needed today.  Pharmacy name entered into Y[employer]: [id number] - [place], [place] - [address] [facility] AT [address]    Clinical concerns: 1 month recheck Infiltrating ductal carcinoma of central portion of left breast in female,       Monika Curtis, CMA          '  "

## 2019-07-19 ENCOUNTER — PATIENT OUTREACH (OUTPATIENT)
Dept: ONCOLOGY | Facility: CLINIC | Age: 59
End: 2019-07-19

## 2019-07-19 NOTE — PROGRESS NOTES
Received paperwork for FMLA. Forms filled out and will have Dr. Gonzales sign when he is back in clinic.  Yuliet Posada RN    Forms signed. Left message with patient's  to call back to see if she wanted to  signed copy or if it should be mailed or faxed. Direct line provided. Copy sent to scanning.  Yuliet Posada RN    Patient's  called back. He will  the original form at  today.  Yuliet Posada RN on 7.23.19 at 12:47 PM

## 2019-07-23 ENCOUNTER — PATIENT OUTREACH (OUTPATIENT)
Dept: ONCOLOGY | Facility: CLINIC | Age: 59
End: 2019-07-23

## 2019-07-23 ENCOUNTER — HOSPITAL ENCOUNTER (EMERGENCY)
Facility: CLINIC | Age: 59
Discharge: HOME | End: 2019-07-23
Attending: FAMILY MEDICINE | Admitting: FAMILY MEDICINE
Payer: COMMERCIAL

## 2019-07-23 ENCOUNTER — APPOINTMENT (OUTPATIENT)
Dept: GENERAL RADIOLOGY | Facility: CLINIC | Age: 59
End: 2019-07-23
Attending: FAMILY MEDICINE
Payer: COMMERCIAL

## 2019-07-23 VITALS
TEMPERATURE: 102.2 F | DIASTOLIC BLOOD PRESSURE: 81 MMHG | RESPIRATION RATE: 22 BRPM | HEART RATE: 103 BPM | BODY MASS INDEX: 28.49 KG/M2 | OXYGEN SATURATION: 95 % | WEIGHT: 193 LBS | SYSTOLIC BLOOD PRESSURE: 141 MMHG

## 2019-07-23 DIAGNOSIS — R50.9 FEVER, UNSPECIFIED FEVER CAUSE: ICD-10-CM

## 2019-07-23 LAB
ALBUMIN SERPL-MCNC: 3.6 G/DL (ref 3.4–5)
ALBUMIN UR-MCNC: NEGATIVE MG/DL
ALP SERPL-CCNC: 79 U/L (ref 40–150)
ALT SERPL W P-5'-P-CCNC: 25 U/L (ref 0–50)
ANION GAP SERPL CALCULATED.3IONS-SCNC: 8 MMOL/L (ref 3–14)
APPEARANCE UR: CLEAR
AST SERPL W P-5'-P-CCNC: 18 U/L (ref 0–45)
BACTERIA #/AREA URNS HPF: ABNORMAL /HPF
BASOPHILS # BLD AUTO: 0 10E9/L (ref 0–0.2)
BASOPHILS NFR BLD AUTO: 0.4 %
BILIRUB SERPL-MCNC: 0.6 MG/DL (ref 0.2–1.3)
BILIRUB UR QL STRIP: NEGATIVE
BUN SERPL-MCNC: 10 MG/DL (ref 7–30)
CALCIUM SERPL-MCNC: 8.2 MG/DL (ref 8.5–10.1)
CHLORIDE SERPL-SCNC: 98 MMOL/L (ref 94–109)
CO2 SERPL-SCNC: 23 MMOL/L (ref 20–32)
COLOR UR AUTO: ABNORMAL
CREAT SERPL-MCNC: 0.64 MG/DL (ref 0.52–1.04)
DIFFERENTIAL METHOD BLD: ABNORMAL
EOSINOPHIL # BLD AUTO: 0 10E9/L (ref 0–0.7)
EOSINOPHIL NFR BLD AUTO: 0.3 %
ERYTHROCYTE [DISTWIDTH] IN BLOOD BY AUTOMATED COUNT: 14.9 % (ref 10–15)
GFR SERPL CREATININE-BSD FRML MDRD: >90 ML/MIN/{1.73_M2}
GLUCOSE SERPL-MCNC: 91 MG/DL (ref 70–99)
GLUCOSE UR STRIP-MCNC: NEGATIVE MG/DL
HCT VFR BLD AUTO: 32.8 % (ref 35–47)
HGB BLD-MCNC: 10.7 G/DL (ref 11.7–15.7)
HGB UR QL STRIP: ABNORMAL
IMM GRANULOCYTES # BLD: 0 10E9/L (ref 0–0.4)
IMM GRANULOCYTES NFR BLD: 0.5 %
KETONES UR STRIP-MCNC: 5 MG/DL
LACTATE BLD-SCNC: 0.6 MMOL/L (ref 0.7–2)
LEUKOCYTE ESTERASE UR QL STRIP: NEGATIVE
LYMPHOCYTES # BLD AUTO: 0.4 10E9/L (ref 0.8–5.3)
LYMPHOCYTES NFR BLD AUTO: 4.7 %
MCH RBC QN AUTO: 31.3 PG (ref 26.5–33)
MCHC RBC AUTO-ENTMCNC: 32.6 G/DL (ref 31.5–36.5)
MCV RBC AUTO: 96 FL (ref 78–100)
MONOCYTES # BLD AUTO: 0.5 10E9/L (ref 0–1.3)
MONOCYTES NFR BLD AUTO: 7 %
MUCOUS THREADS #/AREA URNS LPF: PRESENT /LPF
NEUTROPHILS # BLD AUTO: 6.5 10E9/L (ref 1.6–8.3)
NEUTROPHILS NFR BLD AUTO: 87.1 %
NITRATE UR QL: NEGATIVE
NRBC # BLD AUTO: 0 10*3/UL
NRBC BLD AUTO-RTO: 0 /100
PH UR STRIP: 5 PH (ref 5–7)
PLATELET # BLD AUTO: 171 10E9/L (ref 150–450)
POTASSIUM SERPL-SCNC: 3.7 MMOL/L (ref 3.4–5.3)
PROT SERPL-MCNC: 6.6 G/DL (ref 6.8–8.8)
RBC # BLD AUTO: 3.42 10E12/L (ref 3.8–5.2)
RBC #/AREA URNS AUTO: 1 /HPF (ref 0–2)
SODIUM SERPL-SCNC: 129 MMOL/L (ref 133–144)
SOURCE: ABNORMAL
SP GR UR STRIP: 1 (ref 1–1.03)
SQUAMOUS #/AREA URNS AUTO: <1 /HPF (ref 0–1)
UROBILINOGEN UR STRIP-MCNC: 0 MG/DL (ref 0–2)
WBC # BLD AUTO: 7.5 10E9/L (ref 4–11)
WBC #/AREA URNS AUTO: 1 /HPF (ref 0–5)

## 2019-07-23 PROCEDURE — 71046 X-RAY EXAM CHEST 2 VIEWS: CPT

## 2019-07-23 PROCEDURE — 96360 HYDRATION IV INFUSION INIT: CPT | Performed by: FAMILY MEDICINE

## 2019-07-23 PROCEDURE — 81001 URINALYSIS AUTO W/SCOPE: CPT | Performed by: FAMILY MEDICINE

## 2019-07-23 PROCEDURE — 25800030 ZZH RX IP 258 OP 636: Performed by: FAMILY MEDICINE

## 2019-07-23 PROCEDURE — 83605 ASSAY OF LACTIC ACID: CPT | Performed by: FAMILY MEDICINE

## 2019-07-23 PROCEDURE — 99285 EMERGENCY DEPT VISIT HI MDM: CPT | Mod: Z6 | Performed by: FAMILY MEDICINE

## 2019-07-23 PROCEDURE — 25000132 ZZH RX MED GY IP 250 OP 250 PS 637: Performed by: FAMILY MEDICINE

## 2019-07-23 PROCEDURE — 85025 COMPLETE CBC W/AUTO DIFF WBC: CPT | Performed by: FAMILY MEDICINE

## 2019-07-23 PROCEDURE — 87040 BLOOD CULTURE FOR BACTERIA: CPT | Performed by: FAMILY MEDICINE

## 2019-07-23 PROCEDURE — 80053 COMPREHEN METABOLIC PANEL: CPT | Performed by: FAMILY MEDICINE

## 2019-07-23 PROCEDURE — 99284 EMERGENCY DEPT VISIT MOD MDM: CPT | Mod: 25 | Performed by: FAMILY MEDICINE

## 2019-07-23 PROCEDURE — 96361 HYDRATE IV INFUSION ADD-ON: CPT | Performed by: FAMILY MEDICINE

## 2019-07-23 PROCEDURE — 87086 URINE CULTURE/COLONY COUNT: CPT | Performed by: FAMILY MEDICINE

## 2019-07-23 RX ORDER — ACETAMINOPHEN 500 MG
1000 TABLET ORAL ONCE
Status: COMPLETED | OUTPATIENT
Start: 2019-07-23 | End: 2019-07-23

## 2019-07-23 RX ADMIN — ACETAMINOPHEN 1000 MG: 500 TABLET, FILM COATED ORAL at 13:56

## 2019-07-23 RX ADMIN — SODIUM CHLORIDE, POTASSIUM CHLORIDE, SODIUM LACTATE AND CALCIUM CHLORIDE 1000 ML: 600; 310; 30; 20 INJECTION, SOLUTION INTRAVENOUS at 15:51

## 2019-07-23 NOTE — PROGRESS NOTES
Patient's  called to report that she came home from work today. She has a temp of 102, fingers and toes are numb.She is not feeling well at all. She received taxol on 7.10.19. Advised patient to be seen in ED.  and patient are in agreement.  Yuliet Posada RN

## 2019-07-23 NOTE — ED NOTES
Port-a-cath accessed and blood drawn without problem. One set of blood cultures drawn and held. Awaiting MD

## 2019-07-23 NOTE — ED PROVIDER NOTES
History     Chief Complaint   Patient presents with     Fever     chemo patient.     YI Nam is a 59 year old female with a history of breast cancer 2018, status post lumpectomy 2019 and is currently receiving chemotherapy with adriamycin and cytoxan followed by taxol presents with fever. She reports that she is experiencing tingling and numbness in her extremities, nausea and a slight headache. She denies any worse cough, rashes, tick bites, sore throat, dysuria, urinary frequency, abdominal pain, chest pain, diarrhea, emesis and contact with any one who is sick.  Last chemo was 13 days ago.    Allergies:  Allergies   Allergen Reactions     Sulfa Drugs Swelling       Problem List:    Patient Active Problem List    Diagnosis Date Noted     Chemotherapy induced nausea and vomiting 05/22/2019     Priority: Medium     Estrogen receptor negative carcinoma of breast, left (H) 04/08/2019     Priority: Medium     Infiltrating ductal carcinoma of central portion of left breast in female (H) 12/27/2018     Priority: Medium     Treatment summary started. Will need to update genetic counseling after visit 04.19.19, radiation therapy after being seen by rad onc (Appt not scheduled yet), ongoing treatment, f/u, and monitoring.         Mild intermittent asthma without complication 05/24/2018     Priority: Medium     Obstructive sleep apnea syndrome 07/25/2014     Priority: Medium     GERD (gastroesophageal reflux disease) 09/07/2012     Priority: Medium     Cervical high risk HPV (human papillomavirus) test positive 05/24/2012     Priority: Medium     5/24/12: Pap--NIL, + HR HPV 66. Repeat pap and HPV in 1 year. Reminder in epic.   7/15/13:Pap--ASCUS, neg for High Risk HPV. Repeat pap 1 year. Reminder in epic.   7/25/14:Pap--ASCUS, neg for High Risk HPV. Repeat pap 1 year. In reminders           CTS (carpal tunnel syndrome) 07/06/2011     Priority: Medium     CARDIOVASCULAR SCREENING; LDL GOAL LESS THAN 130  10/31/2010     Priority: Medium     Chronic depressive personality disorder 04/16/2008     Priority: Medium     She has been on meds since 2005. She has tried going off these and the sx recur. We discussed staying on these indefinitely.        Obesity 04/16/2008     Priority: Medium     Problem list name updated by automated process. Provider to review       Tobacco use disorder 07/09/2007     Priority: Medium     Allergic rhinitis 01/26/2006     Priority: Medium     Problem list name updated by automated process. Provider to review       Anxiety disorder due to medical condition 01/26/2006     Priority: Medium     Problem list name updated by automated process. Provider to review          Past Medical History:    Past Medical History:   Diagnosis Date     Abnormal Papanicolaou smear of cervix and cervical HPV      ASCUS favor benign 7/2013, 7/2014     Cancer (H) 01/23/2019     Cervical high risk HPV (human papillomavirus) test positive 5/24/12     PONV (postoperative nausea and vomiting)        Past Surgical History:    Past Surgical History:   Procedure Laterality Date     BIOPSY       BREAST SURGERY       INSERT PORT VASCULAR ACCESS Right 1/23/2019    Procedure: Port-a-Cath Placement;  Surgeon: Servando Claros MD;  Location: [facility] OR     LUMPECTOMY BREAST WITH SENTINEL NODE, COMBINED Left 1/23/2019    Procedure: COMBINED LUMPECTOMY BREAST WITH SENTINEL NODE;  Surgeon: Servando Claros MD;  Location: [facility] OR     SURGICAL HISTORY OF -   1996    cholecystectomy     SURGICAL HISTORY OF -   1994    LEEP        Family History:    Family History   Problem Relation Age of Onset     Gastrointestinal Disease Mother         desmond dz     Hypertension Father      Lipids Father      Breast Cancer Paternal Grandmother      Neurologic Disorder Paternal Grandfather         parkinsons     Cancer - colorectal No family hx of      Prostate Cancer No family hx of      Diabetes No family hx of      C.A.D. No family hx of        Social  History:  Marital Status:   [2]  Social History     Tobacco Use     Smoking status: Current Every Day Smoker     Packs/day: 0.50     Years: 25.00     Pack years: 12.50     Types: Cigarettes     Smokeless tobacco: Never Used     Tobacco comment: 1/2 ppd . Did try the patch- gets a rash.  She is trying to quit.   Substance Use Topics     Alcohol use: Yes     Comment: 3-4 X per week.  About 7 drinks per week.     Drug use: No        Medications:      calcium-vitamin D (CALCIUM 600-D) 600-400 MG-UNIT per tablet   FLUoxetine (PROZAC) 20 MG capsule   loratadine (CLARITIN) 10 MG tablet   MULTIVITAMIN TABS   OR   Pyridoxine HCl (VITAMIN B6 PO)   ranitidine (ZANTAC) 150 MG tablet   acetaminophen (TYLENOL) 500 MG tablet   albuterol (PROAIR HFA/PROVENTIL HFA/VENTOLIN HFA) 108 (90 Base) MCG/ACT inhaler   aspirin 81 MG tablet   augmented betamethasone dipropionate (DIPROLENE-AF) 0.05 % cream   LORazepam (ATIVAN) 0.5 MG tablet   ondansetron (ZOFRAN) 4 MG tablet   prochlorperazine (COMPAZINE) 10 MG tablet   pseudoePHEDrine (SUDAFED) 30 MG tablet         Review of Systems  All other systems are reviewed and are negative    Physical Exam   BP: 120/87  Pulse: 113  Temp: 102.2  F (39  C)  Resp: 22  Weight: 87.5 kg (193 lb)  SpO2: 98 %      Physical Exam  Nursing note and vitals were reviewed.  Constitutional: Awake and alert, adequately nourished and developed appearing 59-year-old in no apparent discomfort, who appears moderately ill but non-toxic, and who answers questions appropriately and cooperates with examination.  HEENT: EACs are clear.  TMs are normal.  Oropharynx is normal.  EOMI.   Neck: Freely mobile.  No adenopathy or meningismus  Cardiovascular: Cardiac examination reveals normal heart rate and regular rhythm without murmur.  Pulmonary/Chest: Breathing is unlabored.  Breath sounds are clear and equal bilaterally.  There no retractions, tachypnea, rales, wheezes, or rhonchi.  Abdomen: Soft, nontender, no HSM or  masses rebound or guarding.  Musculoskeletal: Extremities are warm and well-perfused and without edema  Neurological: Alert, oriented, thought content logical, coherent   Skin: Warm, dry, no rashes.  Psychiatric: Affect broad and appropriate.      ED Course        Procedures               Critical Care time:  none               Results for orders placed or performed during the hospital encounter of 07/23/19 (from the past 24 hour(s))   CBC with platelets differential   Result Value Ref Range    WBC 7.5 4.0 - 11.0 10e9/L    RBC Count 3.42 (L) 3.8 - 5.2 10e12/L    Hemoglobin 10.7 (L) 11.7 - 15.7 g/dL    Hematocrit 32.8 (L) 35.0 - 47.0 %    MCV 96 78 - 100 fl    MCH 31.3 26.5 - 33.0 pg    MCHC 32.6 31.5 - 36.5 g/dL    RDW 14.9 10.0 - 15.0 %    Platelet Count 171 150 - 450 10e9/L    Diff Method Automated Method     % Neutrophils 87.1 %    % Lymphocytes 4.7 %    % Monocytes 7.0 %    % Eosinophils 0.3 %    % Basophils 0.4 %    % Immature Granulocytes 0.5 %    Nucleated RBCs 0 0 /100    Absolute Neutrophil 6.5 1.6 - 8.3 10e9/L    Absolute Lymphocytes 0.4 (L) 0.8 - 5.3 10e9/L    Absolute Monocytes 0.5 0.0 - 1.3 10e9/L    Absolute Eosinophils 0.0 0.0 - 0.7 10e9/L    Absolute Basophils 0.0 0.0 - 0.2 10e9/L    Abs Immature Granulocytes 0.0 0 - 0.4 10e9/L    Absolute Nucleated RBC 0.0    Comprehensive metabolic panel   Result Value Ref Range    Sodium 129 (L) 133 - 144 mmol/L    Potassium 3.7 3.4 - 5.3 mmol/L    Chloride 98 94 - 109 mmol/L    Carbon Dioxide 23 20 - 32 mmol/L    Anion Gap 8 3 - 14 mmol/L    Glucose 91 70 - 99 mg/dL    Urea Nitrogen 10 7 - 30 mg/dL    Creatinine 0.64 0.52 - 1.04 mg/dL    GFR Estimate >90 >60 mL/min/[1.73_m2]    GFR Estimate If Black >90 >60 mL/min/[1.73_m2]    Calcium 8.2 (L) 8.5 - 10.1 mg/dL    Bilirubin Total 0.6 0.2 - 1.3 mg/dL    Albumin 3.6 3.4 - 5.0 g/dL    Protein Total 6.6 (L) 6.8 - 8.8 g/dL    Alkaline Phosphatase 79 40 - 150 U/L    ALT 25 0 - 50 U/L    AST 18 0 - 45 U/L   Lactic acid  whole blood   Result Value Ref Range    Lactic Acid 0.6 (L) 0.7 - 2.0 mmol/L   XR Chest 2 Views    Narrative    CHEST TWO VIEWS 7/23/2019 4:45 PM     HISTORY: Fever; chemo.    COMPARISON: 1/23/2019    FINDINGS: Right-sided subclavian Port-A-Cath unchanged. Heart size and  pulmonary vascularity are within normal limits. The lungs are clear.  No pneumothorax or pleural effusion.       Impression    IMPRESSION: No radiographic evidence of acute chest abnormality.     ALEIDA CORONA MD   UA reflex to Microscopic   Result Value Ref Range    Color Urine Straw     Appearance Urine Clear     Glucose Urine Negative NEG^Negative mg/dL    Bilirubin Urine Negative NEG^Negative    Ketones Urine 5 (A) NEG^Negative mg/dL    Specific Gravity Urine 1.005 1.003 - 1.035    Blood Urine Small (A) NEG^Negative    pH Urine 5.0 5.0 - 7.0 pH    Protein Albumin Urine Negative NEG^Negative mg/dL    Urobilinogen mg/dL 0.0 0.0 - 2.0 mg/dL    Nitrite Urine Negative NEG^Negative    Leukocyte Esterase Urine Negative NEG^Negative    Source Midstream Urine     RBC Urine 1 0 - 2 /HPF    WBC Urine 1 0 - 5 /HPF    Bacteria Urine Few (A) NEG^Negative /HPF    Squamous Epithelial /HPF Urine <1 0 - 1 /HPF    Mucous Urine Present (A) NEG^Negative /LPF       Medications   lactated ringers BOLUS 1,000 mL (1,000 mLs Intravenous New Bag 7/23/19 1061)     Followed by   lactated ringers BOLUS 1,000 mL (has no administration in time range)   acetaminophen (TYLENOL) tablet 1,000 mg (1,000 mg Oral Given 7/23/19 1356)     14:57 patient assessed. Course of care outlined.    Assessments & Plan (with Medical Decision Making)     59-year-old female receiving chemotherapy for breast cancer presented with a fever.  There were no focal symptoms to report to a source for her fever.  She does have a tunneled port.  She is not neutropenic.  Urine analysis showed no evidence of infection.  Chest x-ray was normal.  We do hear her coughing a bit so she may be developing a viral  respiratory infection.  At this time without a clear source of infection with normal lab test results, we recommend continued observation.  I discussed her situation with her oncologist Dr. Gonzales.  He recommends against empiric antibiotic therapy.  I advised the patient to return if fevers have not resolved after 72 hours or if she develops additional symptoms to point to a source including chest pain, breathing difficulty, abdominal pain, dysuria, or other concerning symptoms.    I have reviewed the nursing notes.    I have reviewed the findings, diagnosis, plan and need for follow up with the patient.          Medication List      There are no discharge medications for this visit.         Final diagnoses:   Fever, unspecified fever cause     This document serves as a record of services personally performed by Markie Hood MD. It was created on their behalf by Chloe Vazquez, a trained medical scribe. The creation of this record is based on the provider's personal observations and the statements of the patient. This document has been checked and approved by the attending provider.    7/23/2019   [facility] EMERGENCY DEPARTMENT     Markie Hood MD  07/23/19 1734

## 2019-07-23 NOTE — ED AVS SNAPSHOT
[facility] Emergency Department  [address] [address]  Phone:  [phone]  Fax:  [phone]                                    Monika Nam   MRN: [id number]    Department:  [facility] Emergency Department   Date of Visit:  7/23/2019           After Visit Summary Signature Page    I have received my discharge instructions, and my questions have been answered. I have discussed any challenges I see with this plan with the nurse or doctor.    ..........................................................................................................................................  Patient/Patient Representative Signature      ..........................................................................................................................................  Patient Representative Print Name and Relationship to Patient    ..................................................               ................................................  Date                                   Time    ..........................................................................................................................................  Reviewed by Signature/Title    ...................................................              ..............................................  Date                                               Time          22EPIC Rev 08/18

## 2019-07-23 NOTE — DISCHARGE INSTRUCTIONS
Return to be seen if fevers persist beyond 72 hours, or if you do not develop new concerning symptoms such as shortness of breath, chest pain, abdominal pain, vomiting, or other concerning symptoms.  You may use acetaminophen 1000 mg 4 times per day if needed for fever.  You may use ibuprofen 400 mg 4 times per day if needed.

## 2019-07-23 NOTE — ED NOTES
Pt presents to Ed with complaint of fever starting this morning. Pt is chemo pt and had a chemo run 13 days ago. She reports some tingling in here extremities, which in not new but is much worse than normal. Also complains of a slight headache. No cough or SOA.

## 2019-07-24 LAB
BACTERIA SPEC CULT: NORMAL
Lab: NORMAL
SPECIMEN SOURCE: NORMAL

## 2019-07-26 ENCOUNTER — PATIENT OUTREACH (OUTPATIENT)
Dept: ONCOLOGY | Facility: CLINIC | Age: 59
End: 2019-07-26

## 2019-07-26 NOTE — RESULT ENCOUNTER NOTE
Final urine culture report is NEGATIVE per [place] ED Lab Result protocol.    If NEGATIVE result, no change in treatment, per [place] ED Lab Result protocol.

## 2019-07-26 NOTE — PROGRESS NOTES
Patient was seen in ED for cough and temp of 102, numbness and tingling in her extremities. Called today 7.26.19 to see how she was doing, Spoke with , he reports patient went back to work today, but still not feeling well. She has a temp of 99 and fingers still tingle a little. Will call back on 7.29.19 to see how she is doing.

## 2019-07-26 NOTE — PROGRESS NOTES
Hematology/ Oncology Follow-up Visit:  Jul 17, 2019    Reason for Visit:   Chief Complaint   Patient presents with     Oncology Clinic Visit     1 month recheck Infiltrating ductal carcinoma of central portion of left breast in female,        Oncologic History:  Infiltrating ductal carcinoma of central portion of left breast in female (H)  Monika Nam presented following her annual mammogram with new focal asymmetry at 12-1 o'clock position of the left breast around 10.7 cm from the nipple.  It measured 1.4 cm.  Subsequently the patient went on to have breast ultrasound on member 30th 2018 showing 1.6 cm in maximum dimension hypoechoic mass.  Subsequently ultrasound breast biopsy was done on December 17, 2018 showing invasive ductal carcinoma grade 3 out of 3 angiolymphatic invasion was not identified.  Ductal carcinoma in situ was not identified . estrogen receptor was negative, progesterone receptor was negative and HER-2/mercedes receptor was negative.        1/2019 left lumpectomy found 1.8 cm IDC, grade III, LN-, margin is clear, pT1cN0    The patient was started on dose dense chemotherapy with Adriamycin and Cytoxan followed by Taxol      Interval History:  Patient returning today for follow-up.  She is concluded treatment with Taxol therapy.  She tolerated treatment well without significant side effects.  She is here today to discuss future treatment plan    Review Of Systems:  Constitutional: Negative for fever, chills, and night sweats.  Skin: negative.  Eyes: negative.  Ears/Nose/Throat: negative.  Respiratory: No shortness of breath, dyspnea on exertion, cough, or hemoptysis.  Cardiovascular: negative.  Gastrointestinal: negative.  Genitourinary: negative.  Musculoskeletal: negative.  Neurologic: negative.  Psychiatric: negative.  Hematologic/Lymphatic/Immunologic: negative.  Endocrine: negative.    All other ROS negative unless mentioned in interval history.    Past medical, social, surgical, and family  histories reviewed.    Allergies:  Allergies as of 07/17/2019 - Reviewed 07/10/2019   Allergen Reaction Noted     Sulfa drugs Swelling 06/09/2005       Current Medications:  Current Outpatient Medications   Medication Sig Dispense Refill     acetaminophen (TYLENOL) 500 MG tablet Take 500-1,000 mg by mouth every 6 hours as needed for mild pain or pain       albuterol (PROAIR HFA/PROVENTIL HFA/VENTOLIN HFA) 108 (90 Base) MCG/ACT inhaler Inhale 2 puffs into the lungs every 4 hours as needed for shortness of breath / dyspnea or wheezing 1 Inhaler 11     aspirin 81 MG tablet Take 1 tablet (81 mg) by mouth daily       augmented betamethasone dipropionate (DIPROLENE-AF) 0.05 % cream APPLY EXTERNALLY TO THE AFFECTED AREA TWICE DAILY (Patient taking differently: Apply topically daily APPLY EXTERNALLY TO THE AFFECTED AREA TWICE DAILY) 50 g 6     calcium-vitamin D (CALCIUM 600-D) 600-400 MG-UNIT per tablet Take 1 tablet by mouth daily       FLUoxetine (PROZAC) 20 MG capsule Take 20 mg daily. (Patient taking differently: Take 20 mg by mouth every evening ) 30 capsule 11     loratadine (CLARITIN) 10 MG tablet Take 10 mg by mouth daily.         LORazepam (ATIVAN) 0.5 MG tablet Take 1 tablet (0.5 mg) by mouth every 4 hours as needed (Anxiety, Nausea/Vomiting or Sleep) 30 tablet 5     MULTIVITAMIN TABS   OR 1 TABLET BY MOUTH DAILY       ondansetron (ZOFRAN) 4 MG tablet Take 1 tablet (4 mg) by mouth every 8 hours as needed for nausea 20 tablet 1     prochlorperazine (COMPAZINE) 10 MG tablet Take 0.5 tablets (5 mg) by mouth every 6 hours as needed (Nausea/Vomiting) 30 tablet 5     pseudoePHEDrine (SUDAFED) 30 MG tablet Take 30 mg by mouth every 12 hours as needed for congestion       Pyridoxine HCl (VITAMIN B6 PO) Take 1 tablet by mouth daily       ranitidine (ZANTAC) 150 MG tablet Take 1 tablet (150 mg) by mouth daily          Physical Exam:  /86 (BP Location: Right arm, Patient Position: Sitting, Cuff Size: Adult Large)    "Pulse 100   Temp 98.4  F (36.9  C) (Tympanic)   Ht 1.753 m (5' 9.02\")   Wt 87.6 kg (193 lb 3.2 oz)   LMP 04/02/2010   SpO2 98%   BMI 28.52 kg/m    Wt Readings from Last 12 Encounters:   07/23/19 87.5 kg (193 lb)   07/17/19 87.6 kg (193 lb 3.2 oz)   07/09/19 89.8 kg (198 lb)   07/02/19 88.9 kg (196 lb)   06/19/19 90.2 kg (198 lb 14.4 oz)   06/18/19 87.8 kg (193 lb 9.6 oz)   06/11/19 89.8 kg (198 lb)   05/29/19 89.8 kg (198 lb)   05/22/19 90.4 kg (199 lb 4.8 oz)   05/21/19 91.2 kg (201 lb)   05/14/19 89.2 kg (196 lb 9.6 oz)   05/07/19 90.8 kg (200 lb 3.2 oz)     ECOG performance status:0  GENERAL APPEARANCE: Healthy, alert and in no acute distress.  HEENT: Sclerae anicteric. PERRLA. Oropharynx without ulcers, lesions, or thrush.  NECK: Supple. No asymmetry or masses.  LYMPHATICS: No palpable cervical, supraclavicular, axillary, or inguinal lymphadenopathy.  RESP: Lungs clear to auscultation bilaterally without rales, rhonchi or wheezes.  CARDIOVASCULAR: Regular rate and rhythm. Normal S1, S2; no S3 or S4. No murmur, gallop, or rub.  ABDOMEN: Soft, nontender. Bowel sounds normal. No palpable organomegaly or masses.  MUSCULOSKELETAL: Extremities without gross deformities noted. No edema of bilateral lower extremities.  SKIN: No suspicious lesions or rashes.  NEURO: Alert and oriented x 3. Cranial nerves II-XII grossly intact.  PSYCHIATRIC: Mentation and affect appear normal.    Laboratory/Imaging Studies:  Infusion Therapy Visit on 07/09/2019   Component Date Value Ref Range Status     WBC 07/09/2019 2.8* 4.0 - 11.0 10e9/L Final     RBC Count 07/09/2019 3.16* 3.8 - 5.2 10e12/L Final     Hemoglobin 07/09/2019 10.3* 11.7 - 15.7 g/dL Final     Hematocrit 07/09/2019 31.0* 35.0 - 47.0 % Final     MCV 07/09/2019 98  78 - 100 fl Final     MCH 07/09/2019 32.6  26.5 - 33.0 pg Final     MCHC 07/09/2019 33.2  31.5 - 36.5 g/dL Final     RDW 07/09/2019 14.8  10.0 - 15.0 % Final     Platelet Count 07/09/2019 176  150 - 450 " 10e9/L Final     Diff Method 07/09/2019 Automated Method   Final     % Neutrophils 07/09/2019 62.2  % Final     % Lymphocytes 07/09/2019 23.4  % Final     % Monocytes 07/09/2019 10.1  % Final     % Eosinophils 07/09/2019 1.8  % Final     % Basophils 07/09/2019 1.8  % Final     % Immature Granulocytes 07/09/2019 0.7  % Final     Nucleated RBCs 07/09/2019 0  0 /100 Final     Absolute Neutrophil 07/09/2019 1.7  1.6 - 8.3 10e9/L Final     Absolute Lymphocytes 07/09/2019 0.7* 0.8 - 5.3 10e9/L Final     Absolute Monocytes 07/09/2019 0.3  0.0 - 1.3 10e9/L Final     Absolute Eosinophils 07/09/2019 0.1  0.0 - 0.7 10e9/L Final     Absolute Basophils 07/09/2019 0.1  0.0 - 0.2 10e9/L Final     Abs Immature Granulocytes 07/09/2019 0.0  0 - 0.4 10e9/L Final     Absolute Nucleated RBC 07/09/2019 0.0   Final        Recent Results (from the past 744 hour(s))   XR Chest 2 Views    Narrative    CHEST TWO VIEWS 7/23/2019 4:45 PM     HISTORY: Fever; chemo.    COMPARISON: 1/23/2019    FINDINGS: Right-sided subclavian Port-A-Cath unchanged. Heart size and  pulmonary vascularity are within normal limits. The lungs are clear.  No pneumothorax or pleural effusion.       Impression    IMPRESSION: No radiographic evidence of acute chest abnormality.     ALEIDA CORONA MD       Assessment and plan:    (C50.112) Infiltrating ductal carcinoma of central portion of left breast in female (H)  (primary encounter diagnosis)  I reviewed with patient today management of early stage breast cancer.  Patient concluded adjuvant chemotherapy.  Patient will proceed with breast radiation therapy we will arrange for radiation oncology evaluation.  We reviewed today follow-up plan.I reviewed with the patient today also follow-up of breast cancer. I told her that evidence from randomized trials indicates that periodic follow-up with bone scans, liver sonography, chest x-rays, and blood tests of liver function does not improve survival or quality of life when  compared with routine physical examinations. Even when these tests permit earlier detection of recurrent disease, patient survival is unaffected. On the basis of these data, acceptable follow-up can be limited to physical examination and annual mammography for asymptomatic patients who complete treatment. According to NCCN guidelines, follow-up of breast cancer should include history and physical examination with emphasis on breast examination every 4-6 months for 5 years then annually thereafter. Emphasis is also on continuing with annual mammography. Evidence also suggests that active lifestyle and achieving and maintaining ideal body weight with a BMI of 20-25 may lead to optimal breast cancer outcomes.  I will see the patient again in 3 months time or sooner if there are new developments or concerns  (F06.4) Anxiety disorder due to medical condition  Patient currently on Prozac 20 mg orally daily.    (K21.9) Gastroesophageal reflux disease without esophagitis  Patient currently on ranitidine 150 mg daily    (J45.20) Mild intermittent asthma without complication  Patient symptoms has been stable.  She is using albuterol inhaler as needed    The patient is ready to learn, no apparent learning barriers were identified.  Diagnosis and treatment plans were explained to the patient. The patient expressed understanding of the content. The patient asked appropriate questions. The patient questions were answered to her satisfaction.    Chart documentation with Dragon Voice recognition Software. Although reviewed after completion, some words and grammatical errors may remain.

## 2019-07-29 ENCOUNTER — PATIENT OUTREACH (OUTPATIENT)
Dept: ONCOLOGY | Facility: CLINIC | Age: 59
End: 2019-07-29

## 2019-07-29 LAB
BACTERIA SPEC CULT: NO GROWTH
BACTERIA SPEC CULT: NO GROWTH
Lab: NORMAL
Lab: NORMAL
SPECIMEN SOURCE: NORMAL
SPECIMEN SOURCE: NORMAL

## 2019-07-29 NOTE — PROGRESS NOTES
Called and spoke with patient's  today to check on her status. She is currently at work,  states that her fevers have resolved and she is feeling better. She still have some neuropathy, but will call us if it worsens.     John Barrera RN on 7/29/2019 at 2:51 PM

## 2019-07-29 NOTE — RESULT ENCOUNTER NOTE
Final blood culture report is NEGATIVE.    No treatment or change in treatment per [place] ED Lab Result protocol.

## 2019-08-05 ENCOUNTER — OFFICE VISIT (OUTPATIENT)
Dept: RADIATION THERAPY | Facility: OUTPATIENT CENTER | Age: 59
End: 2019-08-05
Payer: COMMERCIAL

## 2019-08-05 ENCOUNTER — OFFICE VISIT (OUTPATIENT)
Dept: RADIATION THERAPY | Facility: OUTPATIENT CENTER | Age: 59
End: 2019-08-05
Attending: INTERNAL MEDICINE
Payer: COMMERCIAL

## 2019-08-05 VITALS
RESPIRATION RATE: 16 BRPM | HEART RATE: 96 BPM | OXYGEN SATURATION: 97 % | BODY MASS INDEX: 27.96 KG/M2 | WEIGHT: 189.4 LBS | SYSTOLIC BLOOD PRESSURE: 123 MMHG | DIASTOLIC BLOOD PRESSURE: 82 MMHG

## 2019-08-05 DIAGNOSIS — C50.412 MALIGNANT NEOPLASM OF UPPER-OUTER QUADRANT OF LEFT BREAST IN FEMALE, ESTROGEN RECEPTOR NEGATIVE (H): ICD-10-CM

## 2019-08-05 DIAGNOSIS — Z17.1 MALIGNANT NEOPLASM OF UPPER-OUTER QUADRANT OF LEFT BREAST IN FEMALE, ESTROGEN RECEPTOR NEGATIVE (H): ICD-10-CM

## 2019-08-05 DIAGNOSIS — Z17.1 MALIGNANT NEOPLASM OF UPPER-OUTER QUADRANT OF LEFT BREAST IN FEMALE, ESTROGEN RECEPTOR NEGATIVE (H): Primary | ICD-10-CM

## 2019-08-05 DIAGNOSIS — F32.A DEPRESSIVE DISORDER: Primary | ICD-10-CM

## 2019-08-05 DIAGNOSIS — C50.412 MALIGNANT NEOPLASM OF UPPER-OUTER QUADRANT OF LEFT BREAST IN FEMALE, ESTROGEN RECEPTOR NEGATIVE (H): Primary | ICD-10-CM

## 2019-08-05 DIAGNOSIS — C80.1 CANCER (H): ICD-10-CM

## 2019-08-05 ASSESSMENT — PAIN SCALES - GENERAL: PAINLEVEL: NO PAIN (0)

## 2019-08-05 NOTE — PROGRESS NOTES
Dear Colleagues,  Today Monika Nam was seen in consultation.  IDENTIFICATION: This is a 59 year old woman with triple negative G3 invasive left breast cancer status post lumpectomy and SLNBx, revealing [id number] disease followed by adjuvant chemotherapy completed on 7/10/19 now referred for adjuvant radiation therapy.   HISTORY OF PRESENT ILLNESS: Monika Nam was in her usual state of health last winter. On 18 screening MMG showed a new 1.4cm focal asymmetry in the left breast at the 1:00 position 10.7cm FN. The right mmg was stable. Left breast US on 19 confirmed a 1.6cm nodule. Additional US of the left axilla showed no abnormal lymph nodes. On 18 a left breast US guided biopsy revealed G3 IDCA with no DCIS/LVSI, ER-/IL-/H2N-. On 19 she was taken to the OR for wire localized lumpectomy and SLNBx. Pathology revealed a single focus of 1.8cm G3 IDCA, no DCIS/LVSI. 0 out of 1 positive lymph node. Clear invasive margins with the closest margin being 8mm. Left breast specimen radiograph showed the lesion, needle core biopsy marker, and hookwire were all present. Postoperative course was marked by inflammatory reaction from lymph node procedure dye but this has resolved.  She was seen by Ha Bundy and Christian and subsequently received ddAC x 4 followed by Taxol x 12 from 19 to 7/10/19.    Currently she has continued to heal well and denies any significant pain.  She has good ROM.  She has some mild neuropathy but is currently taking B6.  Otherwise no complaints. She denies any other new masses, skin changes, shortness of breath, chest or bony pain, or new neurologic symptoms. She is being seen here today for consideration of postoperative radiotherapy.  REVIEW OF SYSTEMS: As per HPI, a 14-point review of system is otherwise negative.  PAST RADIATION THERAPY:  Denies.  PAST CTD/PACEMAKER: Denies  BREAST RISK FACTORS:  She started her menses at age 12.  Menopause age 54. . She   for a total of 1.5 months.  No history of HRT. History of OCP use x 15 years..  No history of prior breast biopsy. No family history of breast or ovarian cancer.  Past Medical History:   Diagnosis Date     Abnormal Papanicolaou smear of cervix and cervical HPV     history of LEEP ion 1994     ASCUS favor benign 7/2013, 7/2014    Neg high risk HPV      Cancer (H) 01/23/2019    breast     Cervical high risk HPV (human papillomavirus) test positive 5/24/12    type 66     Depressive disorder      PONV (postoperative nausea and vomiting)        Past Surgical History:   Procedure Laterality Date     BIOPSY       BREAST SURGERY       INSERT PORT VASCULAR ACCESS Right 1/23/2019    Procedure: Port-a-Cath Placement;  Surgeon: Servando Claros MD;  Location: [facility]     LUMPECTOMY BREAST WITH SENTINEL NODE, COMBINED Left 1/23/2019    Procedure: COMBINED LUMPECTOMY BREAST WITH SENTINEL NODE;  Surgeon: Servando Claros MD;  Location: [facility]     SURGICAL HISTORY OF -   1996    cholecystectomy     SURGICAL HISTORY OF -   1994    LEEP        Family History   Problem Relation Age of Onset     Gastrointestinal Disease Mother         desmond dz     Hypertension Father      Lipids Father      Breast Cancer Paternal Grandmother      Neurologic Disorder Paternal Grandfather         parkinsons     Cancer - colorectal No family hx of      Prostate Cancer No family hx of      Diabetes No family hx of      C.A.D. No family hx of        Social History     Tobacco Use     Smoking status: Current Every Day Smoker     Packs/day: 0.50     Years: 25.00     Pack years: 12.50     Types: Cigarettes     Smokeless tobacco: Never Used     Tobacco comment: 1/2 ppd . Did try the patch- gets a rash.  She is trying to quit.   Substance Use Topics     Alcohol use: Yes     Comment: 3-4 X per week.  About 7 drinks per week.     Current Outpatient Medications   Medication     acetaminophen (TYLENOL) 500 MG tablet     albuterol (PROAIR HFA/PROVENTIL  HFA/VENTOLIN HFA) 108 (90 Base) MCG/ACT inhaler     aspirin 81 MG tablet     augmented betamethasone dipropionate (DIPROLENE-AF) 0.05 % cream     calcium-vitamin D (CALCIUM 600-D) 600-400 MG-UNIT per tablet     FLUoxetine (PROZAC) 20 MG capsule     loratadine (CLARITIN) 10 MG tablet     LORazepam (ATIVAN) 0.5 MG tablet     MULTIVITAMIN TABS   OR     ondansetron (ZOFRAN) 4 MG tablet     prochlorperazine (COMPAZINE) 10 MG tablet     pseudoePHEDrine (SUDAFED) 30 MG tablet     Pyridoxine HCl (VITAMIN B6 PO)     ranitidine (ZANTAC) 150 MG tablet     No current facility-administered medications for this visit.         Allergies   Allergen Reactions     Sulfa Drugs Swelling     PHYSICAL EXAMINATION:  /82 (BP Location: Right arm, Cuff Size: Adult Large)   Pulse 96   Resp 16   Wt 85.9 kg (189 lb 6.4 oz)   LMP 04/02/2014   SpO2 97%   BMI 27.96 kg/m    GENERAL Well-appearing woman in no acute distress.  HEENT Normocephalic, atraumatic.  Sclerae anicteric.  CVR  Regular rate and rhythm.  No murmurs, rubs, or gallops.  LUNGS Clear to auscultation bilaterally.  BREASTS Breasts are examined in the supine and upright position.  The breasts are large symmetric and pendulous.  The right breast is unremarkable, as there is no other erythema, ulceration or suspicious nodularity within the right breast.  Within the left upper outer breast and left axilla there are two ~2-3cm well healed incisions. Firmness of these incisions is not suspicious.  There is no other erythema, ulceration or suspicious nodularity within the left breast.  There is no erythema, retraction, desquamation or discharge appreciated within the right or left nipple areolar complex.    LYMPH No supraclavicular, infraclavicular, or axillary lymphadenopathy appreciated bilaterally.  ABDOMEN Soft.   EXT  No clubbing or cyanosis or edema.  NEURO No focal deficits. Sensation intact in all areas tested.  MSK  Good ROM. No spinal tenderness.  SKIN  Warm and well  perfused.    PSYCH  Alert and oriented x 3    IMPRESSION/PLAN: Monika Nam is a 59 year old woman with triple negative G3 invasive left breast cancer status post lumpectomy and SLNBx, revealing [id number] disease followed by adjuvant chemotherapy completed on 7/10/19 now referred for adjuvant radiation therapy.   I recommend adjuvant XRT to improve local control and overall survival.  Plan is to treat the affected breast based on multiple randomized prospective data which show decrease risk of local recurrence by ~50% with the addition of XRT to BCS and the EBCTCG metaanalysis published in Lancet 2011 which shows that for every 4 recurrences avoided by year 10 one breast cancer death is avoided by year 15.  Given her HG disease she will also receive a left breast cavity boost based EORTC and [employer] Boost Trials.  The risks, benefits, treatment rationale and regimen of radiation therapy to the left breast were discussed in great detail today with the patient.  Risks include but are not limited to skin erythema, desquamation, hyperpigmentation, breast and lymphedema, fibrosis, telengectasia, pneumonitis, cardiac toxicity, rib fracture and secondary malignancy. The patient consented to therapy and had a CT simulation completed today. Treatment will start within 1 week.    Additional problem list to be addressed in the following manner:  1. Systemic Treatment in Postmenopausal Woman: Node negative TNBC s/p ddAC and Taxol completed 7/10/19.  There was ample time for questions and all were answered to the patient's satisfaction. Thank you for allowing me to participate in the care of this pleasant patient. If you have any questions, please do not hesitate to contact my office.    Sincerely,  Kekio Timmons MD

## 2019-08-05 NOTE — NURSING NOTE
REASON FOR APPOINTMENT   T1cN0 left breast cancer, s/p lumpectomy 1/2019 followed by ACT chemotherapy with Dr. Gonzales, completed  7/10/19. Here to discuss radiation therapy.    PERSONAL HISTORY OF CANCER   Previous Cancer ? no   Prior Radiation ? no   Prior Chemotherapy ? no   Prior Hormonal Therapy ? no     REFERRALS NEEDED  Not at this time    VITALS  /82 (BP Location: Right arm, Cuff Size: Adult Large)   Pulse 96   Resp 16   Wt 85.9 kg (189 lb 6.4 oz)   LMP 04/02/2014   SpO2 97%   BMI 27.96 kg/m      PACEMAKER/IMPLANTED CARDIAC DEVICE : No    PAIN  Denies    PSYCHOSOCIAL  Marital Status:   Patient lives in [place], [place] with .  Number of children: 1  [place]  in [facility] [employer] system  Do you feel safe in your home? Yes    REVIEW OF SYSTEMS  Skin: negative  Eyes: negative  Ears/Nose/Throat: negative  Respiratory: No shortness of breath, dyspnea on exertion, cough, or hemoptysis  Cardiovascular: negative  Gastrointestinal: negative  Genitourinary: negative  Musculoskeletal: negative  Neurologic: neuropathy in fingers and toes, takes vitamin B6  Psychiatric: depression stable  Hematologic/Lymphatic/Immunologic: negative  Endocrine: negative    WOMEN ONLY  Any chance you may be pregnant: No  Age at first period: 12  Date of last period: 2014  Number of pregnancies: 1  Birth Control pills: Yes  If yes, for how long: 15 yrs    Radiation Oncology Patient Teaching    Person involved with teaching: Patient and   Patient asked Questions: Yes  Patient was cooperative: Yes  Patient was receptive (willing to accept information given): Yes    Education Assessment  Comprehension ability: High  Knowledge level: High  Factors affecting teaching: None    Education Materials Given  Radiation Therapy and You  Caring for Your Skin During Radiation ...  Eating Hints  Diet and Nutrition Information    Educational Topics Discussed  Side effects,  Activity and Nutrition    Response To Teaching  Verbalizes understanding    GYN Only  Vaginal Dilator-given and educated: N/A    Do you have an advanced directive or living will? no  Are you DNR/DNI? no

## 2019-08-05 NOTE — LETTER
8/5/2019      RE: Monika Nam  [address] [facility] [address]       Dear Colleagues,  Today Monika Nam was seen in consultation.  IDENTIFICATION: This is a 59 year old woman with triple negative G3 invasive left breast cancer status post lumpectomy and SLNBx, revealing [id number] disease  followed by adjuvant chemotherapy completed on 7/10/19 now referred for adjuvant radiation therapy.   HISTORY OF PRESENT ILLNESS: Monika Nam was in her usual state of health last winter. On 11/20/18 screening MMG showed a new 1.4cm focal asymmetry in the left breast at the 1:00 position 10.7cm FN. The right mmg was stable. Left breast US on 11/30/19 confirmed a 1.6cm nodule. Additional US of the left axilla showed no abnormal lymph nodes. On 12/17/18 a left breast US guided biopsy revealed G3 IDCA with no DCIS/LVSI, ER-/NM-/H2N-. On 1/23/19 she was taken to the OR for wire localized lumpectomy and SLNBx. Pathology revealed a single focus of 1.8cm G3 IDCA, no DCIS/LVSI. 0 out of 1 positive lymph node. Clear invasive margins with the closest margin being 8mm. Left breast specimen radiograph showed the lesion, needle core biopsy marker, and hookwire were all present. Postoperative course was marked by inflammatory reaction from lymph node procedure dye but this has resolved.  She was seen by Ha Bundy and Christian and subsequently received ddAC x 4 followed by Taxol x 12 from 2/21/19 to 7/10/19.    Currently she has continued to heal well and denies any significant pain.  She has good ROM.   She has some mild neuropathy but is currently taking B6.  Otherwise no complaints. She denies any other new masses, skin changes, shortness of breath, chest or bony pain, or new neurologic symptoms. She is being seen here today for consideration of postoperative radiotherapy.  REVIEW OF SYSTEMS: As per HPI, a 14-point review of system is otherwise negative.  PAST RADIATION THERAPY:  Denies.  PAST CTD/PACEMAKER:  Denies  BREAST RISK FACTORS:  She started her menses at age 12.  Menopause age 54. . She  for a total of 1.5 months.  No history of HRT. History of OCP use x 15 years..  No history of prior breast biopsy. No family history of breast or ovarian cancer.  Past Medical History:   Diagnosis Date     Abnormal Papanicolaou smear of cervix and cervical HPV     history of LEEP ion      ASCUS favor benign 2013, 2014    Neg high risk HPV      Cancer (H) 2019    breast     Cervical high risk HPV (human papillomavirus) test positive 12    type 66     Depressive disorder      PONV (postoperative nausea and vomiting)        Past Surgical History:   Procedure Laterality Date     BIOPSY       BREAST SURGERY       INSERT PORT VASCULAR ACCESS Right 2019    Procedure: Port-a-Cath Placement;  Surgeon: Servando Claros MD;  Location: [facility]     LUMPECTOMY BREAST WITH SENTINEL NODE, COMBINED Left 2019    Procedure: COMBINED LUMPECTOMY BREAST WITH SENTINEL NODE;  Surgeon: Servando Claros MD;  Location: [facility]     SURGICAL HISTORY OF -       cholecystectomy     SURGICAL HISTORY OF -       LEEP        Family History   Problem Relation Age of Onset     Gastrointestinal Disease Mother         desmond dz     Hypertension Father      Lipids Father      Breast Cancer Paternal Grandmother      Neurologic Disorder Paternal Grandfather         parkinsons     Cancer - colorectal No family hx of      Prostate Cancer No family hx of      Diabetes No family hx of      C.A.D. No family hx of        Social History     Tobacco Use     Smoking status: Current Every Day Smoker     Packs/day: 0.50     Years: 25.00     Pack years: 12.50     Types: Cigarettes     Smokeless tobacco: Never Used     Tobacco comment: 1/2 ppd . Did try the patch- gets a rash.  She is trying to quit.   Substance Use Topics     Alcohol use: Yes     Comment: 3-4 X per week.  About 7 drinks per week.     Current Outpatient Medications    Medication     acetaminophen (TYLENOL) 500 MG tablet     albuterol (PROAIR HFA/PROVENTIL HFA/VENTOLIN HFA) 108 (90 Base) MCG/ACT inhaler     aspirin 81 MG tablet     augmented betamethasone dipropionate (DIPROLENE-AF) 0.05 % cream     calcium-vitamin D (CALCIUM 600-D) 600-400 MG-UNIT per tablet     FLUoxetine (PROZAC) 20 MG capsule     loratadine (CLARITIN) 10 MG tablet     LORazepam (ATIVAN) 0.5 MG tablet     MULTIVITAMIN TABS   OR     ondansetron (ZOFRAN) 4 MG tablet     prochlorperazine (COMPAZINE) 10 MG tablet     pseudoePHEDrine (SUDAFED) 30 MG tablet     Pyridoxine HCl (VITAMIN B6 PO)     ranitidine (ZANTAC) 150 MG tablet     No current facility-administered medications for this visit.         Allergies   Allergen Reactions     Sulfa Drugs Swelling     PHYSICAL EXAMINATION:  /82 (BP Location: Right arm, Cuff Size: Adult Large)   Pulse 96   Resp 16   Wt 85.9 kg (189 lb 6.4 oz)   LMP 04/02/2014   SpO2 97%   BMI 27.96 kg/m     GENERAL Well-appearing woman in no acute distress.  HEENT Normocephalic, atraumatic.  Sclerae anicteric.  CVR  Regular rate and rhythm.  No murmurs, rubs, or gallops.  LUNGS Clear to auscultation bilaterally.  BREASTS Breasts are examined in the supine and upright position.  The breasts are large symmetric and pendulous.  The right breast is unremarkable, as there is no other erythema, ulceration or suspicious nodularity within the right breast.  Within the left upper outer breast and left axilla there are two ~2-3cm well healed incisions. Firmness of these incisions is not suspicious.  There is no other erythema, ulceration or suspicious nodularity within the left breast.  There is no erythema, retraction, desquamation or discharge appreciated within the right or left nipple areolar complex.    LYMPH No supraclavicular, infraclavicular, or axillary lymphadenopathy appreciated bilaterally.  ABDOMEN Soft.   EXT  No clubbing or cyanosis or edema.  NEURO No focal deficits.  Sensation intact in all areas tested.  MSK  Good ROM. No spinal tenderness.  SKIN  Warm and well perfused.    PSYCH  Alert and oriented x 3    IMPRESSION/PLAN: Monika Nam is a 59 year old woman with triple negative G3 invasive left breast cancer status post lumpectomy and SLNBx, revealing [id number] disease  followed by adjuvant chemotherapy completed on 7/10/19 now referred for adjuvant radiation therapy.   I recommend adjuvant XRT to improve local control and overall survival.  Plan is to treat the affected breast based on multiple randomized prospective data which show decrease risk of local recurrence by ~50% with the addition of XRT to BCS and the EBCTCG metaanalysis published in Lancet 2011 which shows that for every 4 recurrences avoided by year 10 one breast cancer death is avoided by year 15.  Given her HG disease she will also receive a left breast cavity boost based EORTC and [employer] Boost Trials.  The risks, benefits, treatment rationale and regimen of radiation therapy to the left breast were discussed in great detail today with the patient.  Risks include but are not limited to skin erythema, desquamation, hyperpigmentation, breast and lymphedema, fibrosis, telengectasia, pneumonitis, cardiac toxicity, rib fracture and secondary malignancy. The patient consented to therapy and had a CT simulation completed today. Treatment will start within 1 week.    Additional problem list to be addressed in the following manner:  1. Systemic Treatment in Postmenopausal Woman: Node negative TNBC s/p ddAC and Taxol completed 7/10/19.  There was ample time for questions and all were answered to the patient's satisfaction. Thank you for allowing me to participate in the care of this pleasant patient. If you have any questions, please do not hesitate to contact my office.    Sincerely,  Keiko Timmons MD

## 2019-08-05 NOTE — LETTER
8/5/2019      RE: Monika CARRANZA [place]  [address] [facility] [address]       CT simulation completed    Keiko Timmons MD

## 2019-08-05 NOTE — LETTER
8/5/2019      RE: Monika CARRANZA [place]  [address] [facility] [address]       CT simulation completed    Keiko Timmons MD

## 2019-08-13 ENCOUNTER — APPOINTMENT (OUTPATIENT)
Dept: RADIATION THERAPY | Facility: OUTPATIENT CENTER | Age: 59
End: 2019-08-13
Payer: COMMERCIAL

## 2019-08-14 ENCOUNTER — APPOINTMENT (OUTPATIENT)
Dept: RADIATION THERAPY | Facility: OUTPATIENT CENTER | Age: 59
End: 2019-08-14
Payer: COMMERCIAL

## 2019-08-14 ENCOUNTER — OFFICE VISIT (OUTPATIENT)
Dept: RADIATION THERAPY | Facility: OUTPATIENT CENTER | Age: 59
End: 2019-08-14
Payer: COMMERCIAL

## 2019-08-14 VITALS — OXYGEN SATURATION: 98 % | HEART RATE: 99 BPM | DIASTOLIC BLOOD PRESSURE: 78 MMHG | SYSTOLIC BLOOD PRESSURE: 124 MMHG

## 2019-08-14 DIAGNOSIS — C50.912 ESTROGEN RECEPTOR NEGATIVE CARCINOMA OF BREAST, LEFT (H): Primary | ICD-10-CM

## 2019-08-14 DIAGNOSIS — Z17.1 ESTROGEN RECEPTOR NEGATIVE CARCINOMA OF BREAST, LEFT (H): Primary | ICD-10-CM

## 2019-08-14 NOTE — LETTER
2019      RE: Monika Nam  [address] [facility] [address]       [facility] PHYSICIANS  SPECIALIZING IN BREAKTHROUGHS  Radiation Oncology    On Treatment Visit Note      Monika Nam      Date: 2019   MRN: [id number]   : 1960         Reason for Visit:  On Radiation Treatment Visit     Treatment Summary to Date   Left breast   532 cGy / 5256 cGy         Subjective:   Doing well. No acute pain. No pruritus. Energy adequate. Using coconut oil.       Nursing ROS:          Objective:   LMP 2014     Labs:  CBC RESULTS:   Recent Labs   Lab Test 19  1410   WBC 7.5   RBC 3.42*   HGB 10.7*   HCT 32.8*   MCV 96   MCH 31.3   MCHC 32.6   RDW 14.9        ELECTROLYTES:  Recent Labs   Lab Test 19  1410   *   POTASSIUM 3.7   CHLORIDE 98   DANI 8.2*   CO2 23   BUN 10   CR 0.64   GLC 91       Assessment:  59 year old woman with triple negative G3 invasive left breast cancer status post lumpectomy and SLNBx, revealing [id number] disease followed by adjuvant chemotherapy completed on 7/10/19 now referred for adjuvant radiation therapy.     Tolerating radiation therapy well.  All questions and concerns addressed.    Plan:   1. Continue current therapy.    2. Continue skin care.   3. Ibuprofen PRN pain. Hydrocortisone PRN pruritus. Neosporin with pain relief PRN pain.      Mosaiq chart and setup information reviewed  Ports checked       Shan Salvador MD

## 2019-08-14 NOTE — PROGRESS NOTES
[facility] PHYSICIANS  SPECIALIZING IN BREAKTHROUGHS  Radiation Oncology    On Treatment Visit Note      Monika Nam      Date: 2019   MRN: [id number]   : 1960         Reason for Visit:  On Radiation Treatment Visit     Treatment Summary to Date   Left breast   532 cGy / 5256 cGy         Subjective:   Doing well. No acute pain. No pruritus. Energy adequate. Using coconut oil.       Nursing ROS:          Objective:   LMP 2014     Labs:  CBC RESULTS:   Recent Labs   Lab Test 19  1410   WBC 7.5   RBC 3.42*   HGB 10.7*   HCT 32.8*   MCV 96   MCH 31.3   MCHC 32.6   RDW 14.9        ELECTROLYTES:  Recent Labs   Lab Test 19  1410   *   POTASSIUM 3.7   CHLORIDE 98   DANI 8.2*   CO2 23   BUN 10   CR 0.64   GLC 91       Assessment:  59 year old woman with triple negative G3 invasive left breast cancer status post lumpectomy and SLNBx, revealing [id number] disease followed by adjuvant chemotherapy completed on 7/10/19 now referred for adjuvant radiation therapy.     Tolerating radiation therapy well.  All questions and concerns addressed.    Plan:   1. Continue current therapy.    2. Continue skin care.   3. Ibuprofen PRN pain. Hydrocortisone PRN pruritus. Neosporin with pain relief PRN pain.      Mosaiq chart and setup information reviewed  Ports checked                Shan Salvador MD

## 2019-08-15 ENCOUNTER — APPOINTMENT (OUTPATIENT)
Dept: RADIATION THERAPY | Facility: OUTPATIENT CENTER | Age: 59
End: 2019-08-15
Payer: COMMERCIAL

## 2019-08-16 ENCOUNTER — APPOINTMENT (OUTPATIENT)
Dept: RADIATION THERAPY | Facility: OUTPATIENT CENTER | Age: 59
End: 2019-08-16
Payer: COMMERCIAL

## 2019-08-20 ENCOUNTER — APPOINTMENT (OUTPATIENT)
Dept: RADIATION THERAPY | Facility: OUTPATIENT CENTER | Age: 59
End: 2019-08-20
Payer: COMMERCIAL

## 2019-08-20 ENCOUNTER — PATIENT OUTREACH (OUTPATIENT)
Dept: ONCOLOGY | Facility: CLINIC | Age: 59
End: 2019-08-20

## 2019-08-20 NOTE — PROGRESS NOTES
Received fax from the [facility] requesting records from 7.1.19 - present. Copies of records sent to provided fax number of [phone].  Yuliet Posada RN

## 2019-08-21 ENCOUNTER — APPOINTMENT (OUTPATIENT)
Dept: RADIATION THERAPY | Facility: OUTPATIENT CENTER | Age: 59
End: 2019-08-21
Payer: COMMERCIAL

## 2019-08-21 ENCOUNTER — OFFICE VISIT (OUTPATIENT)
Dept: RADIATION THERAPY | Facility: OUTPATIENT CENTER | Age: 59
End: 2019-08-21
Payer: COMMERCIAL

## 2019-08-21 VITALS
BODY MASS INDEX: 28.93 KG/M2 | WEIGHT: 196 LBS | HEART RATE: 90 BPM | DIASTOLIC BLOOD PRESSURE: 80 MMHG | SYSTOLIC BLOOD PRESSURE: 123 MMHG

## 2019-08-21 DIAGNOSIS — Z17.1 ESTROGEN RECEPTOR NEGATIVE CARCINOMA OF BREAST, LEFT (H): Primary | ICD-10-CM

## 2019-08-21 DIAGNOSIS — C50.912 ESTROGEN RECEPTOR NEGATIVE CARCINOMA OF BREAST, LEFT (H): Primary | ICD-10-CM

## 2019-08-21 NOTE — LETTER
2019      RE: Monika Nam  [address] [facility] [address]       [facility] PHYSICIANS  SPECIALIZING IN BREAKTHROUGHS  Radiation Oncology    On Treatment Visit Note      Monika Nam      Date: 2019   MRN: [id number]   : 1960         Reason for Visit:  On Radiation Treatment Visit     Treatment Summary to Date   Left breast   1596 cGy / 5256 cGy         Subjective:   Doing well. No acute pain. No pruritus. Energy adequate. Using coconut oil.       Nursing ROS:        Objective:   No skin erythema or desquamation.     Labs:  CBC RESULTS:   Recent Labs   Lab Test 19  1410   WBC 7.5   RBC 3.42*   HGB 10.7*   HCT 32.8*   MCV 96   MCH 31.3   MCHC 32.6   RDW 14.9        ELECTROLYTES:  Recent Labs   Lab Test 19  1410   *   POTASSIUM 3.7   CHLORIDE 98   DANI 8.2*   CO2 23   BUN 10   CR 0.64   GLC 91       Assessment:  59 year old woman with triple negative G3 invasive left breast cancer status post lumpectomy and SLNBx, revealing [id number] disease followed by adjuvant chemotherapy completed on 7/10/19 now referred for adjuvant radiation therapy.     Tolerating radiation therapy well.  All questions and concerns addressed.    Plan:   1. Continue current therapy.    2. Continue skin care.   3. Ibuprofen PRN pain. Hydrocortisone PRN pruritus. Neosporin with pain relief PRN pain.      Mosaiq chart and setup information reviewed  Ports checked                MD Shan Romeo MD

## 2019-08-21 NOTE — PROGRESS NOTES
[facility] PHYSICIANS  SPECIALIZING IN BREAKTHROUGHS  Radiation Oncology    On Treatment Visit Note      Monika Nam      Date: 2019   MRN: [id number]   : 1960         Reason for Visit:  On Radiation Treatment Visit     Treatment Summary to Date   Left breast   1596 cGy / 5256 cGy         Subjective:   Doing well. No acute pain. No pruritus. Energy adequate. Using coconut oil.       Nursing ROS:        Objective:   No skin erythema or desquamation.     Labs:  CBC RESULTS:   Recent Labs   Lab Test 19  1410   WBC 7.5   RBC 3.42*   HGB 10.7*   HCT 32.8*   MCV 96   MCH 31.3   MCHC 32.6   RDW 14.9        ELECTROLYTES:  Recent Labs   Lab Test 19  1410   *   POTASSIUM 3.7   CHLORIDE 98   DANI 8.2*   CO2 23   BUN 10   CR 0.64   GLC 91       Assessment:  59 year old woman with triple negative G3 invasive left breast cancer status post lumpectomy and SLNBx, revealing [id number] disease followed by adjuvant chemotherapy completed on 7/10/19 now referred for adjuvant radiation therapy.     Tolerating radiation therapy well.  All questions and concerns addressed.    Plan:   1. Continue current therapy.    2. Continue skin care.   3. Ibuprofen PRN pain. Hydrocortisone PRN pruritus. Neosporin with pain relief PRN pain.      Mosaiq chart and setup information reviewed  Ports checked                Shan Salvador MD

## 2019-08-22 ENCOUNTER — APPOINTMENT (OUTPATIENT)
Dept: RADIATION THERAPY | Facility: OUTPATIENT CENTER | Age: 59
End: 2019-08-22
Payer: COMMERCIAL

## 2019-08-23 ENCOUNTER — APPOINTMENT (OUTPATIENT)
Dept: RADIATION THERAPY | Facility: OUTPATIENT CENTER | Age: 59
End: 2019-08-23
Payer: COMMERCIAL

## 2019-08-23 ENCOUNTER — INFUSION THERAPY VISIT (OUTPATIENT)
Dept: INFUSION THERAPY | Facility: CLINIC | Age: 59
End: 2019-08-23
Attending: INTERNAL MEDICINE
Payer: COMMERCIAL

## 2019-08-23 DIAGNOSIS — C50.112 INFILTRATING DUCTAL CARCINOMA OF CENTRAL PORTION OF LEFT BREAST IN FEMALE (H): Primary | ICD-10-CM

## 2019-08-23 PROCEDURE — 99207 ZZC NO CHARGE NURSE ONLY: CPT

## 2019-08-23 PROCEDURE — 96523 IRRIG DRUG DELIVERY DEVICE: CPT

## 2019-08-23 PROCEDURE — 25000128 H RX IP 250 OP 636

## 2019-08-23 RX ORDER — HEPARIN SODIUM (PORCINE) LOCK FLUSH IV SOLN 100 UNIT/ML 100 UNIT/ML
SOLUTION INTRAVENOUS
Status: COMPLETED
Start: 2019-08-23 | End: 2019-08-23

## 2019-08-23 RX ORDER — HEPARIN SODIUM (PORCINE) LOCK FLUSH IV SOLN 100 UNIT/ML 100 UNIT/ML
5 SOLUTION INTRAVENOUS
Status: DISCONTINUED | OUTPATIENT
Start: 2019-08-23 | End: 2019-08-23 | Stop reason: HOSPADM

## 2019-08-23 RX ADMIN — Medication 5 ML: at 16:02

## 2019-08-23 RX ADMIN — HEPARIN SODIUM (PORCINE) LOCK FLUSH IV SOLN 100 UNIT/ML 5 ML: 100 SOLUTION at 16:02

## 2019-08-23 NOTE — PROGRESS NOTES
Infusion Nursing Note:  Monika Nam presents today for port flush.    Patient seen by provider today: No   present during visit today: Not Applicable.    Note: N/A.    Intravenous Access:  Implanted Port.    Treatment Conditions:  Not Applicable.      Post Infusion Assessment:  Site patent and intact, free from redness, edema or discomfort. Port flushed per protocol.      Discharge Plan:   Patient and/or family verbalized understanding of discharge instructions and all questions answered.  Patient discharged in stable condition accompanied by: self.  Departure Mode: Ambulatory.    Abbi Villalta, RN, RN

## 2019-08-26 ENCOUNTER — APPOINTMENT (OUTPATIENT)
Dept: RADIATION THERAPY | Facility: OUTPATIENT CENTER | Age: 59
End: 2019-08-26
Payer: COMMERCIAL

## 2019-08-27 ENCOUNTER — APPOINTMENT (OUTPATIENT)
Dept: RADIATION THERAPY | Facility: OUTPATIENT CENTER | Age: 59
End: 2019-08-27
Payer: COMMERCIAL

## 2019-08-28 ENCOUNTER — APPOINTMENT (OUTPATIENT)
Dept: RADIATION THERAPY | Facility: OUTPATIENT CENTER | Age: 59
End: 2019-08-28
Payer: COMMERCIAL

## 2019-08-28 ENCOUNTER — OFFICE VISIT (OUTPATIENT)
Dept: RADIATION THERAPY | Facility: OUTPATIENT CENTER | Age: 59
End: 2019-08-28
Payer: COMMERCIAL

## 2019-08-28 VITALS
BODY MASS INDEX: 27.84 KG/M2 | WEIGHT: 188.6 LBS | SYSTOLIC BLOOD PRESSURE: 138 MMHG | DIASTOLIC BLOOD PRESSURE: 84 MMHG | HEART RATE: 83 BPM

## 2019-08-28 DIAGNOSIS — Z17.1 ESTROGEN RECEPTOR NEGATIVE CARCINOMA OF BREAST, LEFT (H): Primary | ICD-10-CM

## 2019-08-28 DIAGNOSIS — C50.912 ESTROGEN RECEPTOR NEGATIVE CARCINOMA OF BREAST, LEFT (H): Primary | ICD-10-CM

## 2019-08-28 NOTE — LETTER
2019      RE: Monika Nam  [address] [address] [facility] [address]       [facility] PHYSICIANS  SPECIALIZING IN BREAKTHROUGHS  Radiation Oncology    On Treatment Visit Note      Monika Nam      Date: 2019   MRN: [id number]   : 1960         Reason for Visit:  On Radiation Treatment Visit     Treatment Summary to Date   Left breast   2926 cGy / 5256 cGy         Subjective:   Doing well. No acute pain. No pruritus. Energy adequate. Using aquaphor.      Nursing ROS:        Objective:    Skin with mild erythema without desquamation.     Labs:  CBC RESULTS:   Recent Labs   Lab Test 19  1410   WBC 7.5   RBC 3.42*   HGB 10.7*   HCT 32.8*   MCV 96   MCH 31.3   MCHC 32.6   RDW 14.9        ELECTROLYTES:  Recent Labs   Lab Test 19  1410   *   POTASSIUM 3.7   CHLORIDE 98   DANI 8.2*   CO2 23   BUN 10   CR 0.64   GLC 91       Assessment:  59 year old woman with triple negative G3 invasive left breast cancer status post lumpectomy and SLNBx, revealing [id number] disease followed by adjuvant chemotherapy completed on 7/10/19 now referred for adjuvant radiation therapy.     Tolerating radiation therapy well.  All questions and concerns addressed.    Plan:   1. Continue current therapy.    2. Continue skin care.   3. Ibuprofen PRN pain. Hydrocortisone PRN pruritus. Neosporin with pain relief PRN pain.      Mosaiq chart and setup information reviewed  Ports checked         Shan Salvador MD

## 2019-08-28 NOTE — PROGRESS NOTES
[facility] PHYSICIANS  SPECIALIZING IN BREAKTHROUGHS  Radiation Oncology    On Treatment Visit Note      Monika Nam      Date: 2019   MRN: [id number]   : 1960         Reason for Visit:  On Radiation Treatment Visit     Treatment Summary to Date   Left breast   2926 cGy / 5256 cGy         Subjective:   Doing well. No acute pain. No pruritus. Energy adequate. Using aquaphor.      Nursing ROS:        Objective:    Skin with mild erythema without desquamation.     Labs:  CBC RESULTS:   Recent Labs   Lab Test 19  1410   WBC 7.5   RBC 3.42*   HGB 10.7*   HCT 32.8*   MCV 96   MCH 31.3   MCHC 32.6   RDW 14.9        ELECTROLYTES:  Recent Labs   Lab Test 19  1410   *   POTASSIUM 3.7   CHLORIDE 98   DANI 8.2*   CO2 23   BUN 10   CR 0.64   GLC 91       Assessment:  59 year old woman with triple negative G3 invasive left breast cancer status post lumpectomy and SLNBx, revealing [id number] disease followed by adjuvant chemotherapy completed on 7/10/19 now referred for adjuvant radiation therapy.     Tolerating radiation therapy well.  All questions and concerns addressed.    Plan:   1. Continue current therapy.    2. Continue skin care.   3. Ibuprofen PRN pain. Hydrocortisone PRN pruritus. Neosporin with pain relief PRN pain.      Mosaiq chart and setup information reviewed  Ports checked                Shan Salvador MD

## 2019-08-29 ENCOUNTER — APPOINTMENT (OUTPATIENT)
Dept: RADIATION THERAPY | Facility: OUTPATIENT CENTER | Age: 59
End: 2019-08-29
Payer: COMMERCIAL

## 2019-08-30 ENCOUNTER — APPOINTMENT (OUTPATIENT)
Dept: RADIATION THERAPY | Facility: OUTPATIENT CENTER | Age: 59
End: 2019-08-30
Payer: COMMERCIAL

## 2019-09-03 ENCOUNTER — APPOINTMENT (OUTPATIENT)
Dept: RADIATION THERAPY | Facility: OUTPATIENT CENTER | Age: 59
End: 2019-09-03
Payer: COMMERCIAL

## 2019-09-04 ENCOUNTER — OFFICE VISIT (OUTPATIENT)
Dept: RADIATION THERAPY | Facility: OUTPATIENT CENTER | Age: 59
End: 2019-09-04
Payer: COMMERCIAL

## 2019-09-04 ENCOUNTER — APPOINTMENT (OUTPATIENT)
Dept: RADIATION THERAPY | Facility: OUTPATIENT CENTER | Age: 59
End: 2019-09-04
Payer: COMMERCIAL

## 2019-09-04 VITALS
SYSTOLIC BLOOD PRESSURE: 123 MMHG | OXYGEN SATURATION: 98 % | HEART RATE: 91 BPM | WEIGHT: 188.6 LBS | DIASTOLIC BLOOD PRESSURE: 79 MMHG | BODY MASS INDEX: 27.84 KG/M2 | RESPIRATION RATE: 18 BRPM

## 2019-09-04 DIAGNOSIS — C50.912 ESTROGEN RECEPTOR NEGATIVE CARCINOMA OF BREAST, LEFT (H): Primary | ICD-10-CM

## 2019-09-04 DIAGNOSIS — Z17.1 ESTROGEN RECEPTOR NEGATIVE CARCINOMA OF BREAST, LEFT (H): Primary | ICD-10-CM

## 2019-09-04 NOTE — PROGRESS NOTES
[facility] PHYSICIANS  SPECIALIZING IN BREAKTHROUGHS  Radiation Oncology    On Treatment Visit Note      Monika Nam      Date: 2019   MRN: [id number]   : 1960         Reason for Visit:  On Radiation Treatment Visit     Treatment Summary to Date   Left breast   3990 cGy / 5256 cGy  15/20       Subjective:   Doing well. Slight increase in breast heaviness sensation and mild discomfort. No pruritus. More fatigued, going to sleep earlier at night. Using aquaphor.      Nursing ROS:        Objective:    Skin with mild erythema without desquamation. Slightly more edematous.    Labs:  CBC RESULTS:   Recent Labs   Lab Test 19  1410   WBC 7.5   RBC 3.42*   HGB 10.7*   HCT 32.8*   MCV 96   MCH 31.3   MCHC 32.6   RDW 14.9        ELECTROLYTES:  Recent Labs   Lab Test 19  1410   *   POTASSIUM 3.7   CHLORIDE 98   DANI 8.2*   CO2 23   BUN 10   CR 0.64   GLC 91       Assessment:  59 year old woman with triple negative G3 invasive left breast cancer status post lumpectomy and SLNBx, revealing [id number] disease followed by adjuvant chemotherapy completed on 7/10/19 now referred for adjuvant radiation therapy.     Tolerating radiation therapy well.  All questions and concerns addressed.    Plan:   1. Continue current therapy.    2. Continue skin care.   3. Ibuprofen PRN pain/ breast edema. Hydrocortisone PRN pruritus. Neosporin with pain relief PRN pain.      Mosaiq chart and setup information reviewed  Ports checked                Shan Salvador MD

## 2019-09-04 NOTE — LETTER
2019      RE: Monika Nam  [address] [address] [facility] [address]       [facility] PHYSICIANS  SPECIALIZING IN BREAKTHROUGHS  Radiation Oncology    On Treatment Visit Note      Monika Nam      Date: 2019   MRN: [id number]   : 1960         Reason for Visit:  On Radiation Treatment Visit     Treatment Summary to Date   Left breast   3990 cGy / 5256 cGy  15/20       Subjective:   Doing well. Slight increase in breast heaviness sensation and mild discomfort. No pruritus. More fatigued, going to sleep earlier at night. Using aquaphor.      Nursing ROS:        Objective:    Skin with mild erythema without desquamation. Slightly more edematous.    Labs:  CBC RESULTS:   Recent Labs   Lab Test 19  1410   WBC 7.5   RBC 3.42*   HGB 10.7*   HCT 32.8*   MCV 96   MCH 31.3   MCHC 32.6   RDW 14.9        ELECTROLYTES:  Recent Labs   Lab Test 19  1410   *   POTASSIUM 3.7   CHLORIDE 98   DANI 8.2*   CO2 23   BUN 10   CR 0.64   GLC 91       Assessment:  59 year old woman with triple negative G3 invasive left breast cancer status post lumpectomy and SLNBx, revealing [id number] disease followed by adjuvant chemotherapy completed on 7/10/19 now referred for adjuvant radiation therapy.     Tolerating radiation therapy well.  All questions and concerns addressed.    Plan:   1. Continue current therapy.    2. Continue skin care.   3. Ibuprofen PRN pain/ breast edema. Hydrocortisone PRN pruritus. Neosporin with pain relief PRN pain.      Mosaiq chart and setup information reviewed  Ports checked       Shan Salvador MD          
no

## 2019-09-05 ENCOUNTER — APPOINTMENT (OUTPATIENT)
Dept: RADIATION THERAPY | Facility: OUTPATIENT CENTER | Age: 59
End: 2019-09-05
Payer: COMMERCIAL

## 2019-09-06 ENCOUNTER — APPOINTMENT (OUTPATIENT)
Dept: RADIATION THERAPY | Facility: OUTPATIENT CENTER | Age: 59
End: 2019-09-06
Payer: COMMERCIAL

## 2019-09-09 ENCOUNTER — APPOINTMENT (OUTPATIENT)
Dept: RADIATION THERAPY | Facility: OUTPATIENT CENTER | Age: 59
End: 2019-09-09
Payer: COMMERCIAL

## 2019-09-10 ENCOUNTER — APPOINTMENT (OUTPATIENT)
Dept: RADIATION THERAPY | Facility: OUTPATIENT CENTER | Age: 59
End: 2019-09-10
Payer: COMMERCIAL

## 2019-09-11 ENCOUNTER — APPOINTMENT (OUTPATIENT)
Dept: RADIATION THERAPY | Facility: OUTPATIENT CENTER | Age: 59
End: 2019-09-11
Payer: COMMERCIAL

## 2019-09-11 ENCOUNTER — OFFICE VISIT (OUTPATIENT)
Dept: RADIATION THERAPY | Facility: OUTPATIENT CENTER | Age: 59
End: 2019-09-11
Payer: COMMERCIAL

## 2019-09-11 DIAGNOSIS — Z17.1 ESTROGEN RECEPTOR NEGATIVE CARCINOMA OF BREAST, LEFT (H): Primary | ICD-10-CM

## 2019-09-11 DIAGNOSIS — C50.912 ESTROGEN RECEPTOR NEGATIVE CARCINOMA OF BREAST, LEFT (H): Primary | ICD-10-CM

## 2019-09-11 NOTE — PROGRESS NOTES
[facility] PHYSICIANS  SPECIALIZING IN BREAKTHROUGHS  Radiation Oncology    On Treatment Visit Note      Monika Nam      Date: 2019   MRN: [id number]   : 1960         Reason for Visit:  On Radiation Treatment Visit     Treatment Summary to Date   Left breast   5256 cGy / 5256 cGy         Subjective:   Doing well. Slight increase in breast heaviness sensation and mild discomfort. No pruritus. More fatigued, going to sleep earlier at night. Using aquaphor.      Nursing ROS:        Objective:    Skin with mild erythema without desquamation. Slightly more edematous.    Labs:  CBC RESULTS:   Recent Labs   Lab Test 19  1410   WBC 7.5   RBC 3.42*   HGB 10.7*   HCT 32.8*   MCV 96   MCH 31.3   MCHC 32.6   RDW 14.9        ELECTROLYTES:  Recent Labs   Lab Test 19  1410   *   POTASSIUM 3.7   CHLORIDE 98   DANI 8.2*   CO2 23   BUN 10   CR 0.64   GLC 91       Assessment:  59 year old woman with triple negative G3 invasive left breast cancer status post lumpectomy and SLNBx, revealing [id number] disease followed by adjuvant chemotherapy completed on 7/10/19 now referred for adjuvant radiation therapy.     Tolerating radiation therapy well.  All questions and concerns addressed.    Plan:   1. Continue current therapy.  EOT today, RTC in 1 month with NP.  2. Continue skin care.   3. Ibuprofen PRN pain/ breast edema. Hydrocortisone PRN pruritus. Neosporin with pain relief PRN pain.      Mosaiq chart and setup information reviewed  Ports checked                Shan Salvador MD

## 2019-09-11 NOTE — LETTER
2019      RE: Monika Nam  [address] [address] [address]       [facility] PHYSICIANS  SPECIALIZING IN BREAKTHROUGHS  Radiation Oncology    On Treatment Visit Note      Monika Nam      Date: 2019   MRN: [id number]   : 1960         Reason for Visit:  On Radiation Treatment Visit     Treatment Summary to Date   Left breast   5256 cGy / 5256 cGy         Subjective:   Doing well. Slight increase in breast heaviness sensation and mild discomfort. No pruritus. More fatigued, going to sleep earlier at night. Using aquaphor.      Nursing ROS:        Objective:    Skin with mild erythema without desquamation. Slightly more edematous.    Labs:  CBC RESULTS:   Recent Labs   Lab Test 19  1410   WBC 7.5   RBC 3.42*   HGB 10.7*   HCT 32.8*   MCV 96   MCH 31.3   MCHC 32.6   RDW 14.9        ELECTROLYTES:  Recent Labs   Lab Test 19  1410   *   POTASSIUM 3.7   CHLORIDE 98   DANI 8.2*   CO2 23   BUN 10   CR 0.64   GLC 91       Assessment:  59 year old woman with triple negative G3 invasive left breast cancer status post lumpectomy and SLNBx, revealing [id number] disease followed by adjuvant chemotherapy completed on 7/10/19 now referred for adjuvant radiation therapy.     Tolerating radiation therapy well.  All questions and concerns addressed.    Plan:   1. Continue current therapy.  EOT today, RTC in 1 month with NP.  2. Continue skin care.   3. Ibuprofen PRN pain/ breast edema. Hydrocortisone PRN pruritus. Neosporin with pain relief PRN pain.      Mosaiq chart and setup information reviewed  Ports checked        Shan Salvador MD

## 2019-09-18 ENCOUNTER — DOCUMENTATION ONLY (OUTPATIENT)
Dept: RADIATION THERAPY | Facility: OUTPATIENT CENTER | Age: 59
End: 2019-09-18

## 2019-09-18 NOTE — PROGRESS NOTES
Department of Radiation Oncology  Radiation Therapy Center  [facility] [place] [address]  [phone]       Radiotherapy Treatment Summary          2019    PATIENT: Monika Nam  MEDICAL RECORD NO: [id number]   : 1960    DIAGNOSIS: triple negative G3 invasive left breast cancer status post lumpectomy and SLNBx, revealing [id number] disease followed by adjuvant chemotherapy completed on 7/10/19  INTENT OF RADIOTHERAPY: adjuvant radiation therapy.  PATHOLOGY: Pathology revealed a single focus of 1.8cm G3 IDCA, no DCIS/LVSI. 0 out of 1 positive lymph node.                              STAGE: [id number]   CONCURRENT SYSTEMIC THERAPY: No    ONCOLOGIC HISTORY:   59 year old woman with triple negative G3 invasive left breast cancer status post lumpectomy and SLNBx, revealing [id number] disease followed by adjuvant chemotherapy completed on 7/10/19.     Monika Nam was in her usual state of health last winter. On 18 screening MMG showed a new 1.4cm focal asymmetry in the left breast at the 1:00 position 10.7cm FN. The right mmg was stable. Left breast US on 19 confirmed a 1.6cm nodule. Additional US of the left axilla showed no abnormal lymph nodes. On 18 a left breast US guided biopsy revealed G3 IDCA with no DCIS/LVSI, ER-/AL-/H2N-. On 19 she was taken to the OR for wire localized lumpectomy and SLNBx. Pathology revealed a single focus of 1.8cm G3 IDCA, no DCIS/LVSI. 0 out of 1 positive lymph node. Clear invasive margins with the closest margin being 8mm. Left breast specimen radiograph showed the lesion, needle core biopsy marker, and hookwire were all present. Postoperative course was marked by inflammatory reaction from lymph node procedure dye but this has resolved.  She was seen by Ha Bunyd and Christian and subsequently received ddAC x 4 followed by Taxol x 12 from 19 to 7/10/19.      SITE OF TREATMENT: left breast    DATES  OF  TREATMENT: 8/13/19-9/11/19    TOTAL DOSE OF TREATMENT: 5256 cGy    DOSE PER FRACTION OF TREATMENT: 266 cGy x 16 fractions to left breast + 250 cGy x 4 fractions left breast boost       COMMENT/TOXICITY:   Slight increase in breast heaviness sensation and mild discomfort. No pruritus. More fatigued, going to sleep earlier at night. Using aquaphor.     Skin with mild erythema without desquamation. Slightly more edematous.       PAIN MANAGEMENT:     Ibuprofen PRN pain/ breast edema. Neosporin with pain relief PRN pain.                       FOLLOW UP PLAN:  1. RTC in 1 month with NP.  2. Continue skin care.   3.    Hydrocortisone PRN pruritus.      Radiation Oncologist: Shan Salvador M.D.  Department of Radiation Oncology  [facility]

## 2019-09-23 ENCOUNTER — INFUSION THERAPY VISIT (OUTPATIENT)
Dept: INFUSION THERAPY | Facility: CLINIC | Age: 59
End: 2019-09-23
Attending: INTERNAL MEDICINE
Payer: COMMERCIAL

## 2019-09-23 DIAGNOSIS — C50.112 INFILTRATING DUCTAL CARCINOMA OF CENTRAL PORTION OF LEFT BREAST IN FEMALE (H): Primary | ICD-10-CM

## 2019-09-23 PROCEDURE — 96523 IRRIG DRUG DELIVERY DEVICE: CPT

## 2019-09-23 PROCEDURE — 25000128 H RX IP 250 OP 636

## 2019-09-23 RX ORDER — HEPARIN SODIUM (PORCINE) LOCK FLUSH IV SOLN 100 UNIT/ML 100 UNIT/ML
SOLUTION INTRAVENOUS
Status: COMPLETED
Start: 2019-09-23 | End: 2019-09-23

## 2019-09-23 RX ADMIN — Medication 500 UNITS: at 16:03

## 2019-09-25 ENCOUNTER — DOCUMENTATION ONLY (OUTPATIENT)
Dept: ONCOLOGY | Facility: CLINIC | Age: 59
End: 2019-09-25

## 2019-09-25 NOTE — PROGRESS NOTES
Long term disability papers completed and faxed to the [employer] per patient request. Copy of papers sent to [facility], original placed in mail per patient request. Sayda Peace RN, BSN, OCN on 9/25/2019 at 10:13 AM

## 2019-10-03 ENCOUNTER — PATIENT OUTREACH (OUTPATIENT)
Dept: ONCOLOGY | Facility: CLINIC | Age: 59
End: 2019-10-03

## 2019-10-08 ENCOUNTER — OFFICE VISIT (OUTPATIENT)
Dept: RADIATION THERAPY | Facility: OUTPATIENT CENTER | Age: 59
End: 2019-10-08
Payer: COMMERCIAL

## 2019-10-08 VITALS
WEIGHT: 186.6 LBS | BODY MASS INDEX: 27.54 KG/M2 | RESPIRATION RATE: 16 BRPM | HEART RATE: 91 BPM | SYSTOLIC BLOOD PRESSURE: 127 MMHG | DIASTOLIC BLOOD PRESSURE: 81 MMHG

## 2019-10-08 DIAGNOSIS — C50.112 INFILTRATING DUCTAL CARCINOMA OF CENTRAL PORTION OF LEFT BREAST IN FEMALE (H): Primary | ICD-10-CM

## 2019-10-08 ASSESSMENT — PAIN SCALES - GENERAL: PAINLEVEL: NO PAIN (0)

## 2019-10-08 NOTE — LETTER
10/8/2019      RE: Monika Nam  [address] [address] [address]          Department of Radiation Oncology  Radiation Therapy Center  [facility] [place] [place] [address] [phone]         Radiation Oncology Follow-up Visit  2019    Monika Nam  MRN: [id number]   : 1960     DIAGNOSIS: triple negative G3 invasive left breast cancer status post lumpectomy and SLNBx, revealing bR7sT2V5 disease followed by adjuvant chemotherapy completed on 7/10/19  INTENT OF RADIOTHERAPY: adjuvant radiation therapy.  PATHOLOGY: Pathology revealed a single focus of 1.8cm G3 IDCA, no DCIS/LVSI. 0 out of 1 positive lymph node.                              STAGE: [id number]   CONCURRENT SYSTEMIC THERAPY: No     ONCOLOGIC HISTORY:   59 year old woman with triple negative G3 invasive left breast cancer status post lumpectomy and SLNBx, revealing [id number] disease followed by adjuvant chemotherapy completed on 7/10/19.      Moniak Nam was in her usual state of health last winter. On 18 screening MMG showed a new 1.4cm focal asymmetry in the left breast at the 1:00 position 10.7cm FN. The right mmg was stable. Left breast US on 19 confirmed a 1.6cm nodule. Additional US of the left axilla showed no abnormal lymph nodes. On 18 a left breast US guided biopsy revealed G3 IDCA with no DCIS/LVSI, ER-/ME-/H2N-. On 19 she was taken to the OR for wire localized lumpectomy and SLNBx. Pathology revealed a single focus of 1.8cm G3 IDCA, no DCIS/LVSI. 0 out of 1 positive lymph node. Clear invasive margins with the closest margin being 8mm. Left breast specimen radiograph showed the lesion, needle core biopsy marker, and hookwire were all present. Postoperative course was marked by inflammatory reaction from lymph node procedure dye but this has resolved.  She was seen by Ha Bundy and Christian and subsequently received ddAC x 4 followed by Taxol x 12 from 19  to 7/10/19.       SITE OF TREATMENT: left breast     DATES  OF TREATMENT: 19-19             TOTAL DOSE OF TREATMENT: 5256 cGy     DOSE PER FRACTION OF TREATMENT: 266 cGy x 16 fractions to left breast + 250 cGy x 4 fractions left breast boost       INTERVAL SINCE COMPLETION OF RADIATION THERAPY:   1 month    SUBJECTIVE:   Monika Nam is a 59 year old female who is here today for routine 1 month follow up after completing radiation therapy.  She has seen healing of her skin reaction.  She continues to have hyperpigmentation to skin of the left breast. She continues to moisturize 1-2 times daily.  Denies any cough or shortness of breath related to possible risk of interstitial pneumonitis. Good ROM. Mild fatigue improving. No lymphedema. No pain.      ROS otherwise negative on a 12-system review.  ECO       Current Outpatient Medications   Medication     acetaminophen (TYLENOL) 500 MG tablet     albuterol (PROAIR HFA/PROVENTIL HFA/VENTOLIN HFA) 108 (90 Base) MCG/ACT inhaler     aspirin 81 MG tablet     augmented betamethasone dipropionate (DIPROLENE-AF) 0.05 % cream     calcium-vitamin D (CALCIUM 600-D) 600-400 MG-UNIT per tablet     FLUoxetine (PROZAC) 20 MG capsule     loratadine (CLARITIN) 10 MG tablet     MULTIVITAMIN TABS   OR     ondansetron (ZOFRAN) 4 MG tablet     pseudoePHEDrine (SUDAFED) 30 MG tablet     Pyridoxine HCl (VITAMIN B6 PO)     ranitidine (ZANTAC) 150 MG tablet     No current facility-administered medications for this visit.           Allergies   Allergen Reactions     Sulfa Drugs Swelling       Past Medical History:   Diagnosis Date     Abnormal Papanicolaou smear of cervix and cervical HPV     history of LEEP ion      ASCUS favor benign 2013, 2014    Neg high risk HPV      Cancer (H) 2019    breast     Cervical high risk HPV (human papillomavirus) test positive 12    type 66     Depressive disorder      PONV (postoperative nausea and vomiting)           PHYSICAL EXAM:  /81 (BP Location: Right arm, Cuff Size: Adult Large)   Pulse 91   Resp 16   Wt 84.6 kg (186 lb 9.6 oz)   LMP 04/02/2014   BMI 27.54 kg/m     General: in no acute distress, alert and oriented x3.   HEENT: Normocephalic, atraumatic.  PERRLA, oropharynx clear with no mucositis or thrush.   Lymph Nodes: No palpable pre/post-auricular, cervical, or axillary lymphadenopathy appreciated.   CV: RRR, normal S1 S2.  No murmurs, gallops, or rubs.   Lungs: Clear to auscultation bilaterally.  No dullness to percussion.   Abdomen:  Soft and non tender. No palpable masses.    Extremities: no clubbing, cyanosis, or edema.   Neurologic: Alert and oriented x3. Cranial nerves II-XII grossly intact.   Breast:left breast noted to have mild hyperpigmentation and minimal edema consistent with radiation changes. Well healed incisions. No concerning lumps or masses bilaterally.     LABS AND IMAGING:  none    IMPRESSION:   Ms. Nam is a 59 year old female with triple negative G3 invasive left breast cancer status post lumpectomy and SLNBx, revealing fE8gN3I3 disease followed by adjuvant chemotherapy completed on 7/10/19. Completed adjuvant radiation therapy to left breast 5256 cGy on 9/11/19.    Here for one month follow-up.     PLAN:   Acute toxicities from RT improving. Has mild residual hyperpigmention and minimal edema which will continue to improve with time. Mild fatigue continues to improve.  Discussed long term care with continued moisturizing of the treated breast.  Discussed possibility of scar formation from radiation and treatment with gentle massage. She will follow up here 6 months with Dr. Salvador (alternate).     Patient will follow up with medical oncology for continued care and imaging ( (3mths follow-up).  According to NCCN guidelines, follow-up of breast cancer should include history and physical examination with emphasis on breast examination every 4-12 months for 5 years as  clinically appropriate then annually thereafter. Last screening bilateral mammogram was 11/2018. Emphasis is also on continuing with annual mammography. Patient to discuss mammogram at next visit with Dr. Gonzales (10/16).    Lymphedema.She remains at low risk for lymphedema.  She denies lymphedema at this time, but she will contact the clinic if she needs a referral to the lymphedema specialist.     Fatigue: Recommend regular exercise. Should continue to improve.     Genetics.  She had genetics counseling 5/2019 and was negative.    Cancer screening.  should undergo routine screening for age group. She should have colonoscopies at least at the age of 50, could consider doing one at this time given the new guidelines. Patient did have one scheduled at time of diagnosis but cancelled the appt and plans to reschedule. Could consider low dose lung CT for smoking history (patient to discuss with PCP).     Healthy lifestyle.   Recommend smoking cessation.   She will continue to see her primary care provider for general health maintenance.    Abigail Velez CNP  Radiation Therapy Center  [facility]  [address], [address]  [place] [address]       HAYLIE Stevenson CNP

## 2019-10-08 NOTE — NURSING NOTE
FOLLOW-UP VISIT    Patient Name: Monika Nam      : 1960     Age: 59 year old        ______________________________________________________________________________     Chief Complaint   Patient presents with     Radiation Therapy     Return visit, radiation therapy for breast cancer     /81 (BP Location: Right arm, Cuff Size: Adult Large)   Pulse 91   Resp 16   Wt 84.6 kg (186 lb 9.6 oz)   LMP 2014   BMI 27.54 kg/m       Pain  Denies    Meds  Current Med List Reviewed: Yes  Medication Note:     Skin: some discoloration yet, using aquaphor    Range of Motion: full    Respiratory: No shortness of breath, dyspnea on exertion, cough, or hemoptysis    Hormone Therapy: N/A triple negative    Lymphedema Follow up: No, has noticed pocket of swelling over breast incision site   Energy Level: normal      Appointments:     DATE  Oncologist: christophe Sees next week   Surgeon:    Primary:      Other Notes:

## 2019-10-08 NOTE — PROGRESS NOTES
Department of Radiation Oncology  Radiation Therapy Center  [facility] [place] [address]  [phone]         Radiation Oncology Follow-up Visit  2019    Monika Nam  MRN: [id number]   : 1960     DIAGNOSIS: triple negative G3 invasive left breast cancer status post lumpectomy and SLNBx, revealing [id number] disease followed by adjuvant chemotherapy completed on 7/10/19  INTENT OF RADIOTHERAPY: adjuvant radiation therapy.  PATHOLOGY: Pathology revealed a single focus of 1.8cm G3 IDCA, no DCIS/LVSI. 0 out of 1 positive lymph node.                              STAGE: [id number]   CONCURRENT SYSTEMIC THERAPY: No     ONCOLOGIC HISTORY:   59 year old woman with triple negative G3 invasive left breast cancer status post lumpectomy and SLNBx, revealing [id number] disease followed by adjuvant chemotherapy completed on 7/10/19.      Monika Nam was in her usual state of health last winter. On 18 screening MMG showed a new 1.4cm focal asymmetry in the left breast at the 1:00 position 10.7cm FN. The right mmg was stable. Left breast US on 19 confirmed a 1.6cm nodule. Additional US of the left axilla showed no abnormal lymph nodes. On 18 a left breast US guided biopsy revealed G3 IDCA with no DCIS/LVSI, ER-/NV-/H2N-. On 19 she was taken to the OR for wire localized lumpectomy and SLNBx. Pathology revealed a single focus of 1.8cm G3 IDCA, no DCIS/LVSI. 0 out of 1 positive lymph node. Clear invasive margins with the closest margin being 8mm. Left breast specimen radiograph showed the lesion, needle core biopsy marker, and hookwire were all present. Postoperative course was marked by inflammatory reaction from lymph node procedure dye but this has resolved.  She was seen by Ha Bundy and Christian and subsequently received ddAC x 4 followed by Taxol x 12 from 19 to 7/10/19.       SITE OF TREATMENT: left breast     DATES  OF TREATMENT: 19-19              TOTAL DOSE OF TREATMENT: 5256 cGy     DOSE PER FRACTION OF TREATMENT: 266 cGy x 16 fractions to left breast + 250 cGy x 4 fractions left breast boost       INTERVAL SINCE COMPLETION OF RADIATION THERAPY:   1 month    SUBJECTIVE:   Monika Nam is a 59 year old female who is here today for routine 1 month follow up after completing radiation therapy.  She has seen healing of her skin reaction.  She continues to have hyperpigmentation to skin of the left breast. She continues to moisturize 1-2 times daily.  Denies any cough or shortness of breath related to possible risk of interstitial pneumonitis. Good ROM. Mild fatigue improving. No lymphedema. No pain.      ROS otherwise negative on a 12-system review.  ECO       Current Outpatient Medications   Medication     acetaminophen (TYLENOL) 500 MG tablet     albuterol (PROAIR HFA/PROVENTIL HFA/VENTOLIN HFA) 108 (90 Base) MCG/ACT inhaler     aspirin 81 MG tablet     augmented betamethasone dipropionate (DIPROLENE-AF) 0.05 % cream     calcium-vitamin D (CALCIUM 600-D) 600-400 MG-UNIT per tablet     FLUoxetine (PROZAC) 20 MG capsule     loratadine (CLARITIN) 10 MG tablet     MULTIVITAMIN TABS   OR     ondansetron (ZOFRAN) 4 MG tablet     pseudoePHEDrine (SUDAFED) 30 MG tablet     Pyridoxine HCl (VITAMIN B6 PO)     ranitidine (ZANTAC) 150 MG tablet     No current facility-administered medications for this visit.           Allergies   Allergen Reactions     Sulfa Drugs Swelling       Past Medical History:   Diagnosis Date     Abnormal Papanicolaou smear of cervix and cervical HPV     history of LEEP ion      ASCUS favor benign 2013, 2014    Neg high risk HPV      Cancer (H) 2019    breast     Cervical high risk HPV (human papillomavirus) test positive 12    type 66     Depressive disorder      PONV (postoperative nausea and vomiting)          PHYSICAL EXAM:  /81 (BP Location: Right arm, Cuff Size: Adult Large)   Pulse 91   Resp 16    Wt 84.6 kg (186 lb 9.6 oz)   LMP 04/02/2014   BMI 27.54 kg/m    General: in no acute distress, alert and oriented x3.   HEENT: Normocephalic, atraumatic.  PERRLA, oropharynx clear with no mucositis or thrush.   Lymph Nodes: No palpable pre/post-auricular, cervical, or axillary lymphadenopathy appreciated.   CV: RRR, normal S1 S2.  No murmurs, gallops, or rubs.   Lungs: Clear to auscultation bilaterally.  No dullness to percussion.   Abdomen:  Soft and non tender. No palpable masses.    Extremities: no clubbing, cyanosis, or edema.   Neurologic: Alert and oriented x3. Cranial nerves II-XII grossly intact.   Breast:left breast noted to have mild hyperpigmentation and minimal edema consistent with radiation changes. Well healed incisions. No concerning lumps or masses bilaterally.     LABS AND IMAGING:  none    IMPRESSION:   Ms. Nam is a 59 year old female with triple negative G3 invasive left breast cancer status post lumpectomy and SLNBx, revealing [id number] disease followed by adjuvant chemotherapy completed on 7/10/19. Completed adjuvant radiation therapy to left breast 5256 cGy on 9/11/19.    Here for one month follow-up.     PLAN:   Acute toxicities from RT improving. Has mild residual hyperpigmention and minimal edema which will continue to improve with time. Mild fatigue continues to improve.  Discussed long term care with continued moisturizing of the treated breast.  Discussed possibility of scar formation from radiation and treatment with gentle massage. She will follow up here 6 months with Dr. Salvador (alternate).     Patient will follow up with medical oncology for continued care and imaging ( (3mths follow-up).  According to NCCN guidelines, follow-up of breast cancer should include history and physical examination with emphasis on breast examination every 4-12 months for 5 years as clinically appropriate then annually thereafter. Last screening bilateral mammogram was 11/2018. Emphasis is  also on continuing with annual mammography. Patient to discuss mammogram at next visit with Dr. Gonzales (10/16).    Lymphedema.She remains at low risk for lymphedema.  She denies lymphedema at this time, but she will contact the clinic if she needs a referral to the lymphedema specialist.     Fatigue: Recommend regular exercise. Should continue to improve.     Genetics.  She had genetics counseling 5/2019 and was negative.    Cancer screening.  should undergo routine screening for age group. She should have colonoscopies at least at the age of 49, could consider doing one at this time given the new guidelines. Patient did have one scheduled at time of diagnosis but cancelled the appt and plans to reschedule. Could consider low dose lung CT for smoking history (patient to discuss with PCP).     Healthy lifestyle.  Recommend smoking cessation.  She will continue to see her primary care provider for general health maintenance.    Abigail Velez [facility]  Radiation Therapy Center  [facility]  [address], [address]  [place] [address]

## 2019-10-14 ENCOUNTER — INFUSION THERAPY VISIT (OUTPATIENT)
Dept: INFUSION THERAPY | Facility: CLINIC | Age: 59
End: 2019-10-14
Attending: INTERNAL MEDICINE
Payer: COMMERCIAL

## 2019-10-14 ENCOUNTER — HOSPITAL ENCOUNTER (OUTPATIENT)
Facility: CLINIC | Age: 59
Discharge: HOME | End: 2019-10-14
Attending: INTERNAL MEDICINE | Admitting: INTERNAL MEDICINE
Payer: COMMERCIAL

## 2019-10-14 DIAGNOSIS — C50.112 INFILTRATING DUCTAL CARCINOMA OF CENTRAL PORTION OF LEFT BREAST IN FEMALE (H): ICD-10-CM

## 2019-10-14 LAB
ALBUMIN SERPL-MCNC: 3.8 G/DL (ref 3.4–5)
ALP SERPL-CCNC: 105 U/L (ref 40–150)
ALT SERPL W P-5'-P-CCNC: 23 U/L (ref 0–50)
ANION GAP SERPL CALCULATED.3IONS-SCNC: 8 MMOL/L (ref 3–14)
AST SERPL W P-5'-P-CCNC: 21 U/L (ref 0–45)
BASOPHILS # BLD AUTO: 0.1 10E9/L (ref 0–0.2)
BASOPHILS NFR BLD AUTO: 1 %
BILIRUB SERPL-MCNC: 0.6 MG/DL (ref 0.2–1.3)
BUN SERPL-MCNC: 12 MG/DL (ref 7–30)
CALCIUM SERPL-MCNC: 9.2 MG/DL (ref 8.5–10.1)
CANCER AG27-29 SERPL-ACNC: 11 U/ML (ref 0–39)
CHLORIDE SERPL-SCNC: 103 MMOL/L (ref 94–109)
CO2 SERPL-SCNC: 25 MMOL/L (ref 20–32)
CREAT SERPL-MCNC: 0.65 MG/DL (ref 0.52–1.04)
DIFFERENTIAL METHOD BLD: NORMAL
EOSINOPHIL # BLD AUTO: 0.2 10E9/L (ref 0–0.7)
EOSINOPHIL NFR BLD AUTO: 2.5 %
ERYTHROCYTE [DISTWIDTH] IN BLOOD BY AUTOMATED COUNT: 13.5 % (ref 10–15)
GFR SERPL CREATININE-BSD FRML MDRD: >90 ML/MIN/{1.73_M2}
GLUCOSE SERPL-MCNC: 91 MG/DL (ref 70–99)
HCT VFR BLD AUTO: 42.8 % (ref 35–47)
HGB BLD-MCNC: 14 G/DL (ref 11.7–15.7)
IMM GRANULOCYTES # BLD: 0 10E9/L (ref 0–0.4)
IMM GRANULOCYTES NFR BLD: 0.3 %
LYMPHOCYTES # BLD AUTO: 1.3 10E9/L (ref 0.8–5.3)
LYMPHOCYTES NFR BLD AUTO: 20.5 %
MCH RBC QN AUTO: 29.7 PG (ref 26.5–33)
MCHC RBC AUTO-ENTMCNC: 32.7 G/DL (ref 31.5–36.5)
MCV RBC AUTO: 91 FL (ref 78–100)
MONOCYTES # BLD AUTO: 0.7 10E9/L (ref 0–1.3)
MONOCYTES NFR BLD AUTO: 10.8 %
NEUTROPHILS # BLD AUTO: 4 10E9/L (ref 1.6–8.3)
NEUTROPHILS NFR BLD AUTO: 64.9 %
NRBC # BLD AUTO: 0 10*3/UL
NRBC BLD AUTO-RTO: 0 /100
PLATELET # BLD AUTO: 172 10E9/L (ref 150–450)
POTASSIUM SERPL-SCNC: 3.5 MMOL/L (ref 3.4–5.3)
PROT SERPL-MCNC: 7.4 G/DL (ref 6.8–8.8)
RBC # BLD AUTO: 4.72 10E12/L (ref 3.8–5.2)
SODIUM SERPL-SCNC: 136 MMOL/L (ref 133–144)
WBC # BLD AUTO: 6.1 10E9/L (ref 4–11)

## 2019-10-14 PROCEDURE — 86300 IMMUNOASSAY TUMOR CA 15-3: CPT | Performed by: INTERNAL MEDICINE

## 2019-10-14 PROCEDURE — 36591 DRAW BLOOD OFF VENOUS DEVICE: CPT

## 2019-10-14 PROCEDURE — 25000128 H RX IP 250 OP 636

## 2019-10-14 PROCEDURE — 80053 COMPREHEN METABOLIC PANEL: CPT | Performed by: INTERNAL MEDICINE

## 2019-10-14 PROCEDURE — 85025 COMPLETE CBC W/AUTO DIFF WBC: CPT | Performed by: INTERNAL MEDICINE

## 2019-10-14 RX ORDER — HEPARIN SODIUM (PORCINE) LOCK FLUSH IV SOLN 100 UNIT/ML 100 UNIT/ML
SOLUTION INTRAVENOUS
Status: COMPLETED
Start: 2019-10-14 | End: 2019-10-14

## 2019-10-14 RX ADMIN — Medication 500 UNITS: at 15:09

## 2019-10-14 NOTE — PROGRESS NOTES
Infusion Nursing Note:  Monika Nam presents today for PAC labs and flush.     Patient seen by provider today: No   present during visit today: Not Applicable.    Note: N/A.    Intravenous Access:  Labs drawn without difficulty.  Implanted Port.    Treatment Conditions:  Not Applicable.      Post Infusion Assessment:  Patient tolerated PAC labs and flush without incident.  Blood return noted  Site patent and intact, free from redness, edema or discomfort.  No evidence of extravasations.  Access discontinued per protocol.       Discharge Plan:   Patient discharged in stable condition accompanied by: self.  Departure Mode: Ambulatory.  Pt to return on 10/16/19 at 2:15 pm for clinic appt with Dr. Bundy.    Rosana Yarbrough, RN, RN

## 2019-10-16 ENCOUNTER — ONCOLOGY VISIT (OUTPATIENT)
Dept: ONCOLOGY | Facility: CLINIC | Age: 59
End: 2019-10-16
Attending: INTERNAL MEDICINE
Payer: COMMERCIAL

## 2019-10-16 VITALS
OXYGEN SATURATION: 98 % | BODY MASS INDEX: 28.75 KG/M2 | SYSTOLIC BLOOD PRESSURE: 151 MMHG | DIASTOLIC BLOOD PRESSURE: 77 MMHG | HEIGHT: 68 IN | TEMPERATURE: 97.6 F | WEIGHT: 189.7 LBS | RESPIRATION RATE: 22 BRPM | HEART RATE: 88 BPM

## 2019-10-16 DIAGNOSIS — F32.A DEPRESSIVE DISORDER: ICD-10-CM

## 2019-10-16 DIAGNOSIS — K21.9 GASTROESOPHAGEAL REFLUX DISEASE WITHOUT ESOPHAGITIS: ICD-10-CM

## 2019-10-16 DIAGNOSIS — F17.200 TOBACCO USE DISORDER: ICD-10-CM

## 2019-10-16 DIAGNOSIS — J45.20 MILD INTERMITTENT ASTHMA WITHOUT COMPLICATION: ICD-10-CM

## 2019-10-16 DIAGNOSIS — C50.112 INFILTRATING DUCTAL CARCINOMA OF CENTRAL PORTION OF LEFT BREAST IN FEMALE (H): Primary | ICD-10-CM

## 2019-10-16 DIAGNOSIS — F06.4 ANXIETY DISORDER DUE TO MEDICAL CONDITION: ICD-10-CM

## 2019-10-16 DIAGNOSIS — F34.1 CHRONIC DEPRESSIVE PERSONALITY DISORDER: ICD-10-CM

## 2019-10-16 PROCEDURE — G0463 HOSPITAL OUTPT CLINIC VISIT: HCPCS

## 2019-10-16 PROCEDURE — 99214 OFFICE O/P EST MOD 30 MIN: CPT | Performed by: INTERNAL MEDICINE

## 2019-10-16 ASSESSMENT — MIFFLIN-ST. JEOR: SCORE: 1478.97

## 2019-10-16 ASSESSMENT — PAIN SCALES - GENERAL: PAINLEVEL: NO PAIN (0)

## 2019-10-16 NOTE — LETTER
"    10/16/2019         RE: Monika Nam  [address] [id number]        Dear Colleague,    Thank you for referring your patient, Monika Nam, to the [facility]. Please see a copy of my visit note below.    Oncology Rooming Note    October 16, 2019 2:13 PM   Monika Nam is a 59 year old female who presents for:    Chief Complaint   Patient presents with     Oncology Clinic Visit     3 month recheck Breast CA, review labs      Initial Vitals: BP (!) 151/77 (BP Location: Right arm, Patient Position: Sitting, Cuff Size: Adult Regular)   Pulse 88   Temp 97.6  F (36.4  C) (Tympanic)   Resp 22   Ht 1.727 m (5' 8\")   Wt 86 kg (189 lb 11.2 oz)   LMP 04/02/2014   SpO2 98%   Breastfeeding? No   BMI 28.84 kg/m    Estimated body mass index is 28.84 kg/m  as calculated from the following:    Height as of this encounter: 1.727 m (5' 8\").    Weight as of this encounter: 86 kg (189 lb 11.2 oz). Body surface area is 2.03 meters squared.  No Pain (0) Comment: Data Unavailable   Patient's last menstrual period was 04/02/2014.  Allergies reviewed: Yes  Medications reviewed: Yes    Medications: Would like Fluoxetine refilled. Pharmacy name entered into [employer]: [id number] - [address] AT [address]    Clinical concerns: 3 month recheck Breast CA, review labs. Questions on when she can deactivate her port? When she can see her dentist? She is planning a trip to [place] in January 2020 wondering about vaccine precautionary? Like extension on her FMLA.       Sana Vazquez, SAW              Hematology/ Oncology Follow-up Visit:  Oct 16, 2019    Reason for Visit:   Chief Complaint   Patient presents with     Oncology Clinic Visit     3 month recheck Breast CA, review labs        Oncologic History:  Infiltrating ductal carcinoma of central portion of left breast in female (H)  Monika Nam presented following her annual " mammogram with new focal asymmetry at 12-1 o'clock position of the left breast around 10.7 cm from the nipple.  It measured 1.4 cm.  Subsequently the patient went on to have breast ultrasound on member 30th 2018 showing 1.6 cm in maximum dimension hypoechoic mass.  Subsequently ultrasound breast biopsy was done on December 17, 2018 showing invasive ductal carcinoma grade 3 out of 3 angiolymphatic invasion was not identified.  Ductal carcinoma in situ was not identified . estrogen receptor was negative, progesterone receptor was negative and HER-2/mercedes receptor was negative.        1/2019 left lumpectomy found 1.8 cm IDC, grade III, LN-, margin is clear, pT1cN0    The patient was started on dose dense chemotherapy with Adriamycin and Cytoxan followed by Taxol      Interval History:  She is here today for follow-up visit.  She has been feeling well without recent complaints weight loss night sweats fever or chills patient denies any nausea vomiting or diarrhea.  She denies any bone aches or or pains.  She denies any shortness of breath or cough or wheezing.  The patient concluded radiation therapy to the breast without any complications.    Review Of Systems:  Constitutional: Negative for fever, chills, and night sweats.  Skin: negative.  Eyes: negative.  Ears/Nose/Throat: negative.  Respiratory: No shortness of breath, dyspnea on exertion, cough, or hemoptysis.  Cardiovascular: negative.  Gastrointestinal: negative.  Genitourinary: negative.  Musculoskeletal: negative.  Neurologic: negative.  Psychiatric: negative.  Hematologic/Lymphatic/Immunologic: negative.  Endocrine: negative.    All other ROS negative unless mentioned in interval history.    Past medical, social, surgical, and family histories reviewed.    Allergies:  Allergies as of 10/16/2019 - Reviewed 10/16/2019   Allergen Reaction Noted     Sulfa drugs Swelling 06/09/2005       Current Medications:  Current Outpatient Medications   Medication Sig Dispense  "Refill     augmented betamethasone dipropionate (DIPROLENE-AF) 0.05 % cream APPLY EXTERNALLY TO THE AFFECTED AREA TWICE DAILY (Patient taking differently: Apply topically daily APPLY EXTERNALLY TO THE AFFECTED AREA TWICE DAILY) 50 g 6     calcium-vitamin D (CALCIUM 600-D) 600-400 MG-UNIT per tablet Take 1 tablet by mouth daily       FLUoxetine (PROZAC) 20 MG capsule Take 20 mg daily. (Patient taking differently: Take 20 mg by mouth every evening ) 30 capsule 11     loratadine (CLARITIN) 10 MG tablet Take 10 mg by mouth daily.         MULTIVITAMIN TABS   OR 1 TABLET BY MOUTH DAILY       ranitidine (ZANTAC) 150 MG tablet Take 1 tablet (150 mg) by mouth daily       acetaminophen (TYLENOL) 500 MG tablet Take 500-1,000 mg by mouth every 6 hours as needed for mild pain or pain       albuterol (PROAIR HFA/PROVENTIL HFA/VENTOLIN HFA) 108 (90 Base) MCG/ACT inhaler Inhale 2 puffs into the lungs every 4 hours as needed for shortness of breath / dyspnea or wheezing (Patient not taking: Reported on 10/16/2019) 1 Inhaler 11     aspirin 81 MG tablet Take 1 tablet (81 mg) by mouth daily       pseudoePHEDrine (SUDAFED) 30 MG tablet Take 30 mg by mouth every 12 hours as needed for congestion       Pyridoxine HCl (VITAMIN B6 PO) Take 1 tablet by mouth daily          Physical Exam:  BP (!) 151/77 (BP Location: Right arm, Patient Position: Sitting, Cuff Size: Adult Regular)   Pulse 88   Temp 97.6  F (36.4  C) (Tympanic)   Resp 22   Ht 1.727 m (5' 8\")   Wt 86 kg (189 lb 11.2 oz)   LMP 04/02/2014   SpO2 98%   Breastfeeding? No   BMI 28.84 kg/m     Wt Readings from Last 12 Encounters:   10/16/19 86 kg (189 lb 11.2 oz)   10/08/19 84.6 kg (186 lb 9.6 oz)   09/04/19 85.5 kg (188 lb 9.6 oz)   08/28/19 85.5 kg (188 lb 9.6 oz)   08/21/19 88.9 kg (196 lb)   08/05/19 85.9 kg (189 lb 6.4 oz)   07/23/19 87.5 kg (193 lb)   07/17/19 87.6 kg (193 lb 3.2 oz)   07/09/19 89.8 kg (198 lb)   07/02/19 88.9 kg (196 lb)   06/19/19 90.2 kg (198 lb 14.4 " "oz)   06/18/19 87.8 kg (193 lb 9.6 oz)     ECOG performance status:0  GENERAL APPEARANCE: Healthy, alert and in no acute distress.  HEENT: Sclerae anicteric. PERRLA. Oropharynx without ulcers, lesions, or thrush.  NECK: Supple. No asymmetry or masses.  LYMPHATICS: No palpable cervical, supraclavicular, axillary, or inguinal lymphadenopathy.  RESP: Lungs clear to auscultation bilaterally without rales, rhonchi or wheezes.\", BREAST: Right- No mass, nipple discharge or axillary adenopathy. Left- No mass, nipple discharge or axillary adenopathy \"CARDIOVASCULAR: Regular rate and rhythm. Normal S1, S2; no S3 or S4. No murmur, gallop, or rub.  ABDOMEN: Soft, nontender. Bowel sounds normal. No palpable organomegaly or masses.  MUSCULOSKELETAL: Extremities without gross deformities noted. No edema of bilateral lower extremities.  SKIN: No suspicious lesions or rashes.  NEURO: Alert and oriented x 3. Cranial nerves II-XII grossly intact.  PSYCHIATRIC: Mentation and affect appear normal.    Laboratory/Imaging Studies:  Infusion Therapy Visit on 10/14/2019   Component Date Value Ref Range Status     CA 27-29 10/14/2019 11  0 - 39 U/mL Final    Assay Method:  Chemiluminescence using Siemens Centaur XP     Sodium 10/14/2019 136  133 - 144 mmol/L Final     Potassium 10/14/2019 3.5  3.4 - 5.3 mmol/L Final     Chloride 10/14/2019 103  94 - 109 mmol/L Final     Carbon Dioxide 10/14/2019 25  20 - 32 mmol/L Final     Anion Gap 10/14/2019 8  3 - 14 mmol/L Final     Glucose 10/14/2019 91  70 - 99 mg/dL Final     Urea Nitrogen 10/14/2019 12  7 - 30 mg/dL Final     Creatinine 10/14/2019 0.65  0.52 - 1.04 mg/dL Final     GFR Estimate 10/14/2019 >90  >60 mL/min/[1.73_m2] Final    Comment: Non  GFR Calc  Starting 12/18/2018, serum creatinine based estimated GFR (eGFR) will be   calculated using the Chronic Kidney Disease Epidemiology Collaboration   (CKD-EPI) equation.       GFR Estimate If Black 10/14/2019 >90  >60 " mL/min/[1.73_m2] Final    Comment:  GFR Calc  Starting 12/18/2018, serum creatinine based estimated GFR (eGFR) will be   calculated using the Chronic Kidney Disease Epidemiology Collaboration   (CKD-EPI) equation.       Calcium 10/14/2019 9.2  8.5 - 10.1 mg/dL Final     Bilirubin Total 10/14/2019 0.6  0.2 - 1.3 mg/dL Final     Albumin 10/14/2019 3.8  3.4 - 5.0 g/dL Final     Protein Total 10/14/2019 7.4  6.8 - 8.8 g/dL Final     Alkaline Phosphatase 10/14/2019 105  40 - 150 U/L Final     ALT 10/14/2019 23  0 - 50 U/L Final     AST 10/14/2019 21  0 - 45 U/L Final     WBC 10/14/2019 6.1  4.0 - 11.0 10e9/L Final     RBC Count 10/14/2019 4.72  3.8 - 5.2 10e12/L Final     Hemoglobin 10/14/2019 14.0  11.7 - 15.7 g/dL Final     Hematocrit 10/14/2019 42.8  35.0 - 47.0 % Final     MCV 10/14/2019 91  78 - 100 fl Final     MCH 10/14/2019 29.7  26.5 - 33.0 pg Final     MCHC 10/14/2019 32.7  31.5 - 36.5 g/dL Final     RDW 10/14/2019 13.5  10.0 - 15.0 % Final     Platelet Count 10/14/2019 172  150 - 450 10e9/L Final     Diff Method 10/14/2019 Automated Method   Final     % Neutrophils 10/14/2019 64.9  % Final     % Lymphocytes 10/14/2019 20.5  % Final     % Monocytes 10/14/2019 10.8  % Final     % Eosinophils 10/14/2019 2.5  % Final     % Basophils 10/14/2019 1.0  % Final     % Immature Granulocytes 10/14/2019 0.3  % Final     Nucleated RBCs 10/14/2019 0  0 /100 Final     Absolute Neutrophil 10/14/2019 4.0  1.6 - 8.3 10e9/L Final     Absolute Lymphocytes 10/14/2019 1.3  0.8 - 5.3 10e9/L Final     Absolute Monocytes 10/14/2019 0.7  0.0 - 1.3 10e9/L Final     Absolute Eosinophils 10/14/2019 0.2  0.0 - 0.7 10e9/L Final     Absolute Basophils 10/14/2019 0.1  0.0 - 0.2 10e9/L Final     Abs Immature Granulocytes 10/14/2019 0.0  0 - 0.4 10e9/L Final     Absolute Nucleated RBC 10/14/2019 0.0   Final          Assessment and plan:    (C50.112) Infiltrating ductal carcinoma of central portion of left breast in female (H)   (primary encounter diagnosis)  I reviewed with patient today most recent laboratory test.  There is no clinical evidence of breast cancer recurrence.  I will see the patient again in 3 months time following her annual mammogram.    (K21.9) Gastroesophageal reflux disease without esophagitis  Patient symptoms has been stable and she is on ranitidine 150 mg orally daily.  (F32.9) Depressive disorder  She currently on fluoxetine 20 mg orally daily.    (J45.20) Mild intermittent asthma without complication  Asthma symptoms has been stable.  The patient currently on albuterol as needed    The patient is ready to learn, no apparent learning barriers were identified.  Diagnosis and treatment plans were explained to the patient. The patient expressed understanding of the content. The patient asked appropriate questions. The patient questions were answered to her satisfaction.    Chart documentation with Dragon Voice recognition Software. Although reviewed after completion, some words and grammatical errors may remain.    Again, thank you for allowing me to participate in the care of your patient.        Sincerely,        Madison Gonzales MD

## 2019-10-16 NOTE — PROGRESS NOTES
Hematology/ Oncology Follow-up Visit:  Oct 16, 2019    Reason for Visit:   Chief Complaint   Patient presents with     Oncology Clinic Visit     3 month recheck Breast CA, review labs        Oncologic History:  Infiltrating ductal carcinoma of central portion of left breast in female (H)  Monika Nam presented following her annual mammogram with new focal asymmetry at 12-1 o'clock position of the left breast around 10.7 cm from the nipple.  It measured 1.4 cm.  Subsequently the patient went on to have breast ultrasound on member 30th 2018 showing 1.6 cm in maximum dimension hypoechoic mass.  Subsequently ultrasound breast biopsy was done on December 17, 2018 showing invasive ductal carcinoma grade 3 out of 3 angiolymphatic invasion was not identified.  Ductal carcinoma in situ was not identified . estrogen receptor was negative, progesterone receptor was negative and HER-2/mercedes receptor was negative.        1/2019 left lumpectomy found 1.8 cm IDC, grade III, LN-, margin is clear, pT1cN0    The patient was started on dose dense chemotherapy with Adriamycin and Cytoxan followed by Taxol      Interval History:  She is here today for follow-up visit.  She has been feeling well without recent complaints weight loss night sweats fever or chills patient denies any nausea vomiting or diarrhea.  She denies any bone aches or or pains.  She denies any shortness of breath or cough or wheezing.  The patient concluded radiation therapy to the breast without any complications.    Review Of Systems:  Constitutional: Negative for fever, chills, and night sweats.  Skin: negative.  Eyes: negative.  Ears/Nose/Throat: negative.  Respiratory: No shortness of breath, dyspnea on exertion, cough, or hemoptysis.  Cardiovascular: negative.  Gastrointestinal: negative.  Genitourinary: negative.  Musculoskeletal: negative.  Neurologic: negative.  Psychiatric: negative.  Hematologic/Lymphatic/Immunologic: negative.  Endocrine:  "negative.    All other ROS negative unless mentioned in interval history.    Past medical, social, surgical, and family histories reviewed.    Allergies:  Allergies as of 10/16/2019 - Reviewed 10/16/2019   Allergen Reaction Noted     Sulfa drugs Swelling 06/09/2005       Current Medications:  Current Outpatient Medications   Medication Sig Dispense Refill     augmented betamethasone dipropionate (DIPROLENE-AF) 0.05 % cream APPLY EXTERNALLY TO THE AFFECTED AREA TWICE DAILY (Patient taking differently: Apply topically daily APPLY EXTERNALLY TO THE AFFECTED AREA TWICE DAILY) 50 g 6     calcium-vitamin D (CALCIUM 600-D) 600-400 MG-UNIT per tablet Take 1 tablet by mouth daily       FLUoxetine (PROZAC) 20 MG capsule Take 20 mg daily. (Patient taking differently: Take 20 mg by mouth every evening ) 30 capsule 11     loratadine (CLARITIN) 10 MG tablet Take 10 mg by mouth daily.         MULTIVITAMIN TABS   OR 1 TABLET BY MOUTH DAILY       ranitidine (ZANTAC) 150 MG tablet Take 1 tablet (150 mg) by mouth daily       acetaminophen (TYLENOL) 500 MG tablet Take 500-1,000 mg by mouth every 6 hours as needed for mild pain or pain       albuterol (PROAIR HFA/PROVENTIL HFA/VENTOLIN HFA) 108 (90 Base) MCG/ACT inhaler Inhale 2 puffs into the lungs every 4 hours as needed for shortness of breath / dyspnea or wheezing (Patient not taking: Reported on 10/16/2019) 1 Inhaler 11     aspirin 81 MG tablet Take 1 tablet (81 mg) by mouth daily       pseudoePHEDrine (SUDAFED) 30 MG tablet Take 30 mg by mouth every 12 hours as needed for congestion       Pyridoxine HCl (VITAMIN B6 PO) Take 1 tablet by mouth daily          Physical Exam:  BP (!) 151/77 (BP Location: Right arm, Patient Position: Sitting, Cuff Size: Adult Regular)   Pulse 88   Temp 97.6  F (36.4  C) (Tympanic)   Resp 22   Ht 1.727 m (5' 8\")   Wt 86 kg (189 lb 11.2 oz)   LMP 04/02/2014   SpO2 98%   Breastfeeding? No   BMI 28.84 kg/m    Wt Readings from Last 12 Encounters: " "  10/16/19 86 kg (189 lb 11.2 oz)   10/08/19 84.6 kg (186 lb 9.6 oz)   09/04/19 85.5 kg (188 lb 9.6 oz)   08/28/19 85.5 kg (188 lb 9.6 oz)   08/21/19 88.9 kg (196 lb)   08/05/19 85.9 kg (189 lb 6.4 oz)   07/23/19 87.5 kg (193 lb)   07/17/19 87.6 kg (193 lb 3.2 oz)   07/09/19 89.8 kg (198 lb)   07/02/19 88.9 kg (196 lb)   06/19/19 90.2 kg (198 lb 14.4 oz)   06/18/19 87.8 kg (193 lb 9.6 oz)     ECOG performance status:0  GENERAL APPEARANCE: Healthy, alert and in no acute distress.  HEENT: Sclerae anicteric. PERRLA. Oropharynx without ulcers, lesions, or thrush.  NECK: Supple. No asymmetry or masses.  LYMPHATICS: No palpable cervical, supraclavicular, axillary, or inguinal lymphadenopathy.  RESP: Lungs clear to auscultation bilaterally without rales, rhonchi or wheezes.\", BREAST: Right- No mass, nipple discharge or axillary adenopathy. Left- No mass, nipple discharge or axillary adenopathy \"CARDIOVASCULAR: Regular rate and rhythm. Normal S1, S2; no S3 or S4. No murmur, gallop, or rub.  ABDOMEN: Soft, nontender. Bowel sounds normal. No palpable organomegaly or masses.  MUSCULOSKELETAL: Extremities without gross deformities noted. No edema of bilateral lower extremities.  SKIN: No suspicious lesions or rashes.  NEURO: Alert and oriented x 3. Cranial nerves II-XII grossly intact.  PSYCHIATRIC: Mentation and affect appear normal.    Laboratory/Imaging Studies:  Infusion Therapy Visit on 10/14/2019   Component Date Value Ref Range Status     CA 27-29 10/14/2019 11  0 - 39 U/mL Final    Assay Method:  Chemiluminescence using Siemens Centaur XP     Sodium 10/14/2019 136  133 - 144 mmol/L Final     Potassium 10/14/2019 3.5  3.4 - 5.3 mmol/L Final     Chloride 10/14/2019 103  94 - 109 mmol/L Final     Carbon Dioxide 10/14/2019 25  20 - 32 mmol/L Final     Anion Gap 10/14/2019 8  3 - 14 mmol/L Final     Glucose 10/14/2019 91  70 - 99 mg/dL Final     Urea Nitrogen 10/14/2019 12  7 - 30 mg/dL Final     Creatinine 10/14/2019 0.65  " 0.52 - 1.04 mg/dL Final     GFR Estimate 10/14/2019 >90  >60 mL/min/[1.73_m2] Final    Comment: Non  GFR Calc  Starting 12/18/2018, serum creatinine based estimated GFR (eGFR) will be   calculated using the Chronic Kidney Disease Epidemiology Collaboration   (CKD-EPI) equation.       GFR Estimate If Black 10/14/2019 >90  >60 mL/min/[1.73_m2] Final    Comment:  GFR Calc  Starting 12/18/2018, serum creatinine based estimated GFR (eGFR) will be   calculated using the Chronic Kidney Disease Epidemiology Collaboration   (CKD-EPI) equation.       Calcium 10/14/2019 9.2  8.5 - 10.1 mg/dL Final     Bilirubin Total 10/14/2019 0.6  0.2 - 1.3 mg/dL Final     Albumin 10/14/2019 3.8  3.4 - 5.0 g/dL Final     Protein Total 10/14/2019 7.4  6.8 - 8.8 g/dL Final     Alkaline Phosphatase 10/14/2019 105  40 - 150 U/L Final     ALT 10/14/2019 23  0 - 50 U/L Final     AST 10/14/2019 21  0 - 45 U/L Final     WBC 10/14/2019 6.1  4.0 - 11.0 10e9/L Final     RBC Count 10/14/2019 4.72  3.8 - 5.2 10e12/L Final     Hemoglobin 10/14/2019 14.0  11.7 - 15.7 g/dL Final     Hematocrit 10/14/2019 42.8  35.0 - 47.0 % Final     MCV 10/14/2019 91  78 - 100 fl Final     MCH 10/14/2019 29.7  26.5 - 33.0 pg Final     MCHC 10/14/2019 32.7  31.5 - 36.5 g/dL Final     RDW 10/14/2019 13.5  10.0 - 15.0 % Final     Platelet Count 10/14/2019 172  150 - 450 10e9/L Final     Diff Method 10/14/2019 Automated Method   Final     % Neutrophils 10/14/2019 64.9  % Final     % Lymphocytes 10/14/2019 20.5  % Final     % Monocytes 10/14/2019 10.8  % Final     % Eosinophils 10/14/2019 2.5  % Final     % Basophils 10/14/2019 1.0  % Final     % Immature Granulocytes 10/14/2019 0.3  % Final     Nucleated RBCs 10/14/2019 0  0 /100 Final     Absolute Neutrophil 10/14/2019 4.0  1.6 - 8.3 10e9/L Final     Absolute Lymphocytes 10/14/2019 1.3  0.8 - 5.3 10e9/L Final     Absolute Monocytes 10/14/2019 0.7  0.0 - 1.3 10e9/L Final     Absolute Eosinophils  10/14/2019 0.2  0.0 - 0.7 10e9/L Final     Absolute Basophils 10/14/2019 0.1  0.0 - 0.2 10e9/L Final     Abs Immature Granulocytes 10/14/2019 0.0  0 - 0.4 10e9/L Final     Absolute Nucleated RBC 10/14/2019 0.0   Final          Assessment and plan:    (C50.112) Infiltrating ductal carcinoma of central portion of left breast in female (H)  (primary encounter diagnosis)  I reviewed with patient today most recent laboratory test.  There is no clinical evidence of breast cancer recurrence.  I will see the patient again in 3 months time following her annual mammogram.    (K21.9) Gastroesophageal reflux disease without esophagitis  Patient symptoms has been stable and she is on ranitidine 150 mg orally daily.  (F32.9) Depressive disorder  She currently on fluoxetine 20 mg orally daily.    (J45.20) Mild intermittent asthma without complication  Asthma symptoms has been stable.  The patient currently on albuterol as needed    The patient is ready to learn, no apparent learning barriers were identified.  Diagnosis and treatment plans were explained to the patient. The patient expressed understanding of the content. The patient asked appropriate questions. The patient questions were answered to her satisfaction.    Chart documentation with Dragon Voice recognition Software. Although reviewed after completion, some words and grammatical errors may remain.

## 2019-10-16 NOTE — PROGRESS NOTES
"Oncology Rooming Note    October 16, 2019 2:13 PM   Monika Nam is a 59 year old female who presents for:    Chief Complaint   Patient presents with     Oncology Clinic Visit     3 month recheck Breast CA, review labs      Initial Vitals: BP (!) 151/77 (BP Location: Right arm, Patient Position: Sitting, Cuff Size: Adult Regular)   Pulse 88   Temp 97.6  F (36.4  C) (Tympanic)   Resp 22   Ht 1.727 m (5' 8\")   Wt 86 kg (189 lb 11.2 oz)   LMP 04/02/2014   SpO2 98%   Breastfeeding? No   BMI 28.84 kg/m   Estimated body mass index is 28.84 kg/m  as calculated from the following:    Height as of this encounter: 1.727 m (5' 8\").    Weight as of this encounter: 86 kg (189 lb 11.2 oz). Body surface area is 2.03 meters squared.  No Pain (0) Comment: Data Unavailable   Patient's last menstrual period was 04/02/2014.  Allergies reviewed: Yes  Medications reviewed: Yes    Medications: Would like Fluoxetine refilled. Pharmacy name entered into [employer]: [id number] - [place], [place] - [address] AT [facility] OF [address] [place] [address]    Clinical concerns: 3 month recheck Breast CA, review labs. Questions on when she can deactivate her port? When she can see her dentist? She is planning a trip to [place] in January 2020 wondering about vaccine precautionary? Like extension on her FMLA.       Sana Vazquez, [facility]            "

## 2019-10-29 ENCOUNTER — PATIENT OUTREACH (OUTPATIENT)
Dept: ONCOLOGY | Facility: CLINIC | Age: 59
End: 2019-10-29

## 2019-10-29 DIAGNOSIS — L30.9 ECZEMA, UNSPECIFIED TYPE: ICD-10-CM

## 2019-10-29 NOTE — PROGRESS NOTES
Forms received for FMLA. Patient is requesting that the form be filled out and resent to reflect extension of 90 days so she can continue to come to appointments for clinic, lab and scans. Forms updated. Will have Sayda GARAY have paperwork signed by Dr. Gonzales and fax to Elsy Moss at [phone]  Yuliet Posada RN

## 2019-10-30 RX ORDER — BETAMETHASONE DIPROPIONATE 0.5 MG/G
CREAM TOPICAL
Qty: 50 G | Refills: 1 | Status: SHIPPED | OUTPATIENT
Start: 2019-10-30 | End: 2020-02-03

## 2019-10-30 NOTE — TELEPHONE ENCOUNTER
"Requested Prescriptions   Pending Prescriptions Disp Refills     augmented betamethasone dipropionate (DIPROLENE-AF) 0.05 % external cream [Pharmacy Med Name: BETAMETHASONE DIP AUG CREAM 50GM] 50 g 0     Sig: APPLY EXTERNALLY TO THE AFFECTED AREA TWICE DAILY       Topical Steroids and Nonsteroidals Protocol Passed - 10/29/2019  6:16 PM        Passed - Patient is age 6 or older        Passed - Authorizing prescriber's most recent note related to this medication read.     If refill request is for ophthalmic use, please forward request to provider for approval.          Passed - High potency steroid not ordered        Passed - Recent (12 mo) or future (30 days) visit within the authorizing provider's specialty     Patient has had an office visit with the authorizing provider or a provider within the authorizing providers department within the previous 12 mos or has a future within next 30 days. See \"Patient Info\" tab in inbasket, or \"Choose Columns\" in Meds & Orders section of the refill encounter.              Passed - Medication is active on med list        Last Written Prescription Date:  11/16/18  Last Fill Quantity: 50,  # refills: 6   Last office visit: 1/7/2019 with prescribing provider:  Jacquelyn   Future Office Visit:   Next 5 appointments (look out 90 days)    Nov 04, 2019  9:30 AM CST  [facility] [address] [facility] [address]  [phone]   Jan 17, 2020  3:45 PM CST  Return Visit with Madison Gonzales MD  [facility] ([facility]) [facility]  [address]  [facility] [address]  [phone]           "

## 2019-10-30 NOTE — PROGRESS NOTES
Per Dr. Gonzales, FMLA is to be completed and signed by Radiation therapy as she is not on active chemotherapy but will be on radiation therapy until January 2020. Paperwork given to [facility] radiation therapy TANISHA. Sayda Peace RN, BSN, OCN on 10/30/2019 at 10:49 AM

## 2019-11-04 ENCOUNTER — OFFICE VISIT (OUTPATIENT)
Dept: SURGERY | Facility: CLINIC | Age: 59
End: 2019-11-04
Payer: COMMERCIAL

## 2019-11-04 VITALS
BODY MASS INDEX: 28.64 KG/M2 | SYSTOLIC BLOOD PRESSURE: 144 MMHG | HEART RATE: 99 BPM | TEMPERATURE: 97.8 F | HEIGHT: 68 IN | DIASTOLIC BLOOD PRESSURE: 86 MMHG | WEIGHT: 189 LBS

## 2019-11-04 DIAGNOSIS — Z95.828 PORT-A-CATH IN PLACE: Primary | ICD-10-CM

## 2019-11-04 PROCEDURE — 36590 REMOVAL TUNNELED CV CATH: CPT | Performed by: SURGERY

## 2019-11-04 ASSESSMENT — MIFFLIN-ST. JEOR: SCORE: 1475.8

## 2019-11-04 NOTE — LETTER
11/4/2019         RE: Monika Nam  [address] [facility] [address]        Dear Colleague,    Thank you for referring your patient, Monika Nam, to the [facility]. Please see a copy of my visit note below.    Monika is a 59-year-old female whom I know from previous breast cancer surgery and port insertion.  She has now finished her chemotherapy as well as her radiation.  As far as we know she is cancer free.  She is in today for port removal.    On exam: The port is in good position in the right subclavian area.  Consent was obtained.  The port site was infiltrated with local anesthetic and then the area was prepped and draped.    Procedure note: Port removal  Anesthetic: 1% Xylocaine with epinephrine approximately 7 cc total    With the patient's permission and after sterile prep and drape the previous incision was infiltrated with anesthetic and then reopened with a scalpel.  Incision was carried down through the capsule and the port was exposed.  2 silk sutures that had been placed to hold it down were removed.  The port was removed without difficulty and was intact.  She was held for at least 10 minutes on the subclavian area.    The capsule was excised sharply and discarded.  The wound was then closed with a deeper layer of interrupted 3-0 Vicryl and running subcuticular 4-0 Monocryl in the subcuticular plane.    Surgical glue was applied.    She was given postoperative instructions.  She does not need to see me again unless there are complications that arise.  I am very glad to see that she is doing well and I wish her all the best in the future.    Servando Claros MD FACS    Again, thank you for allowing me to participate in the care of your patient.        Sincerely,        Servando Claros MD

## 2019-11-04 NOTE — PROGRESS NOTES
Monika is a 59-year-old female whom I know from previous breast cancer surgery and port insertion.  She has now finished her chemotherapy as well as her radiation.  As far as we know she is cancer free.  She is in today for port removal.    On exam: The port is in good position in the right subclavian area.  Consent was obtained.  The port site was infiltrated with local anesthetic and then the area was prepped and draped.    Procedure note: Port removal  Anesthetic: 1% Xylocaine with epinephrine approximately 7 cc total    With the patient's permission and after sterile prep and drape the previous incision was infiltrated with anesthetic and then reopened with a scalpel.  Incision was carried down through the capsule and the port was exposed.  2 silk sutures that had been placed to hold it down were removed.  The port was removed without difficulty and was intact.  She was held for at least 10 minutes on the subclavian area.    The capsule was excised sharply and discarded.  The wound was then closed with a deeper layer of interrupted 3-0 Vicryl and running subcuticular 4-0 Monocryl in the subcuticular plane.    Surgical glue was applied.    She was given postoperative instructions.  She does not need to see me again unless there are complications that arise.  I am very glad to see that she is doing well and I wish her all the best in the future.    Servando Claros MD FACS

## 2019-11-04 NOTE — NURSING NOTE
"Initial BP (!) 144/86 (BP Location: Left arm, Patient Position: Sitting, Cuff Size: Adult Regular)   Pulse 99   Temp 97.8  F (36.6  C) (Oral)   Ht 1.727 m (5' 8\")   Wt 85.7 kg (189 lb)   LMP 04/02/2014   BMI 28.74 kg/m   Estimated body mass index is 28.74 kg/m  as calculated from the following:    Height as of this encounter: 1.727 m (5' 8\").    Weight as of this encounter: 85.7 kg (189 lb). .    Tonya Lehman CMA    "

## 2019-11-05 ENCOUNTER — TELEPHONE (OUTPATIENT)
Dept: SURGERY | Facility: CLINIC | Age: 59
End: 2019-11-05

## 2019-11-05 NOTE — TELEPHONE ENCOUNTER
Reason for Call:  Other     Detailed comments: Pt had port removed yesterday. Thinks she is having a large amount of swelling (size of 1/2 tennis ball) and wants to know if that is normal. No pain. - Please advise    Phone Number Patient can be reached at: Before noon call work #:  [phone]    After 2pm call:  [phone]    Best Time: See above    Can we leave a detailed message on this number? YES    Call taken on 11/5/2019 at 10:16 AM by Denise Behrendt

## 2019-11-05 NOTE — TELEPHONE ENCOUNTER
I spoke to Monika today. She is a patient of Dr Claros's. She had her port removed yesterday in clinic by Dr Claros. She said that today there is swelling over the excision site. The swelling she compares to half of a tennis ball. She has no redness, drainage, or warmth in the port removal area. She does not have pain. The surgical area is soft and not firm. I told her that it is not uncommon to have some swelling and bruising at the surgical site.  There is sometimes a fluid collection of blood or tissue fluid that will ultimately absorb back into the body. I would not expect the swelling to increase and we would want to know if it does or if there is fever, increasing pain, drainage, redness or warmth at the site. In the mean time she can ice and use over the counter pain medications if she does have discomfort. She is in agreement with the plan and she is encouraged to call with questions or concerns. I told her that I would FYI Dr Claros as well. Akila CARRILLO Rn

## 2019-11-22 NOTE — PROGRESS NOTES
Additional forms from the [facility] were received 11.14.19. Completed per patient's request. Reviewed, signed by Dr. Gonzales. Faxed to the [facility] at the number provided. Copy to scanning. Original in envelope at . Patient will pick this up next week. Sayda Peace RN, BSN, OCN on 11/22/2019 at 3:51 PM

## 2019-12-02 ENCOUNTER — HOSPITAL ENCOUNTER (OUTPATIENT)
Dept: MAMMOGRAPHY | Facility: CLINIC | Age: 59
Discharge: HOME | End: 2019-12-02
Attending: INTERNAL MEDICINE | Admitting: INTERNAL MEDICINE
Payer: COMMERCIAL

## 2019-12-02 DIAGNOSIS — C50.112 INFILTRATING DUCTAL CARCINOMA OF CENTRAL PORTION OF LEFT BREAST IN FEMALE (H): ICD-10-CM

## 2019-12-02 PROCEDURE — 77066 DX MAMMO INCL CAD BI: CPT

## 2019-12-02 NOTE — LETTER
Monika Nam  [address] [facility] [address]    December 2, 2019    Date of Exam:       Dear Monika Nam:    Thank you for your recent visit.    Breast Imaging Result: Your breast imaging examination showed an area that we believe is benign (not cancer). We recommend that you come back again in 6 months prior to your next yearly mammogram for short term follow-up.  If you have not already scheduled this follow-up exam, please call [phone].     As you know, early detection of cancer is very important. Although mammography is the most accurate method for early detection, not all cancers are found through mammography. A thorough examination includes a combination of mammography and a physical examination by your provider. Therefore, if you have not had a recent physical exam of your breasts see your health care provider.    A report of your breast imaging results was sent to: Madison Gonzales    Your breast imaging will become part of your medical file here at [facility] for at least 10 years. You are responsible for informing any new health care provider or mammography facility of the date and location of this examination.    We appreciate the opportunity to participate in your health care.    Sincerely,  Omid Powell MD      Interpreting Radiologist  [facility]

## 2019-12-03 ENCOUNTER — TELEPHONE (OUTPATIENT)
Dept: SURGERY | Facility: CLINIC | Age: 59
End: 2019-12-03

## 2019-12-03 NOTE — PROGRESS NOTES
Reviewed the following results with patient and Dr. Powell's recommendations:    There is skin thickening and coarsening of the trabecular   pattern in the left breast. This can relate to surgery and/or   radiation therapy. Microcalcifications are present in the surgical   bed. Spot magnification views suggested they are probably benign such   as dystrophic calcification. A six-month follow-up left mammogram is   recommended in reassessment. The right mammogram is unremarkable.  Patient reports she does have the letter from Dr. Powell's office with these recommendations and she will be scheduling an appt in 6 months. Sayda Peace RN, BSN, OCN on 12/3/2019 at 9:15 AM

## 2019-12-03 NOTE — TELEPHONE ENCOUNTER
"Reason for Call:  Other call back    Detailed comments: pt  calling stating port was removed in Sept. Still has a lump and bruising in the area. States its the size of \"zippo lighter\".  Is this normal     Phone Number Patient can be reached at: Home number on file [phone] (home)    Best Time: any     Can we leave a detailed message on this number? YES    Call taken on 12/3/2019 at 9:28 AM by Tarah Purcell     "

## 2019-12-03 NOTE — TELEPHONE ENCOUNTER
"I spoke to Fernando  (consent to communicate 12-26-18).    Pt had port removed a month ago and she continues to have swelling the size of a \"zippo lighter that is raised approx 1/2 to 3/4 inch, with bruising and purple in color\".  He does not believe she has any redness or tenderness at the site, she does not complain of pain, and she does not have a fever.    I asked him if I could speak to Monika and he says she is at work.  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    I asked him to have her give us a call this afternoon so we can assess his concern further.  He agrees with this plan.    Jen Kwan  [facility] RN  "

## 2020-01-12 DIAGNOSIS — F34.1 CHRONIC DEPRESSIVE PERSONALITY DISORDER: ICD-10-CM

## 2020-01-12 DIAGNOSIS — L30.9 ECZEMA, UNSPECIFIED TYPE: ICD-10-CM

## 2020-01-12 NOTE — LETTER
[facility]  [address]  [facility] [address]  [phone]        January 14, 2020  Monika Nam  [address] [address]    Dear Monika,    I care about your health and have reviewed your health plan. I have reviewed your medical conditions, medication list, and lab results and am making recommendations based on this review, to better manage your health.    You are in particular need of attention regarding:  -Wellness (Physical) Visit     I am recommending that you:  -schedule a WELLNESS APPOINTMENT with me.        Please call us at [phone] (or use [email or web address]) to address the above recommendations.     Thank you for trusting [facility] and we appreciate the opportunity to serve you.  We look forward to supporting your healthcare needs in the future.    Healthy Regards,    Servando Valladares MD/Bindu LAWRENCE RN

## 2020-01-13 ENCOUNTER — HOSPITAL ENCOUNTER (OUTPATIENT)
Dept: LAB | Facility: CLINIC | Age: 60
Discharge: HOME | End: 2020-01-13
Attending: INTERNAL MEDICINE | Admitting: INTERNAL MEDICINE
Payer: COMMERCIAL

## 2020-01-13 DIAGNOSIS — J45.20 MILD INTERMITTENT ASTHMA WITHOUT COMPLICATION: ICD-10-CM

## 2020-01-13 DIAGNOSIS — C50.112 INFILTRATING DUCTAL CARCINOMA OF CENTRAL PORTION OF LEFT BREAST IN FEMALE (H): ICD-10-CM

## 2020-01-13 LAB
ALBUMIN SERPL-MCNC: 3.8 G/DL (ref 3.4–5)
ALP SERPL-CCNC: 104 U/L (ref 40–150)
ALT SERPL W P-5'-P-CCNC: 27 U/L (ref 0–50)
ANION GAP SERPL CALCULATED.3IONS-SCNC: 6 MMOL/L (ref 3–14)
AST SERPL W P-5'-P-CCNC: 20 U/L (ref 0–45)
BASOPHILS # BLD AUTO: 0.1 10E9/L (ref 0–0.2)
BASOPHILS NFR BLD AUTO: 0.8 %
BILIRUB SERPL-MCNC: 0.9 MG/DL (ref 0.2–1.3)
BUN SERPL-MCNC: 16 MG/DL (ref 7–30)
CALCIUM SERPL-MCNC: 9.2 MG/DL (ref 8.5–10.1)
CANCER AG27-29 SERPL-ACNC: 9 U/ML (ref 0–39)
CHLORIDE SERPL-SCNC: 101 MMOL/L (ref 94–109)
CO2 SERPL-SCNC: 28 MMOL/L (ref 20–32)
CREAT SERPL-MCNC: 0.69 MG/DL (ref 0.52–1.04)
DIFFERENTIAL METHOD BLD: NORMAL
EOSINOPHIL # BLD AUTO: 0.2 10E9/L (ref 0–0.7)
EOSINOPHIL NFR BLD AUTO: 3.1 %
ERYTHROCYTE [DISTWIDTH] IN BLOOD BY AUTOMATED COUNT: 13.2 % (ref 10–15)
GFR SERPL CREATININE-BSD FRML MDRD: >90 ML/MIN/{1.73_M2}
GLUCOSE SERPL-MCNC: 86 MG/DL (ref 70–99)
HCT VFR BLD AUTO: 41 % (ref 35–47)
HGB BLD-MCNC: 13.7 G/DL (ref 11.7–15.7)
IMM GRANULOCYTES # BLD: 0 10E9/L (ref 0–0.4)
IMM GRANULOCYTES NFR BLD: 0.3 %
LYMPHOCYTES # BLD AUTO: 1.1 10E9/L (ref 0.8–5.3)
LYMPHOCYTES NFR BLD AUTO: 17.5 %
MCH RBC QN AUTO: 30.5 PG (ref 26.5–33)
MCHC RBC AUTO-ENTMCNC: 33.4 G/DL (ref 31.5–36.5)
MCV RBC AUTO: 91 FL (ref 78–100)
MONOCYTES # BLD AUTO: 0.6 10E9/L (ref 0–1.3)
MONOCYTES NFR BLD AUTO: 9.5 %
NEUTROPHILS # BLD AUTO: 4.3 10E9/L (ref 1.6–8.3)
NEUTROPHILS NFR BLD AUTO: 68.8 %
NRBC # BLD AUTO: 0 10*3/UL
NRBC BLD AUTO-RTO: 0 /100
PLATELET # BLD AUTO: 178 10E9/L (ref 150–450)
POTASSIUM SERPL-SCNC: 3.6 MMOL/L (ref 3.4–5.3)
PROT SERPL-MCNC: 7.7 G/DL (ref 6.8–8.8)
RBC # BLD AUTO: 4.49 10E12/L (ref 3.8–5.2)
SODIUM SERPL-SCNC: 135 MMOL/L (ref 133–144)
WBC # BLD AUTO: 6.2 10E9/L (ref 4–11)

## 2020-01-13 PROCEDURE — 85025 COMPLETE CBC W/AUTO DIFF WBC: CPT | Performed by: INTERNAL MEDICINE

## 2020-01-13 PROCEDURE — 86300 IMMUNOASSAY TUMOR CA 15-3: CPT | Performed by: INTERNAL MEDICINE

## 2020-01-13 PROCEDURE — 80053 COMPREHEN METABOLIC PANEL: CPT | Performed by: INTERNAL MEDICINE

## 2020-01-13 PROCEDURE — 36415 COLL VENOUS BLD VENIPUNCTURE: CPT | Performed by: INTERNAL MEDICINE

## 2020-01-13 NOTE — TELEPHONE ENCOUNTER
"Requested Prescriptions   Pending Prescriptions Disp Refills     FLUoxetine (PROZAC) 20 MG capsule [Pharmacy Med Name: FLUOXETINE 20MG CAPSULES] 30 capsule 11     Sig: TAKE 1 CAPSULE BY MOUTH DAILY  Duplicate        SSRIs Protocol Failed - 1/12/2020  4:20 PM        Failed - PHQ-9 score less than 5 in past 6 months     Please review last PHQ-9 score.           Passed - Medication is active on med list        Passed - Patient is age 18 or older        Passed - No active pregnancy on record        Passed - No positive pregnancy test in last 12 months        Passed - Recent (6 mo) or future (30 days) visit within the authorizing provider's specialty     Patient had office visit in the last 6 months or has a visit in the next 30 days with authorizing provider or within the authorizing provider's specialty.  See \"Patient Info\" tab in inbasket, or \"Choose Columns\" in Meds & Orders section of the refill encounter.            augmented betamethasone dipropionate (DIPROLENE-AF) 0.05 % external cream [Pharmacy Med Name: BETAMETHASONE DIP AUG CREAM 50GM] 50 g 6     Sig: APPLY EXTERNALLY TO THE AFFECTED AREA TWICE DAILY  Last Written Prescription Date:  10/30/2019  Last Fill Quantity: 50g,  # refills: 1   Last office visit: 1/7/2019 with prescribing provider: Jacquelyn   Future Office Visit:   Next 5 appointments (look out 90 days)    Jan 17, 2020  3:45 PM CST  Return Visit with Madison Gonzales MD  [facility] ([facility]) [facility]  [address] [address]  [phone]                Topical Steroids and Nonsteroidals Protocol Failed - 1/12/2020  4:20 PM        Failed - Recent (12 mo) or future (30 days) visit within the authorizing provider's specialty     Patient has had an office visit with the authorizing provider or a provider within the authorizing providers department within the previous 12 mos or has a future within next 30 days. See \"Patient " "Info\" tab in inbasket, or \"Choose Columns\" in Meds & Orders section of the refill encounter.              Passed - Patient is age 6 or older        Passed - Authorizing prescriber's most recent note related to this medication read.     If refill request is for ophthalmic use, please forward request to provider for approval.          Passed - High potency steroid not ordered        Passed - Medication is active on med list          "

## 2020-01-13 NOTE — LETTER
[facility]  [address] [facility] [address]  Phone: [phone]       January 15, 2020         Monika Nam  [id number] [place] [id number]            Dear Monika:    We are concerned about your health care.  We recently provided you with medication refills.  Many medications require routine follow-up with your doctor.    Your prescription(s) have been refilled for 30 days so you may have time for the above noted follow-up. Please call to schedule soon so we can assure you have an appointment before your next refills are needed.    Thank you,      Servando Valladares MD / italo

## 2020-01-13 NOTE — TELEPHONE ENCOUNTER
"Requested Prescriptions   Pending Prescriptions Disp Refills     albuterol (PROAIR HFA/PROVENTIL HFA/VENTOLIN HFA) 108 (90 Base)  Last Written Prescription Date:  11/16/18  Last Fill Quantity: 1,  # refills: 11   Last office visit: 1/7/2019 with prescribing provider:  Servando Valladares   Future Office Visit:   Next 5 appointments (look out 90 days)    Jan 17, 2020  3:45 PM CST  Return Visit with Madison Gonzales MD  [facility] ([facility]) [facility]  [address] [facility] [address]  [facility] [address]  [phone]          MCG/ACT inhaler 1 Inhaler 11     Sig: Inhale 2 puffs into the lungs every 4 hours as needed for shortness of breath / dyspnea or wheezing       Asthma Maintenance Inhalers - Anticholinergics Failed - 1/13/2020  8:03 AM        Failed - Asthma control assessment score within normal limits in last 6 months     Please review ACT score.   ACT Total Scores 11/16/2018   ACT TOTAL SCORE (Goal Greater than or Equal to 20) 23   In the past 12 months, how many times did you visit the emergency room for your asthma without being admitted to the hospital? 0   In the past 12 months, how many times were you hospitalized overnight because of your asthma? 0           Passed - Patient is age 12 years or older        Passed - Medication is active on med list        Passed - Recent (6 mo) or future (30 days) visit within the authorizing provider's specialty     Patient had office visit in the last 6 months or has a visit in the next 30 days with authorizing provider or within the authorizing provider's specialty.  See \"Patient Info\" tab in inbasket, or \"Choose Columns\" in Meds & Orders section of the refill encounter.              "

## 2020-01-14 RX ORDER — BETAMETHASONE DIPROPIONATE 0.5 MG/G
CREAM TOPICAL
Qty: 15 G | Refills: 0 | Status: SHIPPED | OUTPATIENT
Start: 2020-01-14 | End: 2020-02-03

## 2020-01-14 NOTE — TELEPHONE ENCOUNTER
Medication (Diprolone cream) is being filled for 1 time refill only due to:  Patient needs to be seen because it has been more than one year since last visit.   Letter mailed to devyn LAWRENCE RN

## 2020-01-15 RX ORDER — ALBUTEROL SULFATE 90 UG/1
2 AEROSOL, METERED RESPIRATORY (INHALATION) EVERY 4 HOURS PRN
Qty: 1 INHALER | Refills: 0 | Status: SHIPPED | OUTPATIENT
Start: 2020-01-15 | End: 2020-02-03

## 2020-01-15 NOTE — TELEPHONE ENCOUNTER
Due for clinic visit. 30 day chalino fill given. Reminder letter sent.      Isabell DOMINGUEZ RN

## 2020-01-17 ENCOUNTER — ONCOLOGY VISIT (OUTPATIENT)
Dept: ONCOLOGY | Facility: CLINIC | Age: 60
End: 2020-01-17
Attending: INTERNAL MEDICINE
Payer: COMMERCIAL

## 2020-01-17 VITALS
HEART RATE: 91 BPM | HEIGHT: 68 IN | BODY MASS INDEX: 28.25 KG/M2 | OXYGEN SATURATION: 98 % | TEMPERATURE: 97.1 F | SYSTOLIC BLOOD PRESSURE: 139 MMHG | RESPIRATION RATE: 18 BRPM | WEIGHT: 186.4 LBS | DIASTOLIC BLOOD PRESSURE: 82 MMHG

## 2020-01-17 DIAGNOSIS — K21.9 GASTROESOPHAGEAL REFLUX DISEASE WITHOUT ESOPHAGITIS: ICD-10-CM

## 2020-01-17 DIAGNOSIS — F34.1 CHRONIC DEPRESSIVE PERSONALITY DISORDER: ICD-10-CM

## 2020-01-17 DIAGNOSIS — C50.112 INFILTRATING DUCTAL CARCINOMA OF CENTRAL PORTION OF LEFT BREAST IN FEMALE (H): Primary | ICD-10-CM

## 2020-01-17 PROCEDURE — 99214 OFFICE O/P EST MOD 30 MIN: CPT | Performed by: INTERNAL MEDICINE

## 2020-01-17 PROCEDURE — G0463 HOSPITAL OUTPT CLINIC VISIT: HCPCS

## 2020-01-17 ASSESSMENT — MIFFLIN-ST. JEOR: SCORE: 1460.03

## 2020-01-17 ASSESSMENT — PAIN SCALES - GENERAL: PAINLEVEL: NO PAIN (0)

## 2020-01-17 NOTE — LETTER
1/17/2020         RE: Monika Nam  [address] [facility] [address]        Dear Colleague,    Thank you for referring your patient, Monika Nam, to the [facility]. Please see a copy of my visit note below.    Hematology/ Oncology Follow-up Visit:  Jan 17, 2020    Reason for Visit:   Chief Complaint   Patient presents with     Oncology Clinic Visit     3 month follow up left breast cancer.        Oncologic History:    Infiltrating ductal carcinoma of central portion of left breast in female (H)  Monika Nam presented following her annual mammogram with new focal asymmetry at 12-1 o'clock position of the left breast around 10.7 cm from the nipple.  It measured 1.4 cm.  Subsequently the patient went on to have breast ultrasound on member 30th 2018 showing 1.6 cm in maximum dimension hypoechoic mass.  Subsequently ultrasound breast biopsy was done on December 17, 2018 showing invasive ductal carcinoma grade 3 out of 3 angiolymphatic invasion was not identified.  Ductal carcinoma in situ was not identified . estrogen receptor was negative, progesterone receptor was negative and HER-2/mercedes receptor was negative.        1/2019 left lumpectomy found 1.8 cm IDC, grade III, LN-, margin is clear, pT1cN0    The patient was started on dose dense chemotherapy with Adriamycin and Cytoxan followed by Taxol      Interval History:  Patient returning today for follow-up visit.  She has been feeling well without any recent complaints of weight loss or night sweats or fever or chills.  She denies any nausea vomiting or diarrhea.  She denies any fever or chills.    Review Of Systems:  Constitutional: Negative for fever, chills, and night sweats.  Skin: negative.  Eyes: negative.  Ears/Nose/Throat: negative.  Respiratory: No shortness of breath, dyspnea on exertion, cough, or hemoptysis.  Cardiovascular: negative.  Gastrointestinal: negative.  Genitourinary: negative.  Musculoskeletal:  negative.  Neurologic: negative.  Psychiatric: negative.  Hematologic/Lymphatic/Immunologic: negative.  Endocrine: negative.    All other ROS negative unless mentioned in interval history.    Past medical, social, surgical, and family histories reviewed.    Allergies:  Allergies as of 01/17/2020 - Reviewed 01/17/2020   Allergen Reaction Noted     Sulfa drugs Swelling 06/09/2005       Current Medications:  Current Outpatient Medications   Medication Sig Dispense Refill     augmented betamethasone dipropionate (DIPROLENE-AF) 0.05 % cream APPLY EXTERNALLY TO THE AFFECTED AREA TWICE DAILY (Patient taking differently: Apply topically daily APPLY EXTERNALLY TO THE AFFECTED AREA TWICE DAILY) 50 g 6     augmented betamethasone dipropionate (DIPROLENE-AF) 0.05 % external cream APPLY EXTERNALLY TO THE AFFECTED AREA TWICE DAILY 15 g 0     augmented betamethasone dipropionate (DIPROLENE-AF) 0.05 % external cream APPLY EXTERNALLY TO THE AFFECTED AREA TWICE DAILY 50 g 1     calcium-vitamin D (CALCIUM 600-D) 600-400 MG-UNIT per tablet Take 1 tablet by mouth daily       FLUoxetine (PROZAC) 20 MG capsule Take 20 mg daily. 30 capsule 11     loratadine (CLARITIN) 10 MG tablet Take 10 mg by mouth daily.         MULTIVITAMIN TABS   OR 1 TABLET BY MOUTH DAILY       Pyridoxine HCl (VITAMIN B6 PO) Take 1 tablet by mouth daily       acetaminophen (TYLENOL) 500 MG tablet Take 500-1,000 mg by mouth every 6 hours as needed for mild pain or pain       albuterol (PROAIR HFA/PROVENTIL HFA/VENTOLIN HFA) 108 (90 Base) MCG/ACT inhaler Inhale 2 puffs into the lungs every 4 hours as needed for shortness of breath / dyspnea or wheezing (Patient not taking: Reported on 1/17/2020) 1 Inhaler 0     aspirin 81 MG tablet Take 1 tablet (81 mg) by mouth daily       pseudoePHEDrine (SUDAFED) 30 MG tablet Take 30 mg by mouth every 12 hours as needed for congestion       ranitidine (ZANTAC) 150 MG tablet Take 1 tablet (150 mg) by mouth daily          Physical  "Exam:  /82 (BP Location: Right arm, Patient Position: Sitting, Cuff Size: Adult Large)   Pulse 91   Temp 97.1  F (36.2  C) (Tympanic)   Resp 18   Ht 1.721 m (5' 7.75\")   Wt 84.6 kg (186 lb 6.4 oz)   LMP 04/02/2014   SpO2 98%   Breastfeeding No   BMI 28.55 kg/m     Wt Readings from Last 12 Encounters:   01/17/20 84.6 kg (186 lb 6.4 oz)   11/04/19 85.7 kg (189 lb)   10/16/19 86 kg (189 lb 11.2 oz)   10/08/19 84.6 kg (186 lb 9.6 oz)   09/04/19 85.5 kg (188 lb 9.6 oz)   08/28/19 85.5 kg (188 lb 9.6 oz)   08/21/19 88.9 kg (196 lb)   08/05/19 85.9 kg (189 lb 6.4 oz)   07/23/19 87.5 kg (193 lb)   07/17/19 87.6 kg (193 lb 3.2 oz)   07/09/19 89.8 kg (198 lb)   07/02/19 88.9 kg (196 lb)     GENERAL APPEARANCE: Healthy, alert and in no acute distress.  HEENT: Sclerae anicteric. PERRLA. Oropharynx without ulcers, lesions, or thrush.  NECK: Supple. No asymmetry or masses.  LYMPHATICS: No palpable cervical, supraclavicular, axillary, or inguinal lymphadenopathy.  RESP: Lungs clear to auscultation bilaterally without rales, rhonchi or wheezes.\",BREAST: Right- No mass, nipple discharge or axillary adenopathy. Left- No mass, nipple discharge or axillary adenopathy \"CARDIOVASCULAR: Regular rate and rhythm. Normal S1, S2; no S3 or S4. No murmur, gallop, or rub.  ABDOMEN: Soft, nontender. Bowel sounds normal. No palpable organomegaly or masses.  MUSCULOSKELETAL: Extremities without gross deformities noted. No edema of bilateral lower extremities.  SKIN: No suspicious lesions or rashes.  NEURO: Alert and oriented x 3. Cranial nerves II-XII grossly intact.  PSYCHIATRIC: Mentation and affect appear normal.    Laboratory/Imaging Studies:  No visits with results within 2 Week(s) from this visit.   Latest known visit with results is:   Infusion Therapy Visit on 10/14/2019   Component Date Value Ref Range Status     CA 27-29 10/14/2019 11  0 - 39 U/mL Final    Assay Method:  Chemiluminescence using Siemens Centaur XP     Sodium " 10/14/2019 136  133 - 144 mmol/L Final     Potassium 10/14/2019 3.5  3.4 - 5.3 mmol/L Final     Chloride 10/14/2019 103  94 - 109 mmol/L Final     Carbon Dioxide 10/14/2019 25  20 - 32 mmol/L Final     Anion Gap 10/14/2019 8  3 - 14 mmol/L Final     Glucose 10/14/2019 91  70 - 99 mg/dL Final     Urea Nitrogen 10/14/2019 12  7 - 30 mg/dL Final     Creatinine 10/14/2019 0.65  0.52 - 1.04 mg/dL Final     GFR Estimate 10/14/2019 >90  >60 mL/min/[1.73_m2] Final    Comment: Non  GFR Calc  Starting 12/18/2018, serum creatinine based estimated GFR (eGFR) will be   calculated using the Chronic Kidney Disease Epidemiology Collaboration   (CKD-EPI) equation.       GFR Estimate If Black 10/14/2019 >90  >60 mL/min/[1.73_m2] Final    Comment:  GFR Calc  Starting 12/18/2018, serum creatinine based estimated GFR (eGFR) will be   calculated using the Chronic Kidney Disease Epidemiology Collaboration   (CKD-EPI) equation.       Calcium 10/14/2019 9.2  8.5 - 10.1 mg/dL Final     Bilirubin Total 10/14/2019 0.6  0.2 - 1.3 mg/dL Final     Albumin 10/14/2019 3.8  3.4 - 5.0 g/dL Final     Protein Total 10/14/2019 7.4  6.8 - 8.8 g/dL Final     Alkaline Phosphatase 10/14/2019 105  40 - 150 U/L Final     ALT 10/14/2019 23  0 - 50 U/L Final     AST 10/14/2019 21  0 - 45 U/L Final     WBC 10/14/2019 6.1  4.0 - 11.0 10e9/L Final     RBC Count 10/14/2019 4.72  3.8 - 5.2 10e12/L Final     Hemoglobin 10/14/2019 14.0  11.7 - 15.7 g/dL Final     Hematocrit 10/14/2019 42.8  35.0 - 47.0 % Final     MCV 10/14/2019 91  78 - 100 fl Final     MCH 10/14/2019 29.7  26.5 - 33.0 pg Final     MCHC 10/14/2019 32.7  31.5 - 36.5 g/dL Final     RDW 10/14/2019 13.5  10.0 - 15.0 % Final     Platelet Count 10/14/2019 172  150 - 450 10e9/L Final     Diff Method 10/14/2019 Automated Method   Final     % Neutrophils 10/14/2019 64.9  % Final     % Lymphocytes 10/14/2019 20.5  % Final     % Monocytes 10/14/2019 10.8  % Final     %  Eosinophils 10/14/2019 2.5  % Final     % Basophils 10/14/2019 1.0  % Final     % Immature Granulocytes 10/14/2019 0.3  % Final     Nucleated RBCs 10/14/2019 0  0 /100 Final     Absolute Neutrophil 10/14/2019 4.0  1.6 - 8.3 10e9/L Final     Absolute Lymphocytes 10/14/2019 1.3  0.8 - 5.3 10e9/L Final     Absolute Monocytes 10/14/2019 0.7  0.0 - 1.3 10e9/L Final     Absolute Eosinophils 10/14/2019 0.2  0.0 - 0.7 10e9/L Final     Absolute Basophils 10/14/2019 0.1  0.0 - 0.2 10e9/L Final     Abs Immature Granulocytes 10/14/2019 0.0  0 - 0.4 10e9/L Final     Absolute Nucleated RBC 10/14/2019 0.0   Final            Assessment and plan:    (C50.112) Infiltrating ductal carcinoma of central portion of left breast in female (H)  (primary encounter diagnosis)  I reviewed with patient today most recent laboratory tests and imaging studies.  There is no clinical evidence of breast cancer recurrence.  I will see the patient again in 6 months time or sooner if there are new developments or concerns.    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    (K21.9) Gastroesophageal reflux disease without esophagitis  Patient currently on ranitidine 150 mg twice daily.    The patient is ready to learn, no apparent learning barriers were identified.  Diagnosis and treatment plans were explained to the patient. The patient expressed understanding of the content. The patient asked appropriate questions. The patient questions were answered to her satisfaction.    Chart documentation with Dragon Voice recognition Software. Although reviewed after completion, some words and grammatical errors may remain.    Again, thank you for allowing me to participate in the care of your patient.        Sincerely,        Madison Gonzales MD

## 2020-01-17 NOTE — NURSING NOTE
"Oncology Rooming Note    January 17, 2020 3:44 PM   Monika Nam is a 59 year old female who presents for:    Chief Complaint   Patient presents with     Oncology Clinic Visit     3 month follow up left breast cancer.      Initial Vitals: /82 (BP Location: Right arm, Patient Position: Sitting, Cuff Size: Adult Large)   Pulse 91   Temp 97.1  F (36.2  C) (Tympanic)   Resp 18   Ht 1.721 m (5' 7.75\")   Wt 84.6 kg (186 lb 6.4 oz)   LMP 04/02/2014   SpO2 98%   Breastfeeding No   BMI 28.55 kg/m   Estimated body mass index is 28.55 kg/m  as calculated from the following:    Height as of this encounter: 1.721 m (5' 7.75\").    Weight as of this encounter: 84.6 kg (186 lb 6.4 oz). Body surface area is 2.01 meters squared.  No Pain (0) Comment: Data Unavailable   Patient's last menstrual period was 04/02/2014.  Allergies reviewed: Yes  Medications reviewed: Yes    Medications: Medication refills not needed today.  Pharmacy name entered into [facility]: [id number] - [place], [place] - [address] [facility] [address]    Clinical concerns: 3 month follow up left breast cancer.       Sana Vazquez [facility]            "

## 2020-01-19 NOTE — PROGRESS NOTES
Hematology/ Oncology Follow-up Visit:  Jan 17, 2020    Reason for Visit:   Chief Complaint   Patient presents with     Oncology Clinic Visit     3 month follow up left breast cancer.        Oncologic History:    Infiltrating ductal carcinoma of central portion of left breast in female (H)  Monika Nam presented following her annual mammogram with new focal asymmetry at 12-1 o'clock position of the left breast around 10.7 cm from the nipple.  It measured 1.4 cm.  Subsequently the patient went on to have breast ultrasound on member 30th 2018 showing 1.6 cm in maximum dimension hypoechoic mass.  Subsequently ultrasound breast biopsy was done on December 17, 2018 showing invasive ductal carcinoma grade 3 out of 3 angiolymphatic invasion was not identified.  Ductal carcinoma in situ was not identified . estrogen receptor was negative, progesterone receptor was negative and HER-2/mercedes receptor was negative.        1/2019 left lumpectomy found 1.8 cm IDC, grade III, LN-, margin is clear, pT1cN0    The patient was started on dose dense chemotherapy with Adriamycin and Cytoxan followed by Taxol      Interval History:  Patient returning today for follow-up visit.  She has been feeling well without any recent complaints of weight loss or night sweats or fever or chills.  She denies any nausea vomiting or diarrhea.  She denies any fever or chills.    Review Of Systems:  Constitutional: Negative for fever, chills, and night sweats.  Skin: negative.  Eyes: negative.  Ears/Nose/Throat: negative.  Respiratory: No shortness of breath, dyspnea on exertion, cough, or hemoptysis.  Cardiovascular: negative.  Gastrointestinal: negative.  Genitourinary: negative.  Musculoskeletal: negative.  Neurologic: negative.  Psychiatric: negative.  Hematologic/Lymphatic/Immunologic: negative.  Endocrine: negative.    All other ROS negative unless mentioned in interval history.    Past medical, social, surgical, and family histories  "reviewed.    Allergies:  Allergies as of 01/17/2020 - Reviewed 01/17/2020   Allergen Reaction Noted     Sulfa drugs Swelling 06/09/2005       Current Medications:  Current Outpatient Medications   Medication Sig Dispense Refill     augmented betamethasone dipropionate (DIPROLENE-AF) 0.05 % cream APPLY EXTERNALLY TO THE AFFECTED AREA TWICE DAILY (Patient taking differently: Apply topically daily APPLY EXTERNALLY TO THE AFFECTED AREA TWICE DAILY) 50 g 6     augmented betamethasone dipropionate (DIPROLENE-AF) 0.05 % external cream APPLY EXTERNALLY TO THE AFFECTED AREA TWICE DAILY 15 g 0     augmented betamethasone dipropionate (DIPROLENE-AF) 0.05 % external cream APPLY EXTERNALLY TO THE AFFECTED AREA TWICE DAILY 50 g 1     calcium-vitamin D (CALCIUM 600-D) 600-400 MG-UNIT per tablet Take 1 tablet by mouth daily       FLUoxetine (PROZAC) 20 MG capsule Take 20 mg daily. 30 capsule 11     loratadine (CLARITIN) 10 MG tablet Take 10 mg by mouth daily.         MULTIVITAMIN TABS   OR 1 TABLET BY MOUTH DAILY       Pyridoxine HCl (VITAMIN B6 PO) Take 1 tablet by mouth daily       acetaminophen (TYLENOL) 500 MG tablet Take 500-1,000 mg by mouth every 6 hours as needed for mild pain or pain       albuterol (PROAIR HFA/PROVENTIL HFA/VENTOLIN HFA) 108 (90 Base) MCG/ACT inhaler Inhale 2 puffs into the lungs every 4 hours as needed for shortness of breath / dyspnea or wheezing (Patient not taking: Reported on 1/17/2020) 1 Inhaler 0     aspirin 81 MG tablet Take 1 tablet (81 mg) by mouth daily       pseudoePHEDrine (SUDAFED) 30 MG tablet Take 30 mg by mouth every 12 hours as needed for congestion       ranitidine (ZANTAC) 150 MG tablet Take 1 tablet (150 mg) by mouth daily          Physical Exam:  /82 (BP Location: Right arm, Patient Position: Sitting, Cuff Size: Adult Large)   Pulse 91   Temp 97.1  F (36.2  C) (Tympanic)   Resp 18   Ht 1.721 m (5' 7.75\")   Wt 84.6 kg (186 lb 6.4 oz)   LMP 04/02/2014   SpO2 98%   " "Breastfeeding No   BMI 28.55 kg/m    Wt Readings from Last 12 Encounters:   01/17/20 84.6 kg (186 lb 6.4 oz)   11/04/19 85.7 kg (189 lb)   10/16/19 86 kg (189 lb 11.2 oz)   10/08/19 84.6 kg (186 lb 9.6 oz)   09/04/19 85.5 kg (188 lb 9.6 oz)   08/28/19 85.5 kg (188 lb 9.6 oz)   08/21/19 88.9 kg (196 lb)   08/05/19 85.9 kg (189 lb 6.4 oz)   07/23/19 87.5 kg (193 lb)   07/17/19 87.6 kg (193 lb 3.2 oz)   07/09/19 89.8 kg (198 lb)   07/02/19 88.9 kg (196 lb)     GENERAL APPEARANCE: Healthy, alert and in no acute distress.  HEENT: Sclerae anicteric. PERRLA. Oropharynx without ulcers, lesions, or thrush.  NECK: Supple. No asymmetry or masses.  LYMPHATICS: No palpable cervical, supraclavicular, axillary, or inguinal lymphadenopathy.  RESP: Lungs clear to auscultation bilaterally without rales, rhonchi or wheezes.\",BREAST: Right- No mass, nipple discharge or axillary adenopathy. Left- No mass, nipple discharge or axillary adenopathy \"CARDIOVASCULAR: Regular rate and rhythm. Normal S1, S2; no S3 or S4. No murmur, gallop, or rub.  ABDOMEN: Soft, nontender. Bowel sounds normal. No palpable organomegaly or masses.  MUSCULOSKELETAL: Extremities without gross deformities noted. No edema of bilateral lower extremities.  SKIN: No suspicious lesions or rashes.  NEURO: Alert and oriented x 3. Cranial nerves II-XII grossly intact.  PSYCHIATRIC: Mentation and affect appear normal.    Laboratory/Imaging Studies:  No visits with results within 2 Week(s) from this visit.   Latest known visit with results is:   Infusion Therapy Visit on 10/14/2019   Component Date Value Ref Range Status     CA 27-29 10/14/2019 11  0 - 39 U/mL Final    Assay Method:  Chemiluminescence using Siemens Centaur XP     Sodium 10/14/2019 136  133 - 144 mmol/L Final     Potassium 10/14/2019 3.5  3.4 - 5.3 mmol/L Final     Chloride 10/14/2019 103  94 - 109 mmol/L Final     Carbon Dioxide 10/14/2019 25  20 - 32 mmol/L Final     Anion Gap 10/14/2019 8  3 - 14 mmol/L " Final     Glucose 10/14/2019 91  70 - 99 mg/dL Final     Urea Nitrogen 10/14/2019 12  7 - 30 mg/dL Final     Creatinine 10/14/2019 0.65  0.52 - 1.04 mg/dL Final     GFR Estimate 10/14/2019 >90  >60 mL/min/[1.73_m2] Final    Comment: Non  GFR Calc  Starting 12/18/2018, serum creatinine based estimated GFR (eGFR) will be   calculated using the Chronic Kidney Disease Epidemiology Collaboration   (CKD-EPI) equation.       GFR Estimate If Black 10/14/2019 >90  >60 mL/min/[1.73_m2] Final    Comment:  GFR Calc  Starting 12/18/2018, serum creatinine based estimated GFR (eGFR) will be   calculated using the Chronic Kidney Disease Epidemiology Collaboration   (CKD-EPI) equation.       Calcium 10/14/2019 9.2  8.5 - 10.1 mg/dL Final     Bilirubin Total 10/14/2019 0.6  0.2 - 1.3 mg/dL Final     Albumin 10/14/2019 3.8  3.4 - 5.0 g/dL Final     Protein Total 10/14/2019 7.4  6.8 - 8.8 g/dL Final     Alkaline Phosphatase 10/14/2019 105  40 - 150 U/L Final     ALT 10/14/2019 23  0 - 50 U/L Final     AST 10/14/2019 21  0 - 45 U/L Final     WBC 10/14/2019 6.1  4.0 - 11.0 10e9/L Final     RBC Count 10/14/2019 4.72  3.8 - 5.2 10e12/L Final     Hemoglobin 10/14/2019 14.0  11.7 - 15.7 g/dL Final     Hematocrit 10/14/2019 42.8  35.0 - 47.0 % Final     MCV 10/14/2019 91  78 - 100 fl Final     MCH 10/14/2019 29.7  26.5 - 33.0 pg Final     MCHC 10/14/2019 32.7  31.5 - 36.5 g/dL Final     RDW 10/14/2019 13.5  10.0 - 15.0 % Final     Platelet Count 10/14/2019 172  150 - 450 10e9/L Final     Diff Method 10/14/2019 Automated Method   Final     % Neutrophils 10/14/2019 64.9  % Final     % Lymphocytes 10/14/2019 20.5  % Final     % Monocytes 10/14/2019 10.8  % Final     % Eosinophils 10/14/2019 2.5  % Final     % Basophils 10/14/2019 1.0  % Final     % Immature Granulocytes 10/14/2019 0.3  % Final     Nucleated RBCs 10/14/2019 0  0 /100 Final     Absolute Neutrophil 10/14/2019 4.0  1.6 - 8.3 10e9/L Final     Absolute  Lymphocytes 10/14/2019 1.3  0.8 - 5.3 10e9/L Final     Absolute Monocytes 10/14/2019 0.7  0.0 - 1.3 10e9/L Final     Absolute Eosinophils 10/14/2019 0.2  0.0 - 0.7 10e9/L Final     Absolute Basophils 10/14/2019 0.1  0.0 - 0.2 10e9/L Final     Abs Immature Granulocytes 10/14/2019 0.0  0 - 0.4 10e9/L Final     Absolute Nucleated RBC 10/14/2019 0.0   Final            Assessment and plan:    (C50.112) Infiltrating ductal carcinoma of central portion of left breast in female (H)  (primary encounter diagnosis)  I reviewed with patient today most recent laboratory tests and imaging studies.  There is no clinical evidence of breast cancer recurrence.  I will see the patient again in 6 months time or sooner if there are new developments or concerns.    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    (K21.9) Gastroesophageal reflux disease without esophagitis  Patient currently on ranitidine 150 mg twice daily.    The patient is ready to learn, no apparent learning barriers were identified.  Diagnosis and treatment plans were explained to the patient. The patient expressed understanding of the content. The patient asked appropriate questions. The patient questions were answered to her satisfaction.    Chart documentation with Dragon Voice recognition Software. Although reviewed after completion, some words and grammatical errors may remain.

## 2020-01-31 ENCOUNTER — PATIENT OUTREACH (OUTPATIENT)
Dept: ONCOLOGY | Facility: CLINIC | Age: 60
End: 2020-01-31

## 2020-01-31 NOTE — PROGRESS NOTES
Called to see if pt had a change to review the care plan posted to [facility] this week and if she had any questions.      for a return call.     Jennifer Smith RN on 1/31/2020 at 9:52 AM    Pt returned call, sent via the mail.     Jennifer Smith RN on 2/6/2020 at 3:27 PM

## 2020-02-03 ENCOUNTER — OFFICE VISIT (OUTPATIENT)
Dept: FAMILY MEDICINE | Facility: CLINIC | Age: 60
End: 2020-02-03
Payer: COMMERCIAL

## 2020-02-03 VITALS
HEART RATE: 98 BPM | OXYGEN SATURATION: 96 % | BODY MASS INDEX: 28.01 KG/M2 | DIASTOLIC BLOOD PRESSURE: 90 MMHG | RESPIRATION RATE: 12 BRPM | WEIGHT: 184.8 LBS | TEMPERATURE: 97.8 F | SYSTOLIC BLOOD PRESSURE: 146 MMHG | HEIGHT: 68 IN

## 2020-02-03 DIAGNOSIS — Z00.00 ROUTINE GENERAL MEDICAL EXAMINATION AT A HEALTH CARE FACILITY: Primary | ICD-10-CM

## 2020-02-03 DIAGNOSIS — Z23 ENCOUNTER FOR IMMUNIZATION: ICD-10-CM

## 2020-02-03 DIAGNOSIS — J45.20 MILD INTERMITTENT ASTHMA WITHOUT COMPLICATION: ICD-10-CM

## 2020-02-03 DIAGNOSIS — L30.9 ECZEMA, UNSPECIFIED TYPE: ICD-10-CM

## 2020-02-03 PROCEDURE — 90732 PPSV23 VACC 2 YRS+ SUBQ/IM: CPT | Performed by: FAMILY MEDICINE

## 2020-02-03 PROCEDURE — 99396 PREV VISIT EST AGE 40-64: CPT | Mod: 25 | Performed by: FAMILY MEDICINE

## 2020-02-03 PROCEDURE — 90471 IMMUNIZATION ADMIN: CPT | Performed by: FAMILY MEDICINE

## 2020-02-03 RX ORDER — ALBUTEROL SULFATE 90 UG/1
2 AEROSOL, METERED RESPIRATORY (INHALATION) EVERY 4 HOURS PRN
Qty: 1 INHALER | Refills: 3 | Status: SHIPPED | OUTPATIENT
Start: 2020-02-03 | End: 2021-06-10

## 2020-02-03 RX ORDER — BETAMETHASONE DIPROPIONATE 0.5 MG/G
CREAM TOPICAL
Qty: 50 G | Refills: 6 | Status: SHIPPED | OUTPATIENT
Start: 2020-02-03 | End: 2021-06-30

## 2020-02-03 ASSESSMENT — PATIENT HEALTH QUESTIONNAIRE - PHQ9
5. POOR APPETITE OR OVEREATING: NOT AT ALL
SUM OF ALL RESPONSES TO PHQ QUESTIONS 1-9: 7

## 2020-02-03 ASSESSMENT — ANXIETY QUESTIONNAIRES
GAD7 TOTAL SCORE: 6
1. FEELING NERVOUS, ANXIOUS, OR ON EDGE: SEVERAL DAYS
IF YOU CHECKED OFF ANY PROBLEMS ON THIS QUESTIONNAIRE, HOW DIFFICULT HAVE THESE PROBLEMS MADE IT FOR YOU TO DO YOUR WORK, TAKE CARE OF THINGS AT HOME, OR GET ALONG WITH OTHER PEOPLE: SOMEWHAT DIFFICULT
6. BECOMING EASILY ANNOYED OR IRRITABLE: SEVERAL DAYS
5. BEING SO RESTLESS THAT IT IS HARD TO SIT STILL: NOT AT ALL
7. FEELING AFRAID AS IF SOMETHING AWFUL MIGHT HAPPEN: SEVERAL DAYS
2. NOT BEING ABLE TO STOP OR CONTROL WORRYING: SEVERAL DAYS
3. WORRYING TOO MUCH ABOUT DIFFERENT THINGS: MORE THAN HALF THE DAYS

## 2020-02-03 ASSESSMENT — MIFFLIN-ST. JEOR: SCORE: 1452.78

## 2020-02-03 NOTE — PROGRESS NOTES
SUBJECTIVE:   CC: Monika Nam is an 59 year old woman who presents for preventive health visit.     Healthy Habits:    Do you get at least three servings of calcium containing foods daily (dairy, green leafy vegetables, etc.)? yes    Amount of exercise or daily activities, outside of work: 2 day(s) per week    Problems taking medications regularly No    Medication side effects: No    Have you had an eye exam in the past two years? yes    Do you see a dentist twice per year? no    Do you have sleep apnea, excessive snoring or daytime drowsiness?no    Today's PHQ-2 Score: No flowsheet data found.  Today the PHQ is 7, and the LEBRON is 6  Abuse: Current or Past(Physical, Sexual or Emotional)- No  Do you feel safe in your environment? Yes    Have you ever done Advance Care Planning? (For example, a Health Directive, POLST, or a discussion with a medical provider or your loved ones about your wishes): No, advance care planning information given to patient to review.  Patient plans to discuss their wishes with loved ones or provider.      Social History     Tobacco Use     Smoking status: Current Every Day Smoker     Packs/day: 0.50     Years: 25.00     Pack years: 12.50     Types: Cigarettes     Smokeless tobacco: Never Used     Tobacco comment: has cut down- 5 cig per day   Substance Use Topics     Alcohol use: Yes     Comment: 3-4 X per week.  About 7 drinks per week.     If you drink alcohol do you typically have >3 drinks per day or >7 drinks per week? No                     Reviewed orders with patient.  Reviewed health maintenance and updated orders accordingly - Yes      Mammogram is in May, 2020.     Pertinent mammograms are reviewed under the imaging tab.  No more Paps needed.   PAP / HPV 7/25/2014 7/15/2013 5/24/2012   PAP ASC-US(A) ASC-US(A) NIL     Reviewed and updated as needed this visit by clinical staff  Allergies  Meds         Reviewed and updated as needed this visit by Provider          Current  Outpatient Medications:      acetaminophen (TYLENOL) 500 MG tablet, Take 500-1,000 mg by mouth every 6 hours as needed for mild pain or pain, Disp: , Rfl:      augmented betamethasone dipropionate (DIPROLENE-AF) 0.05 % cream, APPLY EXTERNALLY TO THE AFFECTED AREA TWICE DAILY (Patient taking differently: Apply topically daily APPLY EXTERNALLY TO THE AFFECTED AREA TWICE DAILY), Disp: 50 g, Rfl: 6     augmented betamethasone dipropionate (DIPROLENE-AF) 0.05 % external cream, APPLY EXTERNALLY TO THE AFFECTED AREA TWICE DAILY, Disp: 15 g, Rfl: 0     augmented betamethasone dipropionate (DIPROLENE-AF) 0.05 % external cream, APPLY EXTERNALLY TO THE AFFECTED AREA TWICE DAILY, Disp: 50 g, Rfl: 1     calcium-vitamin D (CALCIUM 600-D) 600-400 MG-UNIT per tablet, Take 1 tablet by mouth daily, Disp: , Rfl:      FLUoxetine (PROZAC) 20 MG capsule, Take 20 mg daily., Disp: 30 capsule, Rfl: 11     loratadine (CLARITIN) 10 MG tablet, Take 10 mg by mouth daily.  , Disp: , Rfl:      MULTIVITAMIN TABS   OR, 1 TABLET BY MOUTH DAILY, Disp: , Rfl:      Pyridoxine HCl (VITAMIN B6 PO), Take 1 tablet by mouth daily, Disp: , Rfl:      ranitidine (ZANTAC) 150 MG tablet, Take 1 tablet (150 mg) by mouth daily, Disp: , Rfl:      albuterol (PROAIR HFA/PROVENTIL HFA/VENTOLIN HFA) 108 (90 Base) MCG/ACT inhaler, Inhale 2 puffs into the lungs every 4 hours as needed for shortness of breath / dyspnea or wheezing (Patient not taking: Reported on 1/17/2020), Disp: 1 Inhaler, Rfl: 0     aspirin 81 MG tablet, Take 1 tablet (81 mg) by mouth daily, Disp: , Rfl:      pseudoePHEDrine (SUDAFED) 30 MG tablet, Take 30 mg by mouth every 12 hours as needed for congestion, Disp: , Rfl:     Patient Active Problem List   Diagnosis     Allergic rhinitis     Anxiety disorder due to medical condition     Tobacco use disorder     Chronic depressive personality disorder     Obesity     CARDIOVASCULAR SCREENING; LDL GOAL LESS THAN 130     CTS (carpal tunnel syndrome)      Cervical high risk HPV (human papillomavirus) test positive     GERD (gastroesophageal reflux disease)     Obstructive sleep apnea syndrome     Mild intermittent asthma without complication     Infiltrating ductal carcinoma of central portion of left breast in female (H)     Estrogen receptor negative carcinoma of breast, left (H)     Depressive disorder     ROS:  CONSTITUTIONAL: NEGATIVE for fever, chills, change in weight  INTEGUMENTARY/SKIN: NEGATIVE for worrisome rashes, moles or lesions  EYES: NEGATIVE for vision changes or irritation  ENT: NEGATIVE for ear, mouth and throat problems  RESP: NEGATIVE for significant cough or SOB  BREAST: NEGATIVE for masses, tenderness or discharge  CV: NEGATIVE for chest pain, palpitations or peripheral edema  GI: NEGATIVE for nausea, abdominal pain, heartburn, or change in bowel habits  : NEGATIVE for unusual urinary or vaginal symptoms. No vaginal bleeding.  MUSCULOSKELETAL: NEGATIVE for significant arthralgias or myalgia  NEURO: NEGATIVE for weakness, dizziness or paresthesias  PSYCHIATRIC: NEGATIVE for changes in mood or affect     OBJECTIVE:   LMP 04/02/2014   EXAM:  GENERAL APPEARANCE: healthy, alert and no distress  EYES: EOMI,  PERRL  NECK: no adenopathy, no asymmetry, masses, or scars and thyroid normal to palpation  RESP: lungs clear to auscultation - no rales, rhonchi or wheezes  CV: regular rates and rhythm, normal S1 S2, no S3 or S4 and no murmur, click or rub -  MS: extremities normal- no gross deformities noted, no evidence of inflammation in joints, FROM in all extremities.  SKIN: no suspicious lesions or rashes  PSYCH: mentation appears normal and affect normal/bright      ASSESSMENT/PLAN:   1. Routine general medical examination at a health care facility  COUNSELING:   Reviewed preventive health counseling, as reflected in patient instructions       Regular exercise       Healthy diet/nutrition       Vision screening       Hearing screening  Estimated body  "mass index is 28.55 kg/m  as calculated from the following:    Height as of 1/17/20: 1.721 m (5' 7.75\").    Weight as of 1/17/20: 84.6 kg (186 lb 6.4 oz).   reports that she has been smoking cigarettes. She has a 12.50 pack-year smoking history. She has never used smokeless tobacco.  Tobacco Cessation Action Plan: Self help information given to patient  Counseling Resources:  ATP IV Guidelines  Pooled Cohorts Equation Calculator  Breast Cancer Risk Calculator  FRAX Risk Assessment  ICSI Preventive Guidelines  Dietary Guidelines for Americans, 2010  USDA's MyPlate  ASA Prophylaxis  Lung CA Screening  - Lipid panel reflex to direct LDL Fasting; Future    2. Eczema, unspecified type  Use the cream in a thin film twice daily for several days, as needed. Avoid drying agents, and wear gloves with using detergents. Use hypoallergenic products, with no smell or colors.   - augmented betamethasone dipropionate (DIPROLENE-AF) 0.05 % external cream; APPLY EXTERNALLY TO THE AFFECTED AREA TWICE DAILY  Dispense: 50 g; Refill: 6    3. Mild intermittent asthma without complication  Use the inhaler as needed, 2 puffs, every 3-4 hours. Refills are done. Identify triggers to avoid. Get the flu shot every fall.   - albuterol (PROAIR HFA/PROVENTIL HFA/VENTOLIN HFA) 108 (90 Base) MCG/ACT inhaler; Inhale 2 puffs into the lungs every 4 hours as needed for shortness of breath / dyspnea or wheezing  Dispense: 1 Inhaler; Refill: 3        Servando Valladares MD  [facility]  "

## 2020-02-03 NOTE — PROGRESS NOTES
Prior to immunization administration, verified patients identity using patient s name and date of birth. Please see Immunization Activity for additional information.     Screening Questionnaire for Adult Immunization    Are you sick today?   No   Do you have allergies to medications, food, a vaccine component or latex?   No   Have you ever had a serious reaction after receiving a vaccination?   No   Do you have a long-term health problem with heart, lung, kidney, or metabolic disease (e.g., diabetes), asthma, a blood disorder, no spleen, complement component deficiency, a cochlear implant, or a spinal fluid leak?  Are you on long-term aspirin therapy?   No   Do you have cancer, leukemia, HIV/AIDS, or any other immune system problem?   No   Do you have a parent, brother, or sister with an immune system problem?   No   In the past 3 months, have you taken medications that affect  your immune system, such as prednisone, other steroids, or anticancer drugs; drugs for the treatment of rheumatoid arthritis, Crohn s disease, or psoriasis; or have you had radiation treatments?   No   Have you had a seizure, or a brain or other nervous system problem?   No   During the past year, have you received a transfusion of blood or blood    products, or been given immune (gamma) globulin or antiviral drug?   No   For women: Are you pregnant or is there a chance you could become       pregnant during the next month?   No   Have you received any vaccinations in the past 4 weeks?   No     Immunization questionnaire answers were all negative.        Per orders of Dr. Valladares, injection of Pneumovax 23 given by Ana Baldwin CMA. Patient instructed to remain in clinic for 15 minutes afterwards, and to report any adverse reaction to me immediately.       Screening performed by Ana Baldwin CMA on 2/3/2020 at 4:23 PM.

## 2020-02-03 NOTE — PATIENT INSTRUCTIONS
Preventive Health Recommendations  Female Ages 50 - 64    Yearly exam: See your health care provider every year in order to  o Review health changes.   o Discuss preventive care.    o Review your medicines if your doctor has prescribed any.      Get a Pap test every three years (unless you have an abnormal result and your provider advises testing more often).    If you get Pap tests with HPV test, you only need to test every 5 years, unless you have an abnormal result.     You do not need a Pap test if your uterus was removed (hysterectomy) and you have not had cancer.    You should be tested each year for STDs (sexually transmitted diseases) if you're at risk.     Have a mammogram every 1 to 2 years.    Have a colonoscopy at age 50, or have a yearly FIT test (stool test). These exams screen for colon cancer.      Have a cholesterol test every 5 years, or more often if advised.    Have a diabetes test (fasting glucose) every three years. If you are at risk for diabetes, you should have this test more often.     If you are at risk for osteoporosis (brittle bone disease), think about having a bone density scan (DEXA).    Shots: Get a flu shot each year. Get a tetanus shot every 10 years.    Nutrition:     Eat at least 5 servings of fruits and vegetables each day.    Eat whole-grain bread, whole-wheat pasta and brown rice instead of white grains and rice.    Get adequate Calcium and Vitamin D.     Lifestyle    Exercise at least 150 minutes a week (30 minutes a day, 5 days a week). This will help you control your weight and prevent disease.    Limit alcohol to one drink per day.    No smoking.     Wear sunscreen to prevent skin cancer.     See your dentist every six months for an exam and cleaning.    See your eye doctor every 1 to 2 years.    ASSESSMENT/PLAN:   1. Routine general medical examination at a health care facility  COUNSELING:   Reviewed preventive health counseling, as reflected in patient instructions     "   Regular exercise       Healthy diet/nutrition       Vision screening       Hearing screening  Estimated body mass index is 28.55 kg/m  as calculated from the following:    Height as of 1/17/20: 1.721 m (5' 7.75\").    Weight as of 1/17/20: 84.6 kg (186 lb 6.4 oz).   reports that she has been smoking cigarettes. She has a 12.50 pack-year smoking history. She has never used smokeless tobacco.  Tobacco Cessation Action Plan: Self help information given to patient  Counseling Resources:  ATP IV Guidelines  Pooled Cohorts Equation Calculator  Breast Cancer Risk Calculator  FRAX Risk Assessment  ICSI Preventive Guidelines  Dietary Guidelines for Americans, 2010  USDA's MyPlate  ASA Prophylaxis  Lung CA Screening  - Lipid panel reflex to direct LDL Fasting; Future    2. Eczema, unspecified type  Use the cream in a thin film twice daily for several days, as needed. Avoid drying agents, and wear gloves with using detergents. Use hypoallergenic products, with no smell or colors.   - augmented betamethasone dipropionate (DIPROLENE-AF) 0.05 % external cream; APPLY EXTERNALLY TO THE AFFECTED AREA TWICE DAILY  Dispense: 50 g; Refill: 6    3. Mild intermittent asthma without complication  Use the inhaler as needed, 2 puffs, every 3-4 hours. Refills are done. Identify triggers to avoid. Get the flu shot every fall.   - albuterol (PROAIR HFA/PROVENTIL HFA/VENTOLIN HFA) 108 (90 Base) MCG/ACT inhaler; Inhale 2 puffs into the lungs every 4 hours as needed for shortness of breath / dyspnea or wheezing  Dispense: 1 Inhaler; Refill: 3      "

## 2020-02-04 ASSESSMENT — ANXIETY QUESTIONNAIRES: GAD7 TOTAL SCORE: 6

## 2020-02-04 ASSESSMENT — ASTHMA QUESTIONNAIRES: ACT_TOTALSCORE: 22

## 2020-02-16 ENCOUNTER — HEALTH MAINTENANCE LETTER (OUTPATIENT)
Age: 60
End: 2020-02-16

## 2020-02-20 ENCOUNTER — HOSPITAL ENCOUNTER (OUTPATIENT)
Facility: CLINIC | Age: 60
End: 2020-02-20
Attending: OPHTHALMOLOGY | Admitting: OPHTHALMOLOGY
Payer: COMMERCIAL

## 2020-02-20 DIAGNOSIS — Z00.00 ROUTINE GENERAL MEDICAL EXAMINATION AT A HEALTH CARE FACILITY: ICD-10-CM

## 2020-02-20 LAB
CHOLEST SERPL-MCNC: 185 MG/DL
HDLC SERPL-MCNC: 68 MG/DL
LDLC SERPL CALC-MCNC: 104 MG/DL
NONHDLC SERPL-MCNC: 117 MG/DL
TRIGL SERPL-MCNC: 63 MG/DL

## 2020-02-20 PROCEDURE — 80061 LIPID PANEL: CPT | Performed by: FAMILY MEDICINE

## 2020-02-20 PROCEDURE — 36415 COLL VENOUS BLD VENIPUNCTURE: CPT | Performed by: FAMILY MEDICINE

## 2020-04-07 ENCOUNTER — VIRTUAL VISIT (OUTPATIENT)
Dept: RADIATION THERAPY | Facility: OUTPATIENT CENTER | Age: 60
End: 2020-04-07
Payer: COMMERCIAL

## 2020-04-07 DIAGNOSIS — C50.112 INFILTRATING DUCTAL CARCINOMA OF CENTRAL PORTION OF LEFT BREAST IN FEMALE (H): Primary | ICD-10-CM

## 2020-04-07 NOTE — PROGRESS NOTES
Department of Radiation Oncology  Radiation Therapy Center  [facility] [place] [address]  [phone]         Radiation Oncology Follow-up Visit  20    Monika Nam  MRN: [id number]   : 1960     DIAGNOSIS: triple negative G3 invasive left breast cancer status post lumpectomy and SLNBx, revealing [id number] disease followed by adjuvant chemotherapy completed on 7/10/19  INTENT OF RADIOTHERAPY: adjuvant radiation therapy.  PATHOLOGY: Pathology revealed a single focus of 1.8cm G3 IDCA, no DCIS/LVSI. 0 out of 1 positive lymph node.                              STAGE: [id number]   CONCURRENT SYSTEMIC THERAPY: No     ONCOLOGIC HISTORY:   59 year old woman with triple negative G3 invasive left breast cancer status post lumpectomy and SLNBx, revealing [id number] disease followed by adjuvant chemotherapy completed on 7/10/19.      Monika Nam was in her usual state of health last winter. On 18 screening MMG showed a new 1.4cm focal asymmetry in the left breast at the 1:00 position 10.7cm FN. The right mmg was stable. Left breast US on 19 confirmed a 1.6cm nodule. Additional US of the left axilla showed no abnormal lymph nodes. On 18 a left breast US guided biopsy revealed G3 IDCA with no DCIS/LVSI, ER-/DC-/H2N-. On 19 she was taken to the OR for wire localized lumpectomy and SLNBx. Pathology revealed a single focus of 1.8cm G3 IDCA, no DCIS/LVSI. 0 out of 1 positive lymph node. Clear invasive margins with the closest margin being 8mm. Left breast specimen radiograph showed the lesion, needle core biopsy marker, and hookwire were all present. Postoperative course was marked by inflammatory reaction from lymph node procedure dye but this has resolved.  She was seen by Ha Bundy and Christian and subsequently received ddAC x 4 followed by Taxol x 12 from 19 to 7/10/19.    She had genetics counseling 2019 and was negative.      SITE OF TREATMENT: left  breast     DATES  OF TREATMENT: 19-19             TOTAL DOSE OF TREATMENT: 5256 cGy     DOSE PER FRACTION OF TREATMENT: 266 cGy x 16 fractions to left breast + 250 cGy x 4 fractions left breast boost       INTERVAL SINCE COMPLETION OF RADIATION THERAPY:   7 months    SUBJECTIVE:   Monika Nam is a 59 year old female who is scheduled  today for routine 6 months follow up after completing radiation therapy.      Denies any lumps, masses, or nipple discharge. No skin changes.  No enlarged lymph nodes. She states she had some mild inflammation of breast tissue after completion of RT that is much improved. No lymphedema. Good ROM.    Denies any abdominal pain or distention. She denies any bone pain.  She denies any shortness of breath or cough or wheezing.       ROS otherwise negative on a 12-system review.  ECO       Current Outpatient Medications   Medication     acetaminophen (TYLENOL) 500 MG tablet     albuterol (PROAIR HFA/PROVENTIL HFA/VENTOLIN HFA) 108 (90 Base) MCG/ACT inhaler     aspirin 81 MG tablet     augmented betamethasone dipropionate (DIPROLENE-AF) 0.05 % external cream     calcium-vitamin D (CALCIUM 600-D) 600-400 MG-UNIT per tablet     FLUoxetine (PROZAC) 20 MG capsule     loratadine (CLARITIN) 10 MG tablet     MULTIVITAMIN TABS   OR     pseudoePHEDrine (SUDAFED) 30 MG tablet     Pyridoxine HCl (VITAMIN B6 PO)     ranitidine (ZANTAC) 150 MG tablet     No current facility-administered medications for this visit.           Allergies   Allergen Reactions     Sulfa Drugs Swelling       Past Medical History:   Diagnosis Date     Abnormal Papanicolaou smear of cervix and cervical HPV     history of LEEP ion      ASCUS favor benign 2013, 2014    Neg high risk HPV      Cancer (H) 2019    breast     Cervical high risk HPV (human papillomavirus) test positive 12    type 66     Depressive disorder      PONV (postoperative nausea and vomiting)          PHYSICAL EXAM:  LMP  04/02/2014   No PE (telephone visit) last breast exam 1/17/2020    LABS AND IMAGING:  MA DIAGNOSTIC DIGITAL BILATERAL 12/2/2019 1:32 PM     HISTORY:  Prior left breast malignancy.     COMPARISON:  11/20/2018.     TECHNIQUE:  Bilateral digital mammography with CAD is performed as  well as DBT.     BREAST DENSITY: Heterogeneously dense.     COMMENTS: There is skin thickening and coarsening of the trabecular  pattern in the left breast. This can relate to surgery and/or  radiation therapy. Microcalcifications are present in the surgical  bed. Spot magnification views suggested they are probably benign such  as dystrophic calcification. A six-month follow-up left mammogram is  recommended in reassessment. The right mammogram is unremarkable.                                                                      IMPRESSION: BI-RADS CATEGORY: 3 - Probably Benign Finding-Short  Interval Follow-Up Suggested.     RECOMMENDED FOLLOW-UP: Short Interval Follow-up 6 Months.     MAYKEL PERKINS MD    IMPRESSION:   Ms. Nam is a 59 year old female with triple negative G3 invasive left breast cancer status post lumpectomy and SLNBx, revealing [id number] disease followed by adjuvant chemotherapy completed on 7/10/19. Completed adjuvant radiation therapy to left breast 5256 cGy on 9/11/19.    Scheduled for 6 months follow-up.    Mammogram completed 12/2/2019 showing skin thickening likely related to surgery and or radiation therapy. Also seen were microcalcifications in the surgical bed probably benign such as dystropic calcification ( occurs as a reaction to tissue to damage). Recommendation is for short interval follow-up 6 months with Left mammogram which is scheduled for 5/4/2020.      PLAN:   Acute toxicities from RT improving. Has mild residual edema which continues to improve.  Discussed long term care with continued moisturizing of the treated breast.  Discussed possibility of scar formation from radiation and treatment with  gentle massage. She will follow up here 6 months with Dr. Salvador (alternate).     Patient will follow up with medical oncology for continued care and imaging ( last seen 1/17/2020). Next follow-up 7/17/2020 with exam (last breast exam 1/17/2020). According to NCCN guidelines,  follow-up of breast cancer should include history and physical examination with emphasis on breast examination 1-4 times per year as clinically appropriate for 5 years then annually thereafter.     Lymphedema.She remains at low risk for lymphedema.  She denies lymphedema at this time, but she will contact the clinic if she needs a referral to the lymphedema specialist.       Cancer screening.  should undergo routine screening for age group. Last colonoscopy 2008 -  Patient did have one scheduled at time of diagnosis but cancelled the appt and plans to reschedule.    Healthy lifestyle.  Recommend smoking cessation.  She will continue to see her primary care provider for general health maintenance (Eczema, asthma, reflux, depression and anxiety).    Abigail Velez [facility]  Radiation Therapy Center  [facility]  [address], [address]  [place] [address]

## 2020-04-07 NOTE — NURSING NOTE
"Monika Nam is a 59 year old female who is being evaluated via a billable telephone visit.      The patient has been notified of following:     \"This telephone visit will be conducted via a call between you and your physician/provider. We have found that certain health care needs can be provided without the need for a physical exam.  This service lets us provide the care you need with a short phone conversation.  If a prescription is necessary we can send it directly to your pharmacy.  If lab work is needed we can place an order for that and you can then stop by our lab to have the test done at a later time.    Telephone visits are billed at different rates depending on your insurance coverage. During this emergency period, for some insurers they may be billed the same as an in-person visit.  Please reach out to your insurance provider with any questions.    If during the course of the call the physician/provider feels a telephone visit is not appropriate, you will not be charged for this service.\"    Patient has given verbal consent for Telephone visit?  Yes    Monika Nam complains of    Chief Complaint   Patient presents with     Radiation Therapy     Telephone return appointment with Abigail Velez CNP       I have reviewed and updated the patient's Past Medical History, Social History, Family History and Medication List.    ALLERGIES  Sulfa drugs    Phone call duration: 7 minutes    Karol Lam RN    FOLLOW-UP VISIT    Patient Name: Monika Nam      : 1960     Age: 59 year old        ______________________________________________________________________________     Chief Complaint   Patient presents with     Radiation Therapy     Telephone return appointment with Abigail Velez CNP     [facility] 2014      Date Radiation Completed: 19    Pain  Denies    Meds  Current Med List Reviewed: Yes  Medication Note:     Skin: Warm  Dry  Intact    Range of Motion: Full    Respiratory: No shortness " of breath, dyspnea on exertion, cough, or hemoptysis    Hormone Therapy: No    Lymphedema Follow up: No    Energy Level: normal      Appointments:     DATE  Oncologist: Dr. Gonzales  7/17/20   Surgeon:    Primary:      Other Notes: Follow up Radiation Oncology in 3 months with exam.

## 2020-06-05 DIAGNOSIS — Z11.59 ENCOUNTER FOR SCREENING FOR OTHER VIRAL DISEASES: Primary | ICD-10-CM

## 2020-06-07 DIAGNOSIS — Z11.59 ENCOUNTER FOR SCREENING FOR OTHER VIRAL DISEASES: ICD-10-CM

## 2020-06-07 PROCEDURE — 87635 SARS-COV-2 COVID-19 AMP PRB: CPT | Mod: 90 | Performed by: OPHTHALMOLOGY

## 2020-06-07 PROCEDURE — 99207 ZZC NO CHARGE NURSE ONLY: CPT

## 2020-06-07 PROCEDURE — 99000 SPECIMEN HANDLING OFFICE-LAB: CPT | Performed by: OPHTHALMOLOGY

## 2020-06-08 LAB
SARS-COV-2 RNA SPEC QL NAA+PROBE: NOT DETECTED
SPECIMEN SOURCE: NORMAL

## 2020-06-09 ENCOUNTER — NURSE TRIAGE (OUTPATIENT)
Dept: NURSING | Facility: CLINIC | Age: 60
End: 2020-06-09

## 2020-06-09 ENCOUNTER — ANESTHESIA EVENT (OUTPATIENT)
Dept: SURGERY | Facility: CLINIC | Age: 60
End: 2020-06-09
Payer: COMMERCIAL

## 2020-06-09 NOTE — H&P
[facility]  TOTAL EYE CARE  [address]  [facility] [address]  [phone]  Dept: [phone]    OPHTHALMOLOGY PRE-OPERATIVE  HISTORY AND PHYSICAL    DATE OF H/P:  6/10/2020    DATE OF SURGERY:  Mesha 10, 2020  PROCEDURE:  Procedure(s):  Cataract removal with implant, Right Eye  SURGEON:  Floyd Mar MD    ANESTHESIA:  TOPICAL / MAC    OR CASE REQUIREMENTS:    DEMOGRAPHICS:  Demographic Information on Monika Nam:    Monika Nam  Gender: female  : 1960  [address] [address]  [phone] (home)     Medical Record: [id number]  Social Security Number: [id number]  Pharmacy: [facility] #[id number] [facility] [address] AT [address]  Primary Care Provider: Servando Valladares    Parent's names are: Data Unavailable (mother) and Data Unavailable (father).    Insurance: Payor: PREFERREDONE / Plan: PREFERREDONE HMO / Product Type: PPO /     OCULAR HISTORY:  Cataracts, each eye.  Myopia.    HISTORIES:  Past Medical History:   Diagnosis Date     Abnormal Papanicolaou smear of cervix and cervical HPV     history of LEEP ion      ASCUS favor benign 2013, 2014    Neg high risk HPV      Cancer (H) 2019    breast     Cervical high risk HPV (human papillomavirus) test positive 12    type 66     Depressive disorder      PONV (postoperative nausea and vomiting)        Past Surgical History:   Procedure Laterality Date     BIOPSY       BREAST SURGERY       INSERT PORT VASCULAR ACCESS Right 2019    Procedure: Port-a-Cath Placement;  Surgeon: eSrvando Claros MD;  Location: [facility] OR     LUMPECTOMY BREAST WITH SENTINEL NODE, COMBINED Left 2019    Procedure: COMBINED LUMPECTOMY BREAST WITH SENTINEL NODE;  Surgeon: Servando Claros MD;  Location: [facility] OR     SURGICAL HISTORY OF -       cholecystectomy     SURGICAL HISTORY OF -       LEEP        Family History   Problem Relation Age of Onset      Gastrointestinal Disease Mother         desmond dz     Hypertension Father      Lipids Father      Breast Cancer Paternal Grandmother      Neurologic Disorder Paternal Grandfather         parkinsons     Cancer - colorectal No family hx of      Prostate Cancer No family hx of      Diabetes No family hx of      C.A.D. No family hx of        Social History     Tobacco Use     Smoking status: Current Every Day Smoker     Packs/day: 0.50     Years: 25.00     Pack years: 12.50     Types: Cigarettes     Smokeless tobacco: Never Used     Tobacco comment: 10 cigarettes a day   Substance Use Topics     Alcohol use: Yes     Comment: 3-4 X per week.  About 7 drinks per week.       MEDICATIONS:  No current facility-administered medications for this encounter.      Current Outpatient Medications   Medication Sig     acetaminophen (TYLENOL) 500 MG tablet Take 500-1,000 mg by mouth every 6 hours as needed for mild pain or pain     aspirin 81 MG tablet Take 1 tablet (81 mg) by mouth daily     augmented betamethasone dipropionate (DIPROLENE-AF) 0.05 % external cream APPLY EXTERNALLY TO THE AFFECTED AREA TWICE DAILY     calcium-vitamin D (CALCIUM 600-D) 600-400 MG-UNIT per tablet Take 1 tablet by mouth daily     FLUoxetine (PROZAC) 20 MG capsule Take 20 mg daily.     loratadine (CLARITIN) 10 MG tablet Take 10 mg by mouth daily as needed      MULTIVITAMIN TABS   OR 1 TABLET BY MOUTH DAILY     pseudoePHEDrine (SUDAFED) 30 MG tablet Take 30 mg by mouth every 12 hours as needed for congestion     albuterol (PROAIR HFA/PROVENTIL HFA/VENTOLIN HFA) 108 (90 Base) MCG/ACT inhaler Inhale 2 puffs into the lungs every 4 hours as needed for shortness of breath / dyspnea or wheezing (Patient not taking: Reported on 4/7/2020)     Pyridoxine HCl (VITAMIN B6 PO) Take 1 tablet by mouth daily       ALLERGIES:     Allergies   Allergen Reactions     Sulfa Drugs Swelling       PERTINENT SYSTEMS REVIEW:    1. No - Do you have a history of heart attack,  stroke, stent, bypass or surgery on an artery in the head, neck, heart or legs?  2. No - Do you ever have any pain or discomfort in your chest?  3. No - Do you have a history of  Heart Failure?  4. No - Are you troubled by shortness of breath when walking: On the level, up a slight hill or at night?  5. No - Do you currently have a cold, bronchitis or other respiratory infection?  6. No - Do you have a cough, shortness of breath or wheezing?  7. No - Do you sometimes get pains in the calves of your legs when you walk?  8. No - Do you or anyone in your family have previous history of blood clots?  9. No - Do you or does anyone in your family have a serious bleeding problem such as prolonged bleeding following surgeries or cuts?  10. No - Have you ever had problems with anemia or been told to take iron pills?  11. No - Have you had any abnormal blood loss such as black, tarry or bloody stools, or abnormal vaginal bleeding?  12. No - Have you ever had a blood transfusion?  13. No - Have you or any of your relatives ever had problems with anesthesia?  14. No - Do you have sleep apnea, excessive snoring or daytime drowsiness?  15. No - Do you have any prosthetic heart valves?  16. No - Do you have prosthetic joints?    EXAMINATION:  Vitals were reviewed  Vison:  See prior;  HEENT:  Cataract, otherwise unremarkable.  LUNGS:  Clear  CV:  Regular rate and rhythm without murmur  ABD:  Soft and nontender  NEURO:  Alert and nonfocal    IMPRESSION:  Patient cleared for ophthalmic surgery.  Low risk with monitored, light sedation.  I have assessed the patient's DVT risk, and no additional orders necessary.    PLAN:  Procedure(s):  Cataract removal with implant, Right Eye      Floyd Mar MD

## 2020-06-09 NOTE — TELEPHONE ENCOUNTER
"Was in for COVID test on Sunday and is scheduled for surgery tomorrow.  Needs to know if OK.     Looked at record.  Advised patient that shows \"not detected\"     Belkis Kennedy RN/[facility]    "

## 2020-06-09 NOTE — H&P
[facility]  TOTAL EYE CARE  [address]  [facility] [address]  [phone]  Dept: [phone]    OPHTHALMOLOGY PRE-OPERATIVE  HISTORY AND PHYSICAL    DATE OF H/P:  2020    DATE OF SURGERY:  Mesha 10, 2020  PROCEDURE:  Procedure(s):  Cataract removal with implant, Right Eye  LENS IMPLANT:  ZCB00 +16.5  REFRACTIVE GOAL:  -2.25 Sph  SURGEON:  Floyd Mar MD    ANESTHESIA:  TOPICAL / MAC    OR CASE REQUIREMENTS:    DEMOGRAPHICS:  Demographic Information on Monika Nam:    Monika Nam  Gender: female  : 1960  [address] [facility] [facility] [place] [place] [address]  [phone] (home)     Medical Record: [id number]  Social Security Number: [id number]  Pharmacy: [id number] [address]  Primary Care Provider: Servando Valladares    Parent's names are: Data Unavailable (mother) and Data Unavailable (father).    Insurance: Payor: PREFERREDONE / Plan: PREFERREDONE HMO / Product Type: PPO /     OCULAR HISTORY:  Cataracts, each eye.  Myopia.    HISTORIES:  Past Medical History:   Diagnosis Date     Abnormal Papanicolaou smear of cervix and cervical HPV     history of LEEP ion      ASCUS favor benign 2013, 2014    Neg high risk HPV      Cancer (H) 2019    breast     Cervical high risk HPV (human papillomavirus) test positive 12    type 66     Depressive disorder      PONV (postoperative nausea and vomiting)        Past Surgical History:   Procedure Laterality Date     BIOPSY       BREAST SURGERY       INSERT PORT VASCULAR ACCESS Right 2019    Procedure: Port-a-Cath Placement;  Surgeon: Servando Claros MD;  Location: [place] OR     LUMPECTOMY BREAST WITH SENTINEL NODE, COMBINED Left 2019    Procedure: COMBINED LUMPECTOMY BREAST WITH SENTINEL NODE;  Surgeon: Servando Claros MD;  Location: [place] OR     SURGICAL HISTORY OF -       cholecystectomy     SURGICAL HISTORY OF -       LEEP         Family History   Problem Relation Age of Onset     Gastrointestinal Disease Mother         desmond dz     Hypertension Father      Lipids Father      Breast Cancer Paternal Grandmother      Neurologic Disorder Paternal Grandfather         parkinsons     Cancer - colorectal No family hx of      Prostate Cancer No family hx of      Diabetes No family hx of      C.A.D. No family hx of        Social History     Tobacco Use     Smoking status: Current Every Day Smoker     Packs/day: 0.50     Years: 25.00     Pack years: 12.50     Types: Cigarettes     Smokeless tobacco: Never Used     Tobacco comment: 10 cigarettes a day   Substance Use Topics     Alcohol use: Yes     Comment: 3-4 X per week.  About 7 drinks per week.       MEDICATIONS:  No current facility-administered medications for this encounter.      Current Outpatient Medications   Medication Sig     acetaminophen (TYLENOL) 500 MG tablet Take 500-1,000 mg by mouth every 6 hours as needed for mild pain or pain     aspirin 81 MG tablet Take 1 tablet (81 mg) by mouth daily     augmented betamethasone dipropionate (DIPROLENE-AF) 0.05 % external cream APPLY EXTERNALLY TO THE AFFECTED AREA TWICE DAILY     calcium-vitamin D (CALCIUM 600-D) 600-400 MG-UNIT per tablet Take 1 tablet by mouth daily     FLUoxetine (PROZAC) 20 MG capsule Take 20 mg daily.     loratadine (CLARITIN) 10 MG tablet Take 10 mg by mouth daily as needed      MULTIVITAMIN TABS   OR 1 TABLET BY MOUTH DAILY     pseudoePHEDrine (SUDAFED) 30 MG tablet Take 30 mg by mouth every 12 hours as needed for congestion     albuterol (PROAIR HFA/PROVENTIL HFA/VENTOLIN HFA) 108 (90 Base) MCG/ACT inhaler Inhale 2 puffs into the lungs every 4 hours as needed for shortness of breath / dyspnea or wheezing (Patient not taking: Reported on 4/7/2020)     Pyridoxine HCl (VITAMIN B6 PO) Take 1 tablet by mouth daily       ALLERGIES:     Allergies   Allergen Reactions     Sulfa Drugs Swelling       PERTINENT SYSTEMS  REVIEW:    1. No - Do you have a history of heart attack, stroke, stent, bypass or surgery on an artery in the head, neck, heart or legs?  2. No - Do you ever have any pain or discomfort in your chest?  3. No - Do you have a history of  Heart Failure?  4. No - Are you troubled by shortness of breath when walking: On the level, up a slight hill or at night?  5. No - Do you currently have a cold, bronchitis or other respiratory infection?  6. No - Do you have a cough, shortness of breath or wheezing?  7. No - Do you sometimes get pains in the calves of your legs when you walk?  8. No - Do you or anyone in your family have previous history of blood clots?  9. No - Do you or does anyone in your family have a serious bleeding problem such as prolonged bleeding following surgeries or cuts?  10. No - Have you ever had problems with anemia or been told to take iron pills?  11. No - Have you had any abnormal blood loss such as black, tarry or bloody stools, or abnormal vaginal bleeding?  12. No - Have you ever had a blood transfusion?  13. No - Have you or any of your relatives ever had problems with anesthesia?  14. No - Do you have sleep apnea, excessive snoring or daytime drowsiness?  15. No - Do you have any prosthetic heart valves?  16. No - Do you have prosthetic joints?    EXAMINATION:  Vitals were reviewed  Vison:  Va, right - 20/100, left - 20/60;   BAT, left - 20/100;  HEENT:  Cataract, otherwise unremarkable.  LUNGS:  Clear  CV:  Regular rate and rhythm without murmur  ABD:  Soft and nontender  NEURO:  Alert and nonfocal    IMPRESSION:  Patient cleared for ophthalmic surgery.  Low risk with monitored, light sedation.  I have assessed the patient's DVT risk, and no additional orders necessary.    PLAN:  Procedure(s):  Cataract removal with implant, Right Eye      Floyd Mar MD

## 2020-06-10 ENCOUNTER — ANESTHESIA (OUTPATIENT)
Dept: SURGERY | Facility: CLINIC | Age: 60
End: 2020-06-10
Payer: COMMERCIAL

## 2020-06-10 ENCOUNTER — HOSPITAL ENCOUNTER (OUTPATIENT)
Facility: CLINIC | Age: 60
Discharge: HOME | End: 2020-06-10
Attending: OPHTHALMOLOGY | Admitting: OPHTHALMOLOGY
Payer: COMMERCIAL

## 2020-06-10 VITALS
DIASTOLIC BLOOD PRESSURE: 87 MMHG | OXYGEN SATURATION: 96 % | WEIGHT: 185 LBS | BODY MASS INDEX: 28.04 KG/M2 | SYSTOLIC BLOOD PRESSURE: 135 MMHG | HEIGHT: 68 IN | TEMPERATURE: 98.2 F | RESPIRATION RATE: 16 BRPM | HEART RATE: 85 BPM

## 2020-06-10 PROCEDURE — 25000128 H RX IP 250 OP 636: Performed by: OPHTHALMOLOGY

## 2020-06-10 PROCEDURE — 25000125 ZZHC RX 250: Performed by: OPHTHALMOLOGY

## 2020-06-10 PROCEDURE — 36000025 ZZH CATARACT SURGICAL PACKAGE: Performed by: OPHTHALMOLOGY

## 2020-06-10 PROCEDURE — 25800030 ZZH RX IP 258 OP 636: Performed by: NURSE ANESTHETIST, CERTIFIED REGISTERED

## 2020-06-10 PROCEDURE — 71000022 ZZH RECOVERY CATRACT PACKAGE: Performed by: OPHTHALMOLOGY

## 2020-06-10 PROCEDURE — V2632 POST CHMBR INTRAOCULAR LENS: HCPCS | Performed by: OPHTHALMOLOGY

## 2020-06-10 PROCEDURE — 25000128 H RX IP 250 OP 636: Performed by: NURSE ANESTHETIST, CERTIFIED REGISTERED

## 2020-06-10 PROCEDURE — 37000012 ZZH ANESTHESIA CATARACT PACKAGE: Performed by: OPHTHALMOLOGY

## 2020-06-10 DEVICE — EYE IMP IOL AMO PCL TECNIS ZCB00 16.5: Type: IMPLANTABLE DEVICE | Site: EYE | Status: FUNCTIONAL

## 2020-06-10 RX ORDER — PHENYLEPHRINE HYDROCHLORIDE 25 MG/ML
1 SOLUTION/ DROPS OPHTHALMIC
Status: COMPLETED | OUTPATIENT
Start: 2020-06-10 | End: 2020-06-10

## 2020-06-10 RX ORDER — PROPARACAINE HYDROCHLORIDE 5 MG/ML
SOLUTION/ DROPS OPHTHALMIC PRN
Status: DISCONTINUED | OUTPATIENT
Start: 2020-06-10 | End: 2020-06-10 | Stop reason: HOSPADM

## 2020-06-10 RX ORDER — SODIUM CHLORIDE, SODIUM LACTATE, POTASSIUM CHLORIDE, CALCIUM CHLORIDE 600; 310; 30; 20 MG/100ML; MG/100ML; MG/100ML; MG/100ML
INJECTION, SOLUTION INTRAVENOUS CONTINUOUS
Status: DISCONTINUED | OUTPATIENT
Start: 2020-06-10 | End: 2020-06-10 | Stop reason: HOSPADM

## 2020-06-10 RX ORDER — LIDOCAINE 40 MG/G
CREAM TOPICAL
Status: DISCONTINUED | OUTPATIENT
Start: 2020-06-10 | End: 2020-06-10 | Stop reason: HOSPADM

## 2020-06-10 RX ORDER — SODIUM CHLORIDE, SODIUM LACTATE, POTASSIUM CHLORIDE, CALCIUM CHLORIDE 600; 310; 30; 20 MG/100ML; MG/100ML; MG/100ML; MG/100ML
INJECTION, SOLUTION INTRAVENOUS CONTINUOUS
Status: CANCELLED | OUTPATIENT
Start: 2020-06-10

## 2020-06-10 RX ORDER — TROPICAMIDE 10 MG/ML
1 SOLUTION/ DROPS OPHTHALMIC
Status: COMPLETED | OUTPATIENT
Start: 2020-06-10 | End: 2020-06-10

## 2020-06-10 RX ORDER — CYCLOPENTOLATE HYDROCHLORIDE 10 MG/ML
1 SOLUTION/ DROPS OPHTHALMIC
Status: COMPLETED | OUTPATIENT
Start: 2020-06-10 | End: 2020-06-10

## 2020-06-10 RX ADMIN — TROPICAMIDE 1 DROP: 10 SOLUTION/ DROPS OPHTHALMIC at 08:01

## 2020-06-10 RX ADMIN — CYCLOPENTOLATE HYDROCHLORIDE 1 DROP: 10 SOLUTION/ DROPS OPHTHALMIC at 07:52

## 2020-06-10 RX ADMIN — TROPICAMIDE 1 DROP: 10 SOLUTION/ DROPS OPHTHALMIC at 07:52

## 2020-06-10 RX ADMIN — TROPICAMIDE 1 DROP: 10 SOLUTION/ DROPS OPHTHALMIC at 07:45

## 2020-06-10 RX ADMIN — CYCLOPENTOLATE HYDROCHLORIDE 1 DROP: 10 SOLUTION/ DROPS OPHTHALMIC at 08:01

## 2020-06-10 RX ADMIN — PHENYLEPHRINE HYDROCHLORIDE 1 DROP: 25 SOLUTION/ DROPS OPHTHALMIC at 08:01

## 2020-06-10 RX ADMIN — CYCLOPENTOLATE HYDROCHLORIDE 1 DROP: 10 SOLUTION/ DROPS OPHTHALMIC at 07:45

## 2020-06-10 RX ADMIN — PHENYLEPHRINE HYDROCHLORIDE 1 DROP: 25 SOLUTION/ DROPS OPHTHALMIC at 07:52

## 2020-06-10 RX ADMIN — MIDAZOLAM 2 MG: 1 INJECTION INTRAMUSCULAR; INTRAVENOUS at 08:52

## 2020-06-10 RX ADMIN — SODIUM CHLORIDE, POTASSIUM CHLORIDE, SODIUM LACTATE AND CALCIUM CHLORIDE: 600; 310; 30; 20 INJECTION, SOLUTION INTRAVENOUS at 08:52

## 2020-06-10 RX ADMIN — PHENYLEPHRINE HYDROCHLORIDE 1 DROP: 25 SOLUTION/ DROPS OPHTHALMIC at 07:45

## 2020-06-10 ASSESSMENT — MIFFLIN-ST. JEOR: SCORE: 1457.65

## 2020-06-10 ASSESSMENT — LIFESTYLE VARIABLES: TOBACCO_USE: 1

## 2020-06-10 NOTE — ANESTHESIA CARE TRANSFER NOTE
Patient: Monika Nam    Procedure(s):  Cataract removal with implant    Diagnosis: Cataract [H26.9]  Diagnosis Additional Information: No value filed.    Anesthesia Type:   MAC     Note:  Airway :Room Air          Vitals: (Last set prior to Anesthesia Care Transfer)    CRNA VITALS  6/10/2020 0840 - 6/10/2020 0910      6/10/2020             Pulse:  76    SpO2:  96 %                Electronically Signed By: Marlena Farley CRNA, APRN CRNA  Mesha 10, 2020  9:10 AM

## 2020-06-10 NOTE — OP NOTE
OPHTHALMOLOGY OPERATIVE NOTE    PATIENT: Monika Nam  DATE OF SURGERY: 6/10/2020  PREOPERATIVE DIAGNOSIS:  Senile Nuclear Cataract, Right eye  POSTOPERATIVE DIAGNOSIS:  Senile Nuclear Cataract, Right eye  OPERATIVE PROCEDURE:  Phacoemulsification with placement of intraocular lens  SURGEON:  Floyd Mar MD  ANESTHESIA:  Topical / MAC  EBL:  None  SPECIMENS:  None  COMPLICATIONS:  None    PROCEDURE:  The patient was brought to the operating room at [facility].  The right eye was prepped and draped in the usual fashion for cataract surgery.  A wire lid speculum was inserted.  A super sharp blade was used to make a paracentesis at the 11 O'clock position.  The super sharp blade was used to make a partial thickness temporal groove, which was 3 mm in length.  0.8 mL of non-preserved epi-Shugarcaine was injected into the anterior chamber.  Viscoelastic was used to inflate the anterior chamber through a cannula.  A 2.5 mm microkeratome was used to make a temporal clear corneal incision in a two-plane fashion.  A cystotome needle and forceps were used to make a capsulorrhexis.  Hydrodissection and hydrodelineation were performed with Balance Salt Solution.  The lens was then phacoemulsified and removed without complications.  The cortical material was removed with bimanual irrigation and aspiration.  The capsular bag was filled with viscoelastic.  A posterior chamber intraocular lens, preselected and recorded, was folded and inserted into the capsular bag.  The viscoelastic was removed with the irrigation and aspiration tip.  Balanced Salt Solution with Vigamox, 150mg/0.1mL, was used to refill the anterior chamber.  The wounds were checked for water tightness and required no suture.  The wire lid speculum was removed.  The patient's right eye was cleaned and a drop of each post-operative drop was placed, followed by a adan shield.  The patient tolerated the procedure well, and there  were no complications.      Floyd Mar MD

## 2020-06-10 NOTE — ANESTHESIA POSTPROCEDURE EVALUATION
Patient: Monika Nam    Procedure(s):  Cataract removal with implant    Diagnosis:Cataract [H26.9]  Diagnosis Additional Information: No value filed.    Anesthesia Type:  MAC    Note:  Anesthesia Post Evaluation    Patient location during evaluation: Bedside  Patient participation: Able to fully participate in evaluation  Level of consciousness: awake and alert  Pain management: adequate  Airway patency: patent  Cardiovascular status: acceptable  Respiratory status: acceptable  Hydration status: acceptable  PONV: none     Anesthetic complications: None          Last vitals:  Vitals:    06/10/20 0729   BP: (!) 143/95   Pulse: 97   Resp: 18   Temp: 36.8  C (98.2  F)   SpO2: 97%         Electronically Signed By: Marlena Farley CRNA, APRN CRNA  Mesha 10, 2020  9:11 AM

## 2020-06-10 NOTE — ANESTHESIA PREPROCEDURE EVALUATION
Anesthesia Pre-Procedure Evaluation    Patient: Monika Nam   MRN: [id number] : 1960          Preoperative Diagnosis: Cataract [H26.9]    Procedure(s):  Cataract removal with implant    Past Medical History:   Diagnosis Date     Abnormal Papanicolaou smear of cervix and cervical HPV     history of LEEP ion      ASCUS favor benign 2013, 2014    Neg high risk HPV      Cancer (H) 2019    breast     Cervical high risk HPV (human papillomavirus) test positive 12    type 66     Depressive disorder      PONV (postoperative nausea and vomiting)      Past Surgical History:   Procedure Laterality Date     BIOPSY       BREAST SURGERY       INSERT PORT VASCULAR ACCESS Right 2019    Procedure: Port-a-Cath Placement;  Surgeon: Servando Claros MD;  Location: [facility]     LUMPECTOMY BREAST WITH SENTINEL NODE, COMBINED Left 2019    Procedure: COMBINED LUMPECTOMY BREAST WITH SENTINEL NODE;  Surgeon: Servando Claros MD;  Location: [facility]     SURGICAL HISTORY OF -       cholecystectomy     SURGICAL HISTORY OF -       LEEP        Anesthesia Evaluation     . Pt has had prior anesthetic.     History of anesthetic complications   - PONV        ROS/MED HX    ENT/Pulmonary:     (+)sleep apnea, TOY risk factors allergic rhinitis, tobacco use, Current use asthma , . .    Neurologic:       Cardiovascular:         METS/Exercise Tolerance:     Hematologic:         Musculoskeletal:         GI/Hepatic:     (+) GERD       Renal/Genitourinary:         Endo:     (+) Obesity, .      Psychiatric:     (+) psychiatric history depression      Infectious Disease:         Malignancy:   (+) Malignancy History of Breast          Other:                          Physical Exam  Normal systems: cardiovascular, pulmonary and dental    Airway   Mallampati: II  TM distance: >3 FB  Neck ROM: full    Dental     Cardiovascular       Pulmonary             Lab Results   Component Value Date    WBC 6.2 2020    HGB 13.7  "01/13/2020    HCT 41.0 01/13/2020     01/13/2020     01/13/2020    POTASSIUM 3.6 01/13/2020    CHLORIDE 101 01/13/2020    CO2 28 01/13/2020    BUN 16 01/13/2020    CR 0.69 01/13/2020    GLC 86 01/13/2020    DANI 9.2 01/13/2020    ALBUMIN 3.8 01/13/2020    PROTTOTAL 7.7 01/13/2020    ALT 27 01/13/2020    AST 20 01/13/2020    ALKPHOS 104 01/13/2020    BILITOTAL 0.9 01/13/2020    TSH 0.62 08/02/2005       Preop Vitals  BP Readings from Last 3 Encounters:   06/10/20 (!) 143/95   02/03/20 (!) 146/90   01/17/20 139/82    Pulse Readings from Last 3 Encounters:   06/10/20 97   02/03/20 98   01/17/20 91      Resp Readings from Last 3 Encounters:   06/10/20 18   02/03/20 12   01/17/20 18    SpO2 Readings from Last 3 Encounters:   06/10/20 97%   02/03/20 96%   01/17/20 98%      Temp Readings from Last 1 Encounters:   06/10/20 36.8  C (98.2  F) (Oral)    Ht Readings from Last 1 Encounters:   06/10/20 1.727 m (5' 8\")      Wt Readings from Last 1 Encounters:   06/10/20 83.9 kg (185 lb)    Estimated body mass index is 28.13 kg/m  as calculated from the following:    Height as of this encounter: 1.727 m (5' 8\").    Weight as of this encounter: 83.9 kg (185 lb).       Anesthesia Plan      History & Physical Review  History and physical reviewed and following examination; no interval change.    ASA Status:  3 .    NPO Status:  > 6 hours    Plan for MAC Reason for MAC:  Deep or markedly invasive procedure (G8)           Postoperative Care      Consents  Anesthetic plan, risks, benefits and alternatives discussed with:  Patient..                 Marlena Farley CRNA, APRN CRNA  "

## 2020-06-16 ENCOUNTER — HOSPITAL ENCOUNTER (OUTPATIENT)
Dept: MAMMOGRAPHY | Facility: CLINIC | Age: 60
Discharge: HOME | End: 2020-06-16
Attending: INTERNAL MEDICINE | Admitting: INTERNAL MEDICINE
Payer: COMMERCIAL

## 2020-06-16 DIAGNOSIS — C50.112 INFILTRATING DUCTAL CARCINOMA OF CENTRAL PORTION OF LEFT BREAST IN FEMALE (H): ICD-10-CM

## 2020-06-16 PROCEDURE — 77065 DX MAMMO INCL CAD UNI: CPT | Mod: LT

## 2020-06-18 ENCOUNTER — ANESTHESIA EVENT (OUTPATIENT)
Dept: SURGERY | Facility: CLINIC | Age: 60
End: 2020-06-18
Payer: COMMERCIAL

## 2020-06-18 ASSESSMENT — LIFESTYLE VARIABLES: TOBACCO_USE: 1

## 2020-06-18 NOTE — ANESTHESIA PREPROCEDURE EVALUATION
Anesthesia Pre-Procedure Evaluation    Patient: Monika Nam   MRN: [id number] : 1960          Preoperative Diagnosis: Cataract [H26.9]    Procedure(s):  Cataract removla with implant.    Past Medical History:   Diagnosis Date     Abnormal Papanicolaou smear of cervix and cervical HPV     history of LEEP ion      Arthritis      ASCUS favor benign 2013, 2014    Neg high risk HPV      Cancer (H) 2019    breast     Cervical high risk HPV (human papillomavirus) test positive 12    type 66     Depressive disorder      PONV (postoperative nausea and vomiting)      Uncomplicated asthma      Past Surgical History:   Procedure Laterality Date     BIOPSY       BREAST SURGERY       INSERT PORT VASCULAR ACCESS Right 2019    Procedure: Port-a-Cath Placement;  Surgeon: Servando Claros MD;  Location: [facility]     LUMPECTOMY BREAST WITH SENTINEL NODE, COMBINED Left 2019    Procedure: COMBINED LUMPECTOMY BREAST WITH SENTINEL NODE;  Surgeon: Servando Claros MD;  Location: [facility]     PHACOEMULSIFICATION WITH STANDARD INTRAOCULAR LENS IMPLANT Right 6/10/2020    Procedure: Cataract removal with implant;  Surgeon: Floyd Mar MD;  Location: [facility]     SURGICAL HISTORY OF -       cholecystectomy     SURGICAL HISTORY OF -       LEEP        Anesthesia Evaluation     . Pt has had prior anesthetic.     History of anesthetic complications   - PONV        ROS/MED HX    ENT/Pulmonary:     (+)sleep apnea, allergic rhinitis, tobacco use, Current use Intermittent asthma , . .    Neurologic:       Cardiovascular:     (+) ----. : . . . :. . Previous cardiac testing Echodate:1/15/19results:Result Notes for Echocardiogram Exercise Stress     Notes recorded by Ana Baldwin CMA on 2019 at 1:21 PM CST   See below.   Ana Baldwin CMA     ------     Notes recorded by Ana Baldwin CMA on 2019 at 1:19 PM CST   Patient notified by phone that she is authorized for surgery.    Surgery is here at [facility].   Ana Baldwin CMA     ------     Notes recorded by Jason Valladares MD on 2019 at 1:14 PM CST   Call the results. The stress echo is normal.   She is authorized for surgery. I addended the 19 H&P.   Jason Valladares         Patient Result Comments     Viewed by Monika DILLON Cyril on 2019 10:49 AM   Written by Jason Valladares MD on 2019  1:14 PM   Call the results. The stress echo is normal.   She is authorized for surgery. I addended the 19 H&P.   Jason Valladares   Results   Echocardiogram Exercise Stress (Order [id number])   External Result Report     External Result Report   PACS Images      Show images for Echocardiogram Exercise Stress   Result Notes for Echocardiogram Exercise Stress     Notes recorded by Ana Baldwin CMA on 2019 at 1:21 PM CST   See below.   Ana Baldwin CMA     ------     Notes recorded by Ana Baldwin CMA on 2019 at 1:19 PM CST   Patient notified by phone that she is authorized for surgery.   Surgery is here at [facility].   Ana Baldwin CMA     ------     Notes recorded by Jason Valladares MD on 2019 at 1:14 PM CST   Call the results. The stress echo is normal.   She is authorized for surgery. I addended the 19 H&P.   Jason Valladares   Echocardiogram Exercise Stress   Order: [id number]   Status: Edited Result - FINAL   Visible to patient: Yes (MyChart) Next appt: 07/15/2020 at 04:00 PM in Lab ([facility]) Dx: Abnormal electrocardiogram   Details       Reading Physician  Reading Date  Result Priority   Ethel Das MD  [phone]  1/15/2019      Narrative & Impression     [id number]  [id number]  [id number]  [id number]^MARGARITA^JASON^JUANCHO           [facility]  Echocardiography Laboratory  [address] [place] [address]        Name: MONIKA ASCENCIO  MRN: [id number]  : 1960  Study Date: 01/15/2019 09:07 AM  Age: 58 yrs  Gender:  Female  Patient Location: [facility]  Reason For Study: Abnormal electrocardiogram  Ordering Physician: JASON AVILA  Referring Physician: JASON AVILA  Performed By: Sanjuana Auguste RDCS     BSA: 2.1 m2  Height: 68 in  Weight: 203 lb  HR: 69  BP: 132/90 mmHg  Medications: ASA  _____________________________________________________________________________  __        Procedure  Stress Echo Complete. Contrast Optison.  _____________________________________________________________________________  __        Interpretation Summary  This was a normal stress echocardiogram with no evidence of stress-induced  ischemia.  _____________________________________________________________________________  __     Stress  The patient exercised 5:55.  There was a borderline hypertensive BP response to exercise.  A treadmill exercise test according to the Keanu protocol was performed.  Target Heart Rate was achieved.  The EKG portion of this stress test was negative for inducible ischemia (see  echo results below).  Arrhythmia noted in recovery: occasional PVC's.  Left ventricular cavity size decreases with exercise.  Global LV systolic function augments with exercise.  The visual ejection fraction is estimated at 65-70%.  Normal left ventricular function and wall motion at rest and post-stress.     Baseline  The patient is in normal sinus rhythm.  Normal left ventricular function and wall motion at rest.  The visual ejection fraction is estimated at 55-60%.     Stress Results        Protocol:  Keanu Protocol        Maximum Predicted HR:   161 bpm         Target HR: 137 bpm               % Maximum Predicted HR: 98 %          Stage DurationHeart Rate  BP                  Comment              (mm:ss)   (bpm)      Stage 1  3:00      112   166/84       Peak    2:55      157   172/92Term-SOB; [facility](5); FAC-below; [id number]     Recovery  6:00      86    138/76                        Stress Duration:   5:55 mm:ss *                  Maximum  Stress HR: 157 bpm *           METS: 7     Mitral Valve  There is trace mitral regurgitation.     Tricuspid Valve  There is trace tricuspid regurgitation.     Aortic Valve  There is mild trileaflet aortic sclerosis. No aortic regurgitation is present.  No aortic stenosis is present.     _____________________________________________________________________________  __                             Report approved by: Adrienne Fall 01/15/2019 11:22 AM              date: results:ECG reviewed date:1/7/19 results:Sinus  Rhythm   -Old inferior infarct.     ABNORMAL    date: results:          METS/Exercise Tolerance:     Hematologic:         Musculoskeletal: Comment: CTS        GI/Hepatic:     (+) GERD       Renal/Genitourinary:         Endo:     (+) Obesity, .      Psychiatric:     (+) psychiatric history anxiety      Infectious Disease:         Malignancy:   (+) Malignancy History of Breast          Other: Comment: HPV  cataract                         Physical Exam  Normal systems: cardiovascular, pulmonary and dental    Airway   Mallampati: I  TM distance: >3 FB  Neck ROM: limited    Dental     Cardiovascular       Pulmonary    breath sounds clear to auscultation            Lab Results   Component Value Date    WBC 6.2 01/13/2020    HGB 13.7 01/13/2020    HCT 41.0 01/13/2020     01/13/2020     01/13/2020    POTASSIUM 3.6 01/13/2020    CHLORIDE 101 01/13/2020    CO2 28 01/13/2020    BUN 16 01/13/2020    CR 0.69 01/13/2020    GLC 86 01/13/2020    DANI 9.2 01/13/2020    ALBUMIN 3.8 01/13/2020    PROTTOTAL 7.7 01/13/2020    ALT 27 01/13/2020    AST 20 01/13/2020    ALKPHOS 104 01/13/2020    BILITOTAL 0.9 01/13/2020    TSH 0.62 08/02/2005       Preop Vitals  BP Readings from Last 3 Encounters:   06/10/20 135/87   02/03/20 (!) 146/90   01/17/20 139/82    Pulse Readings from Last 3 Encounters:   06/10/20 85   02/03/20 98   01/17/20 91      Resp Readings from Last 3 Encounters:   06/10/20 16   02/03/20 12  "  01/17/20 18    SpO2 Readings from Last 3 Encounters:   06/10/20 96%   02/03/20 96%   01/17/20 98%      Temp Readings from Last 1 Encounters:   06/10/20 36.8  C (98.2  F) (Oral)    Ht Readings from Last 1 Encounters:   06/10/20 1.727 m (5' 8\")      Wt Readings from Last 1 Encounters:   06/10/20 83.9 kg (185 lb)    Estimated body mass index is 28.13 kg/m  as calculated from the following:    Height as of 6/10/20: 1.727 m (5' 8\").    Weight as of 6/10/20: 83.9 kg (185 lb).       Anesthesia Plan      History & Physical Review  History and physical reviewed and following examination; no interval change.    ASA Status:  3 .    NPO Status:  > 6 hours    Plan for MAC Reason for MAC:  Deep or markedly invasive procedure (G8)           Postoperative Care      Consents  Anesthetic plan, risks, benefits and alternatives discussed with:  Patient..                 HAYLIE Allred CRNA  "

## 2020-06-25 NOTE — H&P
[facility]  [facility]  [address]  [facility] [address]  [phone]  Dept: [phone]    OPHTHALMOLOGY PRE-OPERATIVE  HISTORY AND PHYSICAL    DATE OF H/P:  2020    DATE OF SURGERY:  2020  PROCEDURE:  Procedure(s):  Cataract removla with implant., Left Eye  LENS IMPLANT:  [id number] +16.0  REFRACTIVE GOAL:  -2.00 Sph  SURGEON:  Floyd Mar MD    ANESTHESIA:  TOPICAL / MAC    OR CASE REQUIREMENTS:    DEMOGRAPHICS:  Demographic Information on Monika Nam:    Monika Nam  Gender: female  : 1960  [address] [facility] [address]  [phone] (home)     Medical Record: [id number]  Social Security Number: [id number]  Pharmacy: [id number] [address]  Primary Care Provider: Servando Valladares    Parent's names are: Data Unavailable (mother) and Data Unavailable (father).    Insurance: Payor: PREFERREDONE / Plan: PREFERREDONE HMO / Product Type: PPO /     OCULAR HISTORY:  Cataracts, s/p IOL right eye.  Myopia.    HISTORIES:  Past Medical History:   Diagnosis Date     Abnormal Papanicolaou smear of cervix and cervical HPV     history of LEEP ion      Arthritis      ASCUS favor benign 2013, 2014    Neg high risk HPV      Cancer (H) 2019    breast     Cervical high risk HPV (human papillomavirus) test positive 12    type 66     Depressive disorder      PONV (postoperative nausea and vomiting)      Uncomplicated asthma        Past Surgical History:   Procedure Laterality Date     BIOPSY       BREAST SURGERY       INSERT PORT VASCULAR ACCESS Right 2019    Procedure: Port-a-Cath Placement;  Surgeon: Servando Claros MD;  Location: [facility]     LUMPECTOMY BREAST WITH SENTINEL NODE, COMBINED Left 2019    Procedure: COMBINED LUMPECTOMY BREAST WITH SENTINEL NODE;  Surgeon: Servando Claros MD;  Location: [facility]     PHACOEMULSIFICATION WITH STANDARD  INTRAOCULAR LENS IMPLANT Right 6/10/2020    Procedure: Cataract removal with implant;  Surgeon: Floyd Mar MD;  Location: [facility]     SURGICAL HISTORY OF -   1996    cholecystectomy     SURGICAL HISTORY OF -   1994    LEEP        Family History   Problem Relation Age of Onset     Gastrointestinal Disease Mother         desmond dz     Hypertension Father      Lipids Father      Breast Cancer Paternal Grandmother      Neurologic Disorder Paternal Grandfather         parkinsons     Cancer - colorectal No family hx of      Prostate Cancer No family hx of      Diabetes No family hx of      C.A.D. No family hx of        Social History     Tobacco Use     Smoking status: Current Every Day Smoker     Packs/day: 0.50     Years: 25.00     Pack years: 12.50     Types: Cigarettes     Smokeless tobacco: Never Used     Tobacco comment: 10 cigarettes a day   Substance Use Topics     Alcohol use: Yes     Comment: 3-4 X per week.  About 7 drinks per week.       MEDICATIONS:  No current facility-administered medications for this encounter.      Current Outpatient Medications   Medication Sig     acetaminophen (TYLENOL) 500 MG tablet Take 500-1,000 mg by mouth every 6 hours as needed for mild pain or pain     albuterol (PROAIR HFA/PROVENTIL HFA/VENTOLIN HFA) 108 (90 Base) MCG/ACT inhaler Inhale 2 puffs into the lungs every 4 hours as needed for shortness of breath / dyspnea or wheezing     aspirin 81 MG tablet Take 1 tablet (81 mg) by mouth daily     augmented betamethasone dipropionate (DIPROLENE-AF) 0.05 % external cream APPLY EXTERNALLY TO THE AFFECTED AREA TWICE DAILY     calcium-vitamin D (CALCIUM 600-D) 600-400 MG-UNIT per tablet Take 1 tablet by mouth daily     FLUoxetine (PROZAC) 20 MG capsule Take 20 mg daily.     loratadine (CLARITIN) 10 MG tablet Take 10 mg by mouth daily as needed      MULTIVITAMIN TABS   OR 1 TABLET BY MOUTH DAILY     pseudoePHEDrine (SUDAFED) 30 MG tablet Take 30 mg by mouth every 12 hours as  needed for congestion     Pyridoxine HCl (VITAMIN B6 PO) Take 1 tablet by mouth daily       ALLERGIES:     Allergies   Allergen Reactions     Sulfa Drugs Swelling       PERTINENT SYSTEMS REVIEW:    1. No - Do you have a history of heart attack, stroke, stent, bypass or surgery on an artery in the head, neck, heart or legs?  2. No - Do you ever have any pain or discomfort in your chest?  3. No - Do you have a history of  Heart Failure?  4. No - Are you troubled by shortness of breath when walking: On the level, up a slight hill or at night?  5. No - Do you currently have a cold, bronchitis or other respiratory infection?  6. No - Do you have a cough, shortness of breath or wheezing?  7. No - Do you sometimes get pains in the calves of your legs when you walk?  8. No - Do you or anyone in your family have previous history of blood clots?  9. No - Do you or does anyone in your family have a serious bleeding problem such as prolonged bleeding following surgeries or cuts?  10. No - Have you ever had problems with anemia or been told to take iron pills?  11. No - Have you had any abnormal blood loss such as black, tarry or bloody stools, or abnormal vaginal bleeding?  12. No - Have you ever had a blood transfusion?  13. No - Have you or any of your relatives ever had problems with anesthesia?  14. No - Do you have sleep apnea, excessive snoring or daytime drowsiness?  15. No - Do you have any prosthetic heart valves?  16. No - Do you have prosthetic joints?    EXAMINATION:  Vitals were reviewed  Vison:  Va, left - 20/60;   BAT, left - 20/100;  HEENT:  Cataract, otherwise unremarkable.  LUNGS:  Clear  CV:  Regular rate and rhythm without murmur  ABD:  Soft and nontender  NEURO:  Alert and nonfocal    IMPRESSION:  Patient cleared for ophthalmic surgery.  Low risk with monitored, light sedation.  I have assessed the patient's DVT risk, and no additional orders necessary.    PLAN:  Procedure(s):  Cataract removla with  implant., Left Eye      Floyd Mar MD

## 2020-06-27 DIAGNOSIS — Z11.59 ENCOUNTER FOR SCREENING FOR OTHER VIRAL DISEASES: ICD-10-CM

## 2020-06-27 PROCEDURE — 99207 ZZC NO CHARGE NURSE ONLY: CPT

## 2020-06-27 PROCEDURE — U0003 INFECTIOUS AGENT DETECTION BY NUCLEIC ACID (DNA OR RNA); SEVERE ACUTE RESPIRATORY SYNDROME CORONAVIRUS 2 (SARS-COV-2) (CORONAVIRUS DISEASE [COVID-19]), AMPLIFIED PROBE TECHNIQUE, MAKING USE OF HIGH THROUGHPUT TECHNOLOGIES AS DESCRIBED BY CMS-2020-01-R: HCPCS | Performed by: OPHTHALMOLOGY

## 2020-06-28 LAB
SARS-COV-2 RNA SPEC QL NAA+PROBE: NOT DETECTED
SPECIMEN SOURCE: NORMAL

## 2020-06-29 ENCOUNTER — HOSPITAL ENCOUNTER (OUTPATIENT)
Facility: CLINIC | Age: 60
Discharge: HOME | End: 2020-06-29
Attending: OPHTHALMOLOGY | Admitting: OPHTHALMOLOGY
Payer: COMMERCIAL

## 2020-06-29 ENCOUNTER — ANESTHESIA (OUTPATIENT)
Dept: SURGERY | Facility: CLINIC | Age: 60
End: 2020-06-29
Payer: COMMERCIAL

## 2020-06-29 VITALS
RESPIRATION RATE: 16 BRPM | WEIGHT: 180 LBS | HEIGHT: 68 IN | OXYGEN SATURATION: 98 % | HEART RATE: 75 BPM | SYSTOLIC BLOOD PRESSURE: 150 MMHG | DIASTOLIC BLOOD PRESSURE: 87 MMHG | TEMPERATURE: 98.2 F | BODY MASS INDEX: 27.28 KG/M2

## 2020-06-29 PROCEDURE — 71000022 ZZH RECOVERY CATRACT PACKAGE: Performed by: OPHTHALMOLOGY

## 2020-06-29 PROCEDURE — 37000012 ZZH ANESTHESIA CATARACT PACKAGE: Performed by: OPHTHALMOLOGY

## 2020-06-29 PROCEDURE — 25000128 H RX IP 250 OP 636: Performed by: OPHTHALMOLOGY

## 2020-06-29 PROCEDURE — 25000128 H RX IP 250 OP 636: Performed by: NURSE ANESTHETIST, CERTIFIED REGISTERED

## 2020-06-29 PROCEDURE — 25000125 ZZHC RX 250: Performed by: OPHTHALMOLOGY

## 2020-06-29 PROCEDURE — V2632 POST CHMBR INTRAOCULAR LENS: HCPCS | Performed by: OPHTHALMOLOGY

## 2020-06-29 PROCEDURE — 25000125 ZZHC RX 250: Performed by: NURSE ANESTHETIST, CERTIFIED REGISTERED

## 2020-06-29 PROCEDURE — 36000025 ZZH CATARACT SURGICAL PACKAGE: Performed by: OPHTHALMOLOGY

## 2020-06-29 PROCEDURE — 25800030 ZZH RX IP 258 OP 636: Performed by: NURSE ANESTHETIST, CERTIFIED REGISTERED

## 2020-06-29 DEVICE — EYE IMP IOL AMO PCL TECNIS ZCB00 16.0: Type: IMPLANTABLE DEVICE | Site: EYE | Status: FUNCTIONAL

## 2020-06-29 RX ORDER — LIDOCAINE HYDROCHLORIDE 20 MG/ML
JELLY TOPICAL PRN
Status: DISCONTINUED | OUTPATIENT
Start: 2020-06-29 | End: 2020-06-29 | Stop reason: HOSPADM

## 2020-06-29 RX ORDER — TROPICAMIDE 10 MG/ML
1 SOLUTION/ DROPS OPHTHALMIC
Status: COMPLETED | OUTPATIENT
Start: 2020-06-29 | End: 2020-06-29

## 2020-06-29 RX ORDER — LIDOCAINE 40 MG/G
CREAM TOPICAL
Status: DISCONTINUED | OUTPATIENT
Start: 2020-06-29 | End: 2020-06-29 | Stop reason: HOSPADM

## 2020-06-29 RX ORDER — PROPARACAINE HYDROCHLORIDE 5 MG/ML
SOLUTION/ DROPS OPHTHALMIC PRN
Status: DISCONTINUED | OUTPATIENT
Start: 2020-06-29 | End: 2020-06-29 | Stop reason: HOSPADM

## 2020-06-29 RX ORDER — BALANCED SALT SOLUTION 6.4; .75; .48; .3; 3.9; 1.7 MG/ML; MG/ML; MG/ML; MG/ML; MG/ML; MG/ML
SOLUTION OPHTHALMIC PRN
Status: DISCONTINUED | OUTPATIENT
Start: 2020-06-29 | End: 2020-06-29 | Stop reason: HOSPADM

## 2020-06-29 RX ORDER — LIDOCAINE HYDROCHLORIDE 20 MG/ML
INJECTION, SOLUTION INFILTRATION; PERINEURAL PRN
Status: DISCONTINUED | OUTPATIENT
Start: 2020-06-29 | End: 2020-06-29

## 2020-06-29 RX ORDER — PHENYLEPHRINE HYDROCHLORIDE 25 MG/ML
1 SOLUTION/ DROPS OPHTHALMIC
Status: COMPLETED | OUTPATIENT
Start: 2020-06-29 | End: 2020-06-29

## 2020-06-29 RX ORDER — SODIUM CHLORIDE, SODIUM LACTATE, POTASSIUM CHLORIDE, CALCIUM CHLORIDE 600; 310; 30; 20 MG/100ML; MG/100ML; MG/100ML; MG/100ML
INJECTION, SOLUTION INTRAVENOUS CONTINUOUS
Status: DISCONTINUED | OUTPATIENT
Start: 2020-06-29 | End: 2020-06-29 | Stop reason: HOSPADM

## 2020-06-29 RX ORDER — CYCLOPENTOLATE HYDROCHLORIDE 10 MG/ML
1 SOLUTION/ DROPS OPHTHALMIC
Status: COMPLETED | OUTPATIENT
Start: 2020-06-29 | End: 2020-06-29

## 2020-06-29 RX ORDER — PROPOFOL 10 MG/ML
INJECTION, EMULSION INTRAVENOUS PRN
Status: DISCONTINUED | OUTPATIENT
Start: 2020-06-29 | End: 2020-06-29

## 2020-06-29 RX ADMIN — TROPICAMIDE 1 DROP: 10 SOLUTION/ DROPS OPHTHALMIC at 09:19

## 2020-06-29 RX ADMIN — PHENYLEPHRINE HYDROCHLORIDE 1 DROP: 25 SOLUTION/ DROPS OPHTHALMIC at 09:25

## 2020-06-29 RX ADMIN — LIDOCAINE HYDROCHLORIDE 1 ML: 10 INJECTION, SOLUTION EPIDURAL; INFILTRATION; INTRACAUDAL; PERINEURAL at 09:33

## 2020-06-29 RX ADMIN — TROPICAMIDE 1 DROP: 10 SOLUTION/ DROPS OPHTHALMIC at 09:34

## 2020-06-29 RX ADMIN — TROPICAMIDE 1 DROP: 10 SOLUTION/ DROPS OPHTHALMIC at 09:25

## 2020-06-29 RX ADMIN — CYCLOPENTOLATE HYDROCHLORIDE 1 DROP: 10 SOLUTION/ DROPS OPHTHALMIC at 09:25

## 2020-06-29 RX ADMIN — PHENYLEPHRINE HYDROCHLORIDE 1 DROP: 25 SOLUTION/ DROPS OPHTHALMIC at 09:19

## 2020-06-29 RX ADMIN — CYCLOPENTOLATE HYDROCHLORIDE 1 DROP: 10 SOLUTION/ DROPS OPHTHALMIC at 09:34

## 2020-06-29 RX ADMIN — CYCLOPENTOLATE HYDROCHLORIDE 1 DROP: 10 SOLUTION/ DROPS OPHTHALMIC at 09:19

## 2020-06-29 RX ADMIN — LIDOCAINE HYDROCHLORIDE 60 MG: 20 INJECTION, SOLUTION INFILTRATION; PERINEURAL at 10:17

## 2020-06-29 RX ADMIN — PHENYLEPHRINE HYDROCHLORIDE 1 DROP: 25 SOLUTION/ DROPS OPHTHALMIC at 09:33

## 2020-06-29 RX ADMIN — SODIUM CHLORIDE, POTASSIUM CHLORIDE, SODIUM LACTATE AND CALCIUM CHLORIDE: 600; 310; 30; 20 INJECTION, SOLUTION INTRAVENOUS at 09:32

## 2020-06-29 RX ADMIN — PROPOFOL 50 MG: 10 INJECTION, EMULSION INTRAVENOUS at 10:17

## 2020-06-29 ASSESSMENT — MIFFLIN-ST. JEOR: SCORE: 1434.97

## 2020-06-29 NOTE — ANESTHESIA POSTPROCEDURE EVALUATION
Patient: Monika Nam    Procedure(s):  Cataract removla with implant.    Diagnosis:Cataract [H26.9]  Diagnosis Additional Information: No value filed.    Anesthesia Type:  MAC    Note:  Anesthesia Post Evaluation    Patient location during evaluation: Bedside  Patient participation: Able to fully participate in evaluation  Level of consciousness: awake and alert  Pain management: adequate  multimodal analgesia used between 6 hours prior to anesthesia start to PACU dischargeAirway patency: patent  Cardiovascular status: acceptable  Respiratory status: acceptable  two or more mitigation strategies used for obstructive sleep apneaHydration status: acceptable  PONV: none     Anesthetic complications: None          Last vitals:  Vitals:    06/29/20 0904 06/29/20 1033   BP: (!) 150/92 131/80   Pulse:  73   Resp: 16 16   Temp: 36.8  C (98.2  F)    SpO2: 98% 96%         Electronically Signed By: HAYLIE Mendosa CRNA  June 29, 2020  10:44 AM

## 2020-06-29 NOTE — ANESTHESIA CARE TRANSFER NOTE
Patient: Monika Nam    Procedure(s):  Cataract removla with implant.    Diagnosis: Cataract [H26.9]  Diagnosis Additional Information: No value filed.    Anesthesia Type:   MAC     Note:  Airway :Room Air  Patient transferred to:Phase II  Handoff Report: Identifed the Patient, Identified the Reponsible Provider, Reviewed the pertinent medical history, Discussed the surgical course, Reviewed Intra-OP anesthesia mangement and issues during anesthesia, Set expectations for post-procedure period and Allowed opportunity for questions and acknowledgement of understanding      Vitals: (Last set prior to Anesthesia Care Transfer)    CRNA VITALS  6/29/2020 1002 - 6/29/2020 1032      6/29/2020             Pulse:  73    SpO2:  96 %                Electronically Signed By: HAYLIE Mendosa CRNA  June 29, 2020  10:32 AM

## 2020-06-29 NOTE — OP NOTE
CATARACT OPERATIVE NOTE    PATIENT: Monika Nam  DATE OF SURGERY: 6/29/2020  PREOPERATIVE DIAGNOSIS:  Senile Nuclear Cataract, Left eye  POSTOPERATIVE DIAGNOSIS:  Senile Nuclear Cataract, Left eye  OPERATIVE PROCEDURE:  Phacoemulsification and placement of intraocular lens  SURGEON:  Floyd Mar MD  ANESTHESIA:  Topical / MAC  EBL:  None  SPECIMENS:  None  COMPLICATIONS:  None    PROCEDURE:  The patient was brought to the operating room at [facility].  The left eye was prepped and draped in the usual fashion for cataract surgery.  A wire lid speculum was inserted.  A super sharp blade was used to make a paracentesis at the 5 O'clock position.  The super sharp blade was used to make a partial thickness temporal groove, which was 3 mm in length.  0.8 mL of non-preserved epi-Shugarcaine was injected into the anterior chamber.  Viscoelastic was used to inflate the anterior chamber through a cannula.  A 2.5 mm microkeratome was used to make a temporal clear corneal incision in a two-plane fashion.  A cystotome needle and forceps were used to make a capsulorrhexis.  Hydrodissection and hydrodelineation were performed with Balance Salt Solution.  The lens was then phacoemulsified and removed without complications.  The cortical material was removed with bimanual irrigation and aspiration.  The capsular bag was filled with viscoelastic.  A posterior chamber intraocular lens, preselected and recorded, was folded and inserted into the capsular bag.  The viscoelastic was removed with the irrigation and aspiration tip.  Balanced Salt Solution with Vigamox, 150mg/0.1mL, was used to refill the anterior chamber.  The wounds were checked for water tightness and required no suture.  The wire lid speculum was removed.  The patient's left eye was cleaned and a drop of each post-operative drop was placed, followed by a adan shield.  The patient tolerated the procedure well, and there were no  complications.      Floyd Mar MD

## 2020-07-15 ENCOUNTER — HOSPITAL ENCOUNTER (OUTPATIENT)
Dept: LAB | Facility: CLINIC | Age: 60
Discharge: HOME | End: 2020-07-15
Attending: INTERNAL MEDICINE | Admitting: INTERNAL MEDICINE
Payer: COMMERCIAL

## 2020-07-15 DIAGNOSIS — C50.112 INFILTRATING DUCTAL CARCINOMA OF CENTRAL PORTION OF LEFT BREAST IN FEMALE (H): ICD-10-CM

## 2020-07-15 LAB
ALBUMIN SERPL-MCNC: 3.7 G/DL (ref 3.4–5)
ALP SERPL-CCNC: 89 U/L (ref 40–150)
ALT SERPL W P-5'-P-CCNC: 25 U/L (ref 0–50)
ANION GAP SERPL CALCULATED.3IONS-SCNC: 4 MMOL/L (ref 3–14)
AST SERPL W P-5'-P-CCNC: 20 U/L (ref 0–45)
BASOPHILS # BLD AUTO: 0.1 10E9/L (ref 0–0.2)
BASOPHILS NFR BLD AUTO: 1 %
BILIRUB SERPL-MCNC: 0.6 MG/DL (ref 0.2–1.3)
BUN SERPL-MCNC: 7 MG/DL (ref 7–30)
CALCIUM SERPL-MCNC: 9 MG/DL (ref 8.5–10.1)
CANCER AG27-29 SERPL-ACNC: 15 U/ML (ref 0–39)
CHLORIDE SERPL-SCNC: 98 MMOL/L (ref 94–109)
CO2 SERPL-SCNC: 30 MMOL/L (ref 20–32)
CREAT SERPL-MCNC: 0.67 MG/DL (ref 0.52–1.04)
DIFFERENTIAL METHOD BLD: NORMAL
EOSINOPHIL # BLD AUTO: 0.2 10E9/L (ref 0–0.7)
EOSINOPHIL NFR BLD AUTO: 3.1 %
ERYTHROCYTE [DISTWIDTH] IN BLOOD BY AUTOMATED COUNT: 13.5 % (ref 10–15)
GFR SERPL CREATININE-BSD FRML MDRD: >90 ML/MIN/{1.73_M2}
GLUCOSE SERPL-MCNC: 89 MG/DL (ref 70–99)
HCT VFR BLD AUTO: 41.9 % (ref 35–47)
HGB BLD-MCNC: 14 G/DL (ref 11.7–15.7)
IMM GRANULOCYTES # BLD: 0 10E9/L (ref 0–0.4)
IMM GRANULOCYTES NFR BLD: 0.2 %
LYMPHOCYTES # BLD AUTO: 1.3 10E9/L (ref 0.8–5.3)
LYMPHOCYTES NFR BLD AUTO: 24.9 %
MCH RBC QN AUTO: 30.6 PG (ref 26.5–33)
MCHC RBC AUTO-ENTMCNC: 33.4 G/DL (ref 31.5–36.5)
MCV RBC AUTO: 92 FL (ref 78–100)
MONOCYTES # BLD AUTO: 0.6 10E9/L (ref 0–1.3)
MONOCYTES NFR BLD AUTO: 12.2 %
NEUTROPHILS # BLD AUTO: 3 10E9/L (ref 1.6–8.3)
NEUTROPHILS NFR BLD AUTO: 58.6 %
NRBC # BLD AUTO: 0 10*3/UL
NRBC BLD AUTO-RTO: 0 /100
PLATELET # BLD AUTO: 168 10E9/L (ref 150–450)
POTASSIUM SERPL-SCNC: 3.8 MMOL/L (ref 3.4–5.3)
PROT SERPL-MCNC: 7.3 G/DL (ref 6.8–8.8)
RBC # BLD AUTO: 4.57 10E12/L (ref 3.8–5.2)
SODIUM SERPL-SCNC: 132 MMOL/L (ref 133–144)
WBC # BLD AUTO: 5.2 10E9/L (ref 4–11)

## 2020-07-15 PROCEDURE — 86300 IMMUNOASSAY TUMOR CA 15-3: CPT | Performed by: INTERNAL MEDICINE

## 2020-07-15 PROCEDURE — 85025 COMPLETE CBC W/AUTO DIFF WBC: CPT | Performed by: INTERNAL MEDICINE

## 2020-07-15 PROCEDURE — 36415 COLL VENOUS BLD VENIPUNCTURE: CPT | Performed by: INTERNAL MEDICINE

## 2020-07-15 PROCEDURE — 80053 COMPREHEN METABOLIC PANEL: CPT | Performed by: INTERNAL MEDICINE

## 2020-07-17 ENCOUNTER — ONCOLOGY VISIT (OUTPATIENT)
Dept: ONCOLOGY | Facility: CLINIC | Age: 60
End: 2020-07-17
Attending: INTERNAL MEDICINE
Payer: COMMERCIAL

## 2020-07-17 VITALS
OXYGEN SATURATION: 96 % | HEIGHT: 68 IN | RESPIRATION RATE: 18 BRPM | WEIGHT: 179.8 LBS | BODY MASS INDEX: 27.25 KG/M2 | DIASTOLIC BLOOD PRESSURE: 82 MMHG | TEMPERATURE: 99.1 F | SYSTOLIC BLOOD PRESSURE: 144 MMHG | HEART RATE: 85 BPM

## 2020-07-17 DIAGNOSIS — K21.9 GASTROESOPHAGEAL REFLUX DISEASE WITHOUT ESOPHAGITIS: ICD-10-CM

## 2020-07-17 DIAGNOSIS — C50.112 INFILTRATING DUCTAL CARCINOMA OF CENTRAL PORTION OF LEFT BREAST IN FEMALE (H): Primary | ICD-10-CM

## 2020-07-17 DIAGNOSIS — F34.1 CHRONIC DEPRESSIVE PERSONALITY DISORDER: ICD-10-CM

## 2020-07-17 DIAGNOSIS — J45.20 MILD INTERMITTENT ASTHMA WITHOUT COMPLICATION: ICD-10-CM

## 2020-07-17 PROCEDURE — G0463 HOSPITAL OUTPT CLINIC VISIT: HCPCS

## 2020-07-17 PROCEDURE — 99214 OFFICE O/P EST MOD 30 MIN: CPT | Performed by: INTERNAL MEDICINE

## 2020-07-17 PROCEDURE — 40001009 ZZH VIDEO/TELEPHONE VISIT; NO CHARGE

## 2020-07-17 ASSESSMENT — PAIN SCALES - GENERAL: PAINLEVEL: NO PAIN (0)

## 2020-07-17 ASSESSMENT — MIFFLIN-ST. JEOR: SCORE: 1429.07

## 2020-07-17 NOTE — PATIENT INSTRUCTIONS
Please arrange for ultrasound left breast   Referral to surgery  Follow-up in 6 months with repeat laboratory tests

## 2020-07-17 NOTE — PROGRESS NOTES
Hematology/ Oncology Follow-up Visit:  Jul 17, 2020    Reason for Visit:   Chief Complaint   Patient presents with     Oncology Clinic Visit     6 month follow up left breast cancer. Review lab and mammogram results.        Oncologic History:    Infiltrating ductal carcinoma of central portion of left breast in female (H)  Monika Nam presented following her annual mammogram with new focal asymmetry at 12-1 o'clock position of the left breast around 10.7 cm from the nipple.  It measured 1.4 cm.  Subsequently the patient went on to have breast ultrasound on member 30th 2018 showing 1.6 cm in maximum dimension hypoechoic mass.  Subsequently ultrasound breast biopsy was done on December 17, 2018 showing invasive ductal carcinoma grade 3 out of 3 angiolymphatic invasion was not identified.  Ductal carcinoma in situ was not identified . estrogen receptor was negative, progesterone receptor was negative and HER-2/mercedes receptor was negative.        1/2019 left lumpectomy found 1.8 cm IDC, grade III, LN-, margin is clear, pT1cN0    The patient was started on dose dense chemotherapy with Adriamycin and Cytoxan followed by Taxol      Interval History:  Patient returns today for follow-up visit.  She has been feeling well without recent complaints weight loss night sweats fever or chills.  She had mammogram done on June 16 which came back with scarring of lumpectomy site and slightly more coarse calcification likely related to dystrophic calcifications.  No new mammographic findings of concern of malignancy.    Review Of Systems:  Constitutional: Negative for fever, chills, and night sweats.  Skin: negative.  Eyes: negative.  Ears/Nose/Throat: negative.  Respiratory: No shortness of breath, dyspnea on exertion, cough, or hemoptysis.  Cardiovascular: negative.  Gastrointestinal: negative.  Genitourinary: negative.  Musculoskeletal: negative.  Neurologic: negative.  Psychiatric: negative.  Hematologic/Lymphatic/Immunologic:  "negative.  Endocrine: negative.    All other ROS negative unless mentioned in interval history.    Past medical, social, surgical, and family histories reviewed.    Allergies:  Allergies as of 07/17/2020 - Reviewed 07/17/2020   Allergen Reaction Noted     Sulfa drugs Swelling 06/09/2005       Current Medications:  Current Outpatient Medications   Medication Sig Dispense Refill     augmented betamethasone dipropionate (DIPROLENE-AF) 0.05 % external cream APPLY EXTERNALLY TO THE AFFECTED AREA TWICE DAILY 50 g 6     calcium-vitamin D (CALCIUM 600-D) 600-400 MG-UNIT per tablet Take 1 tablet by mouth daily       FLUoxetine (PROZAC) 20 MG capsule Take 20 mg daily. 30 capsule 11     loratadine (CLARITIN) 10 MG tablet Take 10 mg by mouth daily as needed        MULTIVITAMIN TABS   OR 1 TABLET BY MOUTH DAILY       Pyridoxine HCl (VITAMIN B6 PO) Take 1 tablet by mouth daily       acetaminophen (TYLENOL) 500 MG tablet Take 500-1,000 mg by mouth every 6 hours as needed for mild pain or pain       albuterol (PROAIR HFA/PROVENTIL HFA/VENTOLIN HFA) 108 (90 Base) MCG/ACT inhaler Inhale 2 puffs into the lungs every 4 hours as needed for shortness of breath / dyspnea or wheezing (Patient not taking: Reported on 7/17/2020) 1 Inhaler 3     aspirin 81 MG tablet Take 1 tablet (81 mg) by mouth daily       pseudoePHEDrine (SUDAFED) 30 MG tablet Take 30 mg by mouth every 12 hours as needed for congestion          Physical Exam:  BP (!) 144/82 (BP Location: Right arm, Patient Position: Sitting, Cuff Size: Adult Regular)   Pulse 85   Temp 99.1  F (37.3  C) (Tympanic)   Resp 18   Ht 1.727 m (5' 8\")   Wt 81.6 kg (179 lb 12.8 oz)   LMP 04/02/2014   SpO2 96%   Breastfeeding No   BMI 27.34 kg/m    Wt Readings from Last 12 Encounters:   07/17/20 81.6 kg (179 lb 12.8 oz)   06/29/20 81.6 kg (180 lb)   06/10/20 83.9 kg (185 lb)   02/03/20 83.8 kg (184 lb 12.8 oz)   01/17/20 84.6 kg (186 lb 6.4 oz)   11/04/19 85.7 kg (189 lb)   10/16/19 86 kg " "(189 lb 11.2 oz)   10/08/19 84.6 kg (186 lb 9.6 oz)   09/04/19 85.5 kg (188 lb 9.6 oz)   08/28/19 85.5 kg (188 lb 9.6 oz)   08/21/19 88.9 kg (196 lb)   08/05/19 85.9 kg (189 lb 6.4 oz)     ECOG performance status: 0  GENERAL APPEARANCE: Healthy, alert and in no acute distress.  HEENT: Sclerae anicteric. PERRLA. Oropharynx without ulcers, lesions, or thrush.  NECK: Supple. No asymmetry or masses.  LYMPHATICS: No palpable cervical, supraclavicular, axillary, or inguinal lymphadenopathy.  RESP: Lungs clear to auscultation bilaterally without rales, rhonchi or wheezes.\",  BREAST: Right- No mass, nipple discharge or axillary adenopathy. Left-there is palpable nodular area in the left breast at the lumpectomy site.  There is no axillary adenopathy.  \"CARDIOVASCULAR: Regular rate and rhythm. Normal S1, S2; no S3 or S4. No murmur, gallop, or rub.  ABDOMEN: Soft, nontender. Bowel sounds normal. No palpable organomegaly or masses.  MUSCULOSKELETAL: Extremities without gross deformities noted. No edema of bilateral lower extremities.  SKIN: No suspicious lesions or rashes.  NEURO: Alert and oriented x 3. Cranial nerves II-XII grossly intact.  PSYCHIATRIC: Mentation and affect appear normal.    Laboratory/Imaging Studies:  No visits with results within 2 Week(s) from this visit.   Latest known visit with results is:   Orders Only on 06/27/2020   Component Date Value Ref Range Status     COVID-19 Virus PCR to [facility] - So* 06/27/2020 Nasopharyngeal   Final     COVID-19 Virus PCR to [facility] - Re* 06/27/2020 Not Detected   Final    Comment: Collection of multiple specimens from the same patient may be necessary to   detect the virus. The possibility of a false negative should be considered if   the patient's recent exposure or clinical presentation suggests 2019 nCOV   infection and diagnostic tests for other causes of illness are negative.   Repeat testing may be considered in this setting.  Viral RNA was extracted via a " validated method and subsequently underwent   single step reverse transcriptase-real time polymerase chain reaction using   primers to the CDC specified N1,N2 gene targets of CoV2 and human RNP as an   internal control.  A negative result does not rule out the presence of real-time PCR inhibitors   in the specimen or COVID-19 RNA in concentrations below the limit of detection   of the assay. The possibility of a false negative should be considered if the   patients recent exposure or clinical presentation suggests COVID-19.   Additional testing or repeat testing requires consultation with the   laborator                           y.  Nasopharyngeal specimen is the preferred choice for swab-based SARS CoV2   testing. When collection of a nasopharyngeal swab is not possible the   following are acceptable alternatives:  an oropharyngeal (OP) specimen collected by a healthcare professional, or a   nasal mid-turbinate (NMT) swab collected by a healthcare professional or by   onsite self-collection (using a flocked tapered swab), or an anterior nares   specimen collected by a healthcare professional or by onsite self-collection   (using a round foam swab). (Centers for Disease Control)  Testing performed by [facility], Room [address], [address] [place] [address]. This test was developed and its   performance characteristics determined by the [facility]. It has not been cleared or approved by the FDA.  The laboratory is regulated under the Clinical Laboratory Improvement   Amendments of 1988 (CLIA-88) as qualified to perform high-complexity testin                           g.   This test is used for clinical purposes. It should not be regarded as   investigational or for research.          Assessment and plan:    (C50.112) Infiltrating ductal carcinoma of central portion of left breast in female (H)  (primary encounter diagnosis)  I reviewed with the  patient today the mammographic images as well as laboratory tests.  There is no clear evidence of breast cancer recurrence.  However, there is a palpable firm nodule at the lumpectomy site.  I will recommended to arrange for ultrasound of the left breast to assess and to have surgery to see her as well.    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    (J45.20) Mild intermittent asthma without complication  Patient currently on albuterol inhaler.  Her symptoms have been well controlled    The patient is ready to learn, no apparent learning barriers were identified.  Diagnosis and treatment plans were explained to the patient. The patient expressed understanding of the content. The patient asked appropriate questions. The patient questions were answered to her satisfaction.    Chart documentation with Dragon Voice recognition Software. Although reviewed after completion, some words and grammatical errors may remain.

## 2020-07-17 NOTE — PROGRESS NOTES
"Oncology Rooming Note    July 17, 2020 2:32 PM   Monika Nam is a 60 year old female who presents for:    Chief Complaint   Patient presents with     Oncology Clinic Visit     6 month follow up left breast cancer. Review lab and mammogram results.      Initial Vitals: BP (!) 144/82 (BP Location: Right arm, Patient Position: Sitting, Cuff Size: Adult Regular)   Pulse 85   Temp 99.1  F (37.3  C) (Tympanic)   Resp 18   Ht 1.727 m (5' 8\")   Wt 81.6 kg (179 lb 12.8 oz)   LMP 04/02/2014   SpO2 96%   Breastfeeding No   BMI 27.34 kg/m   Estimated body mass index is 27.34 kg/m  as calculated from the following:    Height as of this encounter: 1.727 m (5' 8\").    Weight as of this encounter: 81.6 kg (179 lb 12.8 oz). Body surface area is 1.98 meters squared.  No Pain (0) Comment: Data Unavailable   Patient's last menstrual period was 04/02/2014.  Allergies reviewed: Yes  Medications reviewed: Yes    Medications: Medication refills not needed today.  Pharmacy name entered into [employer]: [facility] #[id number] - [place], [place] - [address] AT [address]    Clinical concerns: 6 month follow up left breast cancer. Review lab and mammogram results.       Sana Vazquez, SAW            "

## 2020-07-17 NOTE — LETTER
"    7/17/2020         RE: Monika Nam  [address] [id number]        Dear Colleague,    Thank you for referring your patient, Monika Nam, to the [facility]. Please see a copy of my visit note below.    Oncology Rooming Note    July 17, 2020 2:32 PM   Monika Nam is a 60 year old female who presents for:    Chief Complaint   Patient presents with     Oncology Clinic Visit     6 month follow up left breast cancer. Review lab and mammogram results.      Initial Vitals: BP (!) 144/82 (BP Location: Right arm, Patient Position: Sitting, Cuff Size: Adult Regular)   Pulse 85   Temp 99.1  F (37.3  C) (Tympanic)   Resp 18   Ht 1.727 m (5' 8\")   Wt 81.6 kg (179 lb 12.8 oz)   LMP 04/02/2014   SpO2 96%   Breastfeeding No   BMI 27.34 kg/m   Estimated body mass index is 27.34 kg/m  as calculated from the following:    Height as of this encounter: 1.727 m (5' 8\").    Weight as of this encounter: 81.6 kg (179 lb 12.8 oz). Body surface area is 1.98 meters squared.  No Pain (0) Comment: Data Unavailable   Patient's last menstrual period was 04/02/2014.  Allergies reviewed: Yes  Medications reviewed: Yes    Medications: Medication refills not needed today.  Pharmacy name entered into [employer]: [facility] #[id number] [address] AT [address]    Clinical concerns: 6 month follow up left breast cancer. Review lab and mammogram results.       Sana Vazquez [facility]              Hematology/ Oncology Follow-up Visit:  Jul 17, 2020    Reason for Visit:   Chief Complaint   Patient presents with     Oncology Clinic Visit     6 month follow up left breast cancer. Review lab and mammogram results.        Oncologic History:    Infiltrating ductal carcinoma of central portion of left breast in female (H)  Monika Nam presented following her annual mammogram with new focal asymmetry at 12-1 o'clock position of the left breast around 10.7 cm " from the nipple.  It measured 1.4 cm.  Subsequently the patient went on to have breast ultrasound on member 30th 2018 showing 1.6 cm in maximum dimension hypoechoic mass.  Subsequently ultrasound breast biopsy was done on December 17, 2018 showing invasive ductal carcinoma grade 3 out of 3 angiolymphatic invasion was not identified.  Ductal carcinoma in situ was not identified . estrogen receptor was negative, progesterone receptor was negative and HER-2/mercedes receptor was negative.        1/2019 left lumpectomy found 1.8 cm IDC, grade III, LN-, margin is clear, pT1cN0    The patient was started on dose dense chemotherapy with Adriamycin and Cytoxan followed by Taxol      Interval History:  Patient returns today for follow-up visit.  She has been feeling well without recent complaints weight loss night sweats fever or chills.  She had mammogram done on June 16 which came back with scarring of lumpectomy site and slightly more coarse calcification likely related to dystrophic calcifications.  No new mammographic findings of concern of malignancy.    Review Of Systems:  Constitutional: Negative for fever, chills, and night sweats.  Skin: negative.  Eyes: negative.  Ears/Nose/Throat: negative.  Respiratory: No shortness of breath, dyspnea on exertion, cough, or hemoptysis.  Cardiovascular: negative.  Gastrointestinal: negative.  Genitourinary: negative.  Musculoskeletal: negative.  Neurologic: negative.  Psychiatric: negative.  Hematologic/Lymphatic/Immunologic: negative.  Endocrine: negative.    All other ROS negative unless mentioned in interval history.    Past medical, social, surgical, and family histories reviewed.    Allergies:  Allergies as of 07/17/2020 - Reviewed 07/17/2020   Allergen Reaction Noted     Sulfa drugs Swelling 06/09/2005       Current Medications:  Current Outpatient Medications   Medication Sig Dispense Refill     augmented betamethasone dipropionate (DIPROLENE-AF) 0.05 % external cream APPLY  "EXTERNALLY TO THE AFFECTED AREA TWICE DAILY 50 g 6     calcium-vitamin D (CALCIUM 600-D) 600-400 MG-UNIT per tablet Take 1 tablet by mouth daily       FLUoxetine (PROZAC) 20 MG capsule Take 20 mg daily. 30 capsule 11     loratadine (CLARITIN) 10 MG tablet Take 10 mg by mouth daily as needed        MULTIVITAMIN TABS   OR 1 TABLET BY MOUTH DAILY       Pyridoxine HCl (VITAMIN B6 PO) Take 1 tablet by mouth daily       acetaminophen (TYLENOL) 500 MG tablet Take 500-1,000 mg by mouth every 6 hours as needed for mild pain or pain       albuterol (PROAIR HFA/PROVENTIL HFA/VENTOLIN HFA) 108 (90 Base) MCG/ACT inhaler Inhale 2 puffs into the lungs every 4 hours as needed for shortness of breath / dyspnea or wheezing (Patient not taking: Reported on 7/17/2020) 1 Inhaler 3     aspirin 81 MG tablet Take 1 tablet (81 mg) by mouth daily       pseudoePHEDrine (SUDAFED) 30 MG tablet Take 30 mg by mouth every 12 hours as needed for congestion          Physical Exam:  BP (!) 144/82 (BP Location: Right arm, Patient Position: Sitting, Cuff Size: Adult Regular)   Pulse 85   Temp 99.1  F (37.3  C) (Tympanic)   Resp 18   Ht 1.727 m (5' 8\")   Wt 81.6 kg (179 lb 12.8 oz)   LMP 04/02/2014   SpO2 96%   Breastfeeding No   BMI 27.34 kg/m    Wt Readings from Last 12 Encounters:   07/17/20 81.6 kg (179 lb 12.8 oz)   06/29/20 81.6 kg (180 lb)   06/10/20 83.9 kg (185 lb)   02/03/20 83.8 kg (184 lb 12.8 oz)   01/17/20 84.6 kg (186 lb 6.4 oz)   11/04/19 85.7 kg (189 lb)   10/16/19 86 kg (189 lb 11.2 oz)   10/08/19 84.6 kg (186 lb 9.6 oz)   09/04/19 85.5 kg (188 lb 9.6 oz)   08/28/19 85.5 kg (188 lb 9.6 oz)   08/21/19 88.9 kg (196 lb)   08/05/19 85.9 kg (189 lb 6.4 oz)     ECOG performance status: 0  GENERAL APPEARANCE: Healthy, alert and in no acute distress.  HEENT: Sclerae anicteric. PERRLA. Oropharynx without ulcers, lesions, or thrush.  NECK: Supple. No asymmetry or masses.  LYMPHATICS: No palpable cervical, supraclavicular, axillary, or " "inguinal lymphadenopathy.  RESP: Lungs clear to auscultation bilaterally without rales, rhonchi or wheezes.\",  BREAST: Right- No mass, nipple discharge or axillary adenopathy. Left-there is palpable nodular area in the left breast at the lumpectomy site.  There is no axillary adenopathy.  \"CARDIOVASCULAR: Regular rate and rhythm. Normal S1, S2; no S3 or S4. No murmur, gallop, or rub.  ABDOMEN: Soft, nontender. Bowel sounds normal. No palpable organomegaly or masses.  MUSCULOSKELETAL: Extremities without gross deformities noted. No edema of bilateral lower extremities.  SKIN: No suspicious lesions or rashes.  NEURO: Alert and oriented x 3. Cranial nerves II-XII grossly intact.  PSYCHIATRIC: Mentation and affect appear normal.    Laboratory/Imaging Studies:  No visits with results within 2 Week(s) from this visit.   Latest known visit with results is:   Orders Only on 06/27/2020   Component Date Value Ref Range Status     COVID-19 Virus PCR to [facility] - So* 06/27/2020 Nasopharyngeal   Final     COVID-19 Virus PCR to [facility] - Re* 06/27/2020 Not Detected   Final    Comment: Collection of multiple specimens from the same patient may be necessary to   detect the virus. The possibility of a false negative should be considered if   the patient's recent exposure or clinical presentation suggests 2019 nCOV   infection and diagnostic tests for other causes of illness are negative.   Repeat testing may be considered in this setting.  Viral RNA was extracted via a validated method and subsequently underwent   single step reverse transcriptase-real time polymerase chain reaction using   primers to the CDC specified N1,N2 gene targets of CoV2 and human RNP as an   internal control.  A negative result does not rule out the presence of real-time PCR inhibitors   in the specimen or COVID-19 RNA in concentrations below the limit of detection   of the assay. The possibility of a false negative should be considered if the   patients " recent exposure or clinical presentation suggests COVID-19.   Additional testing or repeat testing requires consultation with the   laborator                           y.  Nasopharyngeal specimen is the preferred choice for swab-based SARS CoV2   testing. When collection of a nasopharyngeal swab is not possible the   following are acceptable alternatives:  an oropharyngeal (OP) specimen collected by a healthcare professional, or a   nasal mid-turbinate (NMT) swab collected by a healthcare professional or by   onsite self-collection (using a flocked tapered swab), or an anterior nares   specimen collected by a healthcare professional or by onsite self-collection   (using a round foam swab). (Centers for Disease Control)  Testing performed by [facility], Room 1[address]210, [address] [place] [address]. This test was developed and its   performance characteristics determined by the [facility]   [place]. It has not been cleared or approved by the FDA.  The laboratory is regulated under the Clinical Laboratory Improvement   Amendments of 1988 (CLIA-88) as qualified to perform high-complexity testin                           g.   This test is used for clinical purposes. It should not be regarded as   investigational or for research.          Assessment and plan:    (C50.112) Infiltrating ductal carcinoma of central portion of left breast in female (H)  (primary encounter diagnosis)  I reviewed with the patient today the mammographic images as well as laboratory tests.  There is no clear evidence of breast cancer recurrence.  However, there is a palpable firm nodule at the lumpectomy site.  I will recommended to arrange for ultrasound of the left breast to assess and to have surgery to see her as well.    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    (J45.20) Mild intermittent asthma without complication  Patient  currently on albuterol inhaler.  Her symptoms have been well controlled    The patient is ready to learn, no apparent learning barriers were identified.  Diagnosis and treatment plans were explained to the patient. The patient expressed understanding of the content. The patient asked appropriate questions. The patient questions were answered to her satisfaction.    Chart documentation with Dragon Voice recognition Software. Although reviewed after completion, some words and grammatical errors may remain.    Again, thank you for allowing me to participate in the care of your patient.        Sincerely,        Madison Gonzales MD

## 2020-07-20 ENCOUNTER — PATIENT OUTREACH (OUTPATIENT)
Dept: CARE COORDINATION | Facility: CLINIC | Age: 60
End: 2020-07-20

## 2020-07-20 NOTE — PROGRESS NOTES
"Oncology Distress Screening Follow-up  Clinical Social Work  [facility]    Identified Concern and Score From Distress Screening:   3. How concerned are you about feeling depressed or very sad?   8Abnormal              4. How concerned are you about feeling anxious or very scared?   8Abnormal                   Date of Distress Screenin2020    Intervention: Monika is a 60 year old woman diagnosed with breast cancer. Monika had a provider visit on 2020 and completed distress screen. SW was consulted. SW contacted Monika to follow up re: concerns around anxiety and depression. Monika shares that she lives with her spouse where she feels they are managing well. Monika has 2 adult children who no longer live with her. Monika is currently working as a  for the [employer] system. Monika shares that \"things are going okay\" and feels her concerns were mostly related to COVID-19. She feels she is having a hard time adjusting to working from home and states that she just misses her co-workers. Monika has a history of chronic depressive personality disorder and is currently taking Prozac 20mg. Monika reports she has been on this medication for \"a long time\" and feels it is working well. Monika does not have any concerns at this time. Monika was provided SW contact info and she plans to reach out if any additional needs arise.     Education Provided: SW role within the oncology clinc    Follow-up Required: SW will remain available for any psychosocial support/resource needs that arise.    DELANEY Mullins, LGSW  [facility]  Adult Oncology Clinic  Phone: [phone]    "

## 2020-07-23 ENCOUNTER — HOSPITAL ENCOUNTER (OUTPATIENT)
Dept: ULTRASOUND IMAGING | Facility: CLINIC | Age: 60
Discharge: HOME | End: 2020-07-23
Attending: INTERNAL MEDICINE | Admitting: INTERNAL MEDICINE
Payer: COMMERCIAL

## 2020-07-23 ENCOUNTER — PATIENT OUTREACH (OUTPATIENT)
Dept: ONCOLOGY | Facility: CLINIC | Age: 60
End: 2020-07-23

## 2020-07-23 DIAGNOSIS — C50.112 INFILTRATING DUCTAL CARCINOMA OF CENTRAL PORTION OF LEFT BREAST IN FEMALE (H): ICD-10-CM

## 2020-07-23 PROCEDURE — 76642 ULTRASOUND BREAST LIMITED: CPT | Mod: LT

## 2020-07-23 NOTE — PROGRESS NOTES
Called patient with negative U/S results. Patient will see Dr. Claros on 7/27 and follow up then with Dr. Gonzales in 6 months.Tonya Zayas RN on 7/23/2020 at 4:57 PM

## 2020-07-27 ENCOUNTER — OFFICE VISIT (OUTPATIENT)
Dept: SURGERY | Facility: CLINIC | Age: 60
End: 2020-07-27
Payer: COMMERCIAL

## 2020-07-27 VITALS
TEMPERATURE: 98.8 F | WEIGHT: 179 LBS | DIASTOLIC BLOOD PRESSURE: 77 MMHG | BODY MASS INDEX: 27.13 KG/M2 | SYSTOLIC BLOOD PRESSURE: 135 MMHG | HEART RATE: 98 BPM | HEIGHT: 68 IN

## 2020-07-27 DIAGNOSIS — N63.20 LEFT BREAST MASS: Primary | ICD-10-CM

## 2020-07-27 PROCEDURE — 99212 OFFICE O/P EST SF 10 MIN: CPT | Performed by: SURGERY

## 2020-07-27 ASSESSMENT — MIFFLIN-ST. JEOR: SCORE: 1425.44

## 2020-07-27 NOTE — LETTER
7/27/2020         RE: Monika Nam  [address] [facility] [id number]        Dear Colleague,    Thank you for referring your patient, Monika Nam, to the [facility]. Please see a copy of my visit note below.    Monika is a 60-year-old female who underwent lumpectomy, sentinel node biopsy on her left breast about a year and a half ago.  This was followed by radiation as well as chemotherapy.  She has recovered completely except on a recent exam it was noted that the lumpectomy site had a mass underneath it.  The patient states that it is been a long time but may be getting a little larger.    She had a mammogram in June which was reported as category 3 and six-month follow-up was recommended.    She just had an ultrasound done of this area and this was category 2 benign findings and it was felt that the changes were due to scarring and fat necrosis from her previous lumpectomy.    Exam reveals that the incision is well-healed.  There is definitely  a smooth rounded density underneath the incision entirely consistent with some scarring or maybe even a seroma that has been replaced with scar tissue.  Fat necrosis is certainly a possibility.      I reviewed the films with her and the exam.  I have no concerns at this time regarding malignancy.  She will get another mammogram in 6 months I told her that if she has any concerns at all in the meantime that she should call and I be happy to see her.  We could even biopsy it, however at this time I am confident that this is not necessary.  She is happy to hear that.  I will see her back as needed.    Servando Claros MD FACS    Again, thank you for allowing me to participate in the care of your patient.        Sincerely,        Servando Claros MD

## 2020-07-27 NOTE — NURSING NOTE
"Initial /77 (BP Location: Right arm, Patient Position: Sitting, Cuff Size: Adult Regular)   Pulse 98   Temp 98.8  F (37.1  C) (Tympanic)   Ht 1.727 m (5' 8\")   Wt 81.2 kg (179 lb)   LMP 04/02/2014   BMI 27.22 kg/m   Estimated body mass index is 27.22 kg/m  as calculated from the following:    Height as of this encounter: 1.727 m (5' 8\").    Weight as of this encounter: 81.2 kg (179 lb). .    Tonya Lehman CMA    "

## 2020-07-27 NOTE — PATIENT INSTRUCTIONS
Per physician instructions      If you have questions or concerns on any instructions given to you by your provider today or if you need to schedule an appointment, you can reach us at [phone].

## 2020-07-27 NOTE — PROGRESS NOTES
Monika is a 60-year-old female who underwent lumpectomy, sentinel node biopsy on her left breast about a year and a half ago.  This was followed by radiation as well as chemotherapy.  She has recovered completely except on a recent exam it was noted that the lumpectomy site had a mass underneath it.  The patient states that it is been a long time but may be getting a little larger.    She had a mammogram in June which was reported as category 3 and six-month follow-up was recommended.    She just had an ultrasound done of this area and this was category 2 benign findings and it was felt that the changes were due to scarring and fat necrosis from her previous lumpectomy.    Exam reveals that the incision is well-healed.  There is definitely  a smooth rounded density underneath the incision entirely consistent with some scarring or maybe even a seroma that has been replaced with scar tissue.  Fat necrosis is certainly a possibility.      I reviewed the films with her and the exam.  I have no concerns at this time regarding malignancy.  She will get another mammogram in 6 months I told her that if she has any concerns at all in the meantime that she should call and I be happy to see her.  We could even biopsy it, however at this time I am confident that this is not necessary.  She is happy to hear that.  I will see her back as needed.    Servando Claros MD FACS

## 2020-10-28 NOTE — PROGRESS NOTES
Department of Radiation Oncology  Radiation Therapy Center  [facility] [place] [address]  [phone]         Radiation Oncology Follow-up Visit  10/30/20      Monika Nam  MRN: [id number]   : 1960     DIAGNOSIS: triple negative G3 invasive left breast cancer status post lumpectomy and SLNBx, revealing [id number] disease followed by adjuvant chemotherapy completed on 7/10/19  INTENT OF RADIOTHERAPY: adjuvant radiation therapy.  PATHOLOGY: Pathology revealed a single focus of 1.8cm G3 IDCA, no DCIS/LVSI. 0 out of 1 positive lymph node.                              STAGE: [id number]   CONCURRENT SYSTEMIC THERAPY: No     ONCOLOGIC HISTORY:   60 year old woman with triple negative G3 invasive left breast cancer status post lumpectomy and SLNBx, revealing [id number] disease followed by adjuvant chemotherapy completed on 7/10/19.      Monika Nam was in her usual state of health last winter. On 18 screening MMG showed a new 1.4cm focal asymmetry in the left breast at the 1:00 position 10.7cm FN. The right mmg was stable. Left breast US on 19 confirmed a 1.6cm nodule. Additional US of the left axilla showed no abnormal lymph nodes. On 18 a left breast US guided biopsy revealed G3 IDCA with no DCIS/LVSI, ER-/NJ-/H2N-. On 19 she was taken to the OR for wire localized lumpectomy and SLNBx. Pathology revealed a single focus of 1.8cm G3 IDCA, no DCIS/LVSI. 0 out of 1 positive lymph node. Clear invasive margins with the closest margin being 8mm. Left breast specimen radiograph showed the lesion, needle core biopsy marker, and hookwire were all present. Postoperative course was marked by inflammatory reaction from lymph node procedure dye but this has resolved.  She was seen by Ha Bundy and Christian and subsequently received ddAC x 4 followed by Taxol x 12 from 19 to 7/10/19.    She had genetics counseling 2019 and was negative.      SITE OF TREATMENT: left  breast     DATES  OF TREATMENT: 8/13/19-9/11/19             TOTAL DOSE OF TREATMENT: 5256 cGy     DOSE PER FRACTION OF TREATMENT: 266 cGy x 16 fractions to left breast + 250 cGy x 4 fractions left breast boost       INTERVAL SINCE COMPLETION OF RADIATION THERAPY:   25 months    SUBJECTIVE:   Monika Nam is a 60 year old female who is scheduled today for routine follow up after completing radiation therapy.      Denies any new lumps, masses, or nipple discharge. She does report palpable density near incision since post surgery. No skin changes. No enlarged lymph nodes.  No lymphedema. Good ROM.    Denies any abdominal pain or distention. She denies any bone pain.  She denies any shortness of breath or cough or wheezing.    Patient did have a Mammogram 12/2/2019. It showed skin thickening likely related to surgery and or radiation therapy. Also seen were microcalcifications in the surgical bed probably benign such as dystropic calcification ( occurs as a reaction to tissue to damage). Recommendation was for short interval follow-up 6 months with Left mammogram.    Left diagnostic mammogram completed 6/16/20 showing postoperative conservation therapy  changes of the left breast again noted. Probable dystrophic calcifications, scarring, and skin thickening from treatment as described. Six-month follow-up recommended.    Seen by medical oncologist 7/17/20 and wanted to further evaluate palpable nodularity at the lumpectomy site. Recommended ultrasound of the left breast to assess. Also placed referral to surgery for evaluation.     US left breast completed 7/23/20 showing a the 12:00 position, 7 cm from the nipple, there is an  irregularly-shaped area of hypoechogenicity and posterior acoustic shadowing which is contiguous with the patient's scar, most consistent with postsurgical scarring and fat necrosis related to prior lumpectomy. No suspicious mass or fluid collection is seen. Benign Finding(s).  Clinical  follow-up as needed. Continue routine annual screening mammography.    Seen by surgery 20 (Dr. Claros). Exam revealed that the incision is well-healed.  Noted a rounded density underneath the incision consistent with some scarring or maybe even a seroma that has been replaced with scar tissue.  Fat necrosis a possibility. No concerns at this time regarding malignancy To followup prn.      ROS otherwise negative on a 12-system review.  ECO       Current Outpatient Medications   Medication     acetaminophen (TYLENOL) 500 MG tablet     albuterol (PROAIR HFA/PROVENTIL HFA/VENTOLIN HFA) 108 (90 Base) MCG/ACT inhaler     aspirin 81 MG tablet     augmented betamethasone dipropionate (DIPROLENE-AF) 0.05 % external cream     calcium-vitamin D (CALCIUM 600-D) 600-400 MG-UNIT per tablet     FLUoxetine (PROZAC) 20 MG capsule     loratadine (CLARITIN) 10 MG tablet     MULTIVITAMIN TABS   OR     pseudoePHEDrine (SUDAFED) 30 MG tablet     Pyridoxine HCl (VITAMIN B6 PO)     No current facility-administered medications for this visit.           Allergies   Allergen Reactions     Sulfa Drugs Swelling       Past Medical History:   Diagnosis Date     Abnormal Papanicolaou smear of cervix and cervical HPV     history of LEEP ion      Arthritis      ASCUS favor benign 2013, 2014    Neg high risk HPV      Cancer (H) 2019    breast     Cervical high risk HPV (human papillomavirus) test positive 12    type 66     Depressive disorder      PONV (postoperative nausea and vomiting)      Uncomplicated asthma          PHYSICAL EXAM:    LMP 2014   No PE (telephone visit) last breast exam 2020    LABS AND IMAGING:    US BREAST LEFT LIMITED 1-3 QUADRANTS, 2020 4:14 PM     HISTORY:  Personal history of left breast cancer, status post  lumpectomy. Patient presents today with reported lump in the superior  left breast corresponding to the area of scarring from prior  lumpectomy.      COMPARISON:  2020,  12/2/2019, 11/30/2018, 11/20/2018.      FINDINGS:  At the 12:00 position, 7 cm from the nipple, there is an  irregularly-shaped area of hypoechogenicity and posterior acoustic  shadowing which is contiguous with the patient's scar, most consistent  with postsurgical scarring and fat necrosis related to prior  lumpectomy. No suspicious mass or fluid collection is seen.                                                                       IMPRESSION: BI-RADS CATEGORY: 2 - Benign Finding(s).     RECOMMENDED FOLLOW-UP: Clinical follow-up as needed. Continue routine  annual screening mammography.     BASSAM LANDRY MD      DIAGNOSTIC MAMMOGRAM, LEFT, DIGITAL  6/16/2020 2:06 PM      HISTORY:  Left breast cancer status post lumpectomy, radiation therapy  and chemotherapy in November 2018. Probable dystrophic calcifications  and scarring at the lumpectomy site along with skin thickening. This  is a six-month follow-up. No current breast symptoms.     COMPARISON:  Mammograms dated 12/2/2019 and 1/23/2019.      BREAST DENSITY: Heterogeneously dense.  Limiting mammographic sensitivity.     FINDINGS:  Skin thickening, scarring at the lumpectomy site, and  slightly more coarse calcifications at the lumpectomy site (likely  dystrophic calcifications) are again noted. No new mammographic  findings of concern for malignancy.                                                                      IMPRESSION: BI-RADS CATEGORY: 3 - Probably Benign Finding-Short  Interval Follow-Up Suggested. Postoperative conservation therapy  changes of the left breast are again noted. Probable dystrophic  calcifications, scarring, and skin thickening from treatment as  described. Six-month follow-up is recommended.     RECOMMENDED FOLLOW-UP: Short Interval Follow-up 6 Months, left breast  diagnostic mammogram.     I discussed the findings and recommendations with the patient at the  time of the exam.     SMITA MAYEN MD        MA DIAGNOSTIC DIGITAL  "BILATERAL 12/2/2019 1:32 PM     HISTORY:  Prior left breast malignancy.     COMPARISON:  11/20/2018.     TECHNIQUE:  Bilateral digital mammography with CAD is performed as  well as DBT.     BREAST DENSITY: Heterogeneously dense.     COMMENTS: There is skin thickening and coarsening of the trabecular  pattern in the left breast. This can relate to surgery and/or  radiation therapy. Microcalcifications are present in the surgical  bed. Spot magnification views suggested they are probably benign such  as dystrophic calcification. A six-month follow-up left mammogram is  recommended in reassessment. The right mammogram is unremarkable.                                                                      IMPRESSION: BI-RADS CATEGORY: 3 - Probably Benign Finding-Short  Interval Follow-Up Suggested.     RECOMMENDED FOLLOW-UP: Short Interval Follow-up 6 Months.     MAYKEL PERKINS MD    IMPRESSION:   Ms. Nam is a 59 year old female with triple negative G3 invasive left breast cancer status post lumpectomy and SLNBx, revealing [id number] disease followed by adjuvant chemotherapy completed on 7/10/19. Completed adjuvant radiation therapy to left breast 5256 cGy on 9/11/19.    Patient did have imaging to further evaluate palpable nodularity near the incision. US left breast completed 7/23/20 showed \"at the 12:00 position, 7 cm from the nipple, there is an irregularly-shaped area of hypoechogenicity and posterior acoustic shadowing which is contiguous with the patient's scar, most consistent with postsurgical scarring and fat necrosis related to prior lumpectomy. No suspicious mass or fluid collection is seen. Benign Finding(s). \" Recommendation for clinical follow-up as needed and routine annual screening mammography.    Seen by surgery 7/27/20 (Dr. Claros). Felt smooth round density underneath incision consistent with some scarring or maybe even a seroma that has been replaced with scar tissue or possibly fat necrosis. No " concerns at this time regarding malignancy To followup prn.      PLAN:   Discussed long term care with continued moisturizing of the treated breast.  Discussed possibility of scar formation from radiation and treatment with gentle massage. She will follow up here 12 months with Dr. Salvador    Patient will follow up with medical oncology for continued care and imaging ( last seen 7/17/2020).  Next bilateral mammogram due 12/2020. Patient aware and to have scheduled.     Lymphedema.She remains at low risk for lymphedema.  She denies lymphedema at this time, but she will contact the clinic if she needs a referral to the lymphedema specialist.     Cancer screening.  should undergo routine screening for age group. Last colonoscopy 2008 -  Patient did have one scheduled at time of diagnosis but cancelled the appt and plans to reschedule.    Healthy lifestyle.  Recommend smoking cessation.  She will continue to see her primary care provider for general health maintenance (Eczema, asthma, reflux, depression and anxiety).    Due to the concerns around COVID-19 and adhering to social distancing we conduct this visit over the telephone.   Phone call duration: 22 minutes    Abigail Velez [facility]  Radiation Therapy Center  [facility]  [address], [address]  [place] [address]

## 2020-10-30 ENCOUNTER — VIRTUAL VISIT (OUTPATIENT)
Dept: RADIATION THERAPY | Facility: OUTPATIENT CENTER | Age: 60
End: 2020-10-30
Payer: COMMERCIAL

## 2020-10-30 DIAGNOSIS — C50.912 ESTROGEN RECEPTOR NEGATIVE CARCINOMA OF BREAST, LEFT (H): Primary | ICD-10-CM

## 2020-10-30 DIAGNOSIS — Z17.1 ESTROGEN RECEPTOR NEGATIVE CARCINOMA OF BREAST, LEFT (H): Primary | ICD-10-CM

## 2020-10-30 NOTE — NURSING NOTE
"Monika Nam is a 60 year old female who is being evaluated via a billable telephone visit.      The patient has been notified of following:     \"This telephone visit will be conducted via a call between you and your physician/provider. We have found that certain health care needs can be provided without the need for a physical exam.  This service lets us provide the care you need with a short phone conversation.  If a prescription is necessary we can send it directly to your pharmacy.  If lab work is needed we can place an order for that and you can then stop by our lab to have the test done at a later time.    Telephone visits are billed at different rates depending on your insurance coverage. During this emergency period, for some insurers they may be billed the same as an in-person visit.  Please reach out to your insurance provider with any questions.    If during the course of the call the physician/provider feels a telephone visit is not appropriate, you will not be charged for this service.\"    Patient has given verbal consent for Telephone visit?  Yes    What phone number would you like to be contacted at? home    [facility]    Phone call duration: 4 minutes    Keren Grove RN    FOLLOW-UP VISIT    Patient Name: Monika Nam      : 1960     Age: 60 year old        ______________________________________________________________________________     Chief Complaint   Patient presents with     Radiation Therapy     phone follow up     LMP 2014      Date Radiation Completed: 19    Pain  Denies    Meds  Current Med List Reviewed: Yes  Medication Note:     Skin: Warm  Dry  Intact    Range of Motion: no complaints    Respiratory: No shortness of breath, dyspnea on exertion, cough, or hemoptysis    Hormone Therapy: No, triple negative    Lymphedema Follow up: No    Energy Level: normal      Appointments:     DATE  Oncologist: Dr Gonzales  last visit " 7/17/20, next due 1/2021   Surgeon:    Primary:      Other Notes:

## 2020-10-30 NOTE — LETTER
10/30/2020         RE: Monika Nam  [address] [facility] [address]        Dear Colleague,    Thank you for referring your patient, Monika Nam, to the [facility]. Please see a copy of my visit note below.       Department of Radiation Oncology  Radiation Therapy Center  [facility]  [place] [address]  [phone]         Radiation Oncology Follow-up Visit  10/30/20      Monika Nam  MRN: [id number]   : 1960     DIAGNOSIS: triple negative G3 invasive left breast cancer status post lumpectomy and SLNBx, revealing [id number] disease followed by adjuvant chemotherapy completed on 7/10/19  INTENT OF RADIOTHERAPY: adjuvant radiation therapy.  PATHOLOGY: Pathology revealed a single focus of 1.8cm G3 IDCA, no DCIS/LVSI. 0 out of 1 positive lymph node.                              STAGE: [id number]   CONCURRENT SYSTEMIC THERAPY: No     ONCOLOGIC HISTORY:   60 year old woman with triple negative G3 invasive left breast cancer status post lumpectomy and SLNBx, revealing [id number] disease followed by adjuvant chemotherapy completed on 7/10/19.      Monika Nam was in her usual state of health last winter. On 18 screening MMG showed a new 1.4cm focal asymmetry in the left breast at the 1:00 position 10.7cm FN. The right mmg was stable. Left breast US on 19 confirmed a 1.6cm nodule. Additional US of the left axilla showed no abnormal lymph nodes. On 18 a left breast US guided biopsy revealed G3 IDCA with no DCIS/LVSI, ER-/MI-/H2N-. On 19 she was taken to the OR for wire localized lumpectomy and SLNBx. Pathology revealed a single focus of 1.8cm G3 IDCA, no DCIS/LVSI. 0 out of 1 positive lymph node. Clear invasive margins with the closest margin being 8mm. Left breast specimen radiograph showed the lesion, needle core biopsy marker, and hookwire were all present. Postoperative course was marked by inflammatory reaction from  lymph node procedure dye but this has resolved.  She was seen by Ha Bundy and Christian and subsequently received ddAC x 4 followed by Taxol x 12 from 2/21/19 to 7/10/19.    She had genetics counseling 5/2019 and was negative.      SITE OF TREATMENT: left breast     DATES  OF TREATMENT: 8/13/19-9/11/19             TOTAL DOSE OF TREATMENT: 5256 cGy     DOSE PER FRACTION OF TREATMENT: 266 cGy x 16 fractions to left breast + 250 cGy x 4 fractions left breast boost       INTERVAL SINCE COMPLETION OF RADIATION THERAPY:   25 months    SUBJECTIVE:   Monika Nam is a 60 year old female who is scheduled today for routine follow up after completing radiation therapy.      Denies any new lumps, masses, or nipple discharge. She does report palpable density near incision since post surgery. No skin changes. No enlarged lymph nodes.  No lymphedema. Good ROM.    Denies any abdominal pain or distention. She denies any bone pain.  She denies any shortness of breath or cough or wheezing.    Patient did have a Mammogram 12/2/2019. It showed skin thickening likely related to surgery and or radiation therapy. Also seen were microcalcifications in the surgical bed probably benign such as dystropic calcification ( occurs as a reaction to tissue to damage). Recommendation was for short interval follow-up 6 months with Left mammogram.    Left diagnostic mammogram completed 6/16/20 showing postoperative conservation therapy  changes of the left breast again noted. Probable dystrophic calcifications, scarring, and skin thickening from treatment as described. Six-month follow-up recommended.    Seen by medical oncologist 7/17/20 and wanted to further evaluate palpable nodularity at the lumpectomy site. Recommended ultrasound of the left breast to assess. Also placed referral to surgery for evaluation.     US left breast completed 7/23/20 showing a the 12:00 position, 7 cm from the nipple, there is an  irregularly-shaped area of  hypoechogenicity and posterior acoustic shadowing which is contiguous with the patient's scar, most consistent with postsurgical scarring and fat necrosis related to prior lumpectomy. No suspicious mass or fluid collection is seen. Benign Finding(s).  Clinical follow-up as needed. Continue routine annual screening mammography.    Seen by surgery 20 (Dr. Claros). Exam revealed that the incision is well-healed.  Noted a rounded density underneath the incision consistent with some scarring or maybe even a seroma that has been replaced with scar tissue.  Fat necrosis a possibility. No concerns at this time regarding malignancy To followup prn.      ROS otherwise negative on a 12-system review.  ECO       Current Outpatient Medications   Medication     acetaminophen (TYLENOL) 500 MG tablet     albuterol (PROAIR HFA/PROVENTIL HFA/VENTOLIN HFA) 108 (90 Base) MCG/ACT inhaler     aspirin 81 MG tablet     augmented betamethasone dipropionate (DIPROLENE-AF) 0.05 % external cream     calcium-vitamin D (CALCIUM 600-D) 600-400 MG-UNIT per tablet     FLUoxetine (PROZAC) 20 MG capsule     loratadine (CLARITIN) 10 MG tablet     MULTIVITAMIN TABS   OR     pseudoePHEDrine (SUDAFED) 30 MG tablet     Pyridoxine HCl (VITAMIN B6 PO)     No current facility-administered medications for this visit.           Allergies   Allergen Reactions     Sulfa Drugs Swelling       Past Medical History:   Diagnosis Date     Abnormal Papanicolaou smear of cervix and cervical HPV     history of LEEP ion      Arthritis      ASCUS favor benign 2013, 2014    Neg high risk HPV      Cancer (H) 2019    breast     Cervical high risk HPV (human papillomavirus) test positive 12    type 66     Depressive disorder      PONV (postoperative nausea and vomiting)      Uncomplicated asthma          PHYSICAL EXAM:    LMP 2014   No PE (telephone visit) last breast exam 2020    LABS AND IMAGING:    US BREAST LEFT LIMITED 1-3 QUADRANTS,  7/23/2020 4:14 PM     HISTORY:  Personal history of left breast cancer, status post  lumpectomy. Patient presents today with reported lump in the superior  left breast corresponding to the area of scarring from prior  lumpectomy.      COMPARISON:  6/16/2020, 12/2/2019, 11/30/2018, 11/20/2018.      FINDINGS:  At the 12:00 position, 7 cm from the nipple, there is an  irregularly-shaped area of hypoechogenicity and posterior acoustic  shadowing which is contiguous with the patient's scar, most consistent  with postsurgical scarring and fat necrosis related to prior  lumpectomy. No suspicious mass or fluid collection is seen.                                                                       IMPRESSION: BI-RADS CATEGORY: 2 - Benign Finding(s).     RECOMMENDED FOLLOW-UP: Clinical follow-up as needed. Continue routine  annual screening mammography.     BASSAM LANDRY MD      DIAGNOSTIC MAMMOGRAM, LEFT, DIGITAL  6/16/2020 2:06 PM      HISTORY:  Left breast cancer status post lumpectomy, radiation therapy  and chemotherapy in November 2018. Probable dystrophic calcifications  and scarring at the lumpectomy site along with skin thickening. This  is a six-month follow-up. No current breast symptoms.     COMPARISON:  Mammograms dated 12/2/2019 and 1/23/2019.      BREAST DENSITY: Heterogeneously dense.  Limiting mammographic sensitivity.     FINDINGS:  Skin thickening, scarring at the lumpectomy site, and  slightly more coarse calcifications at the lumpectomy site (likely  dystrophic calcifications) are again noted. No new mammographic  findings of concern for malignancy.                                                                      IMPRESSION: BI-RADS CATEGORY: 3 - Probably Benign Finding-Short  Interval Follow-Up Suggested. Postoperative conservation therapy  changes of the left breast are again noted. Probable dystrophic  calcifications, scarring, and skin thickening from treatment as  described. Six-month  "follow-up is recommended.     RECOMMENDED FOLLOW-UP: Short Interval Follow-up 6 Months, left breast  diagnostic mammogram.     I discussed the findings and recommendations with the patient at the  time of the exam.     SMITA MAYEN MD        MA DIAGNOSTIC DIGITAL BILATERAL 12/2/2019 1:32 PM     HISTORY:  Prior left breast malignancy.     COMPARISON:  11/20/2018.     TECHNIQUE:  Bilateral digital mammography with CAD is performed as  well as DBT.     BREAST DENSITY: Heterogeneously dense.     COMMENTS: There is skin thickening and coarsening of the trabecular  pattern in the left breast. This can relate to surgery and/or  radiation therapy. Microcalcifications are present in the surgical  bed. Spot magnification views suggested they are probably benign such  as dystrophic calcification. A six-month follow-up left mammogram is  recommended in reassessment. The right mammogram is unremarkable.                                                                      IMPRESSION: BI-RADS CATEGORY: 3 - Probably Benign Finding-Short  Interval Follow-Up Suggested.     RECOMMENDED FOLLOW-UP: Short Interval Follow-up 6 Months.     MAYKEL PERKINS MD    IMPRESSION:   Ms. Nam is a 59 year old female with triple negative G3 invasive left breast cancer status post lumpectomy and SLNBx, revealing [id number] disease followed by adjuvant chemotherapy completed on 7/10/19. Completed adjuvant radiation therapy to left breast 5256 cGy on 9/11/19.    Patient did have imaging to further evaluate palpable nodularity near the incision. US left breast completed 7/23/20 showed \"at the 12:00 position, 7 cm from the nipple, there is an irregularly-shaped area of hypoechogenicity and posterior acoustic shadowing which is contiguous with the patient's scar, most consistent with postsurgical scarring and fat necrosis related to prior lumpectomy. No suspicious mass or fluid collection is seen. Benign Finding(s). \" Recommendation for clinical follow-up " as needed and routine annual screening mammography.    Seen by surgery 7/27/20 (Dr. Claros). Felt smooth round density underneath incision consistent with some scarring or maybe even a seroma that has been replaced with scar tissue or possibly fat necrosis. No concerns at this time regarding malignancy To followup prn.      PLAN:   Discussed long term care with continued moisturizing of the treated breast.  Discussed possibility of scar formation from radiation and treatment with gentle massage. She will follow up here 12 months with Dr. Salvador    Patient will follow up with medical oncology for continued care and imaging ( last seen 7/17/2020).  Next bilateral mammogram due 12/2020. Patient aware and to have scheduled.     Lymphedema.She remains at low risk for lymphedema.  She denies lymphedema at this time, but she will contact the clinic if she needs a referral to the lymphedema specialist.     Cancer screening.  should undergo routine screening for age group. Last colonoscopy 2008 -  Patient did have one scheduled at time of diagnosis but cancelled the appt and plans to reschedule.    Healthy lifestyle.  Recommend smoking cessation.  She will continue to see her primary care provider for general health maintenance (Eczema, asthma, reflux, depression and anxiety).    Due to the concerns around COVID-19 and adhering to social distancing we conduct this visit over the telephone.   Phone call duration: 22 minutes    Abigail Velez [facility]  Radiation Therapy Center  [facility]  [address], [address]  [place] [address]

## 2020-11-19 ENCOUNTER — IMMUNIZATION (OUTPATIENT)
Dept: FAMILY MEDICINE | Facility: CLINIC | Age: 60
End: 2020-11-19
Payer: COMMERCIAL

## 2020-11-19 DIAGNOSIS — Z23 NEED FOR PROPHYLACTIC VACCINATION AND INOCULATION AGAINST INFLUENZA: Primary | ICD-10-CM

## 2020-11-19 PROCEDURE — 90471 IMMUNIZATION ADMIN: CPT

## 2020-11-19 PROCEDURE — 99207 PR NO CHARGE NURSE ONLY: CPT

## 2020-11-19 PROCEDURE — 90682 RIV4 VACC RECOMBINANT DNA IM: CPT

## 2020-11-22 ENCOUNTER — VIRTUAL VISIT (OUTPATIENT)
Dept: FAMILY MEDICINE | Facility: OTHER | Age: 60
End: 2020-11-22

## 2020-11-22 NOTE — PROGRESS NOTES
"Date: 2020 14:33:47  Clinician: Lillian Bansal  Clinician NPI: [id number]  Patient: Monika Nam  Patient : 1960  Patient Address: [address] [place], [place] [address]  Patient Phone: [phone]  Visit Protocol: URI  Patient Summary:  Monika is a 60 year old ( : 1960 ) female who initiated a [facility] Visit for COVID-19 (Coronavirus) evaluation and screening. When asked the question \"Please sign me up to receive news, health information and promotions. \", Monika responded \"Yes\".    When asked when her symptoms started, Monika reported that she does not have any symptoms.   She denies taking antibiotic medication in the past month and having recent facial or sinus surgery in the past 60 days.    Pertinent COVID-19 (Coronavirus) information  Monika does not work or volunteer as healthcare worker or a . In the past 14 days, Monika has not worked or volunteered at a healthcare facility or group living setting.   In the past 14 days, she also has not lived in a congregate living setting.   Monika has had a close contact with a laboratory-confirmed COVID-19 patient in the last 14 days. She was not exposed at her work. Date Monika was exposed to the laboratory-confirmed COVID-19 patient: 2020   Additional information about contact with COVID-19 (Coronavirus) patient as reported by the patient (free text): My grandson just got his COVID results back.  He is positive.  I was visiting him at his house on    Monika is not living in the same household with the COVID-19 positive patient. She was in an enclosed space for greater than 15 minutes with the COVID-19 patient.   During the encounter, neither were wearing masks.   Since 2019, Monika has been tested for COVID-19 and has not had upper respiratory infection or influenza-like illness.      Result of COVID-19 test: Negative     Date of her COVID-19 test: 2002      Pertinent medical history  Monika does " not get yeast infections when she takes antibiotics.   Monika needs a return to work/school note.   Weight: 170 lbs   Monika smokes or uses smokeless tobacco.   Weight: 170 lbs    MEDICATIONS: fluoxetine oral, ALLERGIES: Sulfa (Sulfonamide Antibiotics)  Clinician Response:  Dear Monika,   Based on your exposure to COVID-19 (coronavirus), we would like to test you for this virus.  1. Please call [phone] to schedule your visit. Explain that you were referred by [facility] to have a COVID-19 test. Be ready to share your [facility] visit ID number.  * If you need to schedule in [facility] please call [phone] or for [employer] employees please call [phone].   * If you need to schedule in the [place] area please call [phone]. [employer] employees call [phone].   The following will serve as your written order for this COVID Test, ordered by me, for the indication of suspected COVID [Z20.828]: The test will be ordered in [facility], our electronic health record, after you are scheduled. It will show as ordered and authorized by Clark Arita MD.  Order: COVID-19 (coronavirus) PCR for ASYMPTOMATIC EXPOSURE testing from [facility].   If you know you have had close contact with someone who tested positive, you should be quarantined for 14 days after this exposure. You should stay in quarantine for the14 days even if the covid test is negative, the optimal time to test after exposure is 5-7 days from the exposure  Quarantine means   What should I do?  For safety, it's very important to follow these rules. Do this for 14 days after the date you were last exposed to the virus..  Stay home and away from others. Don't go to school or anywhere else. Generally quarantine means staying home from work but there are some exceptions to this. Please contact your workplace.   No hugging, kissing or shaking hands.  Don't let anyone visit.  Cover your mouth and nose with a mask, tissue or washcloth to avoid spreading germs.  Wash your  hands and face often. Use soap and water.  What are the symptoms of COVID-19?  The most common symptoms are cough, fever and trouble breathing. Less common symptoms include headache, body aches, fatigue (feeling very tired), chills, sore throat, stuffy or runny nose, diarrhea (loose poop), loss of taste or smell, belly pain, and nausea or vomiting (feeling sick to your stomach or throwing up).  After 14 days, if you have still don't have symptoms, you likely don't have this virus.  If you develop symptoms, follow these guidelines.  If you're normally healthy: Please start another [facility] visit to report your symptoms. Go to [email or web address].  If you have a serious health problem (like cancer, heart failure, an organ transplant or kidney disease): Call your specialty clinic. Let them know that you might have COVID-19.  2. When it's time for your COVID test:  Stay at least 6 feet away from others. (If someone will drive you to your test, stay in the backseat, as far away from the  as you can.)  Cover your mouth and nose with a mask, tissue or washcloth.  Go straight to the testing site. Don't make any stops on the way there or back.  Please note  Caregivers in these groups are at risk for severe illness due to COVID-19:  o People 65 years and older  o People who live in a nursing home or long-term care facility  o People with chronic disease (lung, heart, cancer, diabetes, kidney, liver, immunologic)  o People who have a weakened immune system, including those who:  Are in cancer treatment  Take medicine that weakens the immune system, such as corticosteroids  Had a bone marrow or organ transplant  Have an immune deficiency  Have poorly controlled HIV or AIDS  Are obese (body mass index of 40 or higher)  Smoke regularly  Where can I get more information?   [employer] -- About COVID-19: [email or web address]  CDC -- What to Do If You're Sick: www.cdc.gov/coronavirus/2019-ncov/about/steps-when-sick.html   CDC -- Ending Home Isolation: www.cdc.gov/coronavirus/2019-ncov/hcp/disposition-in-home-patients.html  CDC -- Caring for Someone: www.cdc.gov/coronavirus/2019-ncov/if-you-are-sick/care-for-someone.html  [facility] -- Interim Guidance for Hospital Discharge to Home: www.health.[facility].[place]./diseases/coronavirus/hcp/hospdischarge.pdf  [facility] clinical trials (COVID-19 research studies): clinicalaffairs.[facility]-clinical-trials  Below are the COVID-19 hotlines at the [place] Department of Health ([facility]). Interpreters are available.  For health questions: Call 023-128-3882 or 1-365.771.4773 (7 a.m. to 7 p.m.)  For questions about schools and childcare: Call 798-322-1226 or 1-566.333.5184 (7 a.m. to 7 p.m.)    Diagnosis: Contact with and (suspected) exposure to other viral communicable diseases  Diagnosis ICD: Z20.828

## 2020-11-24 DIAGNOSIS — F34.1 CHRONIC DEPRESSIVE PERSONALITY DISORDER: ICD-10-CM

## 2020-11-24 NOTE — PROGRESS NOTES
request received from patient requesting a refill of fluoxetine on behalf of Dr. Gonzales.  Last refill: 10/16/19  # 30 with 11 refills at [facility].  Last office visit:  7/17/20  Next office visit:  1/17/20    Tonya Zayas RN

## 2021-02-10 NOTE — PROGRESS NOTES
Patient called to request to see if she is able to get a flu shot this year. She is not on active treatment for her breast cancer. She would like a message left on her home phone either today or tomorrow to let her know if she is able to receive immunization.  Message left on home answering machine per patient's request.  Direct line provided if there are any questions or concerns.  Yuliet Posada RN     Gabapentin Counseling: I discussed with the patient the risks of gabapentin including but not limited to dizziness, somnolence, fatigue and ataxia.

## 2021-02-11 NOTE — PATIENT INSTRUCTIONS
Agree. We have previously spoken to mother about this as well. Per physician instructions      If you have questions or concerns on any instructions given to you by your provider today or if you need to schedule an appointment, you can reach us at [phone].

## 2021-02-15 ENCOUNTER — OFFICE VISIT (OUTPATIENT)
Dept: FAMILY MEDICINE | Facility: CLINIC | Age: 61
End: 2021-02-15
Payer: COMMERCIAL

## 2021-02-15 VITALS
WEIGHT: 183 LBS | HEART RATE: 92 BPM | RESPIRATION RATE: 20 BRPM | HEIGHT: 68 IN | BODY MASS INDEX: 27.74 KG/M2 | DIASTOLIC BLOOD PRESSURE: 88 MMHG | OXYGEN SATURATION: 98 % | TEMPERATURE: 98.5 F | SYSTOLIC BLOOD PRESSURE: 144 MMHG

## 2021-02-15 DIAGNOSIS — R35.0 URINARY FREQUENCY: ICD-10-CM

## 2021-02-15 DIAGNOSIS — N30.00 ACUTE CYSTITIS WITHOUT HEMATURIA: ICD-10-CM

## 2021-02-15 DIAGNOSIS — N89.8 VAGINAL DISCHARGE: Primary | ICD-10-CM

## 2021-02-15 LAB
ALBUMIN UR-MCNC: NEGATIVE MG/DL
APPEARANCE UR: ABNORMAL
BACTERIA #/AREA URNS HPF: ABNORMAL /HPF
BILIRUB UR QL STRIP: NEGATIVE
COLOR UR AUTO: YELLOW
GLUCOSE UR STRIP-MCNC: NEGATIVE MG/DL
HGB UR QL STRIP: ABNORMAL
KETONES UR STRIP-MCNC: NEGATIVE MG/DL
LEUKOCYTE ESTERASE UR QL STRIP: ABNORMAL
NITRATE UR QL: POSITIVE
NON-SQ EPI CELLS #/AREA URNS LPF: ABNORMAL /LPF
PH UR STRIP: 5.5 PH (ref 5–7)
RBC #/AREA URNS AUTO: ABNORMAL /HPF
SOURCE: ABNORMAL
SP GR UR STRIP: 1.02 (ref 1–1.03)
SPECIMEN SOURCE: NORMAL
UROBILINOGEN UR STRIP-ACNC: 0.2 EU/DL (ref 0.2–1)
WBC #/AREA URNS AUTO: ABNORMAL /HPF
WET PREP SPEC: NORMAL

## 2021-02-15 PROCEDURE — 87088 URINE BACTERIA CULTURE: CPT | Performed by: FAMILY MEDICINE

## 2021-02-15 PROCEDURE — 99213 OFFICE O/P EST LOW 20 MIN: CPT | Performed by: FAMILY MEDICINE

## 2021-02-15 PROCEDURE — 81001 URINALYSIS AUTO W/SCOPE: CPT | Performed by: FAMILY MEDICINE

## 2021-02-15 PROCEDURE — 87210 SMEAR WET MOUNT SALINE/INK: CPT | Performed by: FAMILY MEDICINE

## 2021-02-15 PROCEDURE — 87186 SC STD MICRODIL/AGAR DIL: CPT | Performed by: FAMILY MEDICINE

## 2021-02-15 PROCEDURE — 87086 URINE CULTURE/COLONY COUNT: CPT | Performed by: FAMILY MEDICINE

## 2021-02-15 RX ORDER — CIPROFLOXACIN 500 MG/1
500 TABLET, FILM COATED ORAL 2 TIMES DAILY
Qty: 14 TABLET | Refills: 0 | Status: SHIPPED | OUTPATIENT
Start: 2021-02-15 | End: 2021-02-22

## 2021-02-15 ASSESSMENT — MIFFLIN-ST. JEOR: SCORE: 1439.61

## 2021-02-15 ASSESSMENT — PATIENT HEALTH QUESTIONNAIRE - PHQ9: SUM OF ALL RESPONSES TO PHQ QUESTIONS 1-9: 5

## 2021-02-15 NOTE — PATIENT INSTRUCTIONS
Thank you for choosing [facility]  You may be receiving an email and/or telephone survey request from [facility] Customer Experience regarding your visit today.  Please take a few minutes to respond to the survey to let us know how we are doing.      If you have questions or concerns, please contact us via [employer] or you can contact your care team at [phone].    Our Clinic hours are:  Monday 6:40 am  to 7:00 pm  Tuesday -Friday 6:40 am to 5:00 pm    The [place] outpatient lab hours are:  Monday - Friday 6:10 am to 4:45 pm  Saturdays 7:00 am to 11:00 am  Appointments are required, call [phone]    If you have clinical questions after hours or would like to schedule an appointment,  call the clinic at [phone].      (N30.00) Acute cystitis without hematuria  Comment:   Plan: ciprofloxacin (CIPRO) 500 MG tablet        The wet prep is normal. The Urine shows an infection. Start Cipro at 500 mg twice daily for 7 days. Monitor for the resolution of the symptoms.   The culture is done and will be called in two days. Stay well hydrated and if desired, try Pyridium at 200 mg every 8 hours for the bladder pain.   This will turn the urine reddish.

## 2021-02-15 NOTE — PROGRESS NOTES
Susan Frank is a 60 year old who presents for the following health issues  accompanied by herself:    HPI       Genitourinary - Female  Onset/Duration: Off and on for 2 months.  Description:   Painful urination (Dysuria): YES- Sometimes.           Frequency: YES  Blood in urine (Hematuria): no  Delay in urine (Hesitency): YES with small amounts  Intensity: mild some days.  Sometimes she will get up 6 times at night to urinate.  Progression of Symptoms:  same  Accompanying Signs & Symptoms:  Fever/chills: no  Flank pain: no  Nausea and vomiting: YES- had some random episodes of vomiting.   Vaginal symptoms: discharge and odor  Abdominal/Pelvic Pain: no  History:   History of frequent UTI s: YES-when she was younger.  Now it has been awhile since her last UTI.  History of kidney stones: no  Sexually Active: YES  Possibility of pregnancy: No  Precipitating or alleviating factors: None  Therapies tried and outcome: Cranberry juice prn (contraindicated in Coumadin patients) and Increase fluid intake    COLON CANCER SCREENING:  Discuss about the FIT test.  Last colonoscopy was done in 2008.    Current Outpatient Medications   Medication Instructions     acetaminophen (TYLENOL) 500-1,000 mg, Oral, EVERY 6 HOURS PRN     albuterol (PROAIR HFA/PROVENTIL HFA/VENTOLIN HFA) 108 (90 Base) MCG/ACT inhaler 2 puffs, Inhalation, EVERY 4 HOURS PRN     aspirin (ASA) 81 mg, Oral, DAILY     augmented betamethasone dipropionate (DIPROLENE-AF) 0.05 % external cream APPLY EXTERNALLY TO THE AFFECTED AREA TWICE DAILY     calcium-vitamin D (CALCIUM 600-D) 600-400 MG-UNIT per tablet 1 tablet, Oral, DAILY     ciprofloxacin (CIPRO) 500 mg, Oral, 2 TIMES DAILY     FLUoxetine (PROZAC) 20 MG capsule Take 20 mg daily.     loratadine (CLARITIN) 10 mg, Oral, DAILY PRN     MULTIVITAMIN TABS   OR 1 TABLET BY MOUTH DAILY     pseudoePHEDrine (SUDAFED) 30 mg, Oral, EVERY 12 HOURS PRN     Pyridoxine HCl (VITAMIN B6 PO) 1 tablet, Oral, DAILY  "      Patient Active Problem List   Diagnosis     Allergic rhinitis     Anxiety disorder due to medical condition     Tobacco use disorder     Chronic depressive personality disorder     Obesity     CARDIOVASCULAR SCREENING; LDL GOAL LESS THAN 130     CTS (carpal tunnel syndrome)     Cervical high risk HPV (human papillomavirus) test positive     GERD (gastroesophageal reflux disease)     Obstructive sleep apnea syndrome     Mild intermittent asthma without complication     Infiltrating ductal carcinoma of central portion of left breast in female (H)     Estrogen receptor negative carcinoma of breast, left (H)     Depressive disorder       Blood pressure (!) 144/88, pulse 92, temperature 98.5  F (36.9  C), temperature source Tympanic, resp. rate 20, height 1.721 m (5' 7.75\"), weight 83 kg (183 lb), last menstrual period 04/02/2014, SpO2 98 %, not currently breastfeeding.    Exam:  GENERAL APPEARANCE: healthy, alert and no distress  ABDOMEN:  soft, nontender, no HSM or masses and bowel sounds normal  PSYCH: mentation appears normal and affect normal/bright    The Wet prep is normal and the UA shows infection.       (N30.00) Acute cystitis without hematuria  Comment:   Plan: ciprofloxacin (CIPRO) 500 MG tablet        The wet prep is normal. The Urine shows an infection. Start Cipro at 500 mg twice daily for 7 days. Monitor for the resolution of the symptoms.   The culture is done and will be called in two days. Stay well hydrated and if desired, try Pyridium at 200 mg every 8 hours for the bladder pain.   This will turn the urine reddish.     Servando Valladares MD          "

## 2021-02-16 ASSESSMENT — ASTHMA QUESTIONNAIRES: ACT_TOTALSCORE: 24

## 2021-02-17 LAB
BACTERIA SPEC CULT: ABNORMAL
Lab: ABNORMAL
SPECIMEN SOURCE: ABNORMAL

## 2021-03-01 DIAGNOSIS — F34.1 CHRONIC DEPRESSIVE PERSONALITY DISORDER: ICD-10-CM

## 2021-03-01 NOTE — PROGRESS NOTES
Fax received from pharmacy requesting a refill of prozac on behalf of patient.  Last refill: 11/24/20  # 30 with 2 refills at [facility].  Last office visit:  7/17/20  Next office visit:  3/5/21    Marilee Cunha RN

## 2021-03-02 ENCOUNTER — HOSPITAL ENCOUNTER (OUTPATIENT)
Dept: LAB | Facility: CLINIC | Age: 61
Discharge: HOME | End: 2021-03-02
Attending: INTERNAL MEDICINE | Admitting: INTERNAL MEDICINE
Payer: COMMERCIAL

## 2021-03-02 DIAGNOSIS — C50.112 INFILTRATING DUCTAL CARCINOMA OF CENTRAL PORTION OF LEFT BREAST IN FEMALE (H): ICD-10-CM

## 2021-03-02 LAB
ALBUMIN SERPL-MCNC: 3.8 G/DL (ref 3.4–5)
ALP SERPL-CCNC: 111 U/L (ref 40–150)
ALT SERPL W P-5'-P-CCNC: 32 U/L (ref 0–50)
ANION GAP SERPL CALCULATED.3IONS-SCNC: 6 MMOL/L (ref 3–14)
AST SERPL W P-5'-P-CCNC: 24 U/L (ref 0–45)
BASOPHILS # BLD AUTO: 0.1 10E9/L (ref 0–0.2)
BASOPHILS NFR BLD AUTO: 1 %
BILIRUB SERPL-MCNC: 0.7 MG/DL (ref 0.2–1.3)
BUN SERPL-MCNC: 12 MG/DL (ref 7–30)
CALCIUM SERPL-MCNC: 9.2 MG/DL (ref 8.5–10.1)
CANCER AG27-29 SERPL-ACNC: 12 U/ML (ref 0–39)
CHLORIDE SERPL-SCNC: 104 MMOL/L (ref 94–109)
CO2 SERPL-SCNC: 27 MMOL/L (ref 20–32)
CREAT SERPL-MCNC: 0.74 MG/DL (ref 0.52–1.04)
DIFFERENTIAL METHOD BLD: NORMAL
EOSINOPHIL # BLD AUTO: 0.3 10E9/L (ref 0–0.7)
EOSINOPHIL NFR BLD AUTO: 5.4 %
ERYTHROCYTE [DISTWIDTH] IN BLOOD BY AUTOMATED COUNT: 12.8 % (ref 10–15)
GFR SERPL CREATININE-BSD FRML MDRD: 87 ML/MIN/{1.73_M2}
GLUCOSE SERPL-MCNC: 130 MG/DL (ref 70–99)
HCT VFR BLD AUTO: 42.7 % (ref 35–47)
HGB BLD-MCNC: 14.2 G/DL (ref 11.7–15.7)
IMM GRANULOCYTES # BLD: 0 10E9/L (ref 0–0.4)
IMM GRANULOCYTES NFR BLD: 0.4 %
LYMPHOCYTES # BLD AUTO: 1.3 10E9/L (ref 0.8–5.3)
LYMPHOCYTES NFR BLD AUTO: 24.3 %
MCH RBC QN AUTO: 31.9 PG (ref 26.5–33)
MCHC RBC AUTO-ENTMCNC: 33.3 G/DL (ref 31.5–36.5)
MCV RBC AUTO: 96 FL (ref 78–100)
MONOCYTES # BLD AUTO: 0.4 10E9/L (ref 0–1.3)
MONOCYTES NFR BLD AUTO: 8.2 %
NEUTROPHILS # BLD AUTO: 3.1 10E9/L (ref 1.6–8.3)
NEUTROPHILS NFR BLD AUTO: 60.7 %
NRBC # BLD AUTO: 0 10*3/UL
NRBC BLD AUTO-RTO: 0 /100
PLATELET # BLD AUTO: 231 10E9/L (ref 150–450)
POTASSIUM SERPL-SCNC: 3.8 MMOL/L (ref 3.4–5.3)
PROT SERPL-MCNC: 7.9 G/DL (ref 6.8–8.8)
RBC # BLD AUTO: 4.45 10E12/L (ref 3.8–5.2)
SODIUM SERPL-SCNC: 137 MMOL/L (ref 133–144)
WBC # BLD AUTO: 5.2 10E9/L (ref 4–11)

## 2021-03-02 PROCEDURE — 36415 COLL VENOUS BLD VENIPUNCTURE: CPT | Performed by: INTERNAL MEDICINE

## 2021-03-02 PROCEDURE — 86300 IMMUNOASSAY TUMOR CA 15-3: CPT | Performed by: INTERNAL MEDICINE

## 2021-03-02 PROCEDURE — 80053 COMPREHEN METABOLIC PANEL: CPT | Performed by: INTERNAL MEDICINE

## 2021-03-02 PROCEDURE — 85025 COMPLETE CBC W/AUTO DIFF WBC: CPT | Performed by: INTERNAL MEDICINE

## 2021-03-05 ENCOUNTER — VIRTUAL VISIT (OUTPATIENT)
Dept: ONCOLOGY | Facility: CLINIC | Age: 61
End: 2021-03-05
Attending: INTERNAL MEDICINE
Payer: COMMERCIAL

## 2021-03-05 VITALS — HEIGHT: 68 IN | WEIGHT: 183 LBS | BODY MASS INDEX: 27.74 KG/M2

## 2021-03-05 DIAGNOSIS — C50.112 INFILTRATING DUCTAL CARCINOMA OF CENTRAL PORTION OF LEFT BREAST IN FEMALE (H): Primary | ICD-10-CM

## 2021-03-05 DIAGNOSIS — F32.A DEPRESSIVE DISORDER: ICD-10-CM

## 2021-03-05 DIAGNOSIS — Z12.31 VISIT FOR SCREENING MAMMOGRAM: ICD-10-CM

## 2021-03-05 PROCEDURE — 99214 OFFICE O/P EST MOD 30 MIN: CPT | Mod: TEL | Performed by: INTERNAL MEDICINE

## 2021-03-05 ASSESSMENT — MIFFLIN-ST. JEOR: SCORE: 1443.58

## 2021-03-05 NOTE — LETTER
"    3/5/2021         RE: Monika Nam  [address] [facility] [address]        Dear Colleague,    Thank you for referring your patient, Monika Nam, to the [facility]. Please see a copy of my visit note below.    Oncology Rooming Note-Telephone contact via #[phone].    March 5, 2021 1:01 PM   Monika Nam is a 60 year old female who presents for:    Chief Complaint   Patient presents with     Oncology Clinic Visit     6 month follow up left breast cancer. Review lab results.      Initial Vitals: Ht 1.727 m (5' 8\")   Wt 83 kg (183 lb)   LMP 04/02/2014   Breastfeeding No   BMI 27.83 kg/m   Estimated body mass index is 27.83 kg/m  as calculated from the following:    Height as of this encounter: 1.727 m (5' 8\").    Weight as of this encounter: 83 kg (183 lb). Body surface area is 2 meters squared.  Data Unavailable Comment: Data Unavailable   Patient's last menstrual period was 04/02/2014.  Allergies reviewed: Yes  Medications reviewed: Yes    Medications: Medication refills not needed today.  Pharmacy name entered into [employer]: [facility] #[id number] - [address] AT [address]    Clinical concerns: 6 month follow up left breast cancer. Review lab results. C/o numbness and tingling in her fingertips and feet.       Sana Vazquez [facility]              Hematology/ Oncology Telephone Visit:  Mar 5, 2021    Due to the concerns around COVID-19 and adhering to social distancing we conducted this visit over the telephone.      Reason for Visit:   Chief Complaint   Patient presents with     Oncology Clinic Visit     6 month follow up left breast cancer. Review lab results.        Oncologic History:  Infiltrating ductal carcinoma of central portion of left breast in female (H)  Monika Nam presented following her annual mammogram with new focal asymmetry at 12-1 o'clock position of the left breast around 10.7 cm from " the nipple.  It measured 1.4 cm.  Subsequently the patient went on to have breast ultrasound on member 30th 2018 showing 1.6 cm in maximum dimension hypoechoic mass.  Subsequently ultrasound breast biopsy was done on December 17, 2018 showing invasive ductal carcinoma grade 3 out of 3 angiolymphatic invasion was not identified.  Ductal carcinoma in situ was not identified . estrogen receptor was negative, progesterone receptor was negative and HER-2/mercedes receptor was negative.        1/2019 left lumpectomy found 1.8 cm IDC, grade III, LN-, margin is clear, pT1cN0    The patient was started on dose dense chemotherapy with Adriamycin and Cytoxan followed by Taxol      Interval History:  Patient has been feeling well without any recent complaints of weight loss or night sweats or fever or chills patient denies any nausea vomiting or diarrhea.  She denies any fever or chills    Review of systems:  Pertinent positives have been included in HPI; remainder of detailed complete 20-point ROS was negative.    Past medical, social, surgical, and family histories reviewed.    Allergies:  Allergies as of 03/05/2021 - Reviewed 03/05/2021   Allergen Reaction Noted     Sulfa drugs Swelling 06/09/2005       Current Medications:  Current Outpatient Medications   Medication Sig Dispense Refill     aspirin 81 MG tablet Take 1 tablet (81 mg) by mouth daily       augmented betamethasone dipropionate (DIPROLENE-AF) 0.05 % external cream APPLY EXTERNALLY TO THE AFFECTED AREA TWICE DAILY 50 g 6     calcium-vitamin D (CALCIUM 600-D) 600-400 MG-UNIT per tablet Take 1 tablet by mouth daily       FLUoxetine (PROZAC) 20 MG capsule Take 20 mg daily. 30 capsule 2     loratadine (CLARITIN) 10 MG tablet Take 10 mg by mouth daily as needed        MULTIVITAMIN TABS   OR 1 TABLET BY MOUTH DAILY       Pyridoxine HCl (VITAMIN B6 PO) Take 1 tablet by mouth daily       acetaminophen (TYLENOL) 500 MG tablet Take 500-1,000 mg by mouth every 6 hours as needed  for mild pain or pain       albuterol (PROAIR HFA/PROVENTIL HFA/VENTOLIN HFA) 108 (90 Base) MCG/ACT inhaler Inhale 2 puffs into the lungs every 4 hours as needed for shortness of breath / dyspnea or wheezing (Patient not taking: Reported on 3/5/2021) 1 Inhaler 3     pseudoePHEDrine (SUDAFED) 30 MG tablet Take 30 mg by mouth every 12 hours as needed for congestion          Laboratory/Imaging Studies:  No visits with results within 2 Week(s) from this visit.   Latest known visit with results is:   Office Visit on 02/15/2021   Component Date Value Ref Range Status     Specimen Description 02/15/2021 Vagina   Final     Wet Prep 02/15/2021 No clue cells seen   Final     Wet Prep 02/15/2021 No yeast seen   Final     Wet Prep 02/15/2021 No Trichomonas seen   Final     Wet Prep 02/15/2021    Final                    Value:WBC'S seen  Moderate       Color Urine 02/15/2021 Yellow   Final     Appearance Urine 02/15/2021 Cloudy   Final     Glucose Urine 02/15/2021 Negative  NEG^Negative mg/dL Final     Bilirubin Urine 02/15/2021 Negative  NEG^Negative Final     Ketones Urine 02/15/2021 Negative  NEG^Negative mg/dL Final     Specific Gravity Urine 02/15/2021 1.020  1.003 - 1.035 Final     Blood Urine 02/15/2021 Small* NEG^Negative Final     pH Urine 02/15/2021 5.5  5.0 - 7.0 pH Final     Protein Albumin Urine 02/15/2021 Negative  NEG^Negative mg/dL Final     Urobilinogen Urine 02/15/2021 0.2  0.2 - 1.0 EU/dL Final     Nitrite Urine 02/15/2021 Positive* NEG^Negative Final     Leukocyte Esterase Urine 02/15/2021 Small* NEG^Negative Final     Source 02/15/2021 Midstream Urine   Final     WBC Urine 02/15/2021 10-25* OTO5^0 - 5 /HPF Final     RBC Urine 02/15/2021 2-5* OTO2^O - 2 /HPF Final     Squamous Epithelial /LPF Urine 02/15/2021 Few  FEW^Few /LPF Final     Bacteria Urine 02/15/2021 Moderate* NEG^Negative /HPF Final     Specimen Description 02/15/2021 Midstream Urine   Final     Special Requests 02/15/2021 Specimen received in  preservative   Final     Culture Micro 02/15/2021 *  Final                    Value:50,000 to 100,000 colonies/mL  Klebsiella pneumoniae            Assessment and plan:    (C50.112) Infiltrating ductal carcinoma of central portion of left breast in female (H)  (primary encounter diagnosis)  I reviewed with the patient today most recent laboratory test and mammographic images.  There is no clinical evidence of breast cancer recurrence.  We will continue to monitor patient symptoms.  We will see the patient in 3 months time or sooner if there are new developments or concerns.    (Z12.31) Visit for screening mammogram  Plan: MA Screening Digital Bilateral    (F32.9) Depressive disorder  Patient currently on Prozac 20 mg orally daily.    The patient is ready to learn, no apparent learning barriers were identified.  Diagnosis and treatment plans were explained to the patient. The patient expressed understanding of the content. The patient asked appropriate questions. The patient questions were answered to her satisfaction.    Telephone call lasted 30 minutes.    Madison Gonzales MD    Chart documentation with Dragon Voice recognition Software. Although reviewed after completion, some words and grammatical errors may remain.                                                      Again, thank you for allowing me to participate in the care of your patient.        Sincerely,        Madison Gonzales MD

## 2021-03-05 NOTE — LETTER
"    3/5/2021         RE: Monika Nam  [address] [facility] [address]        Dear Colleague,    Thank you for referring your patient, Monika Nam, to the [facility]. Please see a copy of my visit note below.    Oncology Rooming Note-Telephone contact via #[phone].    March 5, 2021 1:01 PM   Monika Nam is a 60 year old female who presents for:    Chief Complaint   Patient presents with     Oncology Clinic Visit     6 month follow up left breast cancer. Review lab results.      Initial Vitals: Ht 1.727 m (5' 8\")   Wt 83 kg (183 lb)   LMP 04/02/2014   Breastfeeding No   BMI 27.83 kg/m   Estimated body mass index is 27.83 kg/m  as calculated from the following:    Height as of this encounter: 1.727 m (5' 8\").    Weight as of this encounter: 83 kg (183 lb). Body surface area is 2 meters squared.  Data Unavailable Comment: Data Unavailable   Patient's last menstrual period was 04/02/2014.  Allergies reviewed: Yes  Medications reviewed: Yes    Medications: Medication refills not needed today.  Pharmacy name entered into [employer]2fx: [facility] #[id number] - [address] AT [address]    Clinical concerns: 6 month follow up left breast cancer. Review lab results. C/o numbness and tingling in her fingertips and feet.       Sana Vazquez [facility]              Hematology/ Oncology Telephone Visit:  Mar 5, 2021    Due to the concerns around COVID-19 and adhering to social distancing we conducted this visit over the telephone.      Reason for Visit:   Chief Complaint   Patient presents with     Oncology Clinic Visit     6 month follow up left breast cancer. Review lab results.        Oncologic History:  Infiltrating ductal carcinoma of central portion of left breast in female (H)  Monika Nam presented following her annual mammogram with new focal asymmetry at 12-1 o'clock position of the left breast around 10.7 cm from " the nipple.  It measured 1.4 cm.  Subsequently the patient went on to have breast ultrasound on member 30th 2018 showing 1.6 cm in maximum dimension hypoechoic mass.  Subsequently ultrasound breast biopsy was done on December 17, 2018 showing invasive ductal carcinoma grade 3 out of 3 angiolymphatic invasion was not identified.  Ductal carcinoma in situ was not identified . estrogen receptor was negative, progesterone receptor was negative and HER-2/mercedes receptor was negative.        1/2019 left lumpectomy found 1.8 cm IDC, grade III, LN-, margin is clear, pT1cN0    The patient was started on dose dense chemotherapy with Adriamycin and Cytoxan followed by Taxol      Interval History:  Patient has been feeling well without any recent complaints of weight loss or night sweats or fever or chills patient denies any nausea vomiting or diarrhea.  She denies any fever or chills    Review of systems:  Pertinent positives have been included in HPI; remainder of detailed complete 20-point ROS was negative.    Past medical, social, surgical, and family histories reviewed.    Allergies:  Allergies as of 03/05/2021 - Reviewed 03/05/2021   Allergen Reaction Noted     Sulfa drugs Swelling 06/09/2005       Current Medications:  Current Outpatient Medications   Medication Sig Dispense Refill     aspirin 81 MG tablet Take 1 tablet (81 mg) by mouth daily       augmented betamethasone dipropionate (DIPROLENE-AF) 0.05 % external cream APPLY EXTERNALLY TO THE AFFECTED AREA TWICE DAILY 50 g 6     calcium-vitamin D (CALCIUM 600-D) 600-400 MG-UNIT per tablet Take 1 tablet by mouth daily       FLUoxetine (PROZAC) 20 MG capsule Take 20 mg daily. 30 capsule 2     loratadine (CLARITIN) 10 MG tablet Take 10 mg by mouth daily as needed        MULTIVITAMIN TABS   OR 1 TABLET BY MOUTH DAILY       Pyridoxine HCl (VITAMIN B6 PO) Take 1 tablet by mouth daily       acetaminophen (TYLENOL) 500 MG tablet Take 500-1,000 mg by mouth every 6 hours as needed  for mild pain or pain       albuterol (PROAIR HFA/PROVENTIL HFA/VENTOLIN HFA) 108 (90 Base) MCG/ACT inhaler Inhale 2 puffs into the lungs every 4 hours as needed for shortness of breath / dyspnea or wheezing (Patient not taking: Reported on 3/5/2021) 1 Inhaler 3     pseudoePHEDrine (SUDAFED) 30 MG tablet Take 30 mg by mouth every 12 hours as needed for congestion          Laboratory/Imaging Studies:  No visits with results within 2 Week(s) from this visit.   Latest known visit with results is:   Office Visit on 02/15/2021   Component Date Value Ref Range Status     Specimen Description 02/15/2021 Vagina   Final     Wet Prep 02/15/2021 No clue cells seen   Final     Wet Prep 02/15/2021 No yeast seen   Final     Wet Prep 02/15/2021 No Trichomonas seen   Final     Wet Prep 02/15/2021    Final                    Value:WBC'S seen  Moderate       Color Urine 02/15/2021 Yellow   Final     Appearance Urine 02/15/2021 Cloudy   Final     Glucose Urine 02/15/2021 Negative  NEG^Negative mg/dL Final     Bilirubin Urine 02/15/2021 Negative  NEG^Negative Final     Ketones Urine 02/15/2021 Negative  NEG^Negative mg/dL Final     Specific Gravity Urine 02/15/2021 1.020  1.003 - 1.035 Final     Blood Urine 02/15/2021 Small* NEG^Negative Final     pH Urine 02/15/2021 5.5  5.0 - 7.0 pH Final     Protein Albumin Urine 02/15/2021 Negative  NEG^Negative mg/dL Final     Urobilinogen Urine 02/15/2021 0.2  0.2 - 1.0 EU/dL Final     Nitrite Urine 02/15/2021 Positive* NEG^Negative Final     Leukocyte Esterase Urine 02/15/2021 Small* NEG^Negative Final     Source 02/15/2021 Midstream Urine   Final     WBC Urine 02/15/2021 10-25* OTO5^0 - 5 /HPF Final     RBC Urine 02/15/2021 2-5* OTO2^O - 2 /HPF Final     Squamous Epithelial /LPF Urine 02/15/2021 Few  FEW^Few /LPF Final     Bacteria Urine 02/15/2021 Moderate* NEG^Negative /HPF Final     Specimen Description 02/15/2021 Midstream Urine   Final     Special Requests 02/15/2021 Specimen received in  preservative   Final     Culture Micro 02/15/2021 *  Final                    Value:50,000 to 100,000 colonies/mL  Klebsiella pneumoniae            Assessment and plan:    (C50.112) Infiltrating ductal carcinoma of central portion of left breast in female (H)  (primary encounter diagnosis)  I reviewed with the patient today most recent laboratory test and mammographic images.  There is no clinical evidence of breast cancer recurrence.  We will continue to monitor patient symptoms.  We will see the patient in 3 months time or sooner if there are new developments or concerns.    (Z12.31) Visit for screening mammogram  Plan: MA Screening Digital Bilateral    (F32.9) Depressive disorder  Patient currently on Prozac 20 mg orally daily.    The patient is ready to learn, no apparent learning barriers were identified.  Diagnosis and treatment plans were explained to the patient. The patient expressed understanding of the content. The patient asked appropriate questions. The patient questions were answered to her satisfaction.    Telephone call lasted 30 minutes.    Madison Gonzales MD    Chart documentation with Dragon Voice recognition Software. Although reviewed after completion, some words and grammatical errors may remain.                                                      Again, thank you for allowing me to participate in the care of your patient.        Sincerely,        Madison Gonzales MD

## 2021-03-05 NOTE — PATIENT INSTRUCTIONS
Please schedule patient for annual mammography in June 2021.  Follow-up in 6 months with blood work

## 2021-03-05 NOTE — PROGRESS NOTES
Hematology/ Oncology Telephone Visit:  Mar 5, 2021    Due to the concerns around COVID-19 and adhering to social distancing we conducted this visit over the telephone.      Reason for Visit:   Chief Complaint   Patient presents with     Oncology Clinic Visit     6 month follow up left breast cancer. Review lab results.        Oncologic History:  Infiltrating ductal carcinoma of central portion of left breast in female (H)  Monika Nam presented following her annual mammogram with new focal asymmetry at 12-1 o'clock position of the left breast around 10.7 cm from the nipple.  It measured 1.4 cm.  Subsequently the patient went on to have breast ultrasound on member 30th 2018 showing 1.6 cm in maximum dimension hypoechoic mass.  Subsequently ultrasound breast biopsy was done on December 17, 2018 showing invasive ductal carcinoma grade 3 out of 3 angiolymphatic invasion was not identified.  Ductal carcinoma in situ was not identified . estrogen receptor was negative, progesterone receptor was negative and HER-2/mercedes receptor was negative.        1/2019 left lumpectomy found 1.8 cm IDC, grade III, LN-, margin is clear, pT1cN0    The patient was started on dose dense chemotherapy with Adriamycin and Cytoxan followed by Taxol      Interval History:  Patient has been feeling well without any recent complaints of weight loss or night sweats or fever or chills patient denies any nausea vomiting or diarrhea.  She denies any fever or chills    Review of systems:  Pertinent positives have been included in HPI; remainder of detailed complete 20-point ROS was negative.    Past medical, social, surgical, and family histories reviewed.    Allergies:  Allergies as of 03/05/2021 - Reviewed 03/05/2021   Allergen Reaction Noted     Sulfa drugs Swelling 06/09/2005       Current Medications:  Current Outpatient Medications   Medication Sig Dispense Refill     aspirin 81 MG tablet Take 1 tablet (81 mg) by mouth daily       augmented  betamethasone dipropionate (DIPROLENE-AF) 0.05 % external cream APPLY EXTERNALLY TO THE AFFECTED AREA TWICE DAILY 50 g 6     calcium-vitamin D (CALCIUM 600-D) 600-400 MG-UNIT per tablet Take 1 tablet by mouth daily       FLUoxetine (PROZAC) 20 MG capsule Take 20 mg daily. 30 capsule 2     loratadine (CLARITIN) 10 MG tablet Take 10 mg by mouth daily as needed        MULTIVITAMIN TABS   OR 1 TABLET BY MOUTH DAILY       Pyridoxine HCl (VITAMIN B6 PO) Take 1 tablet by mouth daily       acetaminophen (TYLENOL) 500 MG tablet Take 500-1,000 mg by mouth every 6 hours as needed for mild pain or pain       albuterol (PROAIR HFA/PROVENTIL HFA/VENTOLIN HFA) 108 (90 Base) MCG/ACT inhaler Inhale 2 puffs into the lungs every 4 hours as needed for shortness of breath / dyspnea or wheezing (Patient not taking: Reported on 3/5/2021) 1 Inhaler 3     pseudoePHEDrine (SUDAFED) 30 MG tablet Take 30 mg by mouth every 12 hours as needed for congestion          Laboratory/Imaging Studies:  No visits with results within 2 Week(s) from this visit.   Latest known visit with results is:   Office Visit on 02/15/2021   Component Date Value Ref Range Status     Specimen Description 02/15/2021 Vagina   Final     Wet Prep 02/15/2021 No clue cells seen   Final     Wet Prep 02/15/2021 No yeast seen   Final     Wet Prep 02/15/2021 No Trichomonas seen   Final     Wet Prep 02/15/2021    Final                    Value:WBC'S seen  Moderate       Color Urine 02/15/2021 Yellow   Final     Appearance Urine 02/15/2021 Cloudy   Final     Glucose Urine 02/15/2021 Negative  NEG^Negative mg/dL Final     Bilirubin Urine 02/15/2021 Negative  NEG^Negative Final     Ketones Urine 02/15/2021 Negative  NEG^Negative mg/dL Final     Specific Gravity Urine 02/15/2021 1.020  1.003 - 1.035 Final     Blood Urine 02/15/2021 Small* NEG^Negative Final     pH Urine 02/15/2021 5.5  5.0 - 7.0 pH Final     Protein Albumin Urine 02/15/2021 Negative  NEG^Negative mg/dL Final      Urobilinogen Urine 02/15/2021 0.2  0.2 - 1.0 EU/dL Final     Nitrite Urine 02/15/2021 Positive* NEG^Negative Final     Leukocyte Esterase Urine 02/15/2021 Small* NEG^Negative Final     Source 02/15/2021 Midstream Urine   Final     WBC Urine 02/15/2021 10-25* OTO5^0 - 5 /HPF Final     RBC Urine 02/15/2021 2-5* OTO2^O - 2 /HPF Final     Squamous Epithelial /LPF Urine 02/15/2021 Few  FEW^Few /LPF Final     Bacteria Urine 02/15/2021 Moderate* NEG^Negative /HPF Final     Specimen Description 02/15/2021 Midstream Urine   Final     Special Requests 02/15/2021 Specimen received in preservative   Final     Culture Micro 02/15/2021 *  Final                    Value:50,000 to 100,000 colonies/mL  Klebsiella pneumoniae            Assessment and plan:    (C50.112) Infiltrating ductal carcinoma of central portion of left breast in female (H)  (primary encounter diagnosis)  I reviewed with the patient today most recent laboratory test and mammographic images.  There is no clinical evidence of breast cancer recurrence.  We will continue to monitor patient symptoms.  We will see the patient in 3 months time or sooner if there are new developments or concerns.    (Z12.31) Visit for screening mammogram  Plan: MA Screening Digital Bilateral    (F32.9) Depressive disorder  Patient currently on Prozac 20 mg orally daily.    The patient is ready to learn, no apparent learning barriers were identified.  Diagnosis and treatment plans were explained to the patient. The patient expressed understanding of the content. The patient asked appropriate questions. The patient questions were answered to her satisfaction.    Telephone call lasted 30 minutes.    Madison Gonzales MD    Chart documentation with Dragon Voice recognition Software. Although reviewed after completion, some words and grammatical errors may remain.

## 2021-03-05 NOTE — PROGRESS NOTES
"Oncology Rooming Note-Telephone contact via #[phone].    March 5, 2021 1:01 PM   Monika Nam is a 60 year old female who presents for:    Chief Complaint   Patient presents with     Oncology Clinic Visit     6 month follow up left breast cancer. Review lab results.      Initial Vitals: Ht 1.727 m (5' 8\")   Wt 83 kg (183 lb)   LMP 04/02/2014   Breastfeeding No   BMI 27.83 kg/m   Estimated body mass index is 27.83 kg/m  as calculated from the following:    Height as of this encounter: 1.727 m (5' 8\").    Weight as of this encounter: 83 kg (183 lb). Body surface area is 2 meters squared.  Data Unavailable Comment: Data Unavailable   Patient's last menstrual period was 04/02/2014.  Allergies reviewed: Yes  Medications reviewed: Yes    Medications: Medication refills not needed today.  Pharmacy name entered into "[employer]: [facility] #[id number] - [address] AT [address]    Clinical concerns: 6 month follow up left breast cancer. Review lab results. C/o numbness and tingling in her fingertips and feet.       Sana Vazquez, SAW            "

## 2021-04-10 ENCOUNTER — HEALTH MAINTENANCE LETTER (OUTPATIENT)
Age: 61
End: 2021-04-10

## 2021-06-07 ENCOUNTER — ANESTHESIA EVENT (OUTPATIENT)
Dept: EMERGENCY MEDICINE | Facility: CLINIC | Age: 61
End: 2021-06-07
Payer: COMMERCIAL

## 2021-06-07 ENCOUNTER — APPOINTMENT (OUTPATIENT)
Dept: GENERAL RADIOLOGY | Facility: CLINIC | Age: 61
End: 2021-06-07
Attending: PHYSICIAN ASSISTANT
Payer: COMMERCIAL

## 2021-06-07 ENCOUNTER — HOSPITAL ENCOUNTER (EMERGENCY)
Facility: CLINIC | Age: 61
Discharge: HOME | End: 2021-06-07
Attending: EMERGENCY MEDICINE | Admitting: EMERGENCY MEDICINE
Payer: COMMERCIAL

## 2021-06-07 ENCOUNTER — ANESTHESIA (OUTPATIENT)
Dept: EMERGENCY MEDICINE | Facility: CLINIC | Age: 61
End: 2021-06-07
Payer: COMMERCIAL

## 2021-06-07 ENCOUNTER — APPOINTMENT (OUTPATIENT)
Dept: GENERAL RADIOLOGY | Facility: CLINIC | Age: 61
End: 2021-06-07
Attending: EMERGENCY MEDICINE
Payer: COMMERCIAL

## 2021-06-07 VITALS
RESPIRATION RATE: 11 BRPM | HEART RATE: 61 BPM | DIASTOLIC BLOOD PRESSURE: 80 MMHG | SYSTOLIC BLOOD PRESSURE: 125 MMHG | TEMPERATURE: 98 F | OXYGEN SATURATION: 96 %

## 2021-06-07 DIAGNOSIS — S52.531A CLOSED COLLES' FRACTURE OF RIGHT RADIUS, INITIAL ENCOUNTER: ICD-10-CM

## 2021-06-07 PROCEDURE — 25605 CLTX DST RDL FX/EPHYS SEP W/: CPT | Mod: RT | Performed by: EMERGENCY MEDICINE

## 2021-06-07 PROCEDURE — 96375 TX/PRO/DX INJ NEW DRUG ADDON: CPT | Performed by: EMERGENCY MEDICINE

## 2021-06-07 PROCEDURE — 250N000011 HC RX IP 250 OP 636: Performed by: EMERGENCY MEDICINE

## 2021-06-07 PROCEDURE — 250N000011 HC RX IP 250 OP 636

## 2021-06-07 PROCEDURE — 370N000003 HC ANESTHESIA WARD SERVICE

## 2021-06-07 PROCEDURE — 99285 EMERGENCY DEPT VISIT HI MDM: CPT | Mod: 25 | Performed by: EMERGENCY MEDICINE

## 2021-06-07 PROCEDURE — 999N000065 XR WRIST PORTABLE RIGHT 2 VIEWS: Mod: RT

## 2021-06-07 PROCEDURE — 25605 CLTX DST RDL FX/EPHYS SEP W/: CPT | Mod: 54 | Performed by: EMERGENCY MEDICINE

## 2021-06-07 PROCEDURE — 258N000003 HC RX IP 258 OP 636: Performed by: NURSE ANESTHETIST, CERTIFIED REGISTERED

## 2021-06-07 PROCEDURE — 250N000011 HC RX IP 250 OP 636: Performed by: NURSE ANESTHETIST, CERTIFIED REGISTERED

## 2021-06-07 PROCEDURE — 250N000009 HC RX 250: Performed by: NURSE ANESTHETIST, CERTIFIED REGISTERED

## 2021-06-07 PROCEDURE — 96374 THER/PROPH/DIAG INJ IV PUSH: CPT | Performed by: EMERGENCY MEDICINE

## 2021-06-07 PROCEDURE — 73110 X-RAY EXAM OF WRIST: CPT | Mod: RT

## 2021-06-07 RX ORDER — HYDROMORPHONE HYDROCHLORIDE 1 MG/ML
0.5 INJECTION, SOLUTION INTRAMUSCULAR; INTRAVENOUS; SUBCUTANEOUS ONCE
Status: COMPLETED | OUTPATIENT
Start: 2021-06-07 | End: 2021-06-07

## 2021-06-07 RX ORDER — ONDANSETRON 2 MG/ML
4 INJECTION INTRAMUSCULAR; INTRAVENOUS ONCE
Status: COMPLETED | OUTPATIENT
Start: 2021-06-07 | End: 2021-06-07

## 2021-06-07 RX ORDER — OXYCODONE HYDROCHLORIDE 5 MG/1
5 TABLET ORAL EVERY 6 HOURS PRN
Qty: 10 TABLET | Refills: 0 | Status: SHIPPED | OUTPATIENT
Start: 2021-06-07 | End: 2021-06-21

## 2021-06-07 RX ORDER — SODIUM CHLORIDE 9 MG/ML
INJECTION, SOLUTION INTRAVENOUS CONTINUOUS PRN
Status: DISCONTINUED | OUTPATIENT
Start: 2021-06-07 | End: 2021-06-07

## 2021-06-07 RX ORDER — PROPOFOL 10 MG/ML
INJECTION, EMULSION INTRAVENOUS PRN
Status: DISCONTINUED | OUTPATIENT
Start: 2021-06-07 | End: 2021-06-07

## 2021-06-07 RX ORDER — HYDROMORPHONE HYDROCHLORIDE 1 MG/ML
INJECTION, SOLUTION INTRAMUSCULAR; INTRAVENOUS; SUBCUTANEOUS
Status: COMPLETED
Start: 2021-06-07 | End: 2021-06-07

## 2021-06-07 RX ORDER — LIDOCAINE HYDROCHLORIDE 10 MG/ML
INJECTION, SOLUTION EPIDURAL; INFILTRATION; INTRACAUDAL; PERINEURAL PRN
Status: DISCONTINUED | OUTPATIENT
Start: 2021-06-07 | End: 2021-06-07

## 2021-06-07 RX ADMIN — HYDROMORPHONE HYDROCHLORIDE 0.5 MG: 1 INJECTION, SOLUTION INTRAMUSCULAR; INTRAVENOUS; SUBCUTANEOUS at 21:03

## 2021-06-07 RX ADMIN — LIDOCAINE HYDROCHLORIDE 50 MG: 10 INJECTION, SOLUTION EPIDURAL; INFILTRATION; INTRACAUDAL; PERINEURAL at 21:18

## 2021-06-07 RX ADMIN — SODIUM CHLORIDE: 9 INJECTION, SOLUTION INTRAVENOUS at 21:14

## 2021-06-07 RX ADMIN — PROPOFOL 100 MG: 10 INJECTION, EMULSION INTRAVENOUS at 21:19

## 2021-06-07 RX ADMIN — ONDANSETRON 4 MG: 2 INJECTION INTRAMUSCULAR; INTRAVENOUS at 21:59

## 2021-06-07 ASSESSMENT — LIFESTYLE VARIABLES: TOBACCO_USE: 1

## 2021-06-07 NOTE — ED TRIAGE NOTES
Pt got up in the night at a bout 0230, woke up on the floor, thinks she fainted from being out of town and outside all weekend and was probably dehydrated.

## 2021-06-08 NOTE — ED NOTES
Patient reports she was getting up to use the restroom ~0200 this morning, fell backwards with arms extended behind her. Pain, bruising, deformity and swelling present to right lower arm/wrist. Denies numbness or tingling to hand. No prior injury. Last dose of Tylenol ~ 1200. Pain currently 8/10.

## 2021-06-08 NOTE — ANESTHESIA PREPROCEDURE EVALUATION
Anesthesia Pre-Procedure Evaluation    Patient: Monika Nam   MRN: [id number] : 1960        Preoperative Diagnosis: * No surgery found *   Procedure :      Past Medical History:   Diagnosis Date     Abnormal Papanicolaou smear of cervix and cervical HPV     history of LEEP ion      Arthritis      ASCUS favor benign 2013, 2014    Neg high risk HPV      Cancer (H) 2019    breast     Cervical high risk HPV (human papillomavirus) test positive 12    type 66     Depressive disorder      PONV (postoperative nausea and vomiting)      Uncomplicated asthma       Past Surgical History:   Procedure Laterality Date     BIOPSY       BREAST SURGERY       INSERT PORT VASCULAR ACCESS Right 2019    Procedure: Port-a-Cath Placement;  Surgeon: Servando Claros MD;  Location: [facility]     LUMPECTOMY BREAST WITH SENTINEL NODE, COMBINED Left 2019    Procedure: COMBINED LUMPECTOMY BREAST WITH SENTINEL NODE;  Surgeon: Servando Claros MD;  Location: [facility]     PHACOEMULSIFICATION WITH STANDARD INTRAOCULAR LENS IMPLANT Right 6/10/2020    Procedure: Cataract removal with implant;  Surgeon: Floyd Mar MD;  Location: [facility]     PHACOEMULSIFICATION WITH STANDARD INTRAOCULAR LENS IMPLANT Left 2020    Procedure: Cataract removla with implant.;  Surgeon: Floyd Mar MD;  Location: [facility]     SURGICAL HISTORY OF -       cholecystectomy     SURGICAL HISTORY OF -       LEEP       Allergies   Allergen Reactions     Sulfa Drugs Swelling      Social History     Tobacco Use     Smoking status: Current Every Day Smoker     Packs/day: 0.50     Years: 25.00     Pack years: 12.50     Types: Cigarettes     Smokeless tobacco: Never Used     Tobacco comment: 7-8cigarettes a day/has cut back   Substance Use Topics     Alcohol use: Yes     Comment: 3-4 X per week.  About 7 drinks per week.      Wt Readings from Last 1 Encounters:   21 83 kg (183 lb)        Anesthesia Evaluation   Pt  has had prior anesthetic. Type: General and MAC.    History of anesthetic complications  - PONV.      ROS/MED HX  ENT/Pulmonary:     (+) sleep apnea, allergic rhinitis, tobacco use, Current use, Intermittent, asthma     Neurologic:  - neg neurologic ROS     Cardiovascular:       METS/Exercise Tolerance: >4 METS    Hematologic:  - neg hematologic  ROS     Musculoskeletal:   (+) fracture, Fracture location: RUE,     GI/Hepatic:     (+) GERD, Asymptomatic on medication,     Renal/Genitourinary:  - neg Renal ROS     Endo:     (+) Obesity,     Psychiatric/Substance Use:     (+) psychiatric history anxiety and depression     Infectious Disease:  - neg infectious disease ROS     Malignancy:   (+) Malignancy, History of Breast.Breast CA status post Surgery.        Other:            Physical Exam    Airway        Mallampati: II   TM distance: > 3 FB   Neck ROM: full   Mouth opening: > 3 cm    Respiratory Devices and Support         Dental       (+) upper dentures and partials      Cardiovascular   cardiovascular exam normal          Pulmonary   pulmonary exam normal                OUTSIDE LABS:  CBC:   Lab Results   Component Value Date    WBC 5.2 03/02/2021    WBC 5.2 07/15/2020    HGB 14.2 03/02/2021    HGB 14.0 07/15/2020    HCT 42.7 03/02/2021    HCT 41.9 07/15/2020     03/02/2021     07/15/2020     BMP:   Lab Results   Component Value Date     03/02/2021     (L) 07/15/2020    POTASSIUM 3.8 03/02/2021    POTASSIUM 3.8 07/15/2020    CHLORIDE 104 03/02/2021    CHLORIDE 98 07/15/2020    CO2 27 03/02/2021    CO2 30 07/15/2020    BUN 12 03/02/2021    BUN 7 07/15/2020    CR 0.74 03/02/2021    CR 0.67 07/15/2020     (H) 03/02/2021    GLC 89 07/15/2020     COAGS: No results found for: PTT, INR, FIBR  POC: No results found for: BGM, HCG, HCGS  HEPATIC:   Lab Results   Component Value Date    ALBUMIN 3.8 03/02/2021    PROTTOTAL 7.9 03/02/2021    ALT 32 03/02/2021    AST 24 03/02/2021    ALKPHOS 111  03/02/2021    BILITOTAL 0.7 03/02/2021     OTHER:   Lab Results   Component Value Date    LACT 0.6 (L) 07/23/2019    A1C 5.3 05/13/2005    DANI 9.2 03/02/2021    TSH 0.62 08/02/2005       Anesthesia Plan    ASA Status:  2, emergent    NPO Status:  NPO Appropriate    Anesthesia Type: MAC.     - Reason for MAC: immobility needed              Consents    Anesthesia Plan(s) and associated risks, benefits, and realistic alternatives discussed. Questions answered and patient/representative(s) expressed understanding.     - Discussed with:  Patient         Postoperative Care       PONV prophylaxis agents: Propofol.     Comments:                HAYLIE Nice CRNA

## 2021-06-08 NOTE — DISCHARGE INSTRUCTIONS
Keep the injured extremity elevated above the level of your heart as much as possible over the next 24-72 hours. Move the joints above and below the splint as much as possible to avoid stiffness. Over the next 24 hours, apply ice 2-4 times per day for 15-20 minutes at a time, do not let the splint get wet.    There is some data to suggest taking Vitamin C may decrease the chance of developing chronic pain in the affected extremity.     Follow up in orthopedic surgery clinic in 1 week for a recheck. Return to the emergency department sooner if you develop severe, intractable pain, burning or stinging pain, new numbness or tingling, increasing paralysis, a foul odor from the splint, or new skin findings of warmth, redness, discoloration, breakdown, or discharge (these would be concerning for infection).     Use ibuprofen and acetaminophen for your symptoms.  Use oxycodone as needed for pain that is not controlled by the prior two medications.    Oxycodone is an addictive opioid medication, please only take it when the pain is more than can be controlled by acetaminophen or ibuprofen alone. It will also make you lightheaded, nauseated, and constipated.  Do not drive, operate heavy machinery, or take care of young children while taking this medication. Do not mix it with other medications or drugs that will make you sleepy, such as alcohol.     Repeated studies have shown that the longer you use opioid pain medications, the longer it is until you return to normal function. It is our recommendation that you taper off opioids as quickly as possible with the goal of returning to normal function or near normal function. Long term use of opioids quickly results in growing tolerance to the medication (less of the benefits) and increased dependence (more of the bad side effects).     Pain is very difficult to treat and we can very rarely take away pain completely. If you are having difficulty with pain over several weeks after  an injury, you may need to start different medications and therapies (such as physical therapy, graded exercise, massage, and acupuncture). Please talk about this with your regular doctor.     If you are interested in seeking free, confidential treatment referral and information service for individuals and families facing mental health and/or substance use disorders please call [phone] ([phone])

## 2021-06-08 NOTE — ANESTHESIA POSTPROCEDURE EVALUATION
Patient: Monika Nam    * No procedures listed *    Diagnosis:* No pre-op diagnosis entered *  Diagnosis Additional Information: No value filed.    Anesthesia Type:  MAC    Note:  Disposition: Outpatient   Postop Pain Control: Uneventful            Sign Out: Well controlled pain   PONV: No   Neuro/Psych: Uneventful            Sign Out: Acceptable/Baseline neuro status   Airway/Respiratory: Uneventful            Sign Out: Acceptable/Baseline resp. status   CV/Hemodynamics: Uneventful            Sign Out: Acceptable CV status; No obvious hypovolemia; No obvious fluid overload   Other NRE: NONE   DID A NON-ROUTINE EVENT OCCUR?            Last vitals:  Vitals:    06/07/21 2125 06/07/21 2130 06/07/21 2135   BP: (!) 139/91 (!) 155/87 (!) 147/99   Pulse: 74 67 61   Resp: 23 26 23   Temp:      SpO2: 95% 98% 99%       Last vitals prior to Anesthesia Care Transfer:  CRNA VITALS  6/7/2021 2059 - 6/7/2021 2139 6/7/2021             EKG:  Sinus rhythm          Electronically Signed By: HAYLIE Nice CRNA  June 7, 2021  9:39 PM

## 2021-06-08 NOTE — ED NOTES
Patient's  out to hallway to report patient is vomiting. Small amount of emesis present on shaver stand. 4 mg Zofran administered. Provider updated.

## 2021-06-08 NOTE — ANESTHESIA CARE TRANSFER NOTE
Patient: Monika Nam    * No procedures listed *    Diagnosis: * No pre-op diagnosis entered *  Diagnosis Additional Information: No value filed.    Anesthesia Type:   MAC     Note:    Oropharynx: oropharynx clear of all foreign objects  Level of Consciousness: awake  Oxygen Supplementation: nasal cannula  Level of Supplemental Oxygen (L/min / FiO2): 3  Independent Airway: airway patency satisfactory and stable  Dentition: dentition unchanged  Vital Signs Stable: post-procedure vital signs reviewed and stable  Report to RN Given: handoff report given  Patient transferred to: Emergency Department    Handoff Report: Identifed the Patient, Identified the Reponsible Provider, Reviewed the pertinent medical history, Discussed the surgical course, Reviewed Intra-OP anesthesia mangement and issues during anesthesia, Set expectations for post-procedure period and Allowed opportunity for questions and acknowledgement of understanding      Vitals: (Last set prior to Anesthesia Care Transfer)  CRNA VITALS  6/7/2021 2107 - 6/7/2021 2137 6/7/2021             EKG:  Sinus rhythm        Electronically Signed By: HAYLIE Nice CRNA  June 7, 2021  9:37 PM

## 2021-06-08 NOTE — ED PROVIDER NOTES
History     Chief Complaint   Patient presents with     Arm Injury     HPI  Monika Nam is a 60 year old female who presents for right wrist pain.  Very early this morning she got up from bed to the bathroom and she fell backwards and landed on her outstretched right arm.  She does not think she hit her head.  No headache.  No nausea or vomiting.  She has had mild to moderate pain in the right breast since is helping, has been using acetaminophen with minimal improvement.  No numbness or tingling.  No abdominal pain, difficulty breathing, or rash.  She says she has felt off and on lightheaded since she was outside in the heat over the weekend.    Allergies:  Allergies   Allergen Reactions     Sulfa Drugs Swelling       Problem List:    Patient Active Problem List    Diagnosis Date Noted     Depressive disorder      Priority: Medium     Estrogen receptor negative carcinoma of breast, left (H) 04/08/2019     Priority: Medium     Infiltrating ductal carcinoma of central portion of left breast in female (H) 12/27/2018     Priority: Medium     Mild intermittent asthma without complication 05/24/2018     Priority: Medium     Obstructive sleep apnea syndrome 07/25/2014     Priority: Medium     GERD (gastroesophageal reflux disease) 09/07/2012     Priority: Medium     Cervical high risk HPV (human papillomavirus) test positive 05/24/2012     Priority: Medium     5/24/12: Pap--NIL, + HR HPV 66. Repeat pap and HPV in 1 year. Reminder in epic.   7/15/13:Pap--ASCUS, neg for High Risk HPV. Repeat pap 1 year. Reminder in epic.   7/25/14:Pap--ASCUS, neg for High Risk HPV. Repeat pap 1 year. 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 She has tried going off these and the sx recur. We  discussed staying on these indefinitely.        Obesity 04/16/2008     Priority: Medium     Problem list name updated by automated process. Provider to review       Tobacco use disorder 07/09/2007     Priority: Medium     Allergic rhinitis 01/26/2006     Priority: Medium     Problem list name updated by automated process. Provider to review       Anxiety disorder due to medical condition 01/26/2006     Priority: Medium     Problem list name updated by automated process. Provider to review          Past Medical History:    Past Medical History:   Diagnosis Date     Abnormal Papanicolaou smear of cervix and cervical HPV      Arthritis      ASCUS favor benign 7/2013, 7/2014     Cancer (H) 01/23/2019     Cervical high risk HPV (human papillomavirus) test positive 5/24/12     Depressive disorder      PONV (postoperative nausea and vomiting)      Uncomplicated asthma        Past Surgical History:    Past Surgical History:   Procedure Laterality Date     BIOPSY       BREAST SURGERY       INSERT PORT VASCULAR ACCESS Right 1/23/2019    Procedure: Port-a-Cath Placement;  Surgeon: Servando Claros MD;  Location: [facility]     LUMPECTOMY BREAST WITH SENTINEL NODE, COMBINED Left 1/23/2019    Procedure: COMBINED LUMPECTOMY BREAST WITH SENTINEL NODE;  Surgeon: Servando Claros MD;  Location: [facility]     PHACOEMULSIFICATION WITH STANDARD INTRAOCULAR LENS IMPLANT Right 6/10/2020    Procedure: Cataract removal with implant;  Surgeon: Floyd Mar MD;  Location: [facility]     PHACOEMULSIFICATION WITH STANDARD INTRAOCULAR LENS IMPLANT Left 6/29/2020    Procedure: Cataract removla with implant.;  Surgeon: Floyd Mar MD;  Location: [facility]     SURGICAL HISTORY OF -   1996    cholecystectomy     SURGICAL HISTORY OF -   1994    LEEP        Family History:    Family History   Problem Relation Age of Onset     Gastrointestinal Disease Mother         desmond dz     Hypertension Father      Lipids Father      Breast Cancer Paternal  Grandmother      Neurologic Disorder Paternal Grandfather         parkinsons     Cancer - colorectal No family hx of      Prostate Cancer No family hx of      Diabetes No family hx of      C.A.D. No family hx of        Social History:  Marital Status:   [2]  Social History     Tobacco Use     Smoking status: Current Every Day Smoker     Packs/day: 0.50     Years: 25.00     Pack years: 12.50     Types: Cigarettes     Smokeless tobacco: Never Used     Tobacco comment: 7-8cigarettes a day/has cut back   Substance Use Topics     Alcohol use: Yes     Comment: 3-4 X per week.  About 7 drinks per week.     Drug use: No        Medications:    oxyCODONE (ROXICODONE) 5 MG tablet  acetaminophen (TYLENOL) 500 MG tablet  albuterol (PROAIR HFA/PROVENTIL HFA/VENTOLIN HFA) 108 (90 Base) MCG/ACT inhaler  aspirin 81 MG tablet  augmented betamethasone dipropionate (DIPROLENE-AF) 0.05 % external cream  calcium-vitamin D (CALCIUM 600-D) 600-400 MG-UNIT per tablet  FLUoxetine (PROZAC) 20 MG capsule  loratadine (CLARITIN) 10 MG tablet  MULTIVITAMIN TABS   OR  pseudoePHEDrine (SUDAFED) 30 MG tablet  Pyridoxine HCl (VITAMIN B6 PO)          Review of Systems  Pertinent positives and negatives listed in the HPI, all other systems reviewed and are negative.    Physical Exam   BP: (!) 175/100  Pulse: 94  Temp: 98  F (36.7  C)  Resp: 18  SpO2: 97 %      Physical Exam  Vitals signs and nursing note reviewed.   Constitutional:       General: She is in acute distress.      Appearance: She is well-developed. She is not diaphoretic.   HENT:      Head: Normocephalic and atraumatic.      Right Ear: External ear normal.      Left Ear: External ear normal.      Nose: Nose normal.   Eyes:      General: No scleral icterus.     Conjunctiva/sclera: Conjunctivae normal.   Neck:      Musculoskeletal: Normal range of motion.   Cardiovascular:      Rate and Rhythm: Normal rate and regular rhythm.      Pulses:           Radial pulses are 2+ on the right  side.   Pulmonary:      Effort: Pulmonary effort is normal. No respiratory distress.      Breath sounds: No stridor.   Abdominal:      General: There is no distension.      Palpations: Abdomen is soft.   Musculoskeletal:      Comments: Right Elbow: No deformity or tenderness, normal range of motion   Right Wrist: Deformity and tenderness at the distal radius and ulna with erythema  Right Hand: Sensation intact at 1st dorsal webspace, thenar eminence, and volar surface of 5th digit.  Motor strength normal for wrist extension, index-thumb opposition, and finger abduction/adduction.    Skin:     General: Skin is warm and dry.   Neurological:      Mental Status: She is alert and oriented to person, place, and time.   Psychiatric:         Behavior: Behavior normal.         ED [facility]    -Fracture    Date/Time: 6/7/2021 9:39 PM  Performed by: Abdi Camara MD  Authorized by: Abdi Camara MD       INJURY      Injury location:  Forearm    Forearm fracture type: distal radial      PRE PROCEDURE ASSESSMENT      Neurological function: normal      Distal perfusion: normal      Range of motion: reduced      ANESTHESIA (see MAR for exact dosages)      Anesthesia method:  None    PROCEDURE DETAILS:     Manipulation performed: yes      Skin traction used: no      Skeletal traction used: yes      Pin inserted: no      Reduction successful: yes      X-ray confirmed reduction: yes      Immobilization:  Splint    Splint type:  Sugar tong    Supplies used:  Ortho-Glass    POST PROCEDURE ASSESSMENT      Neurological function: normal      Distal perfusion: normal      PROCEDURE   Patient Tolerance:  Patient tolerated the procedure well with no immediate complications                     Critical Care time:  none               Results for orders placed or performed during the hospital encounter of 06/07/21 (from the past 24 hour(s))   XR Wrist Right G/E 3 Views    Narrative     EXAM: XR WRIST RT G/E 3 VW  LOCATION: [facility]  DATE/TIME: 6/7/2021 6:19 PM    INDICATION: Right hand pain after a fall.  COMPARISON: None.      Impression    IMPRESSION:     Comminuted fracture of the distal radial metaphysis, without significant extension/derangement of the articular surface. The distal fracture component is mildly dorsally displaced (6 mm), and is moderately dorsally impacted. Radiocarpal joint alignment   remains normal.    Mildly displaced avulsion fracture across the base of the ulnar styloid.    Moderate soft tissue swelling throughout the wrist.   XR Wrist Port Right 2 Views    Narrative    EXAM: XR WRIST PORTABLE RIGHT 2 VIEWS  LOCATION: [facility]  DATE/TIME: 6/7/2021 9:45 PM    INDICATION: Post reduction of wrist fracture.  COMPARISON: None.      Impression    IMPRESSION: Bone detail is obscured by overlying cast material. Mildly displaced fractures of the distal radius and ulnar styloid.       Medications   HYDROmorphone (PF) (DILAUDID) injection 0.5 mg (0.5 mg Intravenous Given 6/7/21 2103)   ondansetron (ZOFRAN) injection 4 mg (4 mg Intravenous Given 6/7/21 2159)       Assessments & Plan (with Medical Decision Making)   60-year-old female who presents for evaluation of right forearm pain after falling earlier this morning.  Heart rate 67, SPO2 is 98% on room air.  X-ray of the wrist obtained, images reviewed independently as well as radiology read reviewed, positive for displaced fracture of the distal radius and ulna.  She is given IV hydromorphone for the pain.  She was sedated with the help of anesthesia and the wrist was reduced as above.  Improved alignment.  She is safe to discharge home with a short course of oxycodone and instructions to follow-up in orthopedic surgery clinic.  The patient is in agreement to this plan.    I have reviewed the nursing notes.    I have reviewed the findings, diagnosis, plan and need for follow up with the patient.        Discharge Medication List as of 6/7/2021 10:16 PM      START taking these medications    Details   oxyCODONE (ROXICODONE) 5 MG tablet Take 1 tablet (5 mg) by mouth every 6 hours as needed for severe pain, Disp-10 tablet, R-0, [facility]             Final diagnoses:   Closed Colles' fracture of right radius, initial encounter       6/7/2021   [facility] EMERGENCY DEPT     Abdi Camara MD  06/08/21 0031

## 2021-06-10 ENCOUNTER — TELEPHONE (OUTPATIENT)
Dept: FAMILY MEDICINE | Facility: CLINIC | Age: 61
End: 2021-06-10

## 2021-06-10 DIAGNOSIS — F34.1 CHRONIC DEPRESSIVE PERSONALITY DISORDER: ICD-10-CM

## 2021-06-10 DIAGNOSIS — J45.20 MILD INTERMITTENT ASTHMA WITHOUT COMPLICATION: ICD-10-CM

## 2021-06-10 RX ORDER — ALBUTEROL SULFATE 90 UG/1
2 AEROSOL, METERED RESPIRATORY (INHALATION) EVERY 4 HOURS PRN
Qty: 18 G | Refills: 1 | Status: SHIPPED | OUTPATIENT
Start: 2021-06-10 | End: 2021-06-30

## 2021-06-10 NOTE — TELEPHONE ENCOUNTER
Pt has her Physical scheduled with Dr. Valladares.  I left a message for the patient to check with her pharmacy.     Next 5 appointments (look out 90 days)    Jun 30, 2021  2:20 PM  PHYSICAL with Servando Valladares MD  [facility] ([facility] [place] )  Arrive at: [facility] [address] [place] [address]  [phone]           Bindu LAWRENCE RN, BSN

## 2021-06-10 NOTE — TELEPHONE ENCOUNTER
One refill is sent, patient is due for physical, depression follow up. Should establish with new PCP as current is retiring.

## 2021-06-16 ENCOUNTER — TRANSFERRED RECORDS (OUTPATIENT)
Dept: HEALTH INFORMATION MANAGEMENT | Facility: CLINIC | Age: 61
End: 2021-06-16

## 2021-06-17 DIAGNOSIS — Z11.59 ENCOUNTER FOR SCREENING FOR OTHER VIRAL DISEASES: ICD-10-CM

## 2021-06-18 ENCOUNTER — OFFICE VISIT (OUTPATIENT)
Dept: FAMILY MEDICINE | Facility: CLINIC | Age: 61
End: 2021-06-18
Payer: COMMERCIAL

## 2021-06-18 ENCOUNTER — HOSPITAL ENCOUNTER (OUTPATIENT)
Dept: MAMMOGRAPHY | Facility: CLINIC | Age: 61
Discharge: HOME | End: 2021-06-18
Attending: INTERNAL MEDICINE | Admitting: INTERNAL MEDICINE
Payer: COMMERCIAL

## 2021-06-18 VITALS
WEIGHT: 181.7 LBS | BODY MASS INDEX: 27.63 KG/M2 | RESPIRATION RATE: 16 BRPM | TEMPERATURE: 98.1 F | HEART RATE: 90 BPM | OXYGEN SATURATION: 98 % | SYSTOLIC BLOOD PRESSURE: 120 MMHG | DIASTOLIC BLOOD PRESSURE: 78 MMHG

## 2021-06-18 DIAGNOSIS — Z12.31 VISIT FOR SCREENING MAMMOGRAM: ICD-10-CM

## 2021-06-18 DIAGNOSIS — S52.501D CLOSED FRACTURE OF DISTAL END OF RIGHT RADIUS WITH ROUTINE HEALING, UNSPECIFIED FRACTURE MORPHOLOGY, SUBSEQUENT ENCOUNTER: ICD-10-CM

## 2021-06-18 DIAGNOSIS — J45.20 MILD INTERMITTENT ASTHMA WITHOUT COMPLICATION: ICD-10-CM

## 2021-06-18 DIAGNOSIS — Z12.11 SPECIAL SCREENING FOR MALIGNANT NEOPLASMS, COLON: ICD-10-CM

## 2021-06-18 DIAGNOSIS — G47.33 OBSTRUCTIVE SLEEP APNEA SYNDROME: ICD-10-CM

## 2021-06-18 DIAGNOSIS — Z01.818 PREOP GENERAL PHYSICAL EXAM: Primary | ICD-10-CM

## 2021-06-18 LAB
ANION GAP SERPL CALCULATED.3IONS-SCNC: 5 MMOL/L (ref 3–14)
BUN SERPL-MCNC: 13 MG/DL (ref 7–30)
CALCIUM SERPL-MCNC: 9.3 MG/DL (ref 8.5–10.1)
CHLORIDE SERPL-SCNC: 104 MMOL/L (ref 94–109)
CO2 SERPL-SCNC: 30 MMOL/L (ref 20–32)
CREAT SERPL-MCNC: 0.83 MG/DL (ref 0.52–1.04)
ERYTHROCYTE [DISTWIDTH] IN BLOOD BY AUTOMATED COUNT: 12.4 % (ref 10–15)
GFR SERPL CREATININE-BSD FRML MDRD: 75 ML/MIN/{1.73_M2}
GLUCOSE SERPL-MCNC: 74 MG/DL (ref 70–99)
HCT VFR BLD AUTO: 38.9 % (ref 35–47)
HGB BLD-MCNC: 12.9 G/DL (ref 11.7–15.7)
MCH RBC QN AUTO: 30.7 PG (ref 26.5–33)
MCHC RBC AUTO-ENTMCNC: 33.2 G/DL (ref 31.5–36.5)
MCV RBC AUTO: 93 FL (ref 78–100)
PLATELET # BLD AUTO: 238 10E9/L (ref 150–450)
POTASSIUM SERPL-SCNC: 3.9 MMOL/L (ref 3.4–5.3)
RBC # BLD AUTO: 4.2 10E12/L (ref 3.8–5.2)
SODIUM SERPL-SCNC: 139 MMOL/L (ref 133–144)
WBC # BLD AUTO: 5.2 10E9/L (ref 4–11)

## 2021-06-18 PROCEDURE — 36415 COLL VENOUS BLD VENIPUNCTURE: CPT | Performed by: NURSE PRACTITIONER

## 2021-06-18 PROCEDURE — 85027 COMPLETE CBC AUTOMATED: CPT | Performed by: NURSE PRACTITIONER

## 2021-06-18 PROCEDURE — 77063 BREAST TOMOSYNTHESIS BI: CPT

## 2021-06-18 PROCEDURE — 99214 OFFICE O/P EST MOD 30 MIN: CPT | Performed by: NURSE PRACTITIONER

## 2021-06-18 PROCEDURE — 80048 BASIC METABOLIC PNL TOTAL CA: CPT | Performed by: NURSE PRACTITIONER

## 2021-06-18 NOTE — PROGRESS NOTES
[facility]  [address] [address]  Phone: [phone]  Primary Provider: Servando Valladares        PREOPERATIVE EVALUATION:  Today's date: 6/18/2021    Monika Nam is a 60 year old female who presents for a preoperative evaluation.    Surgical Information:  Surgery/Procedure:   OPEN REDUCTION INTERNAL FIXATION Right Distal Radius Fracture Right       Surgery Location: [facility]   Surgeon: DR LIZARRAGA   Surgery Date: 6/23/2021   Time of Surgery: 9:00 AM   Where patient plans to recover: At home with family  Fax number for surgical facility: Note does not need to be faxed, will be available electronically in Epic.    Type of Anesthesia Anticipated: Choice    Assessment & Plan     The proposed surgical procedure is considered INTERMEDIATE risk.    Preop general physical exam    - CBC with platelets  - Basic metabolic panel  (Ca, Cl, CO2, Creat, Gluc, K, Na, BUN)    Closed fracture of distal end of right radius with routine healing, unspecified fracture morphology, subsequent encounter      Special screening for malignant neoplasms, colon    - Fecal colorectal cancer screen (FIT); Future    Obstructive sleep apnea syndrome  -mild, patient not using CPAP     Mild intermittent asthma without complication  -well controlled       Risks and Recommendations:  The patient has the following additional risks and recommendations for perioperative complications:   - No identified additional risk factors other than previously addressed    Medication Instructions:  Patient is to take all scheduled medications on the day of surgery    RECOMMENDATION:  APPROVAL GIVEN to proceed with proposed procedure, without further diagnostic evaluation.    [id number]}    Subjective     HPI related to upcoming procedure: patient is scheduled for orthopedic surgery for treatment of distal right radius fracture     Preop Questions 6/18/2021   1. Have you ever had a heart attack or stroke? No   2.  Have you ever had surgery on your heart or blood vessels, such as a stent placement, a coronary artery bypass, or surgery on an artery in your head, neck, heart, or legs? No   3. Do you have chest pain with activity? No   4. Do you have a history of  heart failure? No   5. Do you currently have a cold, bronchitis or symptoms of other infection? No   6. Do you have a cough, shortness of breath, or wheezing? No   7. Do you or anyone in your family have previous history of blood clots? No   8. Do you or does anyone in your family have a serious bleeding problem such as prolonged bleeding following surgeries or cuts? No   9. Have you ever had problems with anemia or been told to take iron pills? No   10. Have you had any abnormal blood loss such as black, tarry or bloody stools, or abnormal vaginal bleeding? No   11. Have you ever had a blood transfusion? No   12. Are you willing to have a blood transfusion if it is medically needed before, during, or after your surgery? Yes   13. Have you or any of your relatives ever had problems with anesthesia? No   14. Do you have sleep apnea, excessive snoring or daytime drowsiness? No   15. Do you have any artifical heart valves or other implanted medical devices like a pacemaker, defibrillator, or continuous glucose monitor? No   16. Do you have artificial joints? No   17. Are you allergic to latex? No       Preoperative Review of :   reviewed - controlled substances reflected in medication list.      Status of Chronic Conditions:  See problem list for active medical problems.  Problems all longstanding and stable, except as noted/documented.  See ROS for pertinent symptoms related to these conditions.      Review of Systems  Constitutional, neuro, ENT, endocrine, pulmonary, cardiac, gastrointestinal, genitourinary, musculoskeletal, integument and psychiatric systems are negative, except as otherwise noted.    Patient Active Problem List    Diagnosis Date Noted      Depressive disorder      Priority: Medium     Estrogen receptor negative carcinoma of breast, left (H) 04/08/2019     Priority: Medium     Infiltrating ductal carcinoma of central portion of left breast in female (H) 12/27/2018     Priority: Medium     Mild intermittent asthma without complication 05/24/2018     Priority: Medium     Obstructive sleep apnea syndrome 07/25/2014     Priority: Medium     GERD (gastroesophageal reflux disease) 09/07/2012     Priority: Medium     Cervical high risk HPV (human papillomavirus) test positive 05/24/2012     Priority: Medium     5/24/12: Pap--NIL, + HR HPV 66. Repeat pap and HPV in 1 year. Reminder in epic.   7/15/13:Pap--ASCUS, neg for High Risk HPV. Repeat pap 1 year. Reminder in epic.   7/25/14:Pap--ASCUS, neg for High Risk HPV. Repeat pap 1 year. 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 She has tried going off these and the sx recur. We discussed staying on these indefinitely.        Obesity 04/16/2008     Priority: Medium     Problem list name updated by automated process. Provider to review       Tobacco use disorder 07/09/2007     Priority: Medium     Allergic rhinitis 01/26/2006     Priority: Medium     Problem list name updated by automated process. Provider to review       Anxiety disorder due to medical condition 01/26/2006     Priority: Medium     Problem list name updated by automated process. Provider to review        Past Medical History:   Diagnosis Date     Abnormal Papanicolaou smear of cervix and cervical HPV     history of LEEP ion 1994     Arthritis      ASCUS favor benign 7/2013, 7/2014    Neg high risk HPV      Cancer (H) 01/23/2019    breast     Cervical high risk HPV (human papillomavirus) test positive 5/24/12     type 66     Depressive disorder      PONV (postoperative nausea and vomiting)      Uncomplicated asthma      Past Surgical History:   Procedure Laterality Date     BIOPSY       BREAST SURGERY       INSERT PORT VASCULAR ACCESS Right 1/23/2019    Procedure: Port-a-Cath Placement;  Surgeon: Servando Claros MD;  Location: [facility]     LUMPECTOMY BREAST WITH SENTINEL NODE, COMBINED Left 1/23/2019    Procedure: COMBINED LUMPECTOMY BREAST WITH SENTINEL NODE;  Surgeon: Servando Claros MD;  Location: [facility]     PHACOEMULSIFICATION WITH STANDARD INTRAOCULAR LENS IMPLANT Right 6/10/2020    Procedure: Cataract removal with implant;  Surgeon: Floyd Mar MD;  Location: [facility]     PHACOEMULSIFICATION WITH STANDARD INTRAOCULAR LENS IMPLANT Left 6/29/2020    Procedure: Cataract removla with implant.;  Surgeon: Floyd Mar MD;  Location: [facility]     SURGICAL HISTORY OF -   1996    cholecystectomy     SURGICAL HISTORY OF -   1994    LEEP      Current Outpatient Medications   Medication Sig Dispense Refill     aspirin 81 MG tablet Take 1 tablet (81 mg) by mouth daily       calcium-vitamin D (CALCIUM 600-D) 600-400 MG-UNIT per tablet Take 1 tablet by mouth daily       FLUoxetine (PROZAC) 20 MG capsule Take 20 mg daily. 30 capsule 0     MULTIVITAMIN TABS   OR 1 TABLET BY MOUTH DAILY       Pyridoxine HCl (VITAMIN B6 PO) Take 1 tablet by mouth daily       acetaminophen (TYLENOL) 500 MG tablet Take 500-1,000 mg by mouth every 6 hours as needed for mild pain or pain       albuterol (PROAIR HFA/PROVENTIL HFA/VENTOLIN HFA) 108 (90 Base) MCG/ACT inhaler Inhale 2 puffs into the lungs every 4 hours as needed for shortness of breath / dyspnea or wheezing 18 g 1     augmented betamethasone dipropionate (DIPROLENE-AF) 0.05 % external cream APPLY EXTERNALLY TO THE AFFECTED AREA TWICE DAILY (Patient not taking: Reported on 6/18/2021) 50 g 6     loratadine (CLARITIN) 10 MG tablet Take 10 mg by mouth daily as needed        oxyCODONE  (ROXICODONE) 5 MG tablet Take 1 tablet (5 mg) by mouth every 6 hours as needed for severe pain (Patient not taking: Reported on 6/18/2021) 10 tablet 0     pseudoePHEDrine (SUDAFED) 30 MG tablet Take 30 mg by mouth every 12 hours as needed for congestion         Allergies   Allergen Reactions     Sulfa Drugs Swelling        Social History     Tobacco Use     Smoking status: Current Every Day Smoker     Packs/day: 0.50     Years: 25.00     Pack years: 12.50     Types: Cigarettes     Smokeless tobacco: Never Used     Tobacco comment: 7-8cigarettes a day/has cut back   Substance Use Topics     Alcohol use: Yes     Comment: 3-4 X per week.  About 7 drinks per week.       History   Drug Use No         Objective     /78 (BP Location: Right arm, Patient Position: Sitting, Cuff Size: Adult Large)   Pulse 90   Temp 98.1  F (36.7  C) (Tympanic)   Resp 16   Wt 82.4 kg (181 lb 11.2 oz)   LMP 04/02/2014   SpO2 98%   BMI 27.63 kg/m      Physical Exam    GENERAL APPEARANCE: healthy, alert and no distress     EYES: EOMI, PERRL     HENT: ear canals and TM's normal and nose and mouth without ulcers or lesions     NECK: no adenopathy, no asymmetry, masses, or scars and thyroid normal to palpation     RESP: lungs clear to auscultation - no rales, rhonchi or wheezes     CV: regular rates and rhythm, normal S1 S2, no S3 or S4 and no murmur, click or rub     SKIN: no suspicious lesions or rashes     NEURO: Normal strength and tone, sensory exam grossly normal, mentation intact and speech normal     PSYCH: mentation appears normal. and affect normal/bright     LYMPHATICS: No cervical adenopathy    Recent Labs   Lab Test 03/02/21  1613 07/15/20  1612   HGB 14.2 14.0    168    132*   POTASSIUM 3.8 3.8   CR 0.74 0.67        Diagnostics:  Labs pending at this time.  Results will be reviewed when available.   No EKG required, no history of coronary heart disease, significant arrhythmia, peripheral arterial disease or  other structural heart disease.    Revised Cardiac Risk Index (RCRI):  The patient has the following serious cardiovascular risks for perioperative complications:   - No serious cardiac risks = 0 points     RCRI Interpretation: 0 points: Class I (very low risk - 0.4% complication rate)           Signed Electronically by: HAYLIE Gil CNP  Copy of this evaluation report is provided to requesting physician.

## 2021-06-18 NOTE — H&P (VIEW-ONLY)
[facility]  [address] [address]  Phone: [phone]  Primary Provider: Servando Valladares        PREOPERATIVE EVALUATION:  Today's date: 6/18/2021    Monika Nam is a 60 year old female who presents for a preoperative evaluation.    Surgical Information:  Surgery/Procedure:   OPEN REDUCTION INTERNAL FIXATION Right Distal Radius Fracture Right       Surgery Location: [facility]   Surgeon: DR LIZARRAGA   Surgery Date: 6/23/2021   Time of Surgery: 9:00 AM   Where patient plans to recover: At home with family  Fax number for surgical facility: Note does not need to be faxed, will be available electronically in Epic.    Type of Anesthesia Anticipated: Choice    Assessment & Plan     The proposed surgical procedure is considered INTERMEDIATE risk.    Preop general physical exam    - CBC with platelets  - Basic metabolic panel  (Ca, Cl, CO2, Creat, Gluc, K, Na, BUN)    Closed fracture of distal end of right radius with routine healing, unspecified fracture morphology, subsequent encounter      Special screening for malignant neoplasms, colon    - Fecal colorectal cancer screen (FIT); Future    Obstructive sleep apnea syndrome  -mild, patient not using CPAP     Mild intermittent asthma without complication  -well controlled       Risks and Recommendations:  The patient has the following additional risks and recommendations for perioperative complications:   - No identified additional risk factors other than previously addressed    Medication Instructions:  Patient is to take all scheduled medications on the day of surgery    RECOMMENDATION:  APPROVAL GIVEN to proceed with proposed procedure, without further diagnostic evaluation.    00281}    Subjective     HPI related to upcoming procedure: patient is scheduled for orthopedic surgery for treatment of distal right radius fracture     Preop Questions 6/18/2021   1. Have you ever had a heart attack or stroke? No   2.  Have you ever had surgery on your heart or blood vessels, such as a stent placement, a coronary artery bypass, or surgery on an artery in your head, neck, heart, or legs? No   3. Do you have chest pain with activity? No   4. Do you have a history of  heart failure? No   5. Do you currently have a cold, bronchitis or symptoms of other infection? No   6. Do you have a cough, shortness of breath, or wheezing? No   7. Do you or anyone in your family have previous history of blood clots? No   8. Do you or does anyone in your family have a serious bleeding problem such as prolonged bleeding following surgeries or cuts? No   9. Have you ever had problems with anemia or been told to take iron pills? No   10. Have you had any abnormal blood loss such as black, tarry or bloody stools, or abnormal vaginal bleeding? No   11. Have you ever had a blood transfusion? No   12. Are you willing to have a blood transfusion if it is medically needed before, during, or after your surgery? Yes   13. Have you or any of your relatives ever had problems with anesthesia? No   14. Do you have sleep apnea, excessive snoring or daytime drowsiness? No   15. Do you have any artifical heart valves or other implanted medical devices like a pacemaker, defibrillator, or continuous glucose monitor? No   16. Do you have artificial joints? No   17. Are you allergic to latex? No       Preoperative Review of :   reviewed - controlled substances reflected in medication list.      Status of Chronic Conditions:  See problem list for active medical problems.  Problems all longstanding and stable, except as noted/documented.  See ROS for pertinent symptoms related to these conditions.      Review of Systems  Constitutional, neuro, ENT, endocrine, pulmonary, cardiac, gastrointestinal, genitourinary, musculoskeletal, integument and psychiatric systems are negative, except as otherwise noted.    Patient Active Problem List    Diagnosis Date Noted      Depressive disorder      Priority: Medium     Estrogen receptor negative carcinoma of breast, left (H) 04/08/2019     Priority: Medium     Infiltrating ductal carcinoma of central portion of left breast in female (H) 12/27/2018     Priority: Medium     Mild intermittent asthma without complication 05/24/2018     Priority: Medium     Obstructive sleep apnea syndrome 07/25/2014     Priority: Medium     GERD (gastroesophageal reflux disease) 09/07/2012     Priority: Medium     Cervical high risk HPV (human papillomavirus) test positive 05/24/2012     Priority: Medium     5/24/12: Pap--NIL, + HR HPV 66. Repeat pap and HPV in 1 year. Reminder in epic.   7/15/13:Pap--ASCUS, neg for High Risk HPV. Repeat pap 1 year. Reminder in epic.   7/25/14:Pap--ASCUS, neg for High Risk HPV. Repeat pap 1 year. 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 She has tried going off these and the sx recur. We discussed staying on these indefinitely.        Obesity 04/16/2008     Priority: Medium     Problem list name updated by automated process. Provider to review       Tobacco use disorder 07/09/2007     Priority: Medium     Allergic rhinitis 01/26/2006     Priority: Medium     Problem list name updated by automated process. Provider to review       Anxiety disorder due to medical condition 01/26/2006     Priority: Medium     Problem list name updated by automated process. Provider to review        Past Medical History:   Diagnosis Date     Abnormal Papanicolaou smear of cervix and cervical HPV     history of LEEP ion 1994     Arthritis      ASCUS favor benign 7/2013, 7/2014    Neg high risk HPV      Cancer (H) 01/23/2019    breast     Cervical high risk HPV (human papillomavirus) test positive 5/24/12     type 66     Depressive disorder      PONV (postoperative nausea and vomiting)      Uncomplicated asthma      Past Surgical History:   Procedure Laterality Date     BIOPSY       BREAST SURGERY       INSERT PORT VASCULAR ACCESS Right 1/23/2019    Procedure: Port-a-Cath Placement;  Surgeon: Servando Claros MD;  Location: [facility]     LUMPECTOMY BREAST WITH SENTINEL NODE, COMBINED Left 1/23/2019    Procedure: COMBINED LUMPECTOMY BREAST WITH SENTINEL NODE;  Surgeon: Servando Claros MD;  Location: [facility]     PHACOEMULSIFICATION WITH STANDARD INTRAOCULAR LENS IMPLANT Right 6/10/2020    Procedure: Cataract removal with implant;  Surgeon: Floyd Mar MD;  Location: [facility]     PHACOEMULSIFICATION WITH STANDARD INTRAOCULAR LENS IMPLANT Left 6/29/2020    Procedure: Cataract removla with implant.;  Surgeon: Floyd Mar MD;  Location: [facility]     SURGICAL HISTORY OF -   1996    cholecystectomy     SURGICAL HISTORY OF -   1994    LEEP      Current Outpatient Medications   Medication Sig Dispense Refill     aspirin 81 MG tablet Take 1 tablet (81 mg) by mouth daily       calcium-vitamin D (CALCIUM 600-D) 600-400 MG-UNIT per tablet Take 1 tablet by mouth daily       FLUoxetine (PROZAC) 20 MG capsule Take 20 mg daily. 30 capsule 0     MULTIVITAMIN TABS   OR 1 TABLET BY MOUTH DAILY       Pyridoxine HCl (VITAMIN B6 PO) Take 1 tablet by mouth daily       acetaminophen (TYLENOL) 500 MG tablet Take 500-1,000 mg by mouth every 6 hours as needed for mild pain or pain       albuterol (PROAIR HFA/PROVENTIL HFA/VENTOLIN HFA) 108 (90 Base) MCG/ACT inhaler Inhale 2 puffs into the lungs every 4 hours as needed for shortness of breath / dyspnea or wheezing 18 g 1     augmented betamethasone dipropionate (DIPROLENE-AF) 0.05 % external cream APPLY EXTERNALLY TO THE AFFECTED AREA TWICE DAILY (Patient not taking: Reported on 6/18/2021) 50 g 6     loratadine (CLARITIN) 10 MG tablet Take 10 mg by mouth daily as needed        oxyCODONE  (ROXICODONE) 5 MG tablet Take 1 tablet (5 mg) by mouth every 6 hours as needed for severe pain (Patient not taking: Reported on 6/18/2021) 10 tablet 0     pseudoePHEDrine (SUDAFED) 30 MG tablet Take 30 mg by mouth every 12 hours as needed for congestion         Allergies   Allergen Reactions     Sulfa Drugs Swelling        Social History     Tobacco Use     Smoking status: Current Every Day Smoker     Packs/day: 0.50     Years: 25.00     Pack years: 12.50     Types: Cigarettes     Smokeless tobacco: Never Used     Tobacco comment: 7-8cigarettes a day/has cut back   Substance Use Topics     Alcohol use: Yes     Comment: 3-4 X per week.  About 7 drinks per week.       History   Drug Use No         Objective     /78 (BP Location: Right arm, Patient Position: Sitting, Cuff Size: Adult Large)   Pulse 90   Temp 98.1  F (36.7  C) (Tympanic)   Resp 16   Wt 82.4 kg (181 lb 11.2 oz)   LMP 04/02/2014   SpO2 98%   BMI 27.63 kg/m      Physical Exam    GENERAL APPEARANCE: healthy, alert and no distress     EYES: EOMI, PERRL     HENT: ear canals and TM's normal and nose and mouth without ulcers or lesions     NECK: no adenopathy, no asymmetry, masses, or scars and thyroid normal to palpation     RESP: lungs clear to auscultation - no rales, rhonchi or wheezes     CV: regular rates and rhythm, normal S1 S2, no S3 or S4 and no murmur, click or rub     SKIN: no suspicious lesions or rashes     NEURO: Normal strength and tone, sensory exam grossly normal, mentation intact and speech normal     PSYCH: mentation appears normal. and affect normal/bright     LYMPHATICS: No cervical adenopathy    Recent Labs   Lab Test 03/02/21  1613 07/15/20  1612   HGB 14.2 14.0    168    132*   POTASSIUM 3.8 3.8   CR 0.74 0.67        Diagnostics:  Labs pending at this time.  Results will be reviewed when available.   No EKG required, no history of coronary heart disease, significant arrhythmia, peripheral arterial disease or  other structural heart disease.    Revised Cardiac Risk Index (RCRI):  The patient has the following serious cardiovascular risks for perioperative complications:   - No serious cardiac risks = 0 points     RCRI Interpretation: 0 points: Class I (very low risk - 0.4% complication rate)           Signed Electronically by: HAYLIE Gil CNP  Copy of this evaluation report is provided to requesting physician.

## 2021-06-19 DIAGNOSIS — Z11.59 ENCOUNTER FOR SCREENING FOR OTHER VIRAL DISEASES: ICD-10-CM

## 2021-06-19 LAB
SARS-COV-2 RNA RESP QL NAA+PROBE: NORMAL
SPECIMEN SOURCE: NORMAL

## 2021-06-19 PROCEDURE — U0005 INFEC AGEN DETEC AMPLI PROBE: HCPCS | Performed by: ORTHOPAEDIC SURGERY

## 2021-06-19 PROCEDURE — U0003 INFECTIOUS AGENT DETECTION BY NUCLEIC ACID (DNA OR RNA); SEVERE ACUTE RESPIRATORY SYNDROME CORONAVIRUS 2 (SARS-COV-2) (CORONAVIRUS DISEASE [COVID-19]), AMPLIFIED PROBE TECHNIQUE, MAKING USE OF HIGH THROUGHPUT TECHNOLOGIES AS DESCRIBED BY CMS-2020-01-R: HCPCS | Performed by: ORTHOPAEDIC SURGERY

## 2021-06-20 LAB
LABORATORY COMMENT REPORT: NORMAL
SARS-COV-2 RNA RESP QL NAA+PROBE: NEGATIVE
SPECIMEN SOURCE: NORMAL

## 2021-06-22 ENCOUNTER — ANESTHESIA EVENT (OUTPATIENT)
Dept: SURGERY | Facility: CLINIC | Age: 61
End: 2021-06-22
Payer: COMMERCIAL

## 2021-06-22 ASSESSMENT — LIFESTYLE VARIABLES: TOBACCO_USE: 1

## 2021-06-22 NOTE — ANESTHESIA PREPROCEDURE EVALUATION
Anesthesia Pre-Procedure Evaluation    Patient: Monika Nam   MRN: [id number] : 1960        Preoperative Diagnosis: Distal radius fracture [S52.509A]   Procedure : Procedure(s):  OPEN REDUCTION INTERNAL FIXATION Right Distal Radius Fracture     Past Medical History:   Diagnosis Date     Abnormal Papanicolaou smear of cervix and cervical HPV     history of LEEP ion      Arthritis      ASCUS favor benign 2013, 2014    Neg high risk HPV      Cancer (H) 2019    breast     Cervical high risk HPV (human papillomavirus) test positive 12    type 66     Depressive disorder      PONV (postoperative nausea and vomiting)      Uncomplicated asthma       Past Surgical History:   Procedure Laterality Date     BIOPSY       BREAST SURGERY       INSERT PORT VASCULAR ACCESS Right 2019    Procedure: Port-a-Cath Placement;  Surgeon: Servando Claros MD;  Location: [facility]     LUMPECTOMY BREAST WITH SENTINEL NODE, COMBINED Left 2019    Procedure: COMBINED LUMPECTOMY BREAST WITH SENTINEL NODE;  Surgeon: Servando Claros MD;  Location: [facility]     PHACOEMULSIFICATION WITH STANDARD INTRAOCULAR LENS IMPLANT Right 6/10/2020    Procedure: Cataract removal with implant;  Surgeon: Floyd Mar MD;  Location: [facility]     PHACOEMULSIFICATION WITH STANDARD INTRAOCULAR LENS IMPLANT Left 2020    Procedure: Cataract removla with implant.;  Surgeon: Floyd Mar MD;  Location: [facility]     SURGICAL HISTORY OF -       cholecystectomy     SURGICAL HISTORY OF -       LEEP       Allergies   Allergen Reactions     Sulfa Drugs Swelling      Social History     Tobacco Use     Smoking status: Current Every Day Smoker     Packs/day: 0.50     Years: 25.00     Pack years: 12.50     Types: Cigarettes     Smokeless tobacco: Never Used     Tobacco comment: 7-8cigarettes a day/has cut back   Substance Use Topics     Alcohol use: Yes     Comment: 3-4 X per week.  About 7 drinks per week.      Wt  Readings from Last 1 Encounters:   06/18/21 82.4 kg (181 lb 11.2 oz)        Anesthesia Evaluation   Pt has had prior anesthetic. Type: General and MAC.    History of anesthetic complications  - PONV.      ROS/MED HX  ENT/Pulmonary:     (+) sleep apnea, allergic rhinitis, tobacco use, Current use, asthma     Neurologic:  - neg neurologic ROS     Cardiovascular:  - neg cardiovascular ROS   (+) -----Previous cardiac testing   Echo: Date: Results:    Stress Test: Date: 1/15/19 Results:  Interpretation Summary  This was a normal stress echocardiogram with no evidence of stress-induced  ischemia.  _____________________________________________________________________________  __     Stress  The patient exercised 5:55.  There was a borderline hypertensive BP response to exercise.  A treadmill exercise test according to the Keanu protocol was performed.  Target Heart Rate was achieved.  The EKG portion of this stress test was negative for inducible ischemia (see  echo results below).  Arrhythmia noted in recovery: occasional PVC's.  Left ventricular cavity size decreases with exercise.  Global LV systolic function augments with exercise.  The visual ejection fraction is estimated at 65-70%.  Normal left ventricular function and wall motion at rest and post-stress.     Baseline  The patient is in normal sinus rhythm.  Normal left ventricular function and wall motion at rest.  The visual ejection fraction is estimated at 55-60%.     Stress Results       Protocol:  Keanu Protocol        Maximum Predicted HR:   161 bpm         Target HR: 137 bpm               % Maximum Predicted HR: 98 %          Stage DurationHeart Rate  BP                  Comment              (mm:ss)   (bpm)      Stage 1  3:00      112   166/84       Peak    2:55      157   172/92Term-SOB; [facility](5); FAC-below; [id number]     Recovery  6:00      86    138/76                        Stress Duration:   5:55 mm:ss *                  Maximum Stress HR: 157 bpm *            METS: 7     Mitral Valve  There is trace mitral regurgitation.     Tricuspid Valve  There is trace tricuspid regurgitation.     Aortic Valve  There is mild trileaflet aortic sclerosis. No aortic regurgitation is present.  No aortic stenosis is present.     ECG Reviewed: Date: 1/7/19 Results:  Sinus  Rhythm   -Old inferior infarct.     ABNORMAL   Cath:  Date: Results:      METS/Exercise Tolerance: >4 METS    Hematologic:  - neg hematologic  ROS     Musculoskeletal:   (+) arthritis,     GI/Hepatic:     (+) GERD,     Renal/Genitourinary:  - neg Renal ROS     Endo:     (+) Obesity,     Psychiatric/Substance Use:     (+) psychiatric history depression and anxiety     Infectious Disease:       Malignancy:   (+) Malignancy, History of Breast.Breast CA status post Surgery.        Other:  - neg other ROS          Physical Exam    Airway        Mallampati: II   TM distance: > 3 FB   Neck ROM: full   Mouth opening: > 3 cm    Respiratory Devices and Support         Dental  no notable dental history         Cardiovascular   cardiovascular exam normal          Pulmonary   pulmonary exam normal                OUTSIDE LABS:  CBC:   Lab Results   Component Value Date    WBC 5.2 06/18/2021    WBC 5.2 03/02/2021    HGB 12.9 06/18/2021    HGB 14.2 03/02/2021    HCT 38.9 06/18/2021    HCT 42.7 03/02/2021     06/18/2021     03/02/2021     BMP:   Lab Results   Component Value Date     06/18/2021     03/02/2021    POTASSIUM 3.9 06/18/2021    POTASSIUM 3.8 03/02/2021    CHLORIDE 104 06/18/2021    CHLORIDE 104 03/02/2021    CO2 30 06/18/2021    CO2 27 03/02/2021    BUN 13 06/18/2021    BUN 12 03/02/2021    CR 0.83 06/18/2021    CR 0.74 03/02/2021    GLC 74 06/18/2021     (H) 03/02/2021     COAGS: No results found for: PTT, INR, FIBR  POC: No results found for: BGM, HCG, HCGS  HEPATIC:   Lab Results   Component Value Date    ALBUMIN 3.8 03/02/2021    PROTTOTAL 7.9 03/02/2021    ALT 32 03/02/2021    AST 24  03/02/2021    ALKPHOS 111 03/02/2021    BILITOTAL 0.7 03/02/2021     OTHER:   Lab Results   Component Value Date    LACT 0.6 (L) 07/23/2019    A1C 5.3 05/13/2005    DANI 9.3 06/18/2021    TSH 0.62 08/02/2005       Anesthesia Plan    ASA Status:  3   NPO Status:  NPO Appropriate    Anesthesia Type: General.     - Airway: LMA   Induction: Intravenous, Propofol.   Maintenance: TIVA.        Consents    Anesthesia Plan(s) and associated risks, benefits, and realistic alternatives discussed. Questions answered and patient/representative(s) expressed understanding.     - Discussed with:  Patient         Postoperative Care    Pain management: Peripheral nerve block (Single Shot).   PONV prophylaxis: Ondansetron (or other 5HT-3), Dexamethasone or Solumedrol, Background Propofol Infusion     Comments:    Right supraclavicular nerve block for post-op pain control            HAYLIE Olivia CRNA

## 2021-06-23 ENCOUNTER — ANESTHESIA (OUTPATIENT)
Dept: SURGERY | Facility: CLINIC | Age: 61
End: 2021-06-23
Payer: COMMERCIAL

## 2021-06-23 ENCOUNTER — HOSPITAL ENCOUNTER (OUTPATIENT)
Facility: CLINIC | Age: 61
Discharge: HOME | End: 2021-06-23
Attending: ORTHOPAEDIC SURGERY | Admitting: ORTHOPAEDIC SURGERY
Payer: COMMERCIAL

## 2021-06-23 ENCOUNTER — APPOINTMENT (OUTPATIENT)
Dept: GENERAL RADIOLOGY | Facility: CLINIC | Age: 61
End: 2021-06-23
Attending: ORTHOPAEDIC SURGERY
Payer: COMMERCIAL

## 2021-06-23 VITALS
TEMPERATURE: 97.8 F | BODY MASS INDEX: 27.43 KG/M2 | HEART RATE: 67 BPM | DIASTOLIC BLOOD PRESSURE: 104 MMHG | OXYGEN SATURATION: 96 % | HEIGHT: 68 IN | RESPIRATION RATE: 16 BRPM | SYSTOLIC BLOOD PRESSURE: 154 MMHG | WEIGHT: 181 LBS

## 2021-06-23 DIAGNOSIS — S52.531D CLOSED COLLES' FRACTURE OF RIGHT RADIUS WITH ROUTINE HEALING, SUBSEQUENT ENCOUNTER: Primary | ICD-10-CM

## 2021-06-23 PROCEDURE — 370N000017 HC ANESTHESIA TECHNICAL FEE, PER MIN: Performed by: ORTHOPAEDIC SURGERY

## 2021-06-23 PROCEDURE — 999N000141 HC STATISTIC PRE-PROCEDURE NURSING ASSESSMENT: Performed by: ORTHOPAEDIC SURGERY

## 2021-06-23 PROCEDURE — 250N000009 HC RX 250: Performed by: NURSE ANESTHETIST, CERTIFIED REGISTERED

## 2021-06-23 PROCEDURE — 250N000011 HC RX IP 250 OP 636: Performed by: NURSE ANESTHETIST, CERTIFIED REGISTERED

## 2021-06-23 PROCEDURE — 250N000013 HC RX MED GY IP 250 OP 250 PS 637: Performed by: ORTHOPAEDIC SURGERY

## 2021-06-23 PROCEDURE — C1769 GUIDE WIRE: HCPCS | Performed by: ORTHOPAEDIC SURGERY

## 2021-06-23 PROCEDURE — 360N000083 HC SURGERY LEVEL 3 W/ FLUORO, PER MIN: Performed by: ORTHOPAEDIC SURGERY

## 2021-06-23 PROCEDURE — C1713 ANCHOR/SCREW BN/BN,TIS/BN: HCPCS | Performed by: ORTHOPAEDIC SURGERY

## 2021-06-23 PROCEDURE — 250N000009 HC RX 250: Mod: JW | Performed by: NURSE ANESTHETIST, CERTIFIED REGISTERED

## 2021-06-23 PROCEDURE — 710N000010 HC RECOVERY PHASE 1, LEVEL 2, PER MIN: Performed by: ORTHOPAEDIC SURGERY

## 2021-06-23 PROCEDURE — 250N000011 HC RX IP 250 OP 636: Performed by: ORTHOPAEDIC SURGERY

## 2021-06-23 PROCEDURE — 710N000012 HC RECOVERY PHASE 2, PER MINUTE: Performed by: ORTHOPAEDIC SURGERY

## 2021-06-23 PROCEDURE — 250N000013 HC RX MED GY IP 250 OP 250 PS 637: Performed by: NURSE ANESTHETIST, CERTIFIED REGISTERED

## 2021-06-23 PROCEDURE — 258N000003 HC RX IP 258 OP 636: Performed by: NURSE ANESTHETIST, CERTIFIED REGISTERED

## 2021-06-23 PROCEDURE — 272N000001 HC OR GENERAL SUPPLY STERILE: Performed by: ORTHOPAEDIC SURGERY

## 2021-06-23 PROCEDURE — 999N000179 XR SURGERY CARM FLUORO LESS THAN 5 MIN W STILLS: Mod: TC

## 2021-06-23 DEVICE — IMPLANTABLE DEVICE: Type: IMPLANTABLE DEVICE | Site: WRIST | Status: FUNCTIONAL

## 2021-06-23 RX ORDER — NALOXONE HYDROCHLORIDE 0.4 MG/ML
0.2 INJECTION, SOLUTION INTRAMUSCULAR; INTRAVENOUS; SUBCUTANEOUS
Status: DISCONTINUED | OUTPATIENT
Start: 2021-06-23 | End: 2021-06-23 | Stop reason: HOSPADM

## 2021-06-23 RX ORDER — ONDANSETRON 4 MG/1
4 TABLET, ORALLY DISINTEGRATING ORAL EVERY 30 MIN PRN
Status: DISCONTINUED | OUTPATIENT
Start: 2021-06-23 | End: 2021-06-23 | Stop reason: HOSPADM

## 2021-06-23 RX ORDER — PROPOFOL 10 MG/ML
INJECTION, EMULSION INTRAVENOUS PRN
Status: DISCONTINUED | OUTPATIENT
Start: 2021-06-23 | End: 2021-06-23

## 2021-06-23 RX ORDER — CEFAZOLIN SODIUM 2 G/100ML
2 INJECTION, SOLUTION INTRAVENOUS SEE ADMIN INSTRUCTIONS
Status: DISCONTINUED | OUTPATIENT
Start: 2021-06-23 | End: 2021-06-23 | Stop reason: HOSPADM

## 2021-06-23 RX ORDER — HYDROCODONE BITARTRATE AND ACETAMINOPHEN 5; 325 MG/1; MG/1
1-2 TABLET ORAL EVERY 4 HOURS PRN
Qty: 25 TABLET | Refills: 0 | Status: SHIPPED | OUTPATIENT
Start: 2021-06-23 | End: 2021-06-30

## 2021-06-23 RX ORDER — MAGNESIUM SULFATE HEPTAHYDRATE 40 MG/ML
2 INJECTION, SOLUTION INTRAVENOUS ONCE
Status: COMPLETED | OUTPATIENT
Start: 2021-06-23 | End: 2021-06-23

## 2021-06-23 RX ORDER — PROPOFOL 10 MG/ML
INJECTION, EMULSION INTRAVENOUS CONTINUOUS PRN
Status: DISCONTINUED | OUTPATIENT
Start: 2021-06-23 | End: 2021-06-23

## 2021-06-23 RX ORDER — LIDOCAINE HYDROCHLORIDE 10 MG/ML
INJECTION, SOLUTION EPIDURAL; INFILTRATION; INTRACAUDAL; PERINEURAL PRN
Status: DISCONTINUED | OUTPATIENT
Start: 2021-06-23 | End: 2021-06-23

## 2021-06-23 RX ORDER — GABAPENTIN 300 MG/1
300 CAPSULE ORAL ONCE
Status: COMPLETED | OUTPATIENT
Start: 2021-06-23 | End: 2021-06-23

## 2021-06-23 RX ORDER — HYDROMORPHONE HYDROCHLORIDE 1 MG/ML
.3-.5 INJECTION, SOLUTION INTRAMUSCULAR; INTRAVENOUS; SUBCUTANEOUS EVERY 10 MIN PRN
Status: DISCONTINUED | OUTPATIENT
Start: 2021-06-23 | End: 2021-06-23 | Stop reason: HOSPADM

## 2021-06-23 RX ORDER — NALOXONE HYDROCHLORIDE 0.4 MG/ML
0.4 INJECTION, SOLUTION INTRAMUSCULAR; INTRAVENOUS; SUBCUTANEOUS
Status: DISCONTINUED | OUTPATIENT
Start: 2021-06-23 | End: 2021-06-23 | Stop reason: HOSPADM

## 2021-06-23 RX ORDER — HYDROCODONE BITARTRATE AND ACETAMINOPHEN 5; 325 MG/1; MG/1
1 TABLET ORAL
Status: DISCONTINUED | OUTPATIENT
Start: 2021-06-23 | End: 2021-06-23 | Stop reason: HOSPADM

## 2021-06-23 RX ORDER — ONDANSETRON 2 MG/ML
INJECTION INTRAMUSCULAR; INTRAVENOUS PRN
Status: DISCONTINUED | OUTPATIENT
Start: 2021-06-23 | End: 2021-06-23

## 2021-06-23 RX ORDER — MEPERIDINE HYDROCHLORIDE 25 MG/ML
12.5 INJECTION INTRAMUSCULAR; INTRAVENOUS; SUBCUTANEOUS
Status: DISCONTINUED | OUTPATIENT
Start: 2021-06-23 | End: 2021-06-23 | Stop reason: HOSPADM

## 2021-06-23 RX ORDER — CEFAZOLIN SODIUM 2 G/100ML
2 INJECTION, SOLUTION INTRAVENOUS
Status: DISCONTINUED | OUTPATIENT
Start: 2021-06-23 | End: 2021-06-23 | Stop reason: HOSPADM

## 2021-06-23 RX ORDER — ACETAMINOPHEN 325 MG/1
975 TABLET ORAL ONCE
Status: COMPLETED | OUTPATIENT
Start: 2021-06-23 | End: 2021-06-23

## 2021-06-23 RX ORDER — FENTANYL CITRATE 50 UG/ML
25-50 INJECTION, SOLUTION INTRAMUSCULAR; INTRAVENOUS
Status: DISCONTINUED | OUTPATIENT
Start: 2021-06-23 | End: 2021-06-23 | Stop reason: HOSPADM

## 2021-06-23 RX ORDER — LIDOCAINE HYDROCHLORIDE AND EPINEPHRINE BITARTRATE 20; .01 MG/ML; MG/ML
INJECTION, SOLUTION SUBCUTANEOUS PRN
Status: DISCONTINUED | OUTPATIENT
Start: 2021-06-23 | End: 2021-06-23

## 2021-06-23 RX ORDER — LIDOCAINE 40 MG/G
CREAM TOPICAL
Status: DISCONTINUED | OUTPATIENT
Start: 2021-06-23 | End: 2021-06-23 | Stop reason: HOSPADM

## 2021-06-23 RX ORDER — EPHEDRINE SULFATE 50 MG/ML
INJECTION, SOLUTION INTRAMUSCULAR; INTRAVENOUS; SUBCUTANEOUS PRN
Status: DISCONTINUED | OUTPATIENT
Start: 2021-06-23 | End: 2021-06-23

## 2021-06-23 RX ORDER — ROPIVACAINE HYDROCHLORIDE 5 MG/ML
INJECTION, SOLUTION EPIDURAL; INFILTRATION; PERINEURAL PRN
Status: DISCONTINUED | OUTPATIENT
Start: 2021-06-23 | End: 2021-06-23

## 2021-06-23 RX ORDER — SODIUM CHLORIDE, SODIUM LACTATE, POTASSIUM CHLORIDE, CALCIUM CHLORIDE 600; 310; 30; 20 MG/100ML; MG/100ML; MG/100ML; MG/100ML
INJECTION, SOLUTION INTRAVENOUS CONTINUOUS
Status: DISCONTINUED | OUTPATIENT
Start: 2021-06-23 | End: 2021-06-23 | Stop reason: HOSPADM

## 2021-06-23 RX ORDER — FENTANYL CITRATE 50 UG/ML
INJECTION, SOLUTION INTRAMUSCULAR; INTRAVENOUS PRN
Status: DISCONTINUED | OUTPATIENT
Start: 2021-06-23 | End: 2021-06-23

## 2021-06-23 RX ORDER — ACETAMINOPHEN 325 MG/1
975 TABLET ORAL ONCE
Status: DISCONTINUED | OUTPATIENT
Start: 2021-06-23 | End: 2021-06-23

## 2021-06-23 RX ORDER — AMOXICILLIN 250 MG
1-2 CAPSULE ORAL 2 TIMES DAILY
Qty: 20 TABLET | Refills: 0 | Status: SHIPPED | OUTPATIENT
Start: 2021-06-23 | End: 2022-08-15

## 2021-06-23 RX ORDER — CEPHALEXIN 500 MG/1
1000 CAPSULE ORAL 3 TIMES DAILY
Qty: 6 CAPSULE | Refills: 0 | Status: SHIPPED | OUTPATIENT
Start: 2021-06-23 | End: 2021-06-24

## 2021-06-23 RX ORDER — DEXAMETHASONE SODIUM PHOSPHATE 4 MG/ML
INJECTION, SOLUTION INTRA-ARTICULAR; INTRALESIONAL; INTRAMUSCULAR; INTRAVENOUS; SOFT TISSUE PRN
Status: DISCONTINUED | OUTPATIENT
Start: 2021-06-23 | End: 2021-06-23

## 2021-06-23 RX ORDER — KETOROLAC TROMETHAMINE 30 MG/ML
30 INJECTION, SOLUTION INTRAMUSCULAR; INTRAVENOUS EVERY 6 HOURS PRN
Status: DISCONTINUED | OUTPATIENT
Start: 2021-06-23 | End: 2021-06-23 | Stop reason: HOSPADM

## 2021-06-23 RX ORDER — ONDANSETRON 2 MG/ML
4 INJECTION INTRAMUSCULAR; INTRAVENOUS EVERY 30 MIN PRN
Status: DISCONTINUED | OUTPATIENT
Start: 2021-06-23 | End: 2021-06-23 | Stop reason: HOSPADM

## 2021-06-23 RX ORDER — LIDOCAINE HYDROCHLORIDE 10 MG/ML
INJECTION, SOLUTION INFILTRATION; PERINEURAL PRN
Status: DISCONTINUED | OUTPATIENT
Start: 2021-06-23 | End: 2021-06-23

## 2021-06-23 RX ORDER — DEXAMETHASONE SODIUM PHOSPHATE 10 MG/ML
INJECTION, SOLUTION INTRAMUSCULAR; INTRAVENOUS PRN
Status: DISCONTINUED | OUTPATIENT
Start: 2021-06-23 | End: 2021-06-23

## 2021-06-23 RX ADMIN — DEXAMETHASONE SODIUM PHOSPHATE 4 MG: 4 INJECTION, SOLUTION INTRA-ARTICULAR; INTRALESIONAL; INTRAMUSCULAR; INTRAVENOUS; SOFT TISSUE at 09:01

## 2021-06-23 RX ADMIN — HYDROMORPHONE HYDROCHLORIDE 1 MG: 1 INJECTION, SOLUTION INTRAMUSCULAR; INTRAVENOUS; SUBCUTANEOUS at 10:50

## 2021-06-23 RX ADMIN — CEFAZOLIN SODIUM 2 G: 2 INJECTION, SOLUTION INTRAVENOUS at 08:56

## 2021-06-23 RX ADMIN — MIDAZOLAM 2 MG: 1 INJECTION INTRAMUSCULAR; INTRAVENOUS at 07:59

## 2021-06-23 RX ADMIN — PROPOFOL 200 MCG/KG/MIN: 10 INJECTION, EMULSION INTRAVENOUS at 09:01

## 2021-06-23 RX ADMIN — LIDOCAINE HYDROCHLORIDE 50 MG: 10 INJECTION, SOLUTION INFILTRATION; PERINEURAL at 09:01

## 2021-06-23 RX ADMIN — GABAPENTIN 300 MG: 300 CAPSULE ORAL at 07:20

## 2021-06-23 RX ADMIN — FENTANYL CITRATE 50 MCG: 50 INJECTION, SOLUTION INTRAMUSCULAR; INTRAVENOUS at 09:45

## 2021-06-23 RX ADMIN — ROPIVACAINE HYDROCHLORIDE 20 ML: 5 INJECTION, SOLUTION EPIDURAL; INFILTRATION; PERINEURAL at 08:07

## 2021-06-23 RX ADMIN — FENTANYL CITRATE 100 MCG: 50 INJECTION, SOLUTION INTRAMUSCULAR; INTRAVENOUS at 07:59

## 2021-06-23 RX ADMIN — ONDANSETRON 4 MG: 2 INJECTION INTRAMUSCULAR; INTRAVENOUS at 09:01

## 2021-06-23 RX ADMIN — MAGNESIUM SULFATE HEPTAHYDRATE 2 G: 40 INJECTION, SOLUTION INTRAVENOUS at 09:07

## 2021-06-23 RX ADMIN — Medication 5 ML: at 08:05

## 2021-06-23 RX ADMIN — ACETAMINOPHEN 975 MG: 325 TABLET, FILM COATED ORAL at 07:20

## 2021-06-23 RX ADMIN — ROPIVACAINE HYDROCHLORIDE 10 ML: 5 INJECTION, SOLUTION EPIDURAL; INFILTRATION; PERINEURAL at 08:08

## 2021-06-23 RX ADMIN — DEXAMETHASONE SODIUM PHOSPHATE 1 MG: 10 INJECTION, SOLUTION INTRAMUSCULAR; INTRAVENOUS at 08:08

## 2021-06-23 RX ADMIN — FENTANYL CITRATE 50 MCG: 50 INJECTION, SOLUTION INTRAMUSCULAR; INTRAVENOUS at 09:01

## 2021-06-23 RX ADMIN — SODIUM CHLORIDE, POTASSIUM CHLORIDE, SODIUM LACTATE AND CALCIUM CHLORIDE 10 ML: 600; 310; 30; 20 INJECTION, SOLUTION INTRAVENOUS at 07:29

## 2021-06-23 RX ADMIN — PROPOFOL 150 MG: 10 INJECTION, EMULSION INTRAVENOUS at 09:01

## 2021-06-23 RX ADMIN — Medication 5 MG: at 09:19

## 2021-06-23 RX ADMIN — DEXAMETHASONE SODIUM PHOSPHATE 3 MG: 10 INJECTION, SOLUTION INTRAMUSCULAR; INTRAVENOUS at 08:07

## 2021-06-23 RX ADMIN — LIDOCAINE HYDROCHLORIDE 1 ML: 10 INJECTION, SOLUTION EPIDURAL; INFILTRATION; INTRACAUDAL; PERINEURAL at 07:29

## 2021-06-23 RX ADMIN — MIDAZOLAM 2 MG: 1 INJECTION INTRAMUSCULAR; INTRAVENOUS at 08:58

## 2021-06-23 RX ADMIN — Medication 5 MG: at 09:15

## 2021-06-23 RX ADMIN — LIDOCAINE HYDROCHLORIDE 5 ML: 10 INJECTION, SOLUTION EPIDURAL; INFILTRATION; INTRACAUDAL; PERINEURAL at 08:02

## 2021-06-23 ASSESSMENT — MIFFLIN-ST. JEOR: SCORE: 1434.51

## 2021-06-23 NOTE — ANESTHESIA CARE TRANSFER NOTE
Patient: Monika Nam    Procedure(s):  OPEN REDUCTION INTERNAL FIXATION RIGHT DISTAL RADIUS FRACTURE    Diagnosis: Distal radius fracture [S52.509A]  Diagnosis Additional Information: No value filed.    Anesthesia Type:   General     Note:    Oropharynx: oropharynx clear of all foreign objects and spontaneously breathing  Level of Consciousness: drowsy  Oxygen Supplementation: face mask  Level of Supplemental Oxygen (L/min / FiO2): 5  Independent Airway: airway patency satisfactory and stable  Dentition: dentition unchanged  Vital Signs Stable: post-procedure vital signs reviewed and stable  Report to RN Given: handoff report given  Patient transferred to: PACU    Handoff Report: Identifed the Patient, Identified the Reponsible Provider, Reviewed the pertinent medical history, Discussed the surgical course, Reviewed Intra-OP anesthesia mangement and issues during anesthesia, Set expectations for post-procedure period and Allowed opportunity for questions and acknowledgement of understanding      Vitals: (Last set prior to Anesthesia Care Transfer)  CRNA VITALS  6/23/2021 1051 - 6/23/2021 1126      6/23/2021             Pulse:  74    SpO2:  100 %        Electronically Signed By: Segundo German CRNA, APRN CRNA  June 23, 2021  11:26 AM

## 2021-06-23 NOTE — OR NURSING
Pt discharged with all belongings and discharge instructions. Son has picked up RX. Son Ray will be staying with pt over night.   Declined

## 2021-06-23 NOTE — ANESTHESIA POSTPROCEDURE EVALUATION
Patient: Monika Nam    Procedure(s):  OPEN REDUCTION INTERNAL FIXATION RIGHT DISTAL RADIUS FRACTURE    Diagnosis:Distal radius fracture [S52.509A]  Diagnosis Additional Information: No value filed.    Anesthesia Type:  General    Note:  Disposition: Outpatient   Postop Pain Control: Uneventful            Sign Out: Well controlled pain   PONV: No   Neuro/Psych: Uneventful            Sign Out: Acceptable/Baseline neuro status   Airway/Respiratory: Uneventful            Sign Out: Acceptable/Baseline resp. status   CV/Hemodynamics: Uneventful            Sign Out: Acceptable CV status; No obvious hypovolemia; No obvious fluid overload   Other NRE: NONE   DID A NON-ROUTINE EVENT OCCUR? No           Last vitals:  Vitals:    06/23/21 1145 06/23/21 1200 06/23/21 1300   BP: 131/84 123/78 (!) 154/104   Pulse: 80 78 67   Resp: 16 16 16   Temp:  36.6  C (97.8  F)    SpO2: 95% 97% 96%       Last vitals prior to Anesthesia Care Transfer:  CRNA VITALS  6/23/2021 1051 - 6/23/2021 1151      6/23/2021             Pulse:  74    SpO2:  100 %          Electronically Signed By: Marlena Farley CRNA, HAYLIE CAO  June 23, 2021  3:34 PM

## 2021-06-23 NOTE — ANESTHESIA PROCEDURE NOTES
Interscalene Procedure Note  Pre-Procedure   Staff -        CRNA: Marlena Farley APRN CRNA       Performed By: CRNA       Location: pre-op       Procedure Start/Stop Times: 6/23/2021 7:59 AM and 6/23/2021 8:09 AM       Pre-Anesthestic Checklist: patient identified, IV checked, site marked, risks and benefits discussed, informed consent, monitors and equipment checked, pre-op evaluation, at physician/surgeon's request and post-op pain management  Timeout:       Correct Patient: Yes        Correct Procedure: Yes        Correct Site: Yes        Correct Position: Yes        Correct Laterality: Yes        Site Marked: Yes  Procedure Documentation  Procedure: interscalene       Laterality: left       Patient Position: sitting       Patient Prep/Sterile Barriers: sterile gloves, mask, patient draped       Skin prep: Chloraprep       Local skin infiltrated with 5 mL of 1% lidocaine.        Needle Type: insulated and short bevel       Needle Gauge: 22.        Needle Length (Inches): 2        Ultrasound guided       1. Ultrasound was used to identify targeted nerve, plexus, vascular marker, or fascial plane and place a needle adjacent to it in real-time.       2. Ultrasound was used to visualize the spread of anesthetic in close proximity to the above referenced structure.       3. A permanent image is entered into the patient's record.       4. The visualized anatomic structures appeared normal.       5. There were no apparent abnormal pathologic findings.       Nerve Stim: Initial Level 1 mA.  Lowest motor response 0.44 mA.    Assessment/Narrative         The placement was negative for: blood aspirated, painful injection and site bleeding       Paresthesias: No.       Test dose of 5 mL lidocaine 2% w/ 1:200,000 epinephrine at 08:05.         Test dose negative, 3 minutes after injection, for signs of intravascular, subdural, or intrathecal injection.      Bolus given via needle. No blood aspirated via catheter.         Secured via.        Insertion/Infusion Method: Single Shot       Complications: none       Injection made incrementally with aspirations every 5 mL.

## 2021-06-23 NOTE — OP NOTE
Orthopedic  Operative Note    Pre-operative diagnosis: Distal radius fracture [S52.509A]    Post-operative diagnosis: Unstable right distal radius fracture    Procedure: Open reduction internal fixation of right distal radius fracture (Arthrex volar distal radius system)    Surgeon: Scott Thompson MD    Assistant(s): Jacinto Casey is an experienced surgical first assist whose assistance was necessary for obtaining and maintaining reduction of the fracture, placement of the plate and screws, retraction of soft tissues, closure, splinting.     Anesthesia: Regional and LMA    Estimated blood loss: 5mL     Tourniquet time: Right upper arm 250mmHg 110 minutes    Specimens: None    Indications:                               Monika had a trip and fall landing on outstretched right arm. This occurred on 6/7 and she had somewhat delayed follow-up to clinic. Given the unstable nature of her fracture, it being her dominant arm and the parameters of her fracture, primarily with significant dorsal angulation, we had discussed surgical intervention    We briefly discussed the risks of fracture fixation surgery. There is a risk of the anesthetic - heart attack, stroke, respiratory compromise, death. There is a risk for surgery in general - bleeding or infection - which could potentially necessitate return to the OR in worst case scenario. There is a risk for failure of healing of the bone - nonunion - or the bone healing in incorrect position - malunion - which may necessitate return to the OR. There is a risk for hardware irritation / pain necessitating late removal of the plate screws. There is a risk to damaging adjacent blood vessels, nerves, tendons. There is a risk for developing DVT/PE blood clots.    Findings: Early bony callous and scar tissue in soft tissues    Complications: None     Procedure Detail: Monika was seen in the pre-op holding area. The right arm was marked with indelible ink. The anesthesia  team performed a regional block. Informed consent was signed after the discussion as above. Then she was brought back to the OR where LMA anesthesia was induced. The right arm was then prepped and draped in normal sterile fashion. A tourniquet had been placed on the right upper arm. Prior to incision a critical pause was done and all in the room were in agreement. Patient had received 2g IV Ancef. The right arm was exsanguinated with an esmarch. The tourniquet was inflated. A volar incision was made over the FCR. The FCR was retracted ulnarly after its sheath was entered. I then incised the dorsal aspect of the tendon sheath. The FPL was noted beneath and this was also retracted ulnarly. The pronator quadratus was with some scar tissue overlying the fracture. I incised this along its distal and radial border from the distal radius and then used the wood handle elevator to subperiosteally dissect the PQ off the volar radius. The fracture was noted. It was extra-articular in nature. There was some bone loss volar and radial from it being impacted. I debrided the fracture of soft tissue and had quite difficult time getting it reduced due to the timing of surgery and the fact that there was comminution radially. I released the brachioradialis radially as the distal fragment was radially translated even with several reduction attempts. At this point volarly I was able to get some bone-bone contact that lined up very well despite the loss of some bone radially. With this reduction I provisionally fixed an Arthrex distal radius plate distally with a K-wire. I checked the plate positioning on imaging and made some slight adjustments based on the AP. On the lateral view there was still some loss of volar tilt so I then placed the kickstand device in the most proximal hole to help reduce the volar tilt. I provisionally fixed the plate proximally with the K-wire at this point as well. I then placed a single cortical screw in  the distal plate through one of the radial styloid holes. This was done using the C-arm to get an optimal length and trajectory. I then placed a 2nd screw in the distal row this time a locking screw. The distal K-wire was then removed. In the proximal holes I then placed a 3.5 cortical screw through the distal of the three, but did not tighten down all the way until I had removed the kickstand. I then continued inserting the screw to reduce the plate down to bone. Note made that initially the screw was not long enough as the length was underestimated. At this point I placed a screw in the most proximal hole to pull the plate to bone with bicortical purchase and then swapped out the other 3.5 cortical screw for a longer one that had bicortical purchase. On the C-arm images the plate was fairly radially deviated, but both of the shaft screws had good purchase at this point. I did place a third in the oblong hole more ulnarly angled to achieve additional fixation. Now with the plate pulled to bone proximally the volar tilt was restored. There was some bone loss radially-volarly yet that made keeping the radial length at anatomic position was quite difficult, but the images showed acceptable alignment. I then went through and filled the remaining distal row with locking screws. The x-rays showed improved alignment from pre-op although not completely anatomic as mentioned above. The surgical site was copiously irrigated and then closed in layers. A volar short-arm splint was applied. The tourniquet was let down at 110 minutes. All sponge and needle counts were correct. Patient was brought out to PACU in stable condition.    Condition: Stable     Weight bearing status: Non-weight bearing     Activity:        Anticoagulation plan:    Plan:      Scott Thompson MD  [facility]  Date:  6/23/2021  11:31 AM   Activity as tolerated  Patient may move about with assist as indicated or with supervision    Ambulation  and mechanical prophylaxis.    Keep splint clean and dry. Wear sling until the block wears off. Follow up 2 weeks

## 2021-06-23 NOTE — DISCHARGE INSTRUCTIONS
Same Day Surgery Discharge Instructions  Special Precautions After Surgery - Adult    1. It is not unusual to feel lightheaded or faint, up to 24 hours after surgery or while taking pain medication.  If you have these symptoms; sit for a few minutes before standing and have someone assist you when getting up.  2. You should rest and relax for the next 24 hours and must have someone stay with you for at least 24 hours after your discharge.  3. DO NOT DRIVE any vehicle or operate mechanical equipment for 24 hours following the end of your surgery.  DO NOT DRIVE while taking narcotic pain medications that have been prescribed by your physician.  If you had a limb operated on, you must be able to use it fully to drive.  4. DO NOT drink alcoholic beverages for 24 hours following surgery or while taking prescription pain medication.  5. Drink clear liquids (apple juice, ginger ale, broth, 7-Up, etc.).  Progress to your regular diet as you feel able.  6. Any questions call your physician and do not make important decisions for 24 hours.    ACTIVITY  ? No lifting more than 1 pound for 2 weeks, consult physician at next appointment.     INCISIONAL CARE  ? Do not remove dressing until seen by physician.  ? Apply ice 1/2 hour on and 1/2 hour off while awake.  ? Do not place any objects in the dressing, as this increases infection risk. Itching can be managed with Benadryl.  ? Be alert for signs of infection:  redness, swelling, heat, drainage of pus, and/or elevated temperature.  Contact your doctor if these occur.        Call for an appointment to return to the clinic in 14 days.    Medications:  ? Hydrocodone (Norco):  Next dose: as needed. You have not started your pain medications yet, so you can start this at any time. Take 1-2 tablets as needed every 4 hours for pain.  ? Cephalexin (Keflex):  Next dose: when you get home. Take 2 capsules (1000mg) by mouth 3 times daily for 1 day. Antibiotic, finish  all 3 doses.  ? Senna-doucosate:  Next dose: when you get home. Take 1-2 tablets by mouth 2 times daily.  ? Follow the instructions on the bottle.        __________________________________________________________________________________________________________________________________  IMPORTANT NUMBERS:    [facility] Main Number:  [phone], [phone]  Pharmacy:  [phone]  Same Day Surgery:  [phone], Monday - Friday until 8:30 p.m.  Urgent Care:  [phone]  Emergency Room:  [phone]        [facility]:  [phone]

## 2021-06-30 ENCOUNTER — OFFICE VISIT (OUTPATIENT)
Dept: FAMILY MEDICINE | Facility: CLINIC | Age: 61
End: 2021-06-30
Payer: COMMERCIAL

## 2021-06-30 VITALS
OXYGEN SATURATION: 96 % | TEMPERATURE: 98.8 F | DIASTOLIC BLOOD PRESSURE: 64 MMHG | BODY MASS INDEX: 26.66 KG/M2 | HEIGHT: 69 IN | WEIGHT: 180 LBS | SYSTOLIC BLOOD PRESSURE: 122 MMHG | HEART RATE: 98 BPM | RESPIRATION RATE: 14 BRPM

## 2021-06-30 DIAGNOSIS — J45.20 MILD INTERMITTENT ASTHMA WITHOUT COMPLICATION: ICD-10-CM

## 2021-06-30 DIAGNOSIS — L30.9 ECZEMA, UNSPECIFIED TYPE: ICD-10-CM

## 2021-06-30 DIAGNOSIS — Z12.4 SCREENING FOR MALIGNANT NEOPLASM OF CERVIX: ICD-10-CM

## 2021-06-30 DIAGNOSIS — Z00.00 ROUTINE GENERAL MEDICAL EXAMINATION AT A HEALTH CARE FACILITY: Primary | ICD-10-CM

## 2021-06-30 DIAGNOSIS — Z11.4 SCREENING FOR HIV (HUMAN IMMUNODEFICIENCY VIRUS): ICD-10-CM

## 2021-06-30 DIAGNOSIS — F34.1 CHRONIC DEPRESSIVE PERSONALITY DISORDER: ICD-10-CM

## 2021-06-30 PROCEDURE — 99214 OFFICE O/P EST MOD 30 MIN: CPT | Mod: 25 | Performed by: FAMILY MEDICINE

## 2021-06-30 PROCEDURE — 99396 PREV VISIT EST AGE 40-64: CPT | Performed by: FAMILY MEDICINE

## 2021-06-30 RX ORDER — ALBUTEROL SULFATE 90 UG/1
2 AEROSOL, METERED RESPIRATORY (INHALATION) EVERY 4 HOURS PRN
Qty: 18 G | Refills: 11 | Status: SHIPPED | OUTPATIENT
Start: 2021-06-30 | End: 2022-08-15

## 2021-06-30 RX ORDER — BETAMETHASONE DIPROPIONATE 0.5 MG/G
CREAM TOPICAL
Qty: 60 G | Refills: 6 | Status: SHIPPED | OUTPATIENT
Start: 2021-06-30 | End: 2022-08-15

## 2021-06-30 ASSESSMENT — MIFFLIN-ST. JEOR: SCORE: 1437.97

## 2021-06-30 NOTE — PROGRESS NOTES
SUBJECTIVE:   CC: Monika Nam is an 60 year old woman who presents for preventive health visit.       Patient has been advised of split billing requirements and indicates understanding: Yes  Healthy Habits:    Do you get at least three servings of calcium containing foods daily (dairy, green leafy vegetables, etc.)? yes    Amount of exercise or daily activities, outside of work: 3 times a week    Problems taking medications regularly No    Medication side effects: No    Have you had an eye exam in the past two years? yes    Do you see a dentist twice per year? yes    Do you have sleep apnea, excessive snoring or daytime drowsiness?no    Today's PHQ-2 Score: 0  PHQ-2 ( 1999 Pfizer) 6/30/2021   Q1: Little interest or pleasure in doing things 0   Q2: Feeling down, depressed or hopeless 0   PHQ-2 Score 0       Abuse: Current or Past(Physical, Sexual or Emotional)- No  Do you feel safe in your environment? Yes    Social History     Tobacco Use     Smoking status: Current Every Day Smoker     Packs/day: 0.50     Years: 25.00     Pack years: 12.50     Types: Cigarettes     Smokeless tobacco: Never Used     Tobacco comment: 7-8cigarettes a day/has cut back   Substance Use Topics     Alcohol use: Yes     Comment: 3-4 X per week.  About 7 drinks per week.     If you drink alcohol do you typically have >3 drinks per day or >7 drinks per week? No                     Reviewed orders with patient.  Reviewed health maintenance and updated orders accordingly - Yes    FHS-7: No flowsheet data found.      Pertinent mammograms are reviewed under the imaging tab.    Pertinent mammograms are reviewed under the imaging tab.  History of abnormal Pap smear: No further Paps are needed  PAP / HPV 7/25/2014 7/15/2013 5/24/2012   PAP ASC-US(A) ASC-US(A) NIL     Reviewed and updated as needed this visit by clinical staff  Tobacco  Allergies  Meds   Med Hx  Surg Hx  Fam Hx  Soc Hx        Reviewed and updated as needed this visit  "by Provider                    ROS:  CONSTITUTIONAL: NEGATIVE for fever, chills, change in weight  INTEGUMENTARY/SKIN: NEGATIVE for worrisome rashes, moles or lesions  EYES: NEGATIVE for vision changes or irritation  ENT: NEGATIVE for ear, mouth and throat problems  RESP: NEGATIVE for significant cough or SOB  BREAST: NEGATIVE for masses, tenderness or discharge  CV: NEGATIVE for chest pain, palpitations or peripheral edema  GI: NEGATIVE for nausea, abdominal pain, heartburn, or change in bowel habits  : NEGATIVE for unusual urinary or vaginal symptoms. No vaginal bleeding.  MUSCULOSKELETAL: NEGATIVE for significant arthralgias or myalgia  NEURO: NEGATIVE for weakness, dizziness or paresthesias  PSYCHIATRIC: NEGATIVE for changes in mood or affect     OBJECTIVE:   LMP 04/02/2014   EXAM:  Exam:  GENERAL APPEARANCE: healthy, alert and no distress  EYES: EOMI,  PERRL  HENT: ear canals and TM's normal and nose and mouth without ulcers or lesions  NECK: no adenopathy, no asymmetry, masses, or scars and thyroid normal to palpation  RESP: lungs clear to auscultation - no rales, rhonchi or wheezes  CV: regular rates and rhythm, normal S1 S2, no S3 or S4 and no murmur, click or rub -  ABDOMEN:  soft, nontender, no HSM or masses and bowel sounds normal  MS: there is a forearm splint on the right side  SKIN: no suspicious lesions or rashes  PSYCH: mentation appears normal and affect normal/bright    ASSESSMENT/PLAN:   Routine general medical examination at a health care facility  Patient has been advised of split billing requirements and indicates understanding: Yes  COUNSELING:   Reviewed preventive health counseling, as reflected in patient instructions       Regular exercise       Healthy diet/nutrition       Vision screening       Hearing screening  Estimated body mass index is 27.52 kg/m  as calculated from the following:    Height as of 6/23/21: 1.727 m (5' 8\").    Weight as of 6/23/21: 82.1 kg (181 lb).  She reports " that she has been smoking cigarettes. She has a 12.50 pack-year smoking history. She has never used smokeless tobacco.  Tobacco Cessation Action Plan:   Self help information given to patient  Counseling Resources:  ATP IV Guidelines  Pooled Cohorts Equation Calculator  Breast Cancer Risk Calculator  BRCA-Related Cancer Risk Assessment: FHS-7 Tool  FRAX Risk Assessment  ICSI Preventive Guidelines  Dietary Guidelines for Americans, 2010  USDA's MyPlate  ASA Prophylaxis  Lung CA Screening      Mild intermittent asthma without complication  Instructions given on diagnoses and refills are done for one year. Get the flu shot in the fall.   - albuterol (PROAIR HFA/PROVENTIL HFA/VENTOLIN HFA) 108 (90 Base) MCG/ACT inhaler; Inhale 2 puffs into the lungs every 4 hours as needed for shortness of breath / dyspnea or wheezing  Dispense: 18 g; Refill: 11    Eczema, unspecified type  Refills are done and use as needed.   - augmented betamethasone dipropionate (DIPROLENE-AF) 0.05 % external cream; APPLY EXTERNALLY TO THE AFFECTED AREA TWICE DAILY  Dispense: 60 g; Refill: 6    Chronic depressive personality disorder  Use the med daily and refills are done for one year. Use the non drug therapies.   - FLUoxetine (PROZAC) 20 MG capsule; Take 20 mg daily.  Dispense: 90 capsule; Refill: 3        Servando Valladares MD  [facility]

## 2021-06-30 NOTE — PATIENT INSTRUCTIONS
Preventive Health Recommendations  Female Ages 50 - 64    Yearly exam: See your health care provider every year in order to  o Review health changes.   o Discuss preventive care.    o Review your medicines if your doctor has prescribed any.      Get a Pap test every three years (unless you have an abnormal result and your provider advises testing more often).    If you get Pap tests with HPV test, you only need to test every 5 years, unless you have an abnormal result.     You do not need a Pap test if your uterus was removed (hysterectomy) and you have not had cancer.    You should be tested each year for STDs (sexually transmitted diseases) if you're at risk.     Have a mammogram every 1 to 2 years.    Have a colonoscopy at age 50, or have a yearly FIT test (stool test). These exams screen for colon cancer.      Have a cholesterol test every 5 years, or more often if advised.    Have a diabetes test (fasting glucose) every three years. If you are at risk for diabetes, you should have this test more often.     If you are at risk for osteoporosis (brittle bone disease), think about having a bone density scan (DEXA).    Shots: Get a flu shot each year. Get a tetanus shot every 10 years.    Nutrition:     Eat at least 5 servings of fruits and vegetables each day.    Eat whole-grain bread, whole-wheat pasta and brown rice instead of white grains and rice.    Get adequate Calcium and Vitamin D.     Lifestyle    Exercise at least 150 minutes a week (30 minutes a day, 5 days a week). This will help you control your weight and prevent disease.    Limit alcohol to one drink per day.    No smoking.     Wear sunscreen to prevent skin cancer.     See your dentist every six months for an exam and cleaning.    See your eye doctor every 1 to 2 years.    ASSESSMENT/PLAN:   Routine general medical examination at a health care facility  Patient has been advised of split billing requirements and indicates understanding:  "Yes  COUNSELING:   Reviewed preventive health counseling, as reflected in patient instructions       Regular exercise       Healthy diet/nutrition       Vision screening       Hearing screening  Estimated body mass index is 27.52 kg/m  as calculated from the following:    Height as of 6/23/21: 1.727 m (5' 8\").    Weight as of 6/23/21: 82.1 kg (181 lb).  She reports that she has been smoking cigarettes. She has a 12.50 pack-year smoking history. She has never used smokeless tobacco.  Tobacco Cessation Action Plan:   Self help information given to patient  Counseling Resources:  ATP IV Guidelines  Pooled Cohorts Equation Calculator  Breast Cancer Risk Calculator  BRCA-Related Cancer Risk Assessment: FHS-7 Tool  FRAX Risk Assessment  ICSI Preventive Guidelines  Dietary Guidelines for Americans, 2010  [employer]'s MyPlate  ASA Prophylaxis  Lung CA Screening      Mild intermittent asthma without complication  Instructions given on diagnoses and refills are done for one year. Get the flu shot in the fall.   - albuterol (PROAIR HFA/PROVENTIL HFA/VENTOLIN HFA) 108 (90 Base) MCG/ACT inhaler; Inhale 2 puffs into the lungs every 4 hours as needed for shortness of breath / dyspnea or wheezing  Dispense: 18 g; Refill: 11    Eczema, unspecified type  Refills are done and use as needed.   - augmented betamethasone dipropionate (DIPROLENE-AF) 0.05 % external cream; APPLY EXTERNALLY TO THE AFFECTED AREA TWICE DAILY  Dispense: 60 g; Refill: 6    Chronic depressive personality disorder  Use the med daily and refills are done for one year. Use the non drug therapies.   - FLUoxetine (PROZAC) 20 MG capsule; Take 20 mg daily.  Dispense: 90 capsule; Refill: 3  "

## 2021-07-27 ENCOUNTER — LAB (OUTPATIENT)
Dept: LAB | Facility: CLINIC | Age: 61
End: 2021-07-27
Payer: COMMERCIAL

## 2021-07-27 DIAGNOSIS — Z12.11 SPECIAL SCREENING FOR MALIGNANT NEOPLASMS, COLON: ICD-10-CM

## 2021-07-27 PROCEDURE — 82274 ASSAY TEST FOR BLOOD FECAL: CPT

## 2021-08-01 LAB — HEMOCCULT STL QL IA: POSITIVE

## 2021-08-02 DIAGNOSIS — R19.5 POSITIVE FIT (FECAL IMMUNOCHEMICAL TEST): Primary | ICD-10-CM

## 2021-08-04 ENCOUNTER — TRANSFERRED RECORDS (OUTPATIENT)
Dept: HEALTH INFORMATION MANAGEMENT | Facility: CLINIC | Age: 61
End: 2021-08-04

## 2021-08-08 DIAGNOSIS — Z11.59 ENCOUNTER FOR SCREENING FOR OTHER VIRAL DISEASES: ICD-10-CM

## 2021-09-15 ENCOUNTER — TRANSFERRED RECORDS (OUTPATIENT)
Dept: HEALTH INFORMATION MANAGEMENT | Facility: CLINIC | Age: 61
End: 2021-09-15

## 2021-09-19 ENCOUNTER — HEALTH MAINTENANCE LETTER (OUTPATIENT)
Age: 61
End: 2021-09-19

## 2021-09-28 ENCOUNTER — LAB (OUTPATIENT)
Dept: LAB | Facility: CLINIC | Age: 61
End: 2021-09-28
Payer: COMMERCIAL

## 2021-09-28 DIAGNOSIS — Z11.59 ENCOUNTER FOR SCREENING FOR OTHER VIRAL DISEASES: ICD-10-CM

## 2021-09-28 PROCEDURE — U0003 INFECTIOUS AGENT DETECTION BY NUCLEIC ACID (DNA OR RNA); SEVERE ACUTE RESPIRATORY SYNDROME CORONAVIRUS 2 (SARS-COV-2) (CORONAVIRUS DISEASE [COVID-19]), AMPLIFIED PROBE TECHNIQUE, MAKING USE OF HIGH THROUGHPUT TECHNOLOGIES AS DESCRIBED BY CMS-2020-01-R: HCPCS

## 2021-09-28 PROCEDURE — U0005 INFEC AGEN DETEC AMPLI PROBE: HCPCS

## 2021-09-29 LAB — SARS-COV-2 RNA RESP QL NAA+PROBE: NEGATIVE

## 2021-09-30 ENCOUNTER — ANESTHESIA EVENT (OUTPATIENT)
Dept: GASTROENTEROLOGY | Facility: CLINIC | Age: 61
End: 2021-09-30
Payer: COMMERCIAL

## 2021-09-30 ASSESSMENT — LIFESTYLE VARIABLES: TOBACCO_USE: 1

## 2021-09-30 NOTE — ANESTHESIA PREPROCEDURE EVALUATION
Anesthesia Pre-Procedure Evaluation    Patient: Monika Nam   MRN: [id number] : 1960        Preoperative Diagnosis: Positive FIT (fecal immunochemical test) [R19.5]   Procedure : Procedure(s):  COLONOSCOPY     Past Medical History:   Diagnosis Date     Abnormal Papanicolaou smear of cervix and cervical HPV     history of LEEP ion      Arthritis      ASCUS favor benign 2013, 2014    Neg high risk HPV      Cancer (H) 2019    breast     Cervical high risk HPV (human papillomavirus) test positive 12    type 66     Depressive disorder      PONV (postoperative nausea and vomiting)      Uncomplicated asthma       Past Surgical History:   Procedure Laterality Date     BIOPSY       BREAST SURGERY       INSERT PORT VASCULAR ACCESS Right 2019    Procedure: Port-a-Cath Placement;  Surgeon: Servando Claros MD;  Location: [facility] OR     LUMPECTOMY BREAST WITH SENTINEL NODE, COMBINED Left 2019    Procedure: COMBINED LUMPECTOMY BREAST WITH SENTINEL NODE;  Surgeon: Servando Claros MD;  Location: [facility] OR     OPEN REDUCTION INTERNAL FIXATION WRIST Right 2021    Procedure: OPEN REDUCTION INTERNAL FIXATION RIGHT DISTAL RADIUS FRACTURE;  Surgeon: Scott Thompson MD;  Location: [facility] OR     PHACOEMULSIFICATION WITH STANDARD INTRAOCULAR LENS IMPLANT Right 6/10/2020    Procedure: Cataract removal with implant;  Surgeon: Floyd Mar MD;  Location: [facility] OR     PHACOEMULSIFICATION WITH STANDARD INTRAOCULAR LENS IMPLANT Left 2020    Procedure: Cataract removla with implant.;  Surgeon: Floyd Mar MD;  Location: [facility] OR     SURGICAL HISTORY OF -       cholecystectomy     SURGICAL HISTORY OF -       LEEP       Allergies   Allergen Reactions     Sulfa Drugs Swelling      Social History     Tobacco Use     Smoking status: Current Every Day Smoker     Packs/day: 0.50     Years: 25.00     Pack years: 12.50     Types: Cigarettes     Smokeless tobacco: Never Used     Tobacco  comment: 7-8cigarettes a day/has cut back   Substance Use Topics     Alcohol use: Yes     Comment: 3-4 X per week.  About 7 drinks per week.      Wt Readings from Last 1 Encounters:   06/30/21 81.6 kg (180 lb)        Anesthesia Evaluation   Pt has had prior anesthetic. Type: General and MAC.    History of anesthetic complications  - PONV.      ROS/MED HX  ENT/Pulmonary:     (+) sleep apnea, tobacco use, Current use, asthma     Neurologic:  - neg neurologic ROS     Cardiovascular:  - neg cardiovascular ROS     METS/Exercise Tolerance:     Hematologic:  - neg hematologic  ROS     Musculoskeletal:  - neg musculoskeletal ROS     GI/Hepatic:     (+) GERD,     Renal/Genitourinary:  - neg Renal ROS     Endo:     (+) Obesity,     Psychiatric/Substance Use:     (+) psychiatric history depression     Infectious Disease:       Malignancy:   (+) Malignancy, History of Breast.    Other:            Physical Exam    Airway  airway exam normal      Mallampati: II   TM distance: > 3 FB   Neck ROM: full   Mouth opening: > 3 cm    Respiratory Devices and Support         Dental       (+) upper dentures and chipped      Cardiovascular   cardiovascular exam normal          Pulmonary   pulmonary exam normal                OUTSIDE LABS:  CBC:   Lab Results   Component Value Date    WBC 5.2 06/18/2021    WBC 5.2 03/02/2021    HGB 12.9 06/18/2021    HGB 14.2 03/02/2021    HCT 38.9 06/18/2021    HCT 42.7 03/02/2021     06/18/2021     03/02/2021     BMP:   Lab Results   Component Value Date     06/18/2021     03/02/2021    POTASSIUM 3.9 06/18/2021    POTASSIUM 3.8 03/02/2021    CHLORIDE 104 06/18/2021    CHLORIDE 104 03/02/2021    CO2 30 06/18/2021    CO2 27 03/02/2021    BUN 13 06/18/2021    BUN 12 03/02/2021    CR 0.83 06/18/2021    CR 0.74 03/02/2021    GLC 74 06/18/2021     (H) 03/02/2021     COAGS: No results found for: PTT, INR, FIBR  POC: No results found for: BGM, HCG, HCGS  HEPATIC:   Lab Results    Component Value Date    ALBUMIN 3.8 03/02/2021    PROTTOTAL 7.9 03/02/2021    ALT 32 03/02/2021    AST 24 03/02/2021    ALKPHOS 111 03/02/2021    BILITOTAL 0.7 03/02/2021     OTHER:   Lab Results   Component Value Date    LACT 0.6 (L) 07/23/2019    A1C 5.3 05/13/2005    DANI 9.3 06/18/2021    TSH 0.62 08/02/2005       Anesthesia Plan    ASA Status:  3   NPO Status:  NPO Appropriate    Anesthesia Type: General.     - Airway: Native airway   Induction: Intravenous.   Maintenance: TIVA.        Consents    Anesthesia Plan(s) and associated risks, benefits, and realistic alternatives discussed. Questions answered and patient/representative(s) expressed understanding.     - Discussed with:  Patient         Postoperative Care    Pain management: IV analgesics, Multi-modal analgesia.   PONV prophylaxis: Ondansetron (or other 5HT-3)     Comments:                HAYLIE Bryant CRNA

## 2021-10-01 ENCOUNTER — HOSPITAL ENCOUNTER (OUTPATIENT)
Facility: CLINIC | Age: 61
Discharge: HOME | End: 2021-10-01
Attending: SURGERY | Admitting: SURGERY
Payer: COMMERCIAL

## 2021-10-01 ENCOUNTER — ANESTHESIA (OUTPATIENT)
Dept: GASTROENTEROLOGY | Facility: CLINIC | Age: 61
End: 2021-10-01
Payer: COMMERCIAL

## 2021-10-01 VITALS
SYSTOLIC BLOOD PRESSURE: 122 MMHG | OXYGEN SATURATION: 95 % | DIASTOLIC BLOOD PRESSURE: 82 MMHG | WEIGHT: 180 LBS | RESPIRATION RATE: 16 BRPM | TEMPERATURE: 97.8 F | HEIGHT: 69 IN | BODY MASS INDEX: 26.66 KG/M2 | HEART RATE: 91 BPM

## 2021-10-01 LAB — COLONOSCOPY: NORMAL

## 2021-10-01 PROCEDURE — 45385 COLONOSCOPY W/LESION REMOVAL: CPT | Performed by: SURGERY

## 2021-10-01 PROCEDURE — 250N000009 HC RX 250: Performed by: NURSE ANESTHETIST, CERTIFIED REGISTERED

## 2021-10-01 PROCEDURE — 258N000003 HC RX IP 258 OP 636: Performed by: NURSE ANESTHETIST, CERTIFIED REGISTERED

## 2021-10-01 PROCEDURE — 88305 TISSUE EXAM BY PATHOLOGIST: CPT | Mod: TC | Performed by: SURGERY

## 2021-10-01 PROCEDURE — 250N000011 HC RX IP 250 OP 636: Performed by: NURSE ANESTHETIST, CERTIFIED REGISTERED

## 2021-10-01 PROCEDURE — 370N000017 HC ANESTHESIA TECHNICAL FEE, PER MIN: Performed by: SURGERY

## 2021-10-01 RX ORDER — LIDOCAINE 40 MG/G
CREAM TOPICAL
Status: DISCONTINUED | OUTPATIENT
Start: 2021-10-01 | End: 2021-10-01 | Stop reason: HOSPADM

## 2021-10-01 RX ORDER — SODIUM CHLORIDE, SODIUM LACTATE, POTASSIUM CHLORIDE, CALCIUM CHLORIDE 600; 310; 30; 20 MG/100ML; MG/100ML; MG/100ML; MG/100ML
INJECTION, SOLUTION INTRAVENOUS CONTINUOUS
Status: DISCONTINUED | OUTPATIENT
Start: 2021-10-01 | End: 2021-10-01 | Stop reason: HOSPADM

## 2021-10-01 RX ORDER — GLYCOPYRROLATE 0.2 MG/ML
INJECTION, SOLUTION INTRAMUSCULAR; INTRAVENOUS PRN
Status: DISCONTINUED | OUTPATIENT
Start: 2021-10-01 | End: 2021-10-01

## 2021-10-01 RX ORDER — LIDOCAINE HYDROCHLORIDE 10 MG/ML
INJECTION, SOLUTION EPIDURAL; INFILTRATION; INTRACAUDAL; PERINEURAL PRN
Status: DISCONTINUED | OUTPATIENT
Start: 2021-10-01 | End: 2021-10-01

## 2021-10-01 RX ORDER — PROPOFOL 10 MG/ML
INJECTION, EMULSION INTRAVENOUS CONTINUOUS PRN
Status: DISCONTINUED | OUTPATIENT
Start: 2021-10-01 | End: 2021-10-01

## 2021-10-01 RX ORDER — ONDANSETRON 2 MG/ML
4 INJECTION INTRAMUSCULAR; INTRAVENOUS
Status: DISCONTINUED | OUTPATIENT
Start: 2021-10-01 | End: 2021-10-01 | Stop reason: HOSPADM

## 2021-10-01 RX ADMIN — GLYCOPYRROLATE 0.2 MG: 0.2 INJECTION, SOLUTION INTRAMUSCULAR; INTRAVENOUS at 08:08

## 2021-10-01 RX ADMIN — PROPOFOL 200 MCG/KG/MIN: 10 INJECTION, EMULSION INTRAVENOUS at 08:08

## 2021-10-01 RX ADMIN — LIDOCAINE HYDROCHLORIDE 50 MG: 10 INJECTION, SOLUTION EPIDURAL; INFILTRATION; INTRACAUDAL; PERINEURAL at 08:08

## 2021-10-01 RX ADMIN — PHENYLEPHRINE HYDROCHLORIDE 100 MCG: 10 INJECTION INTRAVENOUS at 08:24

## 2021-10-01 RX ADMIN — PHENYLEPHRINE HYDROCHLORIDE 200 MCG: 10 INJECTION INTRAVENOUS at 08:36

## 2021-10-01 RX ADMIN — SODIUM CHLORIDE, POTASSIUM CHLORIDE, SODIUM LACTATE AND CALCIUM CHLORIDE: 600; 310; 30; 20 INJECTION, SOLUTION INTRAVENOUS at 07:24

## 2021-10-01 ASSESSMENT — MIFFLIN-ST. JEOR: SCORE: 1432.91

## 2021-10-01 NOTE — ANESTHESIA POSTPROCEDURE EVALUATION
Patient: Monika Nam    Procedure(s):  COLONOSCOPY, FLEXIBLE, WITH LESION REMOVAL USING SNARE    Diagnosis:Positive FIT (fecal immunochemical test) [R19.5]  Diagnosis Additional Information: No value filed.    Anesthesia Type:  General    Note:  Disposition: Outpatient   Postop Pain Control: Uneventful            Sign Out: Well controlled pain   PONV: No   Neuro/Psych: Uneventful            Sign Out: Acceptable/Baseline neuro status   Airway/Respiratory: Uneventful            Sign Out: Acceptable/Baseline resp. status   CV/Hemodynamics: Uneventful            Sign Out: Acceptable CV status   Other NRE: NONE   DID A NON-ROUTINE EVENT OCCUR? No           Last vitals:  Vitals Value Taken Time   BP 85/55 10/01/21 0845   Temp     Pulse 85 10/01/21 0845   Resp     SpO2 94 % 10/01/21 0846   Vitals shown include unvalidated device data.    Electronically Signed By: HAYLIE Brownlee CRNA  October 1, 2021  8:47 AM

## 2021-10-01 NOTE — ANESTHESIA CARE TRANSFER NOTE
Patient: Monika Nam    Procedure(s):  COLONOSCOPY, FLEXIBLE, WITH LESION REMOVAL USING SNARE    Diagnosis: Positive FIT (fecal immunochemical test) [R19.5]  Diagnosis Additional Information: No value filed.    Anesthesia Type:   General     Note:    Oropharynx: spontaneously breathing  Level of Consciousness: drowsy  Oxygen Supplementation: room air    Independent Airway: airway patency satisfactory and stable  Dentition: dentition unchanged  Vital Signs Stable: post-procedure vital signs reviewed and stable  Report to RN Given: handoff report given  Patient transferred to: Phase II    Handoff Report: Identifed the Patient, Identified the Reponsible Provider, Reviewed the pertinent medical history, Discussed the surgical course, Reviewed Intra-OP anesthesia mangement and issues during anesthesia, Set expectations for post-procedure period and Allowed opportunity for questions and acknowledgement of understanding      Vitals:  Vitals Value Taken Time   BP     Temp     Pulse     Resp     SpO2 94 % 10/01/21 0845   Vitals shown include unvalidated device data.    Electronically Signed By: HAYLIE Brownlee CRNA  October 1, 2021  8:47 AM

## 2021-10-01 NOTE — BRIEF OP NOTE
[facility]   Brief Operative Note    Pre-operative diagnosis: Positive FIT (fecal immunochemical test) [R19.5]   Post-operative diagnosis multiple polyps, otherwise normal     Procedure: Procedure(s):  COLONOSCOPY, FLEXIBLE, WITH LESION REMOVAL USING SNARE   Surgeon(s): Surgeon(s) and Role:     * Servando Claros MD - Primary   Estimated blood loss: * No values recorded between 10/1/2021  8:12 AM and 10/1/2021  8:40 AM *    Specimens: ID Type Source Tests Collected by Time Destination   1 :  Polyp Large Intestine, Colon, Cecum SURGICAL PATHOLOGY EXAM Servando Claros MD 10/1/2021  8:21 AM    2 :  Polyp Large Intestine, Colon, Transverse SURGICAL PATHOLOGY EXAM Servando Claros MD 10/1/2021  8:36 AM    3 : 40cm Polyp Large Intestine, Colon SURGICAL PATHOLOGY EXAM Servando Claros MD 10/1/2021  8:37 AM       Findings: 1. Polyps as outlined   2. Colon otherwise normal

## 2021-10-01 NOTE — H&P
61 year old year old female here for colonoscopy for occult blood in stool.    Patient Active Problem List   Diagnosis     Allergic rhinitis     Anxiety disorder due to medical condition     Tobacco use disorder     Chronic depressive personality disorder     Obesity     CARDIOVASCULAR SCREENING; LDL GOAL LESS THAN 130     CTS (carpal tunnel syndrome)     Cervical high risk HPV (human papillomavirus) test positive     GERD (gastroesophageal reflux disease)     Obstructive sleep apnea syndrome     Mild intermittent asthma without complication     Infiltrating ductal carcinoma of central portion of left breast in female (H)     Estrogen receptor negative carcinoma of breast, left (H)     Depressive disorder       Past Medical History:   Diagnosis Date     Abnormal Papanicolaou smear of cervix and cervical HPV     history of LEEP ion 1994     Arthritis      ASCUS favor benign 7/2013, 7/2014    Neg high risk HPV      Cancer (H) 01/23/2019    breast     Cervical high risk HPV (human papillomavirus) test positive 5/24/12    type 66     Depressive disorder      PONV (postoperative nausea and vomiting)      Uncomplicated asthma        Past Surgical History:   Procedure Laterality Date     BIOPSY       BREAST SURGERY       INSERT PORT VASCULAR ACCESS Right 1/23/2019    Procedure: Port-a-Cath Placement;  Surgeon: Servando Claros MD;  Location: [facility]     LUMPECTOMY BREAST WITH SENTINEL NODE, COMBINED Left 1/23/2019    Procedure: COMBINED LUMPECTOMY BREAST WITH SENTINEL NODE;  Surgeon: Servando Claros MD;  Location: [facility]     OPEN REDUCTION INTERNAL FIXATION WRIST Right 6/23/2021    Procedure: OPEN REDUCTION INTERNAL FIXATION RIGHT DISTAL RADIUS FRACTURE;  Surgeon: Scott Thompson MD;  Location: [facility]     PHACOEMULSIFICATION WITH STANDARD INTRAOCULAR LENS IMPLANT Right 6/10/2020    Procedure: Cataract removal with implant;  Surgeon: Floyd Mar MD;  Location: [facility]     PHACOEMULSIFICATION WITH STANDARD  INTRAOCULAR LENS IMPLANT Left 6/29/2020    Procedure: Cataract removla with implant.;  Surgeon: Floyd Mar MD;  Location: [facility]     SURGICAL HISTORY OF -   1996    cholecystectomy     SURGICAL HISTORY OF -   1994    LEE        @[facility]@    No current outpatient medications on file.       Allergies   Allergen Reactions     Sulfa Drugs Swelling       Pt reports that she has been smoking cigarettes. She has a 12.50 pack-year smoking history. She has never used smokeless tobacco. She reports current alcohol use. She reports that she does not use drugs.    Exam:  LMP 04/02/2014     Awake, Alert OX3  Lungs - CTA bilaterally  CV - RRR, no murmurs, distal pulses intact  Abd - soft, non-distended, non-tender, +BS  Extr - No cyanosis or edema    A/P 61 year old year old female in need of colonoscopy for positive FIT.  Risks, benefits, alternatives, and complications were discussed including the possibility of perforation and the patient agreed to proceed.    Servando Claros MD

## 2021-10-04 LAB
PATH REPORT.COMMENTS IMP SPEC: NORMAL
PATH REPORT.COMMENTS IMP SPEC: NORMAL
PATH REPORT.FINAL DX SPEC: NORMAL
PATH REPORT.GROSS SPEC: NORMAL
PATH REPORT.MICROSCOPIC SPEC OTHER STN: NORMAL
PHOTO IMAGE: NORMAL

## 2021-10-04 PROCEDURE — 88305 TISSUE EXAM BY PATHOLOGIST: CPT | Mod: 26 | Performed by: PATHOLOGY

## 2021-10-19 ENCOUNTER — LAB (OUTPATIENT)
Dept: LAB | Facility: CLINIC | Age: 61
End: 2021-10-19
Payer: COMMERCIAL

## 2021-10-19 DIAGNOSIS — C50.112 INFILTRATING DUCTAL CARCINOMA OF CENTRAL PORTION OF LEFT BREAST IN FEMALE (H): ICD-10-CM

## 2021-10-19 LAB
ALBUMIN SERPL-MCNC: 3.4 G/DL (ref 3.4–5)
ALP SERPL-CCNC: 92 U/L (ref 40–150)
ALT SERPL W P-5'-P-CCNC: 20 U/L (ref 0–50)
ANION GAP SERPL CALCULATED.3IONS-SCNC: 5 MMOL/L (ref 3–14)
AST SERPL W P-5'-P-CCNC: 15 U/L (ref 0–45)
BASOPHILS # BLD AUTO: 0.1 10E3/UL (ref 0–0.2)
BASOPHILS NFR BLD AUTO: 2 %
BILIRUB SERPL-MCNC: 0.4 MG/DL (ref 0.2–1.3)
BUN SERPL-MCNC: 6 MG/DL (ref 7–30)
CALCIUM SERPL-MCNC: 8.7 MG/DL (ref 8.5–10.1)
CANCER AG27-29 SERPL-ACNC: 14 U/ML (ref 0–39)
CHLORIDE BLD-SCNC: 106 MMOL/L (ref 94–109)
CO2 SERPL-SCNC: 26 MMOL/L (ref 20–32)
CREAT SERPL-MCNC: 0.62 MG/DL (ref 0.52–1.04)
EOSINOPHIL # BLD AUTO: 0.4 10E3/UL (ref 0–0.7)
EOSINOPHIL NFR BLD AUTO: 8 %
ERYTHROCYTE [DISTWIDTH] IN BLOOD BY AUTOMATED COUNT: 13.6 % (ref 10–15)
GFR SERPL CREATININE-BSD FRML MDRD: >90 ML/MIN/1.73M2
GLUCOSE BLD-MCNC: 80 MG/DL (ref 70–99)
HCT VFR BLD AUTO: 39.6 % (ref 35–47)
HGB BLD-MCNC: 13.3 G/DL (ref 11.7–15.7)
IMM GRANULOCYTES # BLD: 0 10E3/UL
IMM GRANULOCYTES NFR BLD: 0 %
LYMPHOCYTES # BLD AUTO: 1.6 10E3/UL (ref 0.8–5.3)
LYMPHOCYTES NFR BLD AUTO: 29 %
MCH RBC QN AUTO: 29.4 PG (ref 26.5–33)
MCHC RBC AUTO-ENTMCNC: 33.6 G/DL (ref 31.5–36.5)
MCV RBC AUTO: 88 FL (ref 78–100)
MONOCYTES # BLD AUTO: 0.6 10E3/UL (ref 0–1.3)
MONOCYTES NFR BLD AUTO: 10 %
NEUTROPHILS # BLD AUTO: 2.8 10E3/UL (ref 1.6–8.3)
NEUTROPHILS NFR BLD AUTO: 51 %
NRBC # BLD AUTO: 0 10E3/UL
NRBC BLD AUTO-RTO: 0 /100
PLATELET # BLD AUTO: 227 10E3/UL (ref 150–450)
POTASSIUM BLD-SCNC: 3.9 MMOL/L (ref 3.4–5.3)
PROT SERPL-MCNC: 7.5 G/DL (ref 6.8–8.8)
RBC # BLD AUTO: 4.52 10E6/UL (ref 3.8–5.2)
SODIUM SERPL-SCNC: 137 MMOL/L (ref 133–144)
WBC # BLD AUTO: 5.5 10E3/UL (ref 4–11)

## 2021-10-19 PROCEDURE — 86300 IMMUNOASSAY TUMOR CA 15-3: CPT

## 2021-10-19 PROCEDURE — 36415 COLL VENOUS BLD VENIPUNCTURE: CPT

## 2021-10-19 PROCEDURE — 82040 ASSAY OF SERUM ALBUMIN: CPT

## 2021-10-19 PROCEDURE — 85004 AUTOMATED DIFF WBC COUNT: CPT

## 2021-10-26 ENCOUNTER — VIRTUAL VISIT (OUTPATIENT)
Dept: ONCOLOGY | Facility: CLINIC | Age: 61
End: 2021-10-26
Attending: INTERNAL MEDICINE
Payer: COMMERCIAL

## 2021-10-26 DIAGNOSIS — C50.112 INFILTRATING DUCTAL CARCINOMA OF CENTRAL PORTION OF LEFT BREAST IN FEMALE (H): Primary | ICD-10-CM

## 2021-10-26 PROCEDURE — 99213 OFFICE O/P EST LOW 20 MIN: CPT | Mod: GT | Performed by: INTERNAL MEDICINE

## 2021-10-26 ASSESSMENT — ENCOUNTER SYMPTOMS
EYES NEGATIVE: 1
FATIGUE: 1
PSYCHIATRIC NEGATIVE: 1
ENDOCRINE NEGATIVE: 1
GASTROINTESTINAL NEGATIVE: 1
MUSCULOSKELETAL NEGATIVE: 1
NEUROLOGICAL NEGATIVE: 1
CARDIOVASCULAR NEGATIVE: 1
HEMATOLOGIC/LYMPHATIC NEGATIVE: 1
RESPIRATORY NEGATIVE: 1

## 2021-10-26 NOTE — PROGRESS NOTES
"Assessment & Plan     Infiltrating ductal carcinoma of central portion of left breast in female (H)  Triple negative       BMI:   Estimated body mass index is 26.97 kg/m  as calculated from the following:    Height as of 10/1/21: 1.74 m (5' 8.5\").    Weight as of 10/1/21: 81.6 kg (180 lb).       Fiona Whitt MD  [facility]      Monika Nam is a 61 year old female who is being evaluated via a billable telephone visit.      How would you like to obtain your AVS? MyChart      Subjective     Monika Nam is a 61 year old female who presents today for the following   health issues:    She was treated with surgery and dose dense adjuvant therapy for triple negative breast cancer.  She has had a negative interval mammogram and has no findings for new breast cancer.  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  She recently had a colonoscopy that was normal.  She did break her wrist last summer, but that is now well healed.      Review of Systems   Constitutional: Positive for fatigue.   HENT: Negative.    Eyes: Negative.    Respiratory: Negative.    Cardiovascular: Negative.    Gastrointestinal: Negative.    Endocrine: Negative.    Genitourinary: Negative.    Musculoskeletal: Negative.    Skin: Negative.    Neurological: Negative.    Hematological: Negative.    Psychiatric/Behavioral: Negative.    All other systems reviewed and are negative.    Review of Systems   Constitutional: Positive for fatigue.   HENT:  Negative.    Eyes: Negative.    Respiratory: Negative.    Cardiovascular: Negative.    Gastrointestinal: Negative.    Endocrine: Negative.    Genitourinary: Negative.     Musculoskeletal: Negative.    Skin: Negative.    Neurological: Negative.    Hematological: Negative.    Psychiatric/Behavioral: Negative.    All other systems reviewed and are negative.        Objective   LMP 04/02/2014   There is no " height or weight on file to calculate BMI.        Patient is located in [place]  Phone call duration: 20 minutes    Billing:  Patient gave verbal consent for audio visit on 26 October 2021 (video not completed due to technical problems)  Visit start 0900  Visit end 0915  Total time including chart review, medication management, follow up orders and documentation: 20 minutes.        Oncologic History:  Infiltrating ductal carcinoma of central portion of left breast in female (H)  Monika Nam presented following her annual mammogram with new focal asymmetry at 12-1 o'clock position of the left breast around 10.7 cm from the nipple.  It measured 1.4 cm.  Subsequently the patient went on to have breast ultrasound on member 30th 2018 showing 1.6 cm in maximum dimension hypoechoic mass.  Subsequently ultrasound breast biopsy was done on December 17, 2018 showing invasive ductal carcinoma grade 3 out of 3 angiolymphatic invasion was not identified.  Ductal carcinoma in situ was not identified . estrogen receptor was negative, progesterone receptor was negative and HER-2/mercedes receptor was negative.        1/2019 left lumpectomy found 1.8 cm IDC, grade III, LN-, margin is clear, pT1cN0     The patient was started on dose dense chemotherapy with Adriamycin and Cytoxan followed by Taxol

## 2021-10-26 NOTE — PROGRESS NOTES
"Oncology Rooming Note    October 26, 2021 8:49 AM   Monika Nam is a 61 year old female who presents for:    No chief complaint on file.    Initial Vitals: LMP 04/02/2014  Estimated body mass index is 26.97 kg/m  as calculated from the following:    Height as of 10/1/21: 1.74 m (5' 8.5\").    Weight as of 10/1/21: 81.6 kg (180 lb). There is no height or weight on file to calculate BSA.  Data Unavailable Comment: Data Unavailable   Patient's last menstrual period was 04/02/2014.  Allergies reviewed: Yes  Medications reviewed: Yes    Medications: Medication refills not needed today.  Pharmacy name entered into [facility]:    [facility] #[id number] - [address] AT [address]    Clinical concerns: No new concerns. Dr. Whitt was NOT notified.       Marilee Cunha RN                "

## 2021-10-26 NOTE — LETTER
"    10/26/2021         RE: Monika Nam  [address] [facility] [address]        Dear Colleague,    Thank you for referring your patient, Monika Nam, to the [facility]. Please see a copy of my visit note below.    Assessment & Plan     Infiltrating ductal carcinoma of central portion of left breast in female (H)  Triple negative       BMI:   Estimated body mass index is 26.97 kg/m  as calculated from the following:    Height as of 10/1/21: 1.74 m (5' 8.5\").    Weight as of 10/1/21: 81.6 kg (180 lb).       Fiona Whitt MD  [facility]      Monika Nam is a 61 year old female who is being evaluated via a billable telephone visit.      How would you like to obtain your AVS? MyChart      Subjective     Monika Nam is a 61 year old female who presents today for the following   health issues:    She was treated with surgery and dose dense adjuvant therapy for triple negative breast cancer.  She has had a negative interval mammogram and has no findings for new breast cancer.  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  She recently had a colonoscopy that was normal.  She did break her wrist last summer, but that is now well healed.      Review of Systems   Constitutional: Positive for fatigue.   HENT: Negative.    Eyes: Negative.    Respiratory: Negative.    Cardiovascular: Negative.    Gastrointestinal: Negative.    Endocrine: Negative.    Genitourinary: Negative.    Musculoskeletal: Negative.    Skin: Negative.    Neurological: Negative.    Hematological: Negative.    Psychiatric/Behavioral: Negative.    All other systems reviewed and are negative.    Review of Systems   Constitutional: Positive for fatigue.   HENT:  Negative.    Eyes: Negative.    Respiratory: Negative.    Cardiovascular: Negative.    Gastrointestinal: Negative.    Endocrine: Negative.  " "  Genitourinary: Negative.     Musculoskeletal: Negative.    Skin: Negative.    Neurological: Negative.    Hematological: Negative.    Psychiatric/Behavioral: Negative.    All other systems reviewed and are negative.        Objective   LMP 04/02/2014   There is no height or weight on file to calculate BMI.        Patient is located in [place]  Phone call duration: 20 minutes            Oncologic History:  Infiltrating ductal carcinoma of central portion of left breast in female (H)  Monika Nam presented following her annual mammogram with new focal asymmetry at 12-1 o'clock position of the left breast around 10.7 cm from the nipple.  It measured 1.4 cm.  Subsequently the patient went on to have breast ultrasound on member 30th 2018 showing 1.6 cm in maximum dimension hypoechoic mass.  Subsequently ultrasound breast biopsy was done on December 17, 2018 showing invasive ductal carcinoma grade 3 out of 3 angiolymphatic invasion was not identified.  Ductal carcinoma in situ was not identified . estrogen receptor was negative, progesterone receptor was negative and HER-2/mercedes receptor was negative.        1/2019 left lumpectomy found 1.8 cm IDC, grade III, LN-, margin is clear, pT1cN0     The patient was started on dose dense chemotherapy with Adriamycin and Cytoxan followed by Taxol    Oncology Rooming Note    October 26, 2021 8:49 AM   Monika Nam is a 61 year old female who presents for:    No chief complaint on file.    Initial Vitals: LMP 04/02/2014  Estimated body mass index is 26.97 kg/m  as calculated from the following:    Height as of 10/1/21: 1.74 m (5' 8.5\").    Weight as of 10/1/21: 81.6 kg (180 lb). There is no height or weight on file to calculate BSA.  Data Unavailable Comment: Data Unavailable   Patient's last menstrual period was 04/02/2014.  Allergies reviewed: Yes  Medications reviewed: Yes    Medications: Medication refills not needed today.  Pharmacy name entered into [employer]:    [facility] #[id number] - [facility] [address] AT [address]    Clinical concerns: No new concerns. Dr. Whitt was NOT notified.       Marilee Cunha RN                    Again, thank you for allowing me to participate in the care of your patient.        Sincerely,        Fiona Whitt MD

## 2021-10-26 NOTE — LETTER
"    10/26/2021         RE: Monika Nam  [address] [facility] [address]        Dear Colleague,    Thank you for referring your patient, Monika Nam, to the [facility]. Please see a copy of my visit note below.    Assessment & Plan     Infiltrating ductal carcinoma of central portion of left breast in female (H)  Triple negative       BMI:   Estimated body mass index is 26.97 kg/m  as calculated from the following:    Height as of 10/1/21: 1.74 m (5' 8.5\").    Weight as of 10/1/21: 81.6 kg (180 lb).       Fiona Whitt MD  [facility]      Monika Nam is a 61 year old female who is being evaluated via a billable telephone visit.      How would you like to obtain your AVS? MyChart      Subjective     Monika Nam is a 61 year old female who presents today for the following   health issues:    She was treated with surgery and dose dense adjuvant therapy for triple negative breast cancer.  She has had a negative interval mammogram and has no findings for new breast cancer.  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  She recently had a colonoscopy that was normal.  She did break her wrist last summer, but that is now well healed.      Review of Systems   Constitutional: Positive for fatigue.   HENT: Negative.    Eyes: Negative.    Respiratory: Negative.    Cardiovascular: Negative.    Gastrointestinal: Negative.    Endocrine: Negative.    Genitourinary: Negative.    Musculoskeletal: Negative.    Skin: Negative.    Neurological: Negative.    Hematological: Negative.    Psychiatric/Behavioral: Negative.    All other systems reviewed and are negative.    Review of Systems   Constitutional: Positive for fatigue.   HENT:  Negative.    Eyes: Negative.    Respiratory: Negative.    Cardiovascular: Negative.    Gastrointestinal: Negative.    Endocrine: Negative.  " "  Genitourinary: Negative.     Musculoskeletal: Negative.    Skin: Negative.    Neurological: Negative.    Hematological: Negative.    Psychiatric/Behavioral: Negative.    All other systems reviewed and are negative.        Objective   LMP 04/02/2014   There is no height or weight on file to calculate BMI.        Patient is located in [place]  Phone call duration: 20 minutes            Oncologic History:  Infiltrating ductal carcinoma of central portion of left breast in female (H)  Monika Nam presented following her annual mammogram with new focal asymmetry at 12-1 o'clock position of the left breast around 10.7 cm from the nipple.  It measured 1.4 cm.  Subsequently the patient went on to have breast ultrasound on member 30th 2018 showing 1.6 cm in maximum dimension hypoechoic mass.  Subsequently ultrasound breast biopsy was done on December 17, 2018 showing invasive ductal carcinoma grade 3 out of 3 angiolymphatic invasion was not identified.  Ductal carcinoma in situ was not identified . estrogen receptor was negative, progesterone receptor was negative and HER-2/mercedes receptor was negative.        1/2019 left lumpectomy found 1.8 cm IDC, grade III, LN-, margin is clear, pT1cN0     The patient was started on dose dense chemotherapy with Adriamycin and Cytoxan followed by Taxol    Oncology Rooming Note    October 26, 2021 8:49 AM   Monika Nam is a 61 year old female who presents for:    No chief complaint on file.    Initial Vitals: LMP 04/02/2014  Estimated body mass index is 26.97 kg/m  as calculated from the following:    Height as of 10/1/21: 1.74 m (5' 8.5\").    Weight as of 10/1/21: 81.6 kg (180 lb). There is no height or weight on file to calculate BSA.  Data Unavailable Comment: Data Unavailable   Patient's last menstrual period was 04/02/2014.  Allergies reviewed: Yes  Medications reviewed: Yes    Medications: Medication refills not needed today.  Pharmacy name entered into [employer]:    [id number] - [facility] [address] AT [address]    Clinical concerns: No new concerns. Dr. Whitt was NOT notified.       Marilee Cunha RN                    Again, thank you for allowing me to participate in the care of your patient.        Sincerely,        Fiona hWitt MD

## 2022-03-23 NOTE — DISCHARGE INSTRUCTIONS
Use medication as directed.    May use acetaminophen, ibuprofen prn.  Follow up with PCP for recheck in 7 days, return sooner if symptoms worsen or change.    Discussed with patient using debrox drops as long as no ear canal pain or drainage from ears to remove the remaining wax after ear pain resolved from antibiotic use.     Patient voiced understanding of instructions given.       
no

## 2022-04-13 ENCOUNTER — TELEPHONE (OUTPATIENT)
Dept: ONCOLOGY | Facility: CLINIC | Age: 62
End: 2022-04-13
Payer: COMMERCIAL

## 2022-04-13 NOTE — TELEPHONE ENCOUNTER
----- Message from Sandy Simon sent at 4/13/2022 12:48 PM CDT -----  LM for patient to call to schedule  Thanks.  Sandy  ----- Message -----  From: Tonya Zayas RN  Sent: 4/5/2022   9:45 AM CDT  To: [facility]     Patient due for 6 month follow up visit with labs this month. Can you please call and assist her in getting scheduled?  Thanks!Treva

## 2022-05-17 ENCOUNTER — TELEPHONE (OUTPATIENT)
Dept: ONCOLOGY | Facility: CLINIC | Age: 62
End: 2022-05-17
Payer: COMMERCIAL

## 2022-05-17 NOTE — TELEPHONE ENCOUNTER
----- Message from Sandy Simon sent at 5/17/2022  8:49 AM CDT -----  LM for patient to call to schedule follow up . Thanks.  Sandy  ----- Message -----  From: Tonya Zayas RN  Sent: 5/13/2022   3:15 PM CDT  To: [facility]     Please call patient again to see if she can get scheduled for her 6 month follow up with labs. She was due in April. Thanks!  Treva

## 2022-05-23 ENCOUNTER — LAB (OUTPATIENT)
Dept: LAB | Facility: CLINIC | Age: 62
End: 2022-05-23
Payer: COMMERCIAL

## 2022-05-23 DIAGNOSIS — C50.112 INFILTRATING DUCTAL CARCINOMA OF CENTRAL PORTION OF LEFT BREAST IN FEMALE (H): ICD-10-CM

## 2022-05-23 LAB
ALBUMIN SERPL-MCNC: 3.8 G/DL (ref 3.4–5)
ALP SERPL-CCNC: 89 U/L (ref 40–150)
ALT SERPL W P-5'-P-CCNC: 22 U/L (ref 0–50)
ANION GAP SERPL CALCULATED.3IONS-SCNC: 7 MMOL/L (ref 3–14)
AST SERPL W P-5'-P-CCNC: 24 U/L (ref 0–45)
BILIRUB SERPL-MCNC: 0.8 MG/DL (ref 0.2–1.3)
BUN SERPL-MCNC: 9 MG/DL (ref 7–30)
CALCIUM SERPL-MCNC: 9 MG/DL (ref 8.5–10.1)
CANCER AG27-29 SERPL-ACNC: 11 U/ML (ref 0–39)
CHLORIDE BLD-SCNC: 100 MMOL/L (ref 94–109)
CO2 SERPL-SCNC: 27 MMOL/L (ref 20–32)
CREAT SERPL-MCNC: 0.65 MG/DL (ref 0.52–1.04)
ERYTHROCYTE [DISTWIDTH] IN BLOOD BY AUTOMATED COUNT: 13.8 % (ref 10–15)
GFR SERPL CREATININE-BSD FRML MDRD: >90 ML/MIN/1.73M2
GLUCOSE BLD-MCNC: 91 MG/DL (ref 70–99)
HCT VFR BLD AUTO: 39.9 % (ref 35–47)
HGB BLD-MCNC: 13.2 G/DL (ref 11.7–15.7)
MCH RBC QN AUTO: 29.5 PG (ref 26.5–33)
MCHC RBC AUTO-ENTMCNC: 33.1 G/DL (ref 31.5–36.5)
MCV RBC AUTO: 89 FL (ref 78–100)
PLATELET # BLD AUTO: 215 10E3/UL (ref 150–450)
POTASSIUM BLD-SCNC: 4.2 MMOL/L (ref 3.4–5.3)
PROT SERPL-MCNC: 7.9 G/DL (ref 6.8–8.8)
RBC # BLD AUTO: 4.47 10E6/UL (ref 3.8–5.2)
SODIUM SERPL-SCNC: 134 MMOL/L (ref 133–144)
WBC # BLD AUTO: 5.7 10E3/UL (ref 4–11)

## 2022-05-23 PROCEDURE — 86300 IMMUNOASSAY TUMOR CA 15-3: CPT

## 2022-05-23 PROCEDURE — 80053 COMPREHEN METABOLIC PANEL: CPT

## 2022-05-23 PROCEDURE — 85014 HEMATOCRIT: CPT

## 2022-05-23 PROCEDURE — 36415 COLL VENOUS BLD VENIPUNCTURE: CPT

## 2022-06-02 ENCOUNTER — ONCOLOGY VISIT (OUTPATIENT)
Dept: ONCOLOGY | Facility: CLINIC | Age: 62
End: 2022-06-02
Attending: INTERNAL MEDICINE
Payer: COMMERCIAL

## 2022-06-02 VITALS
BODY MASS INDEX: 26.07 KG/M2 | HEART RATE: 87 BPM | SYSTOLIC BLOOD PRESSURE: 173 MMHG | RESPIRATION RATE: 12 BRPM | WEIGHT: 174 LBS | DIASTOLIC BLOOD PRESSURE: 90 MMHG | TEMPERATURE: 98.2 F | OXYGEN SATURATION: 99 %

## 2022-06-02 DIAGNOSIS — Z12.31 ENCOUNTER FOR SCREENING MAMMOGRAM FOR BREAST CANCER: ICD-10-CM

## 2022-06-02 DIAGNOSIS — C50.912 ESTROGEN RECEPTOR NEGATIVE CARCINOMA OF BREAST, LEFT (H): Primary | ICD-10-CM

## 2022-06-02 DIAGNOSIS — Z17.1 ESTROGEN RECEPTOR NEGATIVE CARCINOMA OF BREAST, LEFT (H): Primary | ICD-10-CM

## 2022-06-02 PROCEDURE — 99214 OFFICE O/P EST MOD 30 MIN: CPT | Performed by: INTERNAL MEDICINE

## 2022-06-02 PROCEDURE — G0463 HOSPITAL OUTPT CLINIC VISIT: HCPCS

## 2022-06-02 NOTE — PROGRESS NOTES
"Oncology Rooming Note    June 2, 2022 3:11 PM   Monika Nam is a 61 year old female who presents for:    Chief Complaint   Patient presents with     Oncology Clinic Visit     Estrogen receptor negative carcinoma of breast, left - Provider visit only     Initial Vitals: BP (!) 173/90 (BP Location: Right arm, Patient Position: Sitting, Cuff Size: Adult Regular)   Pulse 87   Temp 98.2  F (36.8  C) (Tympanic)   Resp 12   Wt 78.9 kg (174 lb)   LMP 04/02/2014   SpO2 99%   BMI 26.07 kg/m   Estimated body mass index is 26.07 kg/m  as calculated from the following:    Height as of 10/1/21: 1.74 m (5' 8.5\").    Weight as of this encounter: 78.9 kg (174 lb). Body surface area is 1.95 meters squared.  Data Unavailable Comment: Data Unavailable   Patient's last menstrual period was 04/02/2014.  Allergies reviewed: Yes  Medications reviewed: Yes    Medications: Medication refills not needed today.  Pharmacy name entered into [facility]:    [id number] [place], [place] - [address] [place] [address] AT [address] AND [facility] #[id number] - [place], [place] - [address] AT [facility] & [address]    Clinical concerns:  None      Virginia Connolly CMA            "

## 2022-06-02 NOTE — LETTER
"    6/2/2022         RE: Monika Nam  [address] [facility] [address]        Dear Colleague,    Thank you for referring your patient, Monika Nam, to the [facility]. Please see a copy of my visit note below.    Oncology Rooming Note    June 2, 2022 3:11 PM   Monika Nam is a 61 year old female who presents for:    Chief Complaint   Patient presents with     Oncology Clinic Visit     Estrogen receptor negative carcinoma of breast, left - Provider visit only     Initial Vitals: BP (!) 173/90 (BP Location: Right arm, Patient Position: Sitting, Cuff Size: Adult Regular)   Pulse 87   Temp 98.2  F (36.8  C) (Tympanic)   Resp 12   Wt 78.9 kg (174 lb)   LMP 04/02/2014   SpO2 99%   BMI 26.07 kg/m   Estimated body mass index is 26.07 kg/m  as calculated from the following:    Height as of 10/1/21: 1.74 m (5' 8.5\").    Weight as of this encounter: 78.9 kg (174 lb). Body surface area is 1.95 meters squared.  Data Unavailable Comment: Data Unavailable   Patient's last menstrual period was 04/02/2014.  Allergies reviewed: Yes  Medications reviewed: Yes    Medications: Medication refills not needed today.  Pharmacy name entered into [facility]:    [id number] - [facility] [address] [place] [address] AT [address] [id number] - [place], [place] - [address] [facility] AT [address]    Clinical concerns:  None      Virginia Connolly CMA              The patient is being seen for the following issue/s:  1. Estrogen receptor negative carcinoma of breast, left (H)    Breast, left, ultrasound-guided needle core biopsy 12/2018:   - Invasive ductal carcinoma:    - [place] grade: III/III; [place] score: 8/9 (tubules:3; nuclear:3; mitosis:3)   - Angiolymphatic invasion not identified in the planes examined.   - Ductal carcinoma in-situ not identified in the planes examined   - ER: " Negative (no staining in tumor nuclei)   - DE: Negative (no staining in tumor nuclei)     Per previous documentation she was treated with lumpectomy and dose dense adjuvant chemotherapy for triple negative breast cancer.  The patient reports that she was also treated with adjuvant radiation.     She denies any self palpated lumps or changes to her breasts.  She is declining a breast exam today.  She is due for bilateral screening mammograms at the end of this month and would like to just wait for those.  She is also going to establish with a new primary care provider and I encouraged her to have her breasts examined when she goes in for her physical with primary care.    She had a positive fecal immunochemical occult blood screening test in 2021 and underwent colonoscopy in October 2021 that revealed tubular adenomas in the cecum and transverse colon. There were a total of 3 of them that were removed and were negative for high-grade dysplasia or malignancy.     She denies any overt bleeding, abdominal pain, persistent cough, shortness of breath, chest pain, or unintentional weight loss.    PHYSICAL EXAMINATION:    General: Todays' vital signs reviewed in the EMR.    BP (!) 173/90 (BP Location: Right arm, Patient Position: Sitting, Cuff Size: Adult Regular)   Pulse 87   Temp 98.2  F (36.8  C) (Tympanic)   Resp 12   Wt 78.9 kg (174 lb)   LMP 04/02/2014   SpO2 99%   BMI 26.07 kg/m      ECOG PS is 1  HEENT: No scleral icterus  Cardiovascular: Cor RRR  Respiratory: Lungs clear to auscultation bilaterally  Gastrointestinal: No abdominal tenderness, no palpable hepatosplenomegaly or mass.  Lymphatic: No palpable cervical, supraclavicular, infraclavicular, or axillary adenopathy.    ASSESSMENT/PLAN:    1. Estrogen receptor negative carcinoma of breast, left (H)      CBC, CMP, and CA 27-29 breast cancer tumor marker results from May 23, 2022 were reviewed which were all normal.    You stated today that you are going  to establish with a new primary care provider to have some of your medications adjusted.    You also stated today that you wish to reduce your visits to the oncology clinic as much as possible so I think it would be reasonable if you see your primary care provider for a history and physical examination in 6 months and return to see me for another history and physical examination in 12 months. I will recheck labs at that visit which will be hopefully be your last surveillance visit in the oncology clinic as you will be reaching the 5-year maximilian from the time of your diagnosis of breast cancer in December 2018.      Please continue bilateral annual mammography screening.    I spent a total of 31 minutes on the care of this patient on the day of service including face to face time and the remainder in chart review, care coordination, and documentation on the day of service.      Again, thank you for allowing me to participate in the care of your patient.        Sincerely,        Alex Mathis MD

## 2022-06-02 NOTE — PATIENT INSTRUCTIONS
CBC, CMP, and CA 27-29 breast cancer tumor marker results from May 23, 2022 were reviewed which were all normal.    You stated today that you are going to establish with a new primary care provider to have some of your medications adjusted.    You also stated today that you wish to reduce your visits to the oncology clinic as much as possible so I think it would be reasonable if you see your primary care provider for a history and physical examination in 6 months and return to see me for another history and physical examination in 12 months. I will recheck labs at that visit which will be hopefully be your last surveillance visit in the oncology clinic as you will be reaching the 5-year maximilian from the time of your diagnosis of breast cancer in December 2018.      Please continue bilateral annual mammography screening.

## 2022-06-02 NOTE — PROGRESS NOTES
The patient is being seen for the following issue/s:  1. Estrogen receptor negative carcinoma of breast, left (H)    Breast, left, ultrasound-guided needle core biopsy 12/2018:   - Invasive ductal carcinoma:    - [place] grade: III/III; [place] score: 8/9 (tubules:3; nuclear:3; mitosis:3)   - Angiolymphatic invasion not identified in the planes examined.   - Ductal carcinoma in-situ not identified in the planes examined   - ER: Negative (no staining in tumor nuclei)   - FL: Negative (no staining in tumor nuclei)     Per previous documentation she was treated with lumpectomy and dose dense adjuvant chemotherapy for triple negative breast cancer.  The patient reports that she was also treated with adjuvant radiation.     She denies any self palpated lumps or changes to her breasts.  She is declining a breast exam today.  She is due for bilateral screening mammograms at the end of this month and would like to just wait for those.  She is also going to establish with a new primary care provider and I encouraged her to have her breasts examined when she goes in for her physical with primary care.    She had a positive fecal immunochemical occult blood screening test in 2021 and underwent colonoscopy in October 2021 that revealed tubular adenomas in the cecum and transverse colon. There were a total of 3 of them that were removed and were negative for high-grade dysplasia or malignancy.     She denies any overt bleeding, abdominal pain, persistent cough, shortness of breath, chest pain, or unintentional weight loss.    PHYSICAL EXAMINATION:    General: Todays' vital signs reviewed in the EMR.    BP (!) 173/90 (BP Location: Right arm, Patient Position: Sitting, Cuff Size: Adult Regular)   Pulse 87   Temp 98.2  F (36.8  C) (Tympanic)   Resp 12   Wt 78.9 kg (174 lb)   LMP 04/02/2014   SpO2 99%   BMI 26.07 kg/m      ECOG PS is 1  HEENT: No scleral icterus  Cardiovascular: Cor RRR  Respiratory: Lungs clear to  auscultation bilaterally  Gastrointestinal: No abdominal tenderness, no palpable hepatosplenomegaly or mass.  Lymphatic: No palpable cervical, supraclavicular, infraclavicular, or axillary adenopathy.    ASSESSMENT/PLAN:    1. Estrogen receptor negative carcinoma of breast, left (H)      CBC, CMP, and CA 27-29 breast cancer tumor marker results from May 23, 2022 were reviewed which were all normal.    You stated today that you are going to establish with a new primary care provider to have some of your medications adjusted.    You also stated today that you wish to reduce your visits to the oncology clinic as much as possible so I think it would be reasonable if you see your primary care provider for a history and physical examination in 6 months and return to see me for another history and physical examination in 12 months. I will recheck labs at that visit which will be hopefully be your last surveillance visit in the oncology clinic as you will be reaching the 5-year maximilian from the time of your diagnosis of breast cancer in December 2018.      Please continue bilateral annual mammography screening.    I spent a total of 31 minutes on the care of this patient on the day of service including face to face time and the remainder in chart review, care coordination, and documentation on the day of service.

## 2022-06-03 ENCOUNTER — PATIENT OUTREACH (OUTPATIENT)
Dept: CARE COORDINATION | Facility: CLINIC | Age: 62
End: 2022-06-03
Payer: COMMERCIAL

## 2022-06-03 NOTE — PROGRESS NOTES
Oncology Distress Screening Follow-up  Clinical Social Work  [facility]    Identified Concern and Score From Distress Screenin. How concerned are you about your ability to eat?  0       2. How concerned are you about unintended weight loss or your current weight?  0       3. How concerned are you about feeling depressed or very sad?  5       4. How concerned are you about feeling anxious or very scared?  8 Abnormal        5. Do you struggle with the loss of meaning and yeimi in your life?  Not at all       6. How concerned are you about work and home life issues that may be affected by your cancer?  0       7. How concerned are you about knowing what resources are available to help you?  0       8. Do you currently have what you would describe as Voodoo or spiritual struggles?             Not at all         Date of Distress Screenin2022    Intervention:   Monika is a 61-year-old woman with a diagnosis of breast cancer who is followed by Dr. Mathis at [facility]. At time of last visit, she scored positive on oncology distress screen. This clinician called Monika today with goal of following up on elevated distress screen and orienting to role of oncology social work as available resource on care team.    Monika reports that she is working to establish with new PCP as prior PCP retired, and she is in need of additional medication support for anxiety. Monika reports that  also  2022, and she has been coping with multiple life changes since that time. Monika receptive to this clinician emailing resources for therapeutic support close to home in [place].     Resources Emailed:   Psychology Today  Behavioral Access Line   [facility]  [facility]  Onc  contact information    Follow-up Required:   No planned followup at this time. Monika knows to outreach in future as needed.       DELANEY Livingston, LICSW  Phone:  [phone]  [facility]: [place]  *every other Tue, 8am-4:30pm  [facility]: W, F, *every other Tue, 8am-4:30pm

## 2022-06-09 ENCOUNTER — TELEPHONE (OUTPATIENT)
Dept: FAMILY MEDICINE | Facility: CLINIC | Age: 62
End: 2022-06-09
Payer: COMMERCIAL

## 2022-06-09 NOTE — TELEPHONE ENCOUNTER
Patient Quality Outreach    Patient is due for the following:   Cervical Cancer Screening - PAP Needed    NEXT STEPS:   No follow up needed at this time.    Type of outreach:    Sent letter.    Next Steps:  Reach out within 90 days via Letter.    Max number of attempts reached: No. Will try again in 90 days if patient still on fail list.    Questions for provider review:    None     Deborah Chaparro CMA  Chart routed to none.

## 2022-06-09 NOTE — LETTER
June 9, 2022      Monika CARRANZA Russell  [address] [facility] [address]      Your healthcare team cares about your health. To provide you with the best care,   we have reviewed your chart and based on our findings, we see that you are due to:     - CERVICAL CANCER SCREENING: Schedule a Cervical Cancer Screening, with Pap and wellness exam.     If you have already completed these items, please contact the clinic via phone or   Mychart so your care team can review and update your records. Thank you for   choosing [facility] for your healthcare needs. For any questions,   concerns, or to schedule an appointment please contact the clinic.       Healthy Regards,      Your [facility] Care Team

## 2022-06-24 ENCOUNTER — HOSPITAL ENCOUNTER (OUTPATIENT)
Dept: MAMMOGRAPHY | Facility: CLINIC | Age: 62
Discharge: HOME | End: 2022-06-24
Attending: INTERNAL MEDICINE | Admitting: INTERNAL MEDICINE
Payer: COMMERCIAL

## 2022-06-24 DIAGNOSIS — C50.912 ESTROGEN RECEPTOR NEGATIVE CARCINOMA OF BREAST, LEFT (H): ICD-10-CM

## 2022-06-24 DIAGNOSIS — Z17.1 ESTROGEN RECEPTOR NEGATIVE CARCINOMA OF BREAST, LEFT (H): ICD-10-CM

## 2022-06-24 DIAGNOSIS — Z12.31 ENCOUNTER FOR SCREENING MAMMOGRAM FOR BREAST CANCER: ICD-10-CM

## 2022-06-24 PROCEDURE — 77067 SCR MAMMO BI INCL CAD: CPT

## 2022-07-25 DIAGNOSIS — F34.1 CHRONIC DEPRESSIVE PERSONALITY DISORDER: ICD-10-CM

## 2022-07-25 NOTE — TELEPHONE ENCOUNTER
"Requested Prescriptions   Pending Prescriptions Disp Refills    FLUoxetine (PROZAC) 20 MG capsule 90 capsule 3     Sig: Take 20 mg daily.        SSRIs Protocol Failed - 7/25/2022  8:28 AM        Failed - PHQ-9 score less than 5 in past 6 months     Please review last PHQ-9 score.           Failed - Recent (6 mo) or future (30 days) visit within the authorizing provider's specialty     Patient had office visit in the last 6 months or has a visit in the next 30 days with authorizing provider or within the authorizing provider's specialty.  See \"Patient Info\" tab in inbasket, or \"Choose Columns\" in Meds & Orders section of the refill encounter.            Passed - Medication is active on med list        Passed - Patient is age 18 or older        Passed - No active pregnancy on record        Passed - No positive pregnancy test in last 12 months            Writer sent PHQ9 to patient via [facility].  "

## 2022-08-15 ENCOUNTER — OFFICE VISIT (OUTPATIENT)
Dept: FAMILY MEDICINE | Facility: OTHER | Age: 62
End: 2022-08-15
Payer: COMMERCIAL

## 2022-08-15 VITALS
TEMPERATURE: 98 F | OXYGEN SATURATION: 99 % | DIASTOLIC BLOOD PRESSURE: 72 MMHG | WEIGHT: 168 LBS | BODY MASS INDEX: 25.46 KG/M2 | SYSTOLIC BLOOD PRESSURE: 116 MMHG | HEART RATE: 92 BPM | RESPIRATION RATE: 16 BRPM | HEIGHT: 68 IN

## 2022-08-15 DIAGNOSIS — C50.112 INFILTRATING DUCTAL CARCINOMA OF CENTRAL PORTION OF LEFT BREAST IN FEMALE (H): ICD-10-CM

## 2022-08-15 DIAGNOSIS — Z87.891 PERSONAL HISTORY OF TOBACCO USE: ICD-10-CM

## 2022-08-15 DIAGNOSIS — L30.9 ECZEMA, UNSPECIFIED TYPE: ICD-10-CM

## 2022-08-15 DIAGNOSIS — F06.4 ANXIETY DISORDER DUE TO MEDICAL CONDITION: ICD-10-CM

## 2022-08-15 DIAGNOSIS — R87.810 CERVICAL HIGH RISK HPV (HUMAN PAPILLOMAVIRUS) TEST POSITIVE: ICD-10-CM

## 2022-08-15 DIAGNOSIS — J45.20 MILD INTERMITTENT ASTHMA WITHOUT COMPLICATION: ICD-10-CM

## 2022-08-15 DIAGNOSIS — Z12.4 CERVICAL CANCER SCREENING: ICD-10-CM

## 2022-08-15 DIAGNOSIS — Z11.4 SCREENING FOR HIV (HUMAN IMMUNODEFICIENCY VIRUS): ICD-10-CM

## 2022-08-15 DIAGNOSIS — C50.912 ESTROGEN RECEPTOR NEGATIVE CARCINOMA OF BREAST, LEFT (H): ICD-10-CM

## 2022-08-15 DIAGNOSIS — Z00.00 ROUTINE GENERAL MEDICAL EXAMINATION AT A HEALTH CARE FACILITY: Primary | ICD-10-CM

## 2022-08-15 DIAGNOSIS — F17.200 NICOTINE DEPENDENCE, UNCOMPLICATED, UNSPECIFIED NICOTINE PRODUCT TYPE: ICD-10-CM

## 2022-08-15 DIAGNOSIS — Z17.1 ESTROGEN RECEPTOR NEGATIVE CARCINOMA OF BREAST, LEFT (H): ICD-10-CM

## 2022-08-15 DIAGNOSIS — F34.1 CHRONIC DEPRESSIVE PERSONALITY DISORDER: ICD-10-CM

## 2022-08-15 LAB
CHOLEST SERPL-MCNC: 157 MG/DL
FASTING STATUS PATIENT QL REPORTED: YES
HDLC SERPL-MCNC: 91 MG/DL
LDLC SERPL CALC-MCNC: 55 MG/DL
NONHDLC SERPL-MCNC: 66 MG/DL
TRIGL SERPL-MCNC: 54 MG/DL

## 2022-08-15 PROCEDURE — 99214 OFFICE O/P EST MOD 30 MIN: CPT | Mod: 25 | Performed by: FAMILY MEDICINE

## 2022-08-15 PROCEDURE — 90471 IMMUNIZATION ADMIN: CPT | Performed by: FAMILY MEDICINE

## 2022-08-15 PROCEDURE — G0296 VISIT TO DETERM LDCT ELIG: HCPCS | Performed by: FAMILY MEDICINE

## 2022-08-15 PROCEDURE — 36415 COLL VENOUS BLD VENIPUNCTURE: CPT | Performed by: FAMILY MEDICINE

## 2022-08-15 PROCEDURE — 80061 LIPID PANEL: CPT | Performed by: FAMILY MEDICINE

## 2022-08-15 PROCEDURE — 90750 HZV VACC RECOMBINANT IM: CPT | Performed by: FAMILY MEDICINE

## 2022-08-15 PROCEDURE — 90472 IMMUNIZATION ADMIN EACH ADD: CPT | Performed by: FAMILY MEDICINE

## 2022-08-15 PROCEDURE — G0145 SCR C/V CYTO,THINLAYER,RESCR: HCPCS | Performed by: FAMILY MEDICINE

## 2022-08-15 PROCEDURE — 87624 HPV HI-RISK TYP POOLED RSLT: CPT | Performed by: FAMILY MEDICINE

## 2022-08-15 PROCEDURE — 99396 PREV VISIT EST AGE 40-64: CPT | Mod: 25 | Performed by: FAMILY MEDICINE

## 2022-08-15 PROCEDURE — 90677 PCV20 VACCINE IM: CPT | Performed by: FAMILY MEDICINE

## 2022-08-15 RX ORDER — ALBUTEROL SULFATE 90 UG/1
2 AEROSOL, METERED RESPIRATORY (INHALATION) EVERY 4 HOURS PRN
Qty: 18 G | Refills: 11 | Status: SHIPPED | OUTPATIENT
Start: 2022-08-15 | End: 2023-09-20

## 2022-08-15 RX ORDER — BETAMETHASONE DIPROPIONATE 0.5 MG/G
CREAM TOPICAL
Qty: 60 G | Refills: 6 | Status: SHIPPED | OUTPATIENT
Start: 2022-08-15 | End: 2023-09-20

## 2022-08-15 ASSESSMENT — ENCOUNTER SYMPTOMS
HEMATOCHEZIA: 0
ARTHRALGIAS: 0
FREQUENCY: 0
BREAST MASS: 0
COUGH: 1
NERVOUS/ANXIOUS: 1
FEVER: 0
DYSURIA: 0
PALPITATIONS: 0
SHORTNESS OF BREATH: 1
JOINT SWELLING: 0
MYALGIAS: 0
PARESTHESIAS: 0
NAUSEA: 0
WEAKNESS: 0
HEARTBURN: 1
SORE THROAT: 0
DIARRHEA: 0
HEADACHES: 0
CHILLS: 0
HEMATURIA: 0
EYE PAIN: 0
CONSTIPATION: 0
DIZZINESS: 1
ABDOMINAL PAIN: 0

## 2022-08-15 ASSESSMENT — ANXIETY QUESTIONNAIRES
3. WORRYING TOO MUCH ABOUT DIFFERENT THINGS: MORE THAN HALF THE DAYS
1. FEELING NERVOUS, ANXIOUS, OR ON EDGE: SEVERAL DAYS
4. TROUBLE RELAXING: SEVERAL DAYS
5. BEING SO RESTLESS THAT IT IS HARD TO SIT STILL: NOT AT ALL
7. FEELING AFRAID AS IF SOMETHING AWFUL MIGHT HAPPEN: SEVERAL DAYS
GAD7 TOTAL SCORE: 7
GAD7 TOTAL SCORE: 7
6. BECOMING EASILY ANNOYED OR IRRITABLE: NOT AT ALL
2. NOT BEING ABLE TO STOP OR CONTROL WORRYING: MORE THAN HALF THE DAYS
IF YOU CHECKED OFF ANY PROBLEMS ON THIS QUESTIONNAIRE, HOW DIFFICULT HAVE THESE PROBLEMS MADE IT FOR YOU TO DO YOUR WORK, TAKE CARE OF THINGS AT HOME, OR GET ALONG WITH OTHER PEOPLE: NOT DIFFICULT AT ALL

## 2022-08-15 ASSESSMENT — ASTHMA QUESTIONNAIRES
QUESTION_1 LAST FOUR WEEKS HOW MUCH OF THE TIME DID YOUR ASTHMA KEEP YOU FROM GETTING AS MUCH DONE AT WORK, SCHOOL OR AT HOME: NONE OF THE TIME
QUESTION_5 LAST FOUR WEEKS HOW WOULD YOU RATE YOUR ASTHMA CONTROL: WELL CONTROLLED
QUESTION_2 LAST FOUR WEEKS HOW OFTEN HAVE YOU HAD SHORTNESS OF BREATH: ONCE OR TWICE A WEEK
QUESTION_3 LAST FOUR WEEKS HOW OFTEN DID YOUR ASTHMA SYMPTOMS (WHEEZING, COUGHING, SHORTNESS OF BREATH, CHEST TIGHTNESS OR PAIN) WAKE YOU UP AT NIGHT OR EARLIER THAN USUAL IN THE MORNING: NOT AT ALL
ACT_TOTALSCORE: 23
ACT_TOTALSCORE: 23
QUESTION_4 LAST FOUR WEEKS HOW OFTEN HAVE YOU USED YOUR RESCUE INHALER OR NEBULIZER MEDICATION (SUCH AS ALBUTEROL): NOT AT ALL

## 2022-08-15 ASSESSMENT — PATIENT HEALTH QUESTIONNAIRE - PHQ9
SUM OF ALL RESPONSES TO PHQ QUESTIONS 1-9: 6
SUM OF ALL RESPONSES TO PHQ QUESTIONS 1-9: 6
10. IF YOU CHECKED OFF ANY PROBLEMS, HOW DIFFICULT HAVE THESE PROBLEMS MADE IT FOR YOU TO DO YOUR WORK, TAKE CARE OF THINGS AT HOME, OR GET ALONG WITH OTHER PEOPLE: SOMEWHAT DIFFICULT

## 2022-08-15 ASSESSMENT — PAIN SCALES - GENERAL: PAINLEVEL: NO PAIN (0)

## 2022-08-15 NOTE — PROGRESS NOTES
SUBJECTIVE:   CC: Monika Nam is an 62 year old woman who presents for preventive health visit.       Patient has been advised of split billing requirements and indicates understanding: Yes  Healthy Habits:     Getting at least 3 servings of Calcium per day:  Yes    Bi-annual eye exam:  Yes    Dental care twice a year:  NO    Sleep apnea or symptoms of sleep apnea:  None    Diet:  Regular (no restrictions)    Duration of exercise:  15-30 minutes    Taking medications regularly:  Yes    Medication side effects:  None    PHQ-2 Total Score: 1    Additional concerns today:  No              Today's PHQ-2 Score:   PHQ-2 ( 1999 Pfizer) 8/15/2022   Q1: Little interest or pleasure in doing things 1   Q2: Feeling down, depressed or hopeless 0   PHQ-2 Score 1   PHQ-2 Total Score (12-17 Years)- Positive if 3 or more points; Administer PHQ-A if positive -   Q1: Little interest or pleasure in doing things Several days   Q2: Feeling down, depressed or hopeless Not at all   PHQ-2 Score 1       Abuse: Current or Past (Physical, Sexual or Emotional) - No  Do you feel safe in your environment? Yes        Social History     Tobacco Use     Smoking status: Current Every Day Smoker     Packs/day: 0.50     Years: 25.00     Pack years: 12.50     Types: Cigarettes     Smokeless tobacco: Never Used     Tobacco comment: 7-8cigarettes a day/has cut back   Substance Use Topics     Alcohol use: Yes     Comment: 3-4 X per week.  About 7 drinks per week.         Alcohol Use 8/15/2022   Prescreen: >3 drinks/day or >7 drinks/week? Yes   Prescreen: >3 drinks/day or >7 drinks/week? -   AUDIT SCORE  5       Reviewed orders with patient.  Reviewed health maintenance and updated orders accordingly - Yes  Lab work is in process    Breast Cancer Screening:    FHS-7:   Breast CA Risk Assessment (FHS-7) 6/24/2022   Did any of your first-degree relatives have breast or ovarian cancer? No   Did any of your relatives have bilateral breast cancer? No    Did any man in your family have breast cancer? No   Did any woman in your family have breast and ovarian cancer? No   Did any woman in your family have breast cancer before age 50 y? No   Do you have 2 or more relatives with breast and/or ovarian cancer? No   Do you have 2 or more relatives with breast and/or bowel cancer? No       Mammogram Screening: Recommended mammography every 1-2 years with patient discussion and risk factor consideration  Pertinent mammograms are reviewed under the imaging tab.    History of abnormal Pap smear: NO - age 30-65 PAP every 5 years with negative HPV co-testing recommended  PAP / HPV 7/25/2014 7/15/2013 5/24/2012   PAP (Historical) ASC-US(A) ASC-US(A) NIL     Reviewed and updated as needed this visit by clinical staff   Tobacco  Allergies  Meds  Problems  Med Hx  Surg Hx  Fam Hx  Soc   Hx          Reviewed and updated as needed this visit by Provider   Tobacco  Allergies  Meds  Problems  Med Hx  Surg Hx  Fam Hx               Review of Systems   Constitutional: Negative for chills and fever.   HENT: Negative for congestion, ear pain, hearing loss and sore throat.    Eyes: Negative for pain and visual disturbance.   Respiratory: Positive for cough and shortness of breath.    Cardiovascular: Negative for chest pain, palpitations and peripheral edema.   Gastrointestinal: Positive for heartburn. Negative for abdominal pain, constipation, diarrhea, hematochezia and nausea.   Breasts:  Negative for tenderness, breast mass and discharge.   Genitourinary: Negative for dysuria, frequency, genital sores, hematuria, pelvic pain, urgency, vaginal bleeding and vaginal discharge.   Musculoskeletal: Negative for arthralgias, joint swelling and myalgias.   Skin: Positive for rash.   Neurological: Positive for dizziness. Negative for weakness, headaches and paresthesias.   Psychiatric/Behavioral: Negative for mood changes. The patient is nervous/anxious.           OBJECTIVE:   BP  "116/72 (BP Location: Right arm, Patient Position: Sitting, Cuff Size: Adult Regular)   Pulse 92   Temp 98  F (36.7  C) (Temporal)   Resp 16   Ht 1.725 m (5' 7.9\")   Wt 76.2 kg (168 lb)   LMP 04/02/2014   SpO2 99%   Breastfeeding No   BMI 25.62 kg/m    Physical Exam  GENERAL: healthy, alert and no distress  EYES: Eyes grossly normal to inspection, PERRL and conjunctivae and sclerae normal  HENT: ear canals and TM's normal, nose and mouth without ulcers or lesions  NECK: no adenopathy, no asymmetry, masses, or scars and thyroid normal to palpation  RESP: lungs clear to auscultation - no rales, rhonchi or wheezes  BREAST: Scar on both sides above breast with the L side having a small nodule present on the scar line - stable since initial cancer per patient and was just a thickened scar. Otherwise normal without masses, tenderness or nipple discharge and no palpable axillary masses or adenopathy  CV: regular rate and rhythm, normal S1 S2, no S3 or S4, no murmur, click or rub, no peripheral edema and peripheral pulses strong  ABDOMEN: soft, nontender, no hepatosplenomegaly, no masses and bowel sounds normal  MS: no gross musculoskeletal defects noted, no edema  SKIN: no suspicious lesions or rashes  NEURO: Normal strength and tone, mentation intact and speech normal  PSYCH: mentation appears normal, affect normal/bright        ASSESSMENT/PLAN:       ICD-10-CM    1. Routine general medical examination at a health care facility  Z00.00 Lipid panel reflex to direct LDL Non-fasting   2. Anxiety disorder due to medical condition  F06.4    3. Chronic depressive personality disorder  F34.1 FLUoxetine (PROZAC) 20 MG capsule   4. Mild intermittent asthma without complication  J45.20 albuterol (PROAIR HFA/PROVENTIL HFA/VENTOLIN HFA) 108 (90 Base) MCG/ACT inhaler   5. Infiltrating ductal carcinoma of central portion of left breast in female (H)  C50.112    6. Estrogen receptor negative carcinoma of breast, left (H)  " C50.912     Z17.1    7. Cervical high risk HPV (human papillomavirus) test positive  R87.810    8. Personal history of tobacco use  Z87.891 Prof fee: Shared Decision Making for Lung Cancer Screening     CT Chest Lung Cancer Scrn Low Dose wo   9. Nicotine dependence, uncomplicated, unspecified nicotine product type  F17.200 [place] Quit Partner Referral   10. Screening for HIV (human immunodeficiency virus)  Z11.4 CANCELED: HIV Antigen Antibody Combo   11. Cervical cancer screening  Z12.4 Pap Screen with HPV - recommended age 30 - 65 years   12. Eczema, unspecified type  L30.9 augmented betamethasone dipropionate (DIPROLENE AF) 0.05 % external cream     Patient continues to follow with oncology but is staying stable with her breast exam and evaluations.  She does have a thickened scar on the left breast which has been present since early treatment and evaluated without change.  We did discuss lung cancer screening as well as Pap smears and she plans to complete both of these  She is a smoker who has been dealing with asthma and eczema and we did encourage her to quit smoking for which she does have a quit plan for this fall as she is moved into a new location she cannot smoke indoors nor in the garage and does not want to continue through the cold winter.  We did give her quit line resources to have nicotine replacement if needed and she will see if she feels this is needed as the date approaches.  Also patient has been getting her psychiatric medications for her psychiatrist and is looking for us to fill these now.  We discussed with her and feels that she is doing well with her current dosing and that this was renewed today with plan for recheck in 6 months    Patient has been advised of split billing requirements and indicates understanding: Yes    COUNSELING:  Reviewed preventive health counseling, as reflected in patient instructions       Regular exercise       Healthy diet/nutrition    Estimated body mass index is  "25.62 kg/m  as calculated from the following:    Height as of this encounter: 1.725 m (5' 7.9\").    Weight as of this encounter: 76.2 kg (168 lb).    Weight management plan: Discussed healthy diet and exercise guidelines    She reports that she has been smoking cigarettes. She has a 12.50 pack-year smoking history. She has never used smokeless tobacco.  Nicotine/Tobacco Cessation Plan:   Phone counseling: Place order for Quit Partner Referral      Counseling Resources:  ATP IV Guidelines  Pooled Cohorts Equation Calculator  Breast Cancer Risk Calculator  BRCA-Related Cancer Risk Assessment: FHS-7 Tool  FRAX Risk Assessment  ICSI Preventive Guidelines  Dietary Guidelines for Americans, 2010  USDA's MyPlate  ASA Prophylaxis  Lung CA Screening    Lorena Brwon MD, MD  [facility]  Answers for HPI/ROS submitted by the patient on 8/15/2022  If you checked off any problems, how difficult have these problems made it for you to do your work, take care of things at home, or get along with other people?: Somewhat difficult  PHQ9 TOTAL SCORE: 6      "

## 2022-08-15 NOTE — PATIENT INSTRUCTIONS
1-800-QUIT-NOW       Preventive Health Recommendations  Female Ages 50 - 64    Yearly exam: See your health care provider every year in order to  o Review health changes.   o Discuss preventive care.    o Review your medicines if your doctor has prescribed any.      Get a Pap test every three years (unless you have an abnormal result and your provider advises testing more often).    If you get Pap tests with HPV test, you only need to test every 5 years, unless you have an abnormal result.     You do not need a Pap test if your uterus was removed (hysterectomy) and you have not had cancer.    You should be tested each year for STDs (sexually transmitted diseases) if you're at risk.     Have a mammogram every 1 to 2 years.    Have a colonoscopy at age 50, or have a yearly FIT test (stool test). These exams screen for colon cancer.      Have a cholesterol test every 5 years, or more often if advised.    Have a diabetes test (fasting glucose) every three years. If you are at risk for diabetes, you should have this test more often.     If you are at risk for osteoporosis (brittle bone disease), think about having a bone density scan (DEXA).    Shots: Get a flu shot each year. Get a tetanus shot every 10 years.    Nutrition:     Eat at least 5 servings of fruits and vegetables each day.    Eat whole-grain bread, whole-wheat pasta and brown rice instead of white grains and rice.    Get adequate Calcium and Vitamin D.     Lifestyle    Exercise at least 150 minutes a week (30 minutes a day, 5 days a week). This will help you control your weight and prevent disease.    Limit alcohol to one drink per day.    No smoking.     Wear sunscreen to prevent skin cancer.     See your dentist every six months for an exam and cleaning.    See your eye doctor every 1 to 2 years.    Preventive Health Recommendations  Female Ages 50 - 64    Yearly exam: See your health care provider every year in order to  o Review health changes.    o Discuss preventive care.    o Review your medicines if your doctor has prescribed any.      Get a Pap test every three years (unless you have an abnormal result and your provider advises testing more often).    If you get Pap tests with HPV test, you only need to test every 5 years, unless you have an abnormal result.     You do not need a Pap test if your uterus was removed (hysterectomy) and you have not had cancer.    You should be tested each year for STDs (sexually transmitted diseases) if you're at risk.     Have a mammogram every 1 to 2 years.    Have a colonoscopy at age 50, or have a yearly FIT test (stool test). These exams screen for colon cancer.      Have a cholesterol test every 5 years, or more often if advised.    Have a diabetes test (fasting glucose) every three years. If you are at risk for diabetes, you should have this test more often.     If you are at risk for osteoporosis (brittle bone disease), think about having a bone density scan (DEXA).    Shots: Get a flu shot each year. Get a tetanus shot every 10 years.    Nutrition:     Eat at least 5 servings of fruits and vegetables each day.    Eat whole-grain bread, whole-wheat pasta and brown rice instead of white grains and rice.    Get adequate Calcium and Vitamin D.     Lifestyle    Exercise at least 150 minutes a week (30 minutes a day, 5 days a week). This will help you control your weight and prevent disease.    Limit alcohol to one drink per day.    No smoking.     Wear sunscreen to prevent skin cancer.     See your dentist every six months for an exam and cleaning.    See your eye doctor every 1 to 2 years.    Lung Cancer Screening   Frequently Asked Questions  If you are at high-risk for lung cancer, getting screened with low-dose computed tomography (LDCT) every year can help save your life. This handout offers answers to some of the most common questions about lung cancer screening. If you have other questions, please call  [phone] ([phone]).     What is it?  Lung cancer screening uses special X-ray technology to create an image of your lung tissue. The exam is quick and easy and takes less than 10 seconds. We don t give you any medicine or use any needles. You can eat before and after the exam. You don t need to change your clothes as long as the clothing on your chest doesn t contain metal. But, you do need to be able to hold your breath for at least 6 seconds during the exam.    What is the goal of lung cancer screening?  The goal of lung cancer screening is to save lives. Many times, lung cancer is not found until a person starts having physical symptoms. Lung cancer screening can help detect lung cancer in the earliest stages when it may be easier to treat.    Who should be screened for lung cancer?  We suggest lung cancer screening for anyone who is at high-risk for lung cancer. You are in the high-risk group if you:      are between the ages of 55 and 79, and    have smoked at least 1 pack of cigarettes a day for 20 or more years, and    still smoke or have quit within the past 15 years.    However, if you have a new cough or shortness of breath, you should talk to your doctor before being screened.    Why does it matter if I have symptoms?  Certain symptoms can be a sign that you have a condition in your lungs that should be checked and treated by your doctor. These symptoms include fever, chest pain, a new or changing cough, shortness of breath that you have never felt before, coughing up blood or unexplained weight loss. Having any of these symptoms can greatly affect the results of lung cancer screening.       Should all smokers get an LDCT lung cancer screening exam?  It depends. Lung cancer screening is for a very specific group of men and women who have a history of heavy smoking over a long period of time (see  Who should be screened for lung cancer  above).  I am in the high-risk group, but have been  diagnosed with cancer in the past. Is LDCT lung cancer screening right for me?  In some cases, you should not have LDCT lung screening, such as when your doctor is already following your cancer with CT scan studies. Your doctor will help you decide if LDCT lung screening is right for you.  Do I need to have a screening exam every year?  Yes. If you are in the high-risk group described earlier, you should get an LDCT lung cancer screening exam every year until you are 79, or are no longer willing or able to undergo screening and possible procedures to diagnose and treat lung cancer.  How effective is LDCT at preventing death from lung cancer?  Studies have shown that LDCT lung cancer screening can lower the risk of death from lung cancer by 20 percent in people who are at high-risk.  What are the risks?  There are some risks and limitations of LDCT lung cancer screening. We want to make sure you understand the risks and benefits, so please let us know if you have any questions. Your doctor may want to talk with you more about these risks.    Radiation exposure: As with any exam that uses radiation, there is a very small increased risk of cancer. The amount of radiation in LDCT is small--about the same amount a person would get from a mammogram. Your doctor orders the exam when he or she feels the potential benefits outweigh the risks.    False negatives: No test is perfect, including LDCT. It is possible that you may have a medical condition, including lung cancer, that is not found during your exam. This is called a false negative result.    False positives and more testing: LDCT very often finds something in the lung that could be cancer, but in fact is not. This is called a false positive result. False positive tests often cause anxiety. To make sure these findings are not cancer, you may need to have more tests. These tests will be done only if you give us permission. Sometimes patients need a treatment that can  have side effects, such as a biopsy. For more information on false positives, see  What can I expect from the results?     Findings not related to lung cancer: Your LDCT exam also takes pictures of areas of your body next to your lungs. In a very small number of cases, the CT scan will show an abnormal finding in one of these areas, such as your kidneys, adrenal glands, liver or thyroid. This finding may not be serious, but you may need more tests. Your doctor can help you decide what other tests you may need, if any.  What can I expect from the results?  About 1 out of 4 LDCT exams will find something that may need more tests. Most of the time, these findings are lung nodules. Lung nodules are very small collections of tissue in the lung. These nodules are very common, and the vast majority--more than 97 percent--are not cancer (benign). Most are normal lymph nodes or small areas of scarring from past infections.  But, if a small lung nodule is found to be cancer, the cancer can be cured more than 90 percent of the time. To know if the nodule is cancer, we may need to get more images before your next yearly screening exam. If the nodule has suspicious features (for example, it is large, has an odd shape or grows over time), we will refer you to a specialist for further testing.  Will my doctor also get the results?  Yes. Your doctor will get a copy of your results.  Is it okay to keep smoking now that there s a cancer screening exam?  No. Tobacco is one of the strongest cancer-causing agents. It causes not only lung cancer, but other cancers and cardiovascular (heart) diseases as well. The damage caused by smoking builds over time. This means that the longer you smoke, the higher your risk of disease. While it is never too late to quit, the sooner you quit, the better.  Where can I find help to quit smoking?  The best way to prevent lung cancer is to stop smoking. If you have already quit smoking, congratulations  and keep it up! For help on quitting smoking, please call [facility] at 4-732-QUITNOW (1-245.945.1768) or the [employer] at 1-354.890.6191 to find local resources near you.  One-on-one health coaching:  If you d prefer to work individually with a health care provider on tobacco cessation, we offer:      Medication Therapy Management:  Our specially trained pharmacists work closely with you and your doctor to help you quit smoking.  Call [phone] or [phone] (toll free).

## 2022-08-15 NOTE — PROGRESS NOTES
Lung Cancer Screening Shared Decision Making Visit     Monika Nam, a 62 year old female, is eligible for lung cancer screening    History   Smoking Status     Current Every Day Smoker     Packs/day: 0.50     Years: 25.00     Types: Cigarettes   Smokeless Tobacco     Never Used     Comment: 7-8cigarettes a day/has cut back       I have discussed with patient the risks and benefits of screening for lung cancer with low-dose CT.     The risks include:    radiation exposure: one low dose chest CT has as much ionizing radiation as about 15 chest x-rays, or 6 months of background radiation living in [place]      false positives: most findings/nodules are NOT cancer, but some might still require additional diagnostic evaluation, including biopsy    over-diagnosis: some slow growing cancers that might never have been clinically significant will be detected and treated unnecessarily     The benefit of early detection of lung cancer is contingent upon adherence to annual screening or more frequent follow up if indicated.     Furthermore, to benefit from screening, Monika must be willing and able to undergo diagnostic procedures, if indicated. Although no specific guide is available for determining severity of comorbidities, it is reasonable to withhold screening in patients who have greater mortality risk from other diseases.     We did discuss that the best way to prevent lung cancer is to not smoke.    Some patients may value a numeric estimation of lung cancer risk when evaluating if lung cancer screening is right for them, here is one calculator:    ShouldIScreen

## 2022-08-18 LAB
BKR LAB AP GYN ADEQUACY: NORMAL
BKR LAB AP GYN INTERPRETATION: NORMAL
BKR LAB AP HPV REFLEX: NORMAL
BKR LAB AP PREVIOUS ABNORMAL: NORMAL
PATH REPORT.COMMENTS IMP SPEC: NORMAL
PATH REPORT.COMMENTS IMP SPEC: NORMAL
PATH REPORT.RELEVANT HX SPEC: NORMAL

## 2022-08-22 ENCOUNTER — PATIENT OUTREACH (OUTPATIENT)
Dept: FAMILY MEDICINE | Facility: OTHER | Age: 62
End: 2022-08-22

## 2022-08-22 LAB
HUMAN PAPILLOMA VIRUS 16 DNA: NEGATIVE
HUMAN PAPILLOMA VIRUS 18 DNA: NEGATIVE
HUMAN PAPILLOMA VIRUS FINAL DIAGNOSIS: ABNORMAL
HUMAN PAPILLOMA VIRUS OTHER HR: POSITIVE

## 2022-08-29 ENCOUNTER — HOSPITAL ENCOUNTER (OUTPATIENT)
Dept: CT IMAGING | Facility: CLINIC | Age: 62
Discharge: HOME | End: 2022-08-29
Attending: FAMILY MEDICINE | Admitting: FAMILY MEDICINE
Payer: COMMERCIAL

## 2022-08-29 DIAGNOSIS — Z87.891 PERSONAL HISTORY OF TOBACCO USE: ICD-10-CM

## 2022-08-29 PROCEDURE — 71271 CT THORAX LUNG CANCER SCR C-: CPT

## 2022-08-30 PROBLEM — D71 CHRONIC GRANULOMATOUS DISEASE (H): Status: ACTIVE | Noted: 2022-08-30

## 2022-11-21 ENCOUNTER — HEALTH MAINTENANCE LETTER (OUTPATIENT)
Age: 62
End: 2022-11-21

## 2023-02-11 DIAGNOSIS — F34.1 CHRONIC DEPRESSIVE PERSONALITY DISORDER: ICD-10-CM

## 2023-02-13 NOTE — TELEPHONE ENCOUNTER
"Pending Prescriptions:                       Disp   Refills    FLUoxetine (PROZAC) 20 MG capsule [Pharmac*90 cap*1        Sig: TAKE 1 CAPSULE BY MOUTH DAILY    Routing refill request to provider for review/approval because:  Patient needs to be seen because:  due for mood follow up    Requested Prescriptions   Pending Prescriptions Disp Refills    FLUoxetine (PROZAC) 20 MG capsule [Pharmacy Med Name: FLUOXETINE 20MG CAPSULES] 90 capsule 1     Sig: TAKE 1 CAPSULE BY MOUTH DAILY       SSRIs Protocol Failed - 2/13/2023  3:26 PM        Failed - PHQ-9 score less than 5 in past 6 months     Please review last PHQ-9 score.           Failed - Recent (6 mo) or future (30 days) visit within the authorizing provider's specialty     Patient had office visit in the last 6 months or has a visit in the next 30 days with authorizing provider or within the authorizing provider's specialty.  See \"Patient Info\" tab in inbasket, or \"Choose Columns\" in Meds & Orders section of the refill encounter.            Passed - Medication is active on med list        Passed - Patient is age 18 or older        Passed - No active pregnancy on record        Passed - No positive pregnancy test in last 12 months                 "

## 2023-02-13 NOTE — TELEPHONE ENCOUNTER
Due for appt. Jaqueline given, please schedule. (Phone, ov, or evisit as appropriate).  Lorena Brown MD

## 2023-03-23 ENCOUNTER — VIRTUAL VISIT (OUTPATIENT)
Dept: FAMILY MEDICINE | Facility: OTHER | Age: 63
End: 2023-03-23
Payer: COMMERCIAL

## 2023-03-23 DIAGNOSIS — C50.912 ESTROGEN RECEPTOR NEGATIVE CARCINOMA OF BREAST, LEFT (H): ICD-10-CM

## 2023-03-23 DIAGNOSIS — F43.21 GRIEF REACTION: Primary | ICD-10-CM

## 2023-03-23 DIAGNOSIS — D71 CHRONIC GRANULOMATOUS DISEASE (H): ICD-10-CM

## 2023-03-23 DIAGNOSIS — Z17.1 ESTROGEN RECEPTOR NEGATIVE CARCINOMA OF BREAST, LEFT (H): ICD-10-CM

## 2023-03-23 DIAGNOSIS — F34.1 CHRONIC DEPRESSIVE PERSONALITY DISORDER: ICD-10-CM

## 2023-03-23 PROCEDURE — 99213 OFFICE O/P EST LOW 20 MIN: CPT | Mod: VID | Performed by: FAMILY MEDICINE

## 2023-03-23 RX ORDER — FLUOXETINE 40 MG/1
CAPSULE ORAL
Qty: 90 CAPSULE | Refills: 1 | Status: SHIPPED | OUTPATIENT
Start: 2023-03-23 | End: 2023-09-20

## 2023-03-23 ASSESSMENT — ASTHMA QUESTIONNAIRES
ACT_TOTALSCORE: 22
QUESTION_5 LAST FOUR WEEKS HOW WOULD YOU RATE YOUR ASTHMA CONTROL: WELL CONTROLLED
QUESTION_4 LAST FOUR WEEKS HOW OFTEN HAVE YOU USED YOUR RESCUE INHALER OR NEBULIZER MEDICATION (SUCH AS ALBUTEROL): NOT AT ALL
ACT_TOTALSCORE: 22
QUESTION_2 LAST FOUR WEEKS HOW OFTEN HAVE YOU HAD SHORTNESS OF BREATH: NOT AT ALL
QUESTION_1 LAST FOUR WEEKS HOW MUCH OF THE TIME DID YOUR ASTHMA KEEP YOU FROM GETTING AS MUCH DONE AT WORK, SCHOOL OR AT HOME: SOME OF THE TIME
QUESTION_3 LAST FOUR WEEKS HOW OFTEN DID YOUR ASTHMA SYMPTOMS (WHEEZING, COUGHING, SHORTNESS OF BREATH, CHEST TIGHTNESS OR PAIN) WAKE YOU UP AT NIGHT OR EARLIER THAN USUAL IN THE MORNING: NOT AT ALL

## 2023-03-23 ASSESSMENT — ANXIETY QUESTIONNAIRES
7. FEELING AFRAID AS IF SOMETHING AWFUL MIGHT HAPPEN: NOT AT ALL
GAD7 TOTAL SCORE: 8
IF YOU CHECKED OFF ANY PROBLEMS ON THIS QUESTIONNAIRE, HOW DIFFICULT HAVE THESE PROBLEMS MADE IT FOR YOU TO DO YOUR WORK, TAKE CARE OF THINGS AT HOME, OR GET ALONG WITH OTHER PEOPLE: SOMEWHAT DIFFICULT
8. IF YOU CHECKED OFF ANY PROBLEMS, HOW DIFFICULT HAVE THESE MADE IT FOR YOU TO DO YOUR WORK, TAKE CARE OF THINGS AT HOME, OR GET ALONG WITH OTHER PEOPLE?: SOMEWHAT DIFFICULT
4. TROUBLE RELAXING: NOT AT ALL
GAD7 TOTAL SCORE: 8
2. NOT BEING ABLE TO STOP OR CONTROL WORRYING: NEARLY EVERY DAY
5. BEING SO RESTLESS THAT IT IS HARD TO SIT STILL: NOT AT ALL
7. FEELING AFRAID AS IF SOMETHING AWFUL MIGHT HAPPEN: NOT AT ALL
1. FEELING NERVOUS, ANXIOUS, OR ON EDGE: MORE THAN HALF THE DAYS
3. WORRYING TOO MUCH ABOUT DIFFERENT THINGS: NEARLY EVERY DAY
GAD7 TOTAL SCORE: 8
6. BECOMING EASILY ANNOYED OR IRRITABLE: NOT AT ALL

## 2023-03-23 ASSESSMENT — PATIENT HEALTH QUESTIONNAIRE - PHQ9
SUM OF ALL RESPONSES TO PHQ QUESTIONS 1-9: 7
SUM OF ALL RESPONSES TO PHQ QUESTIONS 1-9: 7
10. IF YOU CHECKED OFF ANY PROBLEMS, HOW DIFFICULT HAVE THESE PROBLEMS MADE IT FOR YOU TO DO YOUR WORK, TAKE CARE OF THINGS AT HOME, OR GET ALONG WITH OTHER PEOPLE: SOMEWHAT DIFFICULT

## 2023-03-23 NOTE — PROGRESS NOTES
Monika is a 62 year old who is being evaluated via a billable video visit.      How would you like to obtain your AVS? MyChart  If the video visit is dropped, the invitation should be resent by: Text to cell phone: [phone]  Will anyone else be joining your video visit? No          Assessment & Plan       ICD-10-CM    1. Grief reaction  F43.21       2. Chronic depressive personality disorder  F34.1 FLUoxetine (PROZAC) 40 MG capsule      3. Chronic granulomatous disease (H)  D71       4. Estrogen receptor negative carcinoma of breast, left (H)  C50.912     Z17.1         Patient has lost her  in the last year and has had significant changes to her life including changes in housing that need to be done soon.  We talked about her finances and housing and she does feel that she has the resources to be able to transition well.  Though her anxiety has increased with this.  She has traditionally been using Prozac which is not his effective against anxiety though given her mood has been well controlled with this she did want to do a trial of increase of Prozac first to see if this is helpful.  We did renew her Prozac at the higher dosing and told her we could follow-up within 2 to 4 weeks to see how its going though we do need to see her for her physical within 6 months as well.  She did mention with her anxiety that she does not have as much appetite and that this is also a concern for her.  We talked briefly about mood and appetite and food is a feel for her body so that she needs to continue to eat despite of her emotional impact though we can increase the caloric density of the foods if needed.  She states her weight has been stable for 2 years after this discussion and so I was a little confused as to whether or not this is a significant concern as her appetite is down but her weight is stable which likely means that she is just bothered by the enjoyment of food that has changed.  Hopefully with the change of  "medication she can come to enjoy her her food again but we will need to keep an eye on this to see if weight has changed.  We did specifically ask her about weight loss and breast cancer recurrence and she feels that she is in a good place with her chronic diseases as well.      29 minutes spent on the date of the encounter doing chart review, history and exam, documentation and further activities per the note       BMI:   Estimated body mass index is 25.62 kg/m  as calculated from the following:    Height as of 8/15/22: 1.725 m (5' 7.9\").    Weight as of 8/15/22: 76.2 kg (168 lb).           Lorena Brown MD, MD  [facility] DOUGLAS Frank is a 62 year old, presenting for the following health issues:   Follow Up and Recheck Medication  No flowsheet data found.  History of Present Illness       Mental Health Follow-up:  Patient presents to follow-up on Depression & Anxiety.Patient's depression since last visit has been:  No change  The patient is not having other symptoms associated with depression.  Patient's anxiety since last visit has been:  Worse  The patient is not having other symptoms associated with anxiety.  Any significant life events: No  Patient is feeling anxious or having panic attacks.  Patient has no concerns about alcohol or drug use.    She eats 2-3 servings of fruits and vegetables daily.She consumes 0 sweetened beverage(s) daily.She exercises with enough effort to increase her heart rate 20 to 29 minutes per day.  She exercises with enough effort to increase her heart rate 3 or less days per week.   She is taking medications regularly.    Today's PHQ-9         PHQ-9 Total Score: 7    PHQ-9 Q9 Thoughts of better off dead/self-harm past 2 weeks :   Not at all    How difficult have these problems made it for you to do your work, take care of things at home, or get along with other people: Somewhat difficult  Today's LEBRON-7 Score: 8               Review of Systems "   Constitutional, HEENT, cardiovascular, pulmonary, GI, , musculoskeletal, neuro, skin, endocrine and psych systems are negative, except as otherwise noted.      Objective           Vitals:  No vitals were obtained today due to virtual visit.    Physical Exam   GENERAL: Healthy, alert and no distress  EYES: Eyes grossly normal to inspection.  No discharge or erythema, or obvious scleral/conjunctival abnormalities.  RESP: No audible wheeze, cough, or visible cyanosis.  No visible retractions or increased work of breathing.    SKIN: Visible skin clear. No significant rash, abnormal pigmentation or lesions.  NEURO: Cranial nerves grossly intact.  Mentation and speech appropriate for age.  PSYCH: Mentation appears normal, affect normal/bright, judgement and insight intact, normal speech and appearance well-groomed.                Video-Visit Details    Type of service:  Video Visit     Originating Location (pt. Location): Home    Distant Location (provider location):  On-site  Platform used for Video Visit: Venaf[employer]

## 2023-06-01 ENCOUNTER — PATIENT OUTREACH (OUTPATIENT)
Dept: ONCOLOGY | Facility: CLINIC | Age: 63
End: 2023-06-01
Payer: COMMERCIAL

## 2023-06-01 DIAGNOSIS — C50.912 ESTROGEN RECEPTOR NEGATIVE CARCINOMA OF BREAST, LEFT (H): Primary | ICD-10-CM

## 2023-06-01 DIAGNOSIS — Z17.1 ESTROGEN RECEPTOR NEGATIVE CARCINOMA OF BREAST, LEFT (H): Primary | ICD-10-CM

## 2023-06-14 ENCOUNTER — LAB (OUTPATIENT)
Dept: LAB | Facility: CLINIC | Age: 63
End: 2023-06-14
Payer: COMMERCIAL

## 2023-06-14 DIAGNOSIS — Z17.1 ESTROGEN RECEPTOR NEGATIVE CARCINOMA OF BREAST, LEFT (H): ICD-10-CM

## 2023-06-14 DIAGNOSIS — C50.912 ESTROGEN RECEPTOR NEGATIVE CARCINOMA OF BREAST, LEFT (H): ICD-10-CM

## 2023-06-14 LAB
ALBUMIN SERPL BCG-MCNC: 4 G/DL (ref 3.5–5.2)
ALP SERPL-CCNC: 69 U/L (ref 35–104)
ALT SERPL W P-5'-P-CCNC: 20 U/L (ref 0–50)
ANION GAP SERPL CALCULATED.3IONS-SCNC: 12 MMOL/L (ref 7–15)
AST SERPL W P-5'-P-CCNC: 27 U/L (ref 0–45)
BASOPHILS # BLD AUTO: 0.1 10E3/UL (ref 0–0.2)
BASOPHILS NFR BLD AUTO: 1 %
BILIRUB SERPL-MCNC: 0.3 MG/DL
BUN SERPL-MCNC: 9.2 MG/DL (ref 8–23)
CALCIUM SERPL-MCNC: 10 MG/DL (ref 8.8–10.2)
CANCER AG15-3 SERPL-ACNC: 6 U/ML
CHLORIDE SERPL-SCNC: 101 MMOL/L (ref 98–107)
CREAT SERPL-MCNC: 0.67 MG/DL (ref 0.51–0.95)
DEPRECATED HCO3 PLAS-SCNC: 25 MMOL/L (ref 22–29)
EOSINOPHIL # BLD AUTO: 0.3 10E3/UL (ref 0–0.7)
EOSINOPHIL NFR BLD AUTO: 4 %
ERYTHROCYTE [DISTWIDTH] IN BLOOD BY AUTOMATED COUNT: 14.2 % (ref 10–15)
GFR SERPL CREATININE-BSD FRML MDRD: >90 ML/MIN/1.73M2
GLUCOSE SERPL-MCNC: 86 MG/DL (ref 70–99)
HCT VFR BLD AUTO: 39.1 % (ref 35–47)
HGB BLD-MCNC: 13 G/DL (ref 11.7–15.7)
IMM GRANULOCYTES # BLD: 0 10E3/UL
IMM GRANULOCYTES NFR BLD: 1 %
LYMPHOCYTES # BLD AUTO: 1.3 10E3/UL (ref 0.8–5.3)
LYMPHOCYTES NFR BLD AUTO: 20 %
MCH RBC QN AUTO: 29.7 PG (ref 26.5–33)
MCHC RBC AUTO-ENTMCNC: 33.2 G/DL (ref 31.5–36.5)
MCV RBC AUTO: 89 FL (ref 78–100)
MONOCYTES # BLD AUTO: 0.5 10E3/UL (ref 0–1.3)
MONOCYTES NFR BLD AUTO: 8 %
NEUTROPHILS # BLD AUTO: 4.1 10E3/UL (ref 1.6–8.3)
NEUTROPHILS NFR BLD AUTO: 66 %
NRBC # BLD AUTO: 0 10E3/UL
NRBC BLD AUTO-RTO: 0 /100
PLATELET # BLD AUTO: 223 10E3/UL (ref 150–450)
POTASSIUM SERPL-SCNC: 4.9 MMOL/L (ref 3.4–5.3)
PROT SERPL-MCNC: 7.6 G/DL (ref 6.4–8.3)
RBC # BLD AUTO: 4.38 10E6/UL (ref 3.8–5.2)
SODIUM SERPL-SCNC: 138 MMOL/L (ref 136–145)
WBC # BLD AUTO: 6.2 10E3/UL (ref 4–11)

## 2023-06-14 PROCEDURE — 85025 COMPLETE CBC W/AUTO DIFF WBC: CPT

## 2023-06-14 PROCEDURE — 80053 COMPREHEN METABOLIC PANEL: CPT

## 2023-06-14 PROCEDURE — 86300 IMMUNOASSAY TUMOR CA 15-3: CPT

## 2023-06-14 PROCEDURE — 36415 COLL VENOUS BLD VENIPUNCTURE: CPT

## 2023-06-15 NOTE — RESULT ENCOUNTER NOTE
Monster Johnson,    I have Monika now scheduled to see you on Tuesday 07/25/23.        April Tatum    [facility] ~ [place]   [phone]

## 2023-06-29 ENCOUNTER — HOSPITAL ENCOUNTER (OUTPATIENT)
Dept: MAMMOGRAPHY | Facility: CLINIC | Age: 63
Discharge: HOME | End: 2023-06-29
Admitting: FAMILY MEDICINE
Payer: COMMERCIAL

## 2023-06-29 DIAGNOSIS — Z12.31 VISIT FOR SCREENING MAMMOGRAM: ICD-10-CM

## 2023-06-29 PROCEDURE — 77067 SCR MAMMO BI INCL CAD: CPT

## 2023-07-13 ENCOUNTER — TELEPHONE (OUTPATIENT)
Dept: FAMILY MEDICINE | Facility: CLINIC | Age: 63
End: 2023-07-13
Payer: COMMERCIAL

## 2023-07-13 NOTE — LETTER
July 13, 2023    To  Monika Nam  [id number] [address] [place] [id number]    Your team at [facility] cares about your health. We have reviewed your chart and based on our findings; we are making the following recommendations to better manage your health.     You are in particular need of attention regarding the following: You are due for a physical and pap August 15th or after     Schedule a primary care office visit with your provider for a Pap Smear to screen for Cervical Cancer.    If you have already completed these items, please contact the clinic via phone or   [facility] Inchart so your care team can review and update your records. Thank you for   choosing [facility] for your healthcare needs. For any questions,   concerns, or to schedule an appointment please contact our clinic.    Healthy Regards,      Your [facility] Care Team

## 2023-07-13 NOTE — TELEPHONE ENCOUNTER
Patient Quality Outreach    Patient is due for the following:   Cervical Cancer Screening - PAP Needed    Next Steps:   Schedule a Adult Preventative    Type of outreach:    Sent letter.    Next Steps:  Reach out within 90 days via Letter.    Max number of attempts reached: No. Will try again in 90 days if patient still on fail list.    Questions for provider review:    None           Deborah Chaparro CMA  Chart routed to none.

## 2023-07-25 ENCOUNTER — ONCOLOGY VISIT (OUTPATIENT)
Dept: ONCOLOGY | Facility: CLINIC | Age: 63
End: 2023-07-25
Attending: NURSE PRACTITIONER
Payer: COMMERCIAL

## 2023-07-25 VITALS
SYSTOLIC BLOOD PRESSURE: 151 MMHG | WEIGHT: 158 LBS | TEMPERATURE: 99.2 F | OXYGEN SATURATION: 96 % | DIASTOLIC BLOOD PRESSURE: 90 MMHG | RESPIRATION RATE: 12 BRPM | HEIGHT: 68 IN | BODY MASS INDEX: 23.95 KG/M2 | HEART RATE: 107 BPM

## 2023-07-25 DIAGNOSIS — Z12.31 ENCOUNTER FOR SCREENING MAMMOGRAM FOR BREAST CANCER: ICD-10-CM

## 2023-07-25 DIAGNOSIS — C50.112 INFILTRATING DUCTAL CARCINOMA OF CENTRAL PORTION OF LEFT BREAST IN FEMALE (H): Primary | ICD-10-CM

## 2023-07-25 PROCEDURE — 99214 OFFICE O/P EST MOD 30 MIN: CPT | Performed by: NURSE PRACTITIONER

## 2023-07-25 PROCEDURE — G0463 HOSPITAL OUTPT CLINIC VISIT: HCPCS | Performed by: NURSE PRACTITIONER

## 2023-07-25 ASSESSMENT — PAIN SCALES - GENERAL: PAINLEVEL: NO PAIN (0)

## 2023-07-25 NOTE — LETTER
7/25/2023         RE: Monika Nam  [address] [address] [place] [id number]        Dear Colleague,    Thank you for referring your patient, Monika Nam, to the [facility]. Please see a copy of my visit note below.    [facility] Hematology and Oncology Outpatient Progress Note    Patient: Monika aNm  MRN: [id number]  Date of Service: Jul 25, 2023          Reason for Visit    Breast cancer, triple negative    Primary Oncologist: formerly, Dr. Mathis      Assessment/Plan  Stage I left breast cancer, triple negative  Monika is now 4-1/2 years post lumpectomy and 4 years post completion of her adjuvant chemotherapy and RT.    6/2023 screening bilateral mammogram benign.  Clinically, no concern for recurrent cancer.    CBC, CMP and CA 15.3 negative.    Plan:  -Return in 1 year following mammogram.  If she remains without recurrent disease at that point, she will be 5 years from completing adjuvant chemotherapy and we would consider her cured with very low risk for recurrence beyond that point  -No role for routine lab work beyond this point  ______________________________________________________________________________    History of Present Illness/ Interval History    Ms. Moinka Nam  is a 63 year old returning for a 1 year follow-up for her history of early-stage triple negative left breast cancer, diagnosed/resected 4.5 years ago.  She completed adjuvant chemotherapy and radiation.    Over the last year, she has done well.  No new breast changes, weight loss, new bone pain, headaches.      ECOG Performance    0      Oncology History/Treatment  Diagnosis/Stage:   12/2018: Stage 1 ([id number]) left breast cancer, triple negative  -Annual screening mammogram detected new focal asymmetry at 1230 o'clock position left breast, measuring 1.4 cm.  -Breast ultrasound confirmed 1.6 cm hypoechoic mass.  Biopsy confirmed invasive ductal carcinoma, grade 3.  AL IA negative.   DCIS negative.  ER negative, MT negative, HER2 negative.  -1/2019 lumpectomy surgical pathology: 1.8 cm IDC, grade 3, lymph nodes negative, margins clear.    Treatment:  1/2019 left lumpectomy    7/2019: Completed adjuvant  ddAC, followed by weekly Taxol    8/2019: Completed adjuvant radiation to left breast      Physical Exam    GENERAL: Alert and oriented to time place and person. Seated comfortably. In no distress.  HEAD: Atraumatic and normocephalic. No alopecia.  EYES: THEO, EOMI. No erythema. No icterus.  LYMPH NODES: No palpable supraclavicular, cervical, axillary adenopathy.   BREASTS: Left lumpectomy scarring. No palpable abnormality bilateral breasts.  CHEST: clear to auscultation bilaterally. Resonant to percussion throughout bilaterally. Symmetrical breath movements bilaterally.  CVS: RRR  ABDOMEN: Soft. Not tender. Not distended. No palpable hepatomegaly or splenomegaly. No other mass palpable. Bowel sounds present.  EXTREMITIES: Warm. No peripheral edema.  SKIN: no rash, or bruising or purpura.   NEURO: No gross deficit noted. Non-antalgic gait.      Lab Results    CBC, CMP, CA 15.3 WNL    Imaging    MA Screen Bilateral w/Saleem    Result Date: 6/30/2023  BILATERAL FULL FIELD DIGITAL SCREENING MAMMOGRAM WITH TOMOSYNTHESIS Performed on: 6/29/23 Compared to: 06/24/2022, 06/18/2021, 07/23/2020, and 12/02/2019 Technique:  This study was evaluated with the assistance of Computer-Aided Detection.  Breast Tomosynthesis was used in interpretation. Findings: The breasts have scattered areas of fibroglandular density.  There are post-surgical changes in the left breast. There is no radiographic evidence of malignancy.     IMPRESSION: ACR BI-RADS Category 2: Benign RECOMMENDED FOLLOW-UP: Annual routine screening mammogram The results and recommendations of this examination will be communicated to the patient. Sb Dawkins DO      Billing  Total time 30 minutes, to include face to face visit, review of EMR,  "ordering, documentation and coordination of care on date of service    Signed by: Elizabeth Price NP      Oncology Rooming Note    July 25, 2023 2:46 PM   Monika Nam is a 63 year old female who presents for:    Chief Complaint   Patient presents with     Oncology Clinic Visit     Estrogen receptor negative carcinoma of breast, left - Provider visit only     Initial Vitals: BP (!) 151/90 (BP Location: Right arm, Patient Position: Sitting, Cuff Size: Adult Regular)   Pulse 107   Temp 99.2  F (37.3  C) (Tympanic)   Resp 12   Ht 1.725 m (5' 7.91\")   Wt 71.7 kg (158 lb)   LMP 04/02/2014   SpO2 96%   BMI 24.09 kg/m   Estimated body mass index is 24.09 kg/m  as calculated from the following:    Height as of this encounter: 1.725 m (5' 7.91\").    Weight as of this encounter: 71.7 kg (158 lb). Body surface area is 1.85 meters squared.  No Pain (0) Comment: Data Unavailable   Patient's last menstrual period was 04/02/2014.  Allergies reviewed: Yes  Medications reviewed: Yes    Medications: Medication refills not needed today.  Pharmacy name entered into xiao qu wu you: [id number] [id number] [facility]  [address]    Clinical concerns:  None      Virginia Connolly CMA                Again, thank you for allowing me to participate in the care of your patient.        Sincerely,        Elizabeth Price NP  "

## 2023-07-25 NOTE — PROGRESS NOTES
"Oncology Rooming Note    July 25, 2023 2:46 PM   Monika Nam is a 63 year old female who presents for:    Chief Complaint   Patient presents with    Oncology Clinic Visit     Estrogen receptor negative carcinoma of breast, left - Provider visit only     Initial Vitals: BP (!) 151/90 (BP Location: Right arm, Patient Position: Sitting, Cuff Size: Adult Regular)   Pulse 107   Temp 99.2  F (37.3  C) (Tympanic)   Resp 12   Ht 1.725 m (5' 7.91\")   Wt 71.7 kg (158 lb)   LMP 04/02/2014   SpO2 96%   BMI 24.09 kg/m   Estimated body mass index is 24.09 kg/m  as calculated from the following:    Height as of this encounter: 1.725 m (5' 7.91\").    Weight as of this encounter: 71.7 kg (158 lb). Body surface area is 1.85 meters squared.  No Pain (0) Comment: Data Unavailable   Patient's last menstrual period was 04/02/2014.  Allergies reviewed: Yes  Medications reviewed: Yes    Medications: Medication refills not needed today.  Pharmacy name entered into [employer]: [facility] #[id number] [address] AT [facility]  & [address]    Clinical concerns:  None      Virginia Connolly CMA              "

## 2023-07-25 NOTE — PROGRESS NOTES
[facility] Hematology and Oncology Outpatient Progress Note    Patient: Monika Nam  MRN: [id number]  Date of Service: Jul 25, 2023          Reason for Visit    Breast cancer, triple negative    Primary Oncologist: formerly, Dr. Mathis      Assessment/Plan  Stage I left breast cancer, triple negative  Monika is now 4-1/2 years post lumpectomy and 4 years post completion of her adjuvant chemotherapy and RT.    6/2023 screening bilateral mammogram benign.  Clinically, no concern for recurrent cancer.    CBC, CMP and CA 15.3 negative.    Plan:  -Return in 1 year following mammogram.  If she remains without recurrent disease at that point, she will be 5 years from completing adjuvant chemotherapy and we would consider her cured with very low risk for recurrence beyond that point  -No role for routine lab work beyond this point  ______________________________________________________________________________    History of Present Illness/ Interval History    Ms. Monika Nam  is a 63 year old returning for a 1 year follow-up for her history of early-stage triple negative left breast cancer, diagnosed/resected 4.5 years ago.  She completed adjuvant chemotherapy and radiation.    Over the last year, she has done well.  No new breast changes, weight loss, new bone pain, headaches.      ECOG Performance    0      Oncology History/Treatment  Diagnosis/Stage:   12/2018: Stage 1 ([id number]) left breast cancer, triple negative  -Annual screening mammogram detected new focal asymmetry at 1230 o'clock position left breast, measuring 1.4 cm.  -Breast ultrasound confirmed 1.6 cm hypoechoic mass.  Biopsy confirmed invasive ductal carcinoma, grade 3.  AL IA negative.  DCIS negative.  ER negative, OK negative, HER2 negative.  -1/2019 lumpectomy surgical pathology: 1.8 cm IDC, grade 3, lymph nodes negative, margins clear.    Treatment:  1/2019 left lumpectomy    7/2019: Completed adjuvant  ddAC, followed by weekly  Taxol    8/2019: Completed adjuvant radiation to left breast      Physical Exam    GENERAL: Alert and oriented to time place and person. Seated comfortably. In no distress.  HEAD: Atraumatic and normocephalic. No alopecia.  EYES: THEO, EOMI. No erythema. No icterus.  LYMPH NODES: No palpable supraclavicular, cervical, axillary adenopathy.   BREASTS: Left lumpectomy scarring. No palpable abnormality bilateral breasts.  CHEST: clear to auscultation bilaterally. Resonant to percussion throughout bilaterally. Symmetrical breath movements bilaterally.  CVS: RRR  ABDOMEN: Soft. Not tender. Not distended. No palpable hepatomegaly or splenomegaly. No other mass palpable. Bowel sounds present.  EXTREMITIES: Warm. No peripheral edema.  SKIN: no rash, or bruising or purpura.   NEURO: No gross deficit noted. Non-antalgic gait.      Lab Results    CBC, CMP, CA 15.3 WNL    Imaging    MA Screen Bilateral w/Saleem    Result Date: 6/30/2023  BILATERAL FULL FIELD DIGITAL SCREENING MAMMOGRAM WITH TOMOSYNTHESIS Performed on: 6/29/23 Compared to: 06/24/2022, 06/18/2021, 07/23/2020, and 12/02/2019 Technique:  This study was evaluated with the assistance of Computer-Aided Detection.  Breast Tomosynthesis was used in interpretation. Findings: The breasts have scattered areas of fibroglandular density.  There are post-surgical changes in the left breast. There is no radiographic evidence of malignancy.     IMPRESSION: ACR BI-RADS Category 2: Benign RECOMMENDED FOLLOW-UP: Annual routine screening mammogram The results and recommendations of this examination will be communicated to the patient. Sb Dawkins DO      Billing  Total time 30 minutes, to include face to face visit, review of EMR, ordering, documentation and coordination of care on date of service    Signed by: Elizabeth Price NP

## 2023-09-17 ENCOUNTER — HEALTH MAINTENANCE LETTER (OUTPATIENT)
Age: 63
End: 2023-09-17

## 2023-09-18 ASSESSMENT — ENCOUNTER SYMPTOMS
COUGH: 0
HEMATOCHEZIA: 0
ABDOMINAL PAIN: 0
HEMATURIA: 0
CHILLS: 0
BREAST MASS: 0
DIZZINESS: 0
CONSTIPATION: 0
MYALGIAS: 1
DIARRHEA: 0
NERVOUS/ANXIOUS: 1
EYE PAIN: 0

## 2023-09-20 ENCOUNTER — OFFICE VISIT (OUTPATIENT)
Dept: FAMILY MEDICINE | Facility: OTHER | Age: 63
End: 2023-09-20
Payer: COMMERCIAL

## 2023-09-20 VITALS
WEIGHT: 157.5 LBS | SYSTOLIC BLOOD PRESSURE: 124 MMHG | DIASTOLIC BLOOD PRESSURE: 80 MMHG | RESPIRATION RATE: 17 BRPM | OXYGEN SATURATION: 96 % | HEART RATE: 107 BPM | TEMPERATURE: 97.8 F | BODY MASS INDEX: 23.87 KG/M2 | HEIGHT: 68 IN

## 2023-09-20 DIAGNOSIS — L30.9 ECZEMA, UNSPECIFIED TYPE: ICD-10-CM

## 2023-09-20 DIAGNOSIS — Z00.00 ROUTINE GENERAL MEDICAL EXAMINATION AT A HEALTH CARE FACILITY: Primary | ICD-10-CM

## 2023-09-20 DIAGNOSIS — J45.20 MILD INTERMITTENT ASTHMA WITHOUT COMPLICATION: ICD-10-CM

## 2023-09-20 DIAGNOSIS — Z12.4 CERVICAL CANCER SCREENING: ICD-10-CM

## 2023-09-20 DIAGNOSIS — F34.1 CHRONIC DEPRESSIVE PERSONALITY DISORDER: ICD-10-CM

## 2023-09-20 DIAGNOSIS — F17.200 NICOTINE DEPENDENCE, UNCOMPLICATED, UNSPECIFIED NICOTINE PRODUCT TYPE: ICD-10-CM

## 2023-09-20 DIAGNOSIS — Z11.4 SCREENING FOR HIV (HUMAN IMMUNODEFICIENCY VIRUS): ICD-10-CM

## 2023-09-20 PROCEDURE — G0145 SCR C/V CYTO,THINLAYER,RESCR: HCPCS | Performed by: FAMILY MEDICINE

## 2023-09-20 PROCEDURE — 99213 OFFICE O/P EST LOW 20 MIN: CPT | Mod: 25 | Performed by: FAMILY MEDICINE

## 2023-09-20 PROCEDURE — 99396 PREV VISIT EST AGE 40-64: CPT | Performed by: FAMILY MEDICINE

## 2023-09-20 PROCEDURE — 87624 HPV HI-RISK TYP POOLED RSLT: CPT | Performed by: FAMILY MEDICINE

## 2023-09-20 PROCEDURE — G0124 SCREEN C/V THIN LAYER BY MD: HCPCS | Performed by: PATHOLOGY

## 2023-09-20 RX ORDER — ALBUTEROL SULFATE 90 UG/1
2 AEROSOL, METERED RESPIRATORY (INHALATION) EVERY 4 HOURS PRN
Qty: 18 G | Refills: 11 | Status: SHIPPED | OUTPATIENT
Start: 2023-09-20 | End: 2024-07-22

## 2023-09-20 RX ORDER — FLUOXETINE 40 MG/1
CAPSULE ORAL
Qty: 90 CAPSULE | Refills: 1 | Status: SHIPPED | OUTPATIENT
Start: 2023-09-20 | End: 2024-03-18

## 2023-09-20 RX ORDER — BETAMETHASONE DIPROPIONATE 0.5 MG/G
CREAM TOPICAL
Qty: 60 G | Refills: 6 | Status: SHIPPED | OUTPATIENT
Start: 2023-09-20 | End: 2024-07-22

## 2023-09-20 ASSESSMENT — ENCOUNTER SYMPTOMS
BREAST MASS: 0
CHILLS: 0
ABDOMINAL PAIN: 0
NERVOUS/ANXIOUS: 1
HEMATOCHEZIA: 0
DIZZINESS: 0
HEMATURIA: 0
EYE PAIN: 0
CONSTIPATION: 0
DIARRHEA: 0
COUGH: 0
MYALGIAS: 1

## 2023-09-20 ASSESSMENT — ANXIETY QUESTIONNAIRES
7. FEELING AFRAID AS IF SOMETHING AWFUL MIGHT HAPPEN: SEVERAL DAYS
IF YOU CHECKED OFF ANY PROBLEMS ON THIS QUESTIONNAIRE, HOW DIFFICULT HAVE THESE PROBLEMS MADE IT FOR YOU TO DO YOUR WORK, TAKE CARE OF THINGS AT HOME, OR GET ALONG WITH OTHER PEOPLE: SOMEWHAT DIFFICULT
6. BECOMING EASILY ANNOYED OR IRRITABLE: SEVERAL DAYS
3. WORRYING TOO MUCH ABOUT DIFFERENT THINGS: MORE THAN HALF THE DAYS
5. BEING SO RESTLESS THAT IT IS HARD TO SIT STILL: NOT AT ALL
2. NOT BEING ABLE TO STOP OR CONTROL WORRYING: MORE THAN HALF THE DAYS
4. TROUBLE RELAXING: NOT AT ALL
GAD7 TOTAL SCORE: 7
GAD7 TOTAL SCORE: 7
1. FEELING NERVOUS, ANXIOUS, OR ON EDGE: SEVERAL DAYS

## 2023-09-20 ASSESSMENT — PAIN SCALES - GENERAL: PAINLEVEL: SEVERE PAIN (6)

## 2023-09-20 ASSESSMENT — ASTHMA QUESTIONNAIRES: ACT_TOTALSCORE: 24

## 2023-09-20 NOTE — PATIENT INSTRUCTIONS
Water can be your best friend or worst enemy.    If water is not your enemy, shower/bath daily.    NEVER soak in bubble bath, oils, etc.    Keep them to 15-30 minutes in lukewarm (NOT HOT) water.  After shower, pat/blot dry and apply moisturizer within minutes    Products that are advised for eczema include:  Soaps -- Dove, Caress, or Basis (only need in underarms and groin unless dirt noted)  Lotions -- Cera Ve, Cetaphil, Vanicream, Vaseline  Petroleum jelly can be used for extra moisturizer when needed but is greasy.     Preventive Health Recommendations  Female Ages 50 - 64    Yearly exam: See your health care provider every year in order to  Review health changes.   Discuss preventive care.    Review your medicines if your doctor has prescribed any.    Get a Pap test every three years (unless you have an abnormal result and your provider advises testing more often).  If you get Pap tests with HPV test, you only need to test every 5 years, unless you have an abnormal result.   You do not need a Pap test if your uterus was removed (hysterectomy) and you have not had cancer.  You should be tested each year for STDs (sexually transmitted diseases) if you're at risk.   Have a mammogram every 1 to 2 years.  Have a colonoscopy at age 50, or have a yearly FIT test (stool test). These exams screen for colon cancer.    Have a cholesterol test every 5 years, or more often if advised.  Have a diabetes test (fasting glucose) every three years. If you are at risk for diabetes, you should have this test more often.   If you are at risk for osteoporosis (brittle bone disease), think about having a bone density scan (DEXA).    Shots: Get a flu shot each year. Get a tetanus shot every 10 years.    Nutrition:   Eat at least 5 servings of fruits and vegetables each day.  Eat whole-grain bread, whole-wheat pasta and brown rice instead of white grains and rice.  Get adequate Calcium and Vitamin D.     Lifestyle  Exercise at least 150  minutes a week (30 minutes a day, 5 days a week). This will help you control your weight and prevent disease.  Limit alcohol to one drink per day.  No smoking.   Wear sunscreen to prevent skin cancer.   See your dentist every six months for an exam and cleaning.  See your eye doctor every 1 to 2 years.

## 2023-09-20 NOTE — PROGRESS NOTES
SUBJECTIVE:   CC: Monika is an 63 year old who presents for preventive health visit.       9/20/2023     1:17 PM   Additional Questions   Roomed by Gris   Accompanied by Self         9/20/2023     1:17 PM   Patient Reported Additional Medications   Patient reports taking the following new medications none       Healthy Habits:     Getting at least 3 servings of Calcium per day:  Yes    Bi-annual eye exam:  Yes    Dental care twice a year:  Yes    Sleep apnea or symptoms of sleep apnea:  None    Diet:  Regular (no restrictions)    Frequency of exercise:  2-3 days/week    Duration of exercise:  30-45 minutes    Taking medications regularly:  Yes    Medication side effects:  Not applicable    Additional concerns today:  Yes  Answers submitted by the patient for this visit:  Patient Health Questionnaire (Submitted on 9/19/2023)  If you checked off any problems, how difficult have these problems made it for you to do your work, take care of things at home, or get along with other people?: Somewhat difficult  PHQ9 TOTAL SCORE: 7  LEBRON-7 (Submitted on 9/20/2023)  LEBRON 7 TOTAL SCORE: 7      Today's PHQ-9 Score:       9/19/2023     5:06 PM   PHQ-9 SCORE   PHQ-9 Total Score MyChart 7 (Mild depression)   PHQ-9 Total Score 7         Asthma Follow-Up    Was ACT completed today?  Yes        9/20/2023     1:05 PM   ACT Total Scores   ACT TOTAL SCORE (Goal Greater than or Equal to 20) 24   In the past 12 months, how many times did you visit the emergency room for your asthma without being admitted to the hospital? 0   In the past 12 months, how many times were you hospitalized overnight because of your asthma? 0        How many days per week do you miss taking your asthma controller medication?  0  Please describe any recent triggers for your asthma: pollens and mold  Have you had any Emergency Room Visits, Urgent Care Visits, or Hospital Admissions since your last office visit?  No                Social History     Tobacco  Use    Smoking status: Every Day     Packs/day: 0.50     Years: 25.00     Pack years: 12.50     Types: Cigarettes    Smokeless tobacco: Never    Tobacco comments:     7-8cigarettes a day/has cut back   Substance Use Topics    Alcohol use: Yes     Comment: 3-4 X per week.  About 7 drinks per week.             9/18/2023     9:35 AM   Alcohol Use   Prescreen: >3 drinks/day or >7 drinks/week? No     Reviewed orders with patient.  Reviewed health maintenance and updated orders accordingly - Yes      Breast Cancer Screening:    FHS-7:       6/24/2022     3:02 PM 6/29/2023    11:17 AM   Breast CA Risk Assessment (FHS-7)   Did any of your first-degree relatives have breast or ovarian cancer? No No   Did any of your relatives have bilateral breast cancer? No No   Did any man in your family have breast cancer? No No   Did any woman in your family have breast and ovarian cancer? No No   Did any woman in your family have breast cancer before age 50 y? No No   Do you have 2 or more relatives with breast and/or ovarian cancer? No No   Do you have 2 or more relatives with breast and/or bowel cancer? No No       Mammogram Screening: Recommended mammography every 1-2 years with patient discussion and risk factor consideration  Pertinent mammograms are reviewed under the imaging tab.    History of abnormal Pap smear: YES - updated in Problem List and Health Maintenance accordingly      Latest Ref Rng & Units 8/15/2022     1:35 PM 7/25/2014    12:00 AM 7/15/2013    12:00 AM   PAP / HPV   PAP  Negative for Intraepithelial Lesion or Malignancy (NILM)      PAP (Historical)   ASC-US  ASC-US    HPV 16 DNA Negative Negative      HPV 18 DNA Negative Negative      Other HR HPV Negative Positive        Reviewed and updated as needed this visit by clinical staff   Tobacco  Allergies  Meds  Problems  Med Hx  Surg Hx  Fam Hx          Reviewed and updated as needed this visit by Provider   Tobacco  Allergies  Meds  Problems  Med Hx   "Surg Hx  Fam Hx             Review of Systems   Constitutional:  Negative for chills.   HENT:  Negative for congestion and ear pain.    Eyes:  Negative for pain.   Respiratory:  Negative for cough.    Cardiovascular:  Negative for chest pain.   Gastrointestinal:  Negative for abdominal pain, constipation, diarrhea and hematochezia.   Breasts:  Negative for tenderness, breast mass and discharge.   Genitourinary:  Negative for hematuria, pelvic pain, vaginal bleeding and vaginal discharge.   Musculoskeletal:  Positive for myalgias.   Skin:  Positive for rash.   Neurological:  Negative for dizziness.   Psychiatric/Behavioral:  The patient is nervous/anxious.           OBJECTIVE:   /80   Pulse 107   Temp 97.8  F (36.6  C) (Temporal)   Resp 17   Ht 1.727 m (5' 8\")   Wt 71.4 kg (157 lb 8 oz)   LMP 04/02/2014   SpO2 96%   BMI 23.95 kg/m    Physical Exam  GENERAL: healthy, alert and no distress  EYES: Eyes grossly normal to inspection, PERRL and conjunctivae and sclerae normal  HENT: ear canals and TM's normal, nose and mouth without ulcers or lesions  NECK: no adenopathy, no asymmetry, masses, or scars and thyroid normal to palpation  RESP: lungs clear to auscultation - no rales, rhonchi or wheezes  BREAST: normal without masses, tenderness or nipple discharge and no palpable axillary masses or adenopathy  CV: regular rate and rhythm, normal S1 S2, no S3 or S4, no murmur, click or rub, no peripheral edema and peripheral pulses strong  ABDOMEN: soft, nontender, no hepatosplenomegaly, no masses and bowel sounds normal   (female): normal female external genitalia, normal urethral meatus, vaginal mucosa pink, moist, well rugated, and normal cervix/adnexa/uterus without masses or discharge  MS: no gross musculoskeletal defects noted, no edema  SKIN: no suspicious lesions or rashes  NEURO: Normal strength and tone, mentation intact and speech normal  PSYCH: mentation appears normal, affect " normal/bright        ASSESSMENT/PLAN:       ICD-10-CM    1. Routine general medical examination at a health care facility  Z00.00       2. Chronic depressive personality disorder  F34.1 FLUoxetine (PROZAC) 40 MG capsule      3. Mild intermittent asthma without complication  J45.20 albuterol (PROAIR HFA/PROVENTIL HFA/VENTOLIN HFA) 108 (90 Base) MCG/ACT inhaler      4. Eczema, unspecified type  L30.9 augmented betamethasone dipropionate (DIPROLENE AF) 0.05 % external cream      5. Screening for HIV (human immunodeficiency virus)  Z11.4       6. Cervical cancer screening  Z12.4 Pap Screen with HPV - recommended age 30 - 65 years      7. Nicotine dependence, uncomplicated, unspecified nicotine product type  F17.200 [place] Quit Partner Referral        Patient has a previous history of HPV on her Pap smear and this will be potentially worsened by smoking and she is interested in smoking cessation and working on this.  She is taking only about a half a pack a day so she did try the loss hinges which increase her blood pressure so she has stopped these.  We did talk to her about how most the time it is related to habits and routines and that she likely would do best with supportive management and was referred to quit plan [place] for this.  She is agreeable and will work to continue to quit as she is aware of its impact on her overall health.  She continues to follow with oncology for breast cancer and has been quite stable with her asthma  Her depression has been stable as well and she feels she is getting into a better place as her  had  and she is moved on to another home and feels she is getting into a new normal though she is not ready for reduction of these medications at this time    Patient has been advised of split billing requirements and indicates understanding: Yes      COUNSELING:  Reviewed preventive health counseling, as reflected in patient instructions       Regular exercise       Healthy  diet/nutrition        She reports that she has been smoking cigarettes. She has a 12.50 pack-year smoking history. She has never used smokeless tobacco.  Nicotine/Tobacco Cessation Plan:   Information offered: Patient not interested at this time          Lorena Brown MD, MD  [facility]

## 2023-09-25 LAB
BKR LAB AP GYN ADEQUACY: ABNORMAL
BKR LAB AP GYN INTERPRETATION: ABNORMAL
BKR LAB AP HPV REFLEX: ABNORMAL
BKR LAB AP PREVIOUS ABNL DX: ABNORMAL
BKR LAB AP PREVIOUS ABNORMAL: ABNORMAL
PATH REPORT.COMMENTS IMP SPEC: ABNORMAL
PATH REPORT.COMMENTS IMP SPEC: ABNORMAL
PATH REPORT.RELEVANT HX SPEC: ABNORMAL

## 2023-09-27 ENCOUNTER — PATIENT OUTREACH (OUTPATIENT)
Dept: FAMILY MEDICINE | Facility: OTHER | Age: 63
End: 2023-09-27
Payer: COMMERCIAL

## 2023-11-27 ENCOUNTER — OFFICE VISIT (OUTPATIENT)
Dept: OBGYN | Facility: OTHER | Age: 63
End: 2023-11-27
Payer: COMMERCIAL

## 2023-11-27 VITALS — DIASTOLIC BLOOD PRESSURE: 85 MMHG | BODY MASS INDEX: 24.71 KG/M2 | SYSTOLIC BLOOD PRESSURE: 137 MMHG | WEIGHT: 162.5 LBS

## 2023-11-27 DIAGNOSIS — R87.610 ASCUS WITH POSITIVE HIGH RISK HPV CERVICAL: Primary | ICD-10-CM

## 2023-11-27 DIAGNOSIS — R87.810 CERVICAL HIGH RISK HPV (HUMAN PAPILLOMAVIRUS) TEST POSITIVE: ICD-10-CM

## 2023-11-27 DIAGNOSIS — R87.810 ASCUS WITH POSITIVE HIGH RISK HPV CERVICAL: Primary | ICD-10-CM

## 2023-11-27 PROCEDURE — 57452 EXAM OF CERVIX W/SCOPE: CPT | Performed by: OBSTETRICS & GYNECOLOGY

## 2023-11-27 NOTE — PROGRESS NOTES
I have been asked to see Monika in consultation by Dr. Brown  to discuss the pap smear, findings and possible further evaluation.  The patient's pap smear on 9/20/2023 showed ASCUS with HR HPV.   I attempted to ensure that the patient was educated regarding the nature of her findings and implications to date.  We reviewed the role of HPV, incidence in the population and the natural history of the infection, and its transmission.  We also reviewed ways to minimize her future risk, the effect of HPV on the cervix and treatment options available, should they be indicated.    The pathophysiology of the cervix, including a discussion of the squamous and columnar cells, metaplasia and dysplasia have been reviewed, drawings, sketches and the pamphlets were reviewed with her.      Patient's last menstrual period was 04/02/2014.  Current Birth Control Method: post menopausal status  History of veneral diseases: : No  History of genital warts:  No  Visible warts now?:  No  Family History of  Cervical, Uterine or Vaginal Cancer?: No    Past Medical History:   Diagnosis Date    Abnormal Papanicolaou smear of cervix and cervical HPV     history of LEEP ion 1994    Arthritis     ASCUS favor benign 7/2013, 7/2014    Neg high risk HPV     Breast cancer (H)     Cancer (H) 01/23/2019    breast    Cervical high risk HPV (human papillomavirus) test positive 05/24/2012    type 66    Depressive disorder     PONV (postoperative nausea and vomiting)     Sleep apnea     Uncomplicated asthma        Past Surgical History:   Procedure Laterality Date    BIOPSY      BREAST SURGERY      INSERT PORT VASCULAR ACCESS Right 01/23/2019    Procedure: Port-a-Cath Placement;  Surgeon: Servando Claros MD;  Location: [facility]    LUMPECTOMY BREAST Left     LUMPECTOMY BREAST WITH SENTINEL NODE, COMBINED Left 01/23/2019    Procedure: COMBINED LUMPECTOMY BREAST WITH SENTINEL NODE;  Surgeon: Servando Claros MD;  Location: [facility]    OPEN REDUCTION INTERNAL FIXATION  WRIST Right 06/23/2021    Procedure: OPEN REDUCTION INTERNAL FIXATION RIGHT DISTAL RADIUS FRACTURE;  Surgeon: Scott Thompson MD;  Location: [facility]    PHACOEMULSIFICATION WITH STANDARD INTRAOCULAR LENS IMPLANT Right 06/10/2020    Procedure: Cataract removal with implant;  Surgeon: Floyd Mar MD;  Location: [facility]    PHACOEMULSIFICATION WITH STANDARD INTRAOCULAR LENS IMPLANT Left 06/29/2020    Procedure: Cataract removla with implant.;  Surgeon: Floyd Mar MD;  Location: [facility]    SURGICAL HISTORY OF -   1996    cholecystectomy    SURGICAL HISTORY OF -   1994    [facility]         Outpatient Encounter Medications as of 11/27/2023   Medication Sig Dispense Refill    albuterol (PROAIR HFA/PROVENTIL HFA/VENTOLIN HFA) 108 (90 Base) MCG/ACT inhaler Inhale 2 puffs into the lungs every 4 hours as needed for shortness of breath or wheezing 18 g 11    augmented betamethasone dipropionate (DIPROLENE AF) 0.05 % external cream APPLY EXTERNALLY TO THE AFFECTED AREA TWICE DAILY 60 g 6    calcium carbonate 600 mg-vitamin D 400 units (CALTRATE) 600-400 MG-UNIT per tablet Take 1 tablet by mouth daily      FLUoxetine (PROZAC) 40 MG capsule TAKE 1 CAPSULE BY MOUTH DAILY 90 capsule 1    loratadine (CLARITIN) 10 MG tablet Take 10 mg by mouth daily as needed      MULTIVITAMIN TABS   OR 1 TABLET BY MOUTH DAILY       No facility-administered encounter medications on file as of 11/27/2023.        Allergies as of 11/27/2023 - Reviewed 11/27/2023   Allergen Reaction Noted    Sulfa antibiotics Swelling 06/09/2005       Social History     Socioeconomic History    Marital status:      Spouse name: None    Number of children: None    Years of education: None    Highest education level: None   Tobacco Use    Smoking status: Every Day     Packs/day: 0.50     Years: 25.00     Additional pack years: 0.00     Total pack years: 12.50     Types: Cigarettes    Smokeless tobacco: Never    Tobacco comments:     7[employer]  day/has cut back   Vaping Use    Vaping Use: Former   Substance and Sexual Activity    Alcohol use: Yes     Comment: 3-4 X per week.  About 7 drinks per week.    Drug use: No    Sexual activity: Not Currently     Partners: Male     Birth control/protection: None   Other Topics Concern    Parent/sibling w/ CABG, MI or angioplasty before 65F 55M? No     Social Determinants of Health     Financial Resource Strain: Low Risk  (9/20/2023)    Financial Resource Strain     Within the past 12 months, have you or your family members you live with been unable to get utilities (heat, electricity) when it was really needed?: No   Food Insecurity: Low Risk  (9/20/2023)    Food Insecurity     Within the past 12 months, did you worry that your food would run out before you got money to buy more?: No     Within the past 12 months, did the food you bought just not last and you didn t have money to get more?: No   Transportation Needs: Low Risk  (9/20/2023)    Transportation Needs     Within the past 12 months, has lack of transportation kept you from medical appointments, getting your medicines, non-medical meetings or appointments, work, or from getting things that you need?: No   Interpersonal Safety: Low Risk  (9/20/2023)    Interpersonal Safety     Do you feel physically and emotionally safe where you currently live?: Yes     Within the past 12 months, have you been hit, slapped, kicked or otherwise physically hurt by someone?: No     Within the past 12 months, have you been humiliated or emotionally abused in other ways by your partner or ex-partner?: No   Housing Stability: Low Risk  (9/20/2023)    Housing Stability     Do you have housing? : Yes     Are you worried about losing your housing?: No        Family History   Problem Relation Age of Onset    Gastrointestinal Disease Mother         desmond dz    Hypertension Father     Lipids Father     Breast Cancer Paternal Grandmother     Neurologic Disorder Paternal Grandfather          parkinsons    Cancer - colorectal No family hx of     Prostate Cancer No family hx of     Diabetes No family hx of     C.A.D. No family hx of          Review Of Systems  Skin: negative  Eyes: negative  Ears/Nose/Throat: negative  Respiratory: negative  Cardiovascular: negative  Gastrointestinal: negative  Genitourinary: negative  Musculoskeletal: negative  Neurologic: negative  Psychiatric: negative  Hematologic/Lymphatic/Immunologic: negative  Endocrine: negative     Exam:   /85 (BP Location: Left arm, Cuff Size: Adult Regular)   Wt 73.7 kg (162 lb 8 oz)   LMP 04/02/2014   BMI 24.71 kg/m    GENERAL:  WNWD female NAD  HEENT: NC/AT, EOMI  SKIN: normal skin turgor  GAIT: Normal  NECK: Symmetrical, no masses noted   VULVA: Normal Genitalia  BUS: Normal  URETHRA:  No hypermobility noted  URETHRAL MEATUS:  No masses noted  VAGINA: Normal mucosa, no discharge  CERVIX: Closed, mobile, no discharge  PERIANAL:  No masses or lesions seen  EXTREMITIES: no clubbing, cyanosis, or edema    Assessment:  ASCUS with HR HPV    Plan:  Recommend to Proceed with Colpo  The details of the colposcopic procedure were reviewed, the risks of missed diagnoses, pain, infection, and bleeding.        Procedure:  Procedure for colposcopy and biopsy has been explained to the patient and consent obtained.    Before the procedure, it was ensured that the patient was educated regarding the nature of her findings and implications to date.  We reviewed the role of HPV and the natural history of the infection.  We also reviewed ways to minimize her future risk, the effect of HPV on the cervix and treatment options available, should they be indicated.    The pathophysiology of the cervix, including a discussion of the squamous and columnar cells, metaplasia and dysplasia have been reviewed, drawings, sketches and the pamphlets were reviewed with her.  The details of the colposcopic procedure were reviewed, the risks of missed diagnoses,  pain, infection, and bleeding.  Questions seemed to be answered before proceeding and the patient then consented to the procedure.     Procedure:    Speculum placed and cervix visualized. Vagina normal with no lesions noted. Acetic acid applied to the cervix. The colposcopy is satisfactory as the entire transformation zone is visualized. No lesions noticed.  No abnormal vascular changes.  No acetowhite epithelial changes noted.  Lugal's solution applied to patients cervix.  Green filter used. No lesions noticed.  Speculum removed    She tolerated the procedure well. There were no apparent complications.    She is instructed not to use tampons or have intercourse for 5 days.  Instructed to call if she has persistent bleeding, foul vaginal discharge or any other concerns.    Findings:    No images are attached to the encounter.     Cervix: no visible lesions  Vaginal inspection: vaginal colposcopy not performed.  Vulvar colposcopy: vulvar colposcopy not performed. N/A  Procedure Summary: Patient tolerated procedure well.      Assessment:   ASCUS with HR HPV    Plan:  Repeat pap smear in one year  My colposcopic impression is normal.    Gabriela Gardner DO

## 2023-11-27 NOTE — PATIENT INSTRUCTIONS
Colposcopy  Colposcopy is a procedure that gives your healthcare provider a magnified view of the cervix. It is done using a lighted microscope called a colposcope. In most cases, a sample of cervical cells is taken during a biopsy. The sample can then be studied in a lab. If any problems are found, you and your healthcare provider will discuss treatment options. It usually takes less than 30 minutes, and you can often go back to your normal routine right away.   Reasons for the Procedure  Colposcopy is usually done as a follow-up exam to help find the cause of an abnormal Pap test. Abnormal Pap tests are often due to an HPV (human papilloma virus) infection. HPV is a large family of viruses. HPV can cause genital warts. It can also cause changes in cervical cells. Colposcopy is also used to assess other problems. These include pain or bleeding during sexual intercourse, or a lesion on the vulva or vagina.   What Are the Risks?  Problems after colposcopy are very rare, but can include:  Bleeding (if a biopsy is done)  Infection  Getting Ready for the Procedure  Colposcopy is normally done in your healthcare provider s office. It will be scheduled for a time when you re not having your menstrual period. You may be asked to sign a form giving your consent to have the procedure. A day or two before the procedure, your healthcare provider may also ask you to:  Avoid sexual intercourse.  Stop using tampons.  Avoid using creams or other vaginal medications.  Avoid douching.  Take over-the-counter pain medications an hour or two before the procedure.  During Colposcopy  You will be asked to lie on an exam table with your knees bent, just as you do for a Pap test.  An instrument called a speculum is inserted into the vagina to hold it open.  A vinegar solution is applied to the cervix to make the cells easier to see. You may feel pressure or a slight burning for a few moments. In some cases, the cervix may be numbed first  with an anesthetic.  The cervix is viewed through the colposcope, which is placed outside the vagina.  If your healthcare provider sees abnormal areas on the cervix, a biopsy will be done. The tissue sample is sent to a lab for study.  You may feel slight pinching or cramping during the biopsy. Medication may be applied to the biopsy site to stop bleeding.  After the Procedure  If you feel lightheaded or dizzy, you can rest on the table until you re ready to get dressed.  If a biopsy was done, you may have mild cramping or light bleeding for a few days. You may also have discharge from the medication used to stop bleeding at the biopsy site.  Use pads, not tampons, for at least the first 24 hours.  If you have any discomfort, over-the-counter pain medication can provide relief.  Ask your healthcare provider when you can resume sexual intercourse.  Follow-Up  If a biopsy was done, your healthcare provider will get the lab report in a week or two. You and your healthcare provider can then discuss the results. In some cases, you may be scheduled for further tests or treatment. Be sure to keep follow-up appointments with your healthcare provider.  Call your healthcare provider if you have:  Heavy vaginal bleeding (more than a pad an hour for 2 hours).  Severe or increasing pelvic pain.  A fever over 101 F.  Foul-smelling or unusual vaginal discharge.

## 2023-12-18 ENCOUNTER — PATIENT OUTREACH (OUTPATIENT)
Dept: OBGYN | Facility: CLINIC | Age: 63
End: 2023-12-18
Payer: COMMERCIAL

## 2023-12-18 DIAGNOSIS — R87.810 CERVICAL HIGH RISK HPV (HUMAN PAPILLOMAVIRUS) TEST POSITIVE: Primary | ICD-10-CM

## 2023-12-26 DIAGNOSIS — L30.9 ECZEMA, UNSPECIFIED TYPE: ICD-10-CM

## 2023-12-26 RX ORDER — BETAMETHASONE DIPROPIONATE 0.5 MG/G
CREAM TOPICAL
Qty: 50 G | OUTPATIENT
Start: 2023-12-26

## 2024-03-16 DIAGNOSIS — F34.1 CHRONIC DEPRESSIVE PERSONALITY DISORDER: ICD-10-CM

## 2024-03-18 RX ORDER — FLUOXETINE 40 MG/1
CAPSULE ORAL
Qty: 90 CAPSULE | Refills: 0 | Status: SHIPPED | OUTPATIENT
Start: 2024-03-18 | End: 2024-06-10

## 2024-05-14 NOTE — ADDENDUM NOTE
Addended by: AMBER WALDROP on: 12/4/2019 10:18 AM     Modules accepted: Orders     Pt arrives to ED intake spot 10b C/O 3 weeks of medial abd pain and decreased appetite. Pt endorses losing approx 20 pounds unintentionally over the past 3 weeks. States they have been more frequently having bowel movements but denies diarrhea, dark tarry stool or blood in stool. Pt denies nausea or vomiting, but feels as if they need to force themselves to eat. 20G placed to LAC, labs drawn and sent. IVF infusing as ordered

## 2024-07-08 ENCOUNTER — HOSPITAL ENCOUNTER (OUTPATIENT)
Dept: MAMMOGRAPHY | Facility: CLINIC | Age: 64
Discharge: HOME | End: 2024-07-08
Attending: NURSE PRACTITIONER | Admitting: NURSE PRACTITIONER
Payer: COMMERCIAL

## 2024-07-08 DIAGNOSIS — Z12.31 ENCOUNTER FOR SCREENING MAMMOGRAM FOR BREAST CANCER: ICD-10-CM

## 2024-07-08 PROCEDURE — 77063 BREAST TOMOSYNTHESIS BI: CPT

## 2024-07-20 SDOH — HEALTH STABILITY: PHYSICAL HEALTH: ON AVERAGE, HOW MANY DAYS PER WEEK DO YOU ENGAGE IN MODERATE TO STRENUOUS EXERCISE (LIKE A BRISK WALK)?: 2 DAYS

## 2024-07-20 SDOH — HEALTH STABILITY: PHYSICAL HEALTH: ON AVERAGE, HOW MANY MINUTES DO YOU ENGAGE IN EXERCISE AT THIS LEVEL?: 40 MIN

## 2024-07-20 ASSESSMENT — ASTHMA QUESTIONNAIRES
QUESTION_1 LAST FOUR WEEKS HOW MUCH OF THE TIME DID YOUR ASTHMA KEEP YOU FROM GETTING AS MUCH DONE AT WORK, SCHOOL OR AT HOME: NONE OF THE TIME
QUESTION_3 LAST FOUR WEEKS HOW OFTEN DID YOUR ASTHMA SYMPTOMS (WHEEZING, COUGHING, SHORTNESS OF BREATH, CHEST TIGHTNESS OR PAIN) WAKE YOU UP AT NIGHT OR EARLIER THAN USUAL IN THE MORNING: NOT AT ALL
QUESTION_4 LAST FOUR WEEKS HOW OFTEN HAVE YOU USED YOUR RESCUE INHALER OR NEBULIZER MEDICATION (SUCH AS ALBUTEROL): NOT AT ALL
QUESTION_2 LAST FOUR WEEKS HOW OFTEN HAVE YOU HAD SHORTNESS OF BREATH: NOT AT ALL
ACT_TOTALSCORE: 24
ACT_TOTALSCORE: 24
QUESTION_5 LAST FOUR WEEKS HOW WOULD YOU RATE YOUR ASTHMA CONTROL: WELL CONTROLLED

## 2024-07-20 ASSESSMENT — SOCIAL DETERMINANTS OF HEALTH (SDOH): HOW OFTEN DO YOU GET TOGETHER WITH FRIENDS OR RELATIVES?: TWICE A WEEK

## 2024-07-22 ENCOUNTER — OFFICE VISIT (OUTPATIENT)
Dept: FAMILY MEDICINE | Facility: OTHER | Age: 64
End: 2024-07-22
Payer: COMMERCIAL

## 2024-07-22 VITALS
HEART RATE: 102 BPM | BODY MASS INDEX: 24.33 KG/M2 | WEIGHT: 155 LBS | DIASTOLIC BLOOD PRESSURE: 72 MMHG | OXYGEN SATURATION: 97 % | TEMPERATURE: 98.6 F | SYSTOLIC BLOOD PRESSURE: 128 MMHG | RESPIRATION RATE: 16 BRPM | HEIGHT: 67 IN

## 2024-07-22 DIAGNOSIS — F34.1 CHRONIC DEPRESSIVE PERSONALITY DISORDER: ICD-10-CM

## 2024-07-22 DIAGNOSIS — Z72.0 TOBACCO ABUSE DISORDER: ICD-10-CM

## 2024-07-22 DIAGNOSIS — D71 CHRONIC GRANULOMATOUS DISEASE (H): ICD-10-CM

## 2024-07-22 DIAGNOSIS — C50.112 INFILTRATING DUCTAL CARCINOMA OF CENTRAL PORTION OF LEFT BREAST IN FEMALE (H): ICD-10-CM

## 2024-07-22 DIAGNOSIS — J45.20 MILD INTERMITTENT ASTHMA WITHOUT COMPLICATION: ICD-10-CM

## 2024-07-22 DIAGNOSIS — N95.1 SYMPTOMATIC MENOPAUSAL OR FEMALE CLIMACTERIC STATES: ICD-10-CM

## 2024-07-22 DIAGNOSIS — F06.4 ANXIETY DISORDER DUE TO MEDICAL CONDITION: ICD-10-CM

## 2024-07-22 DIAGNOSIS — Z00.00 ENCOUNTER FOR MEDICARE ANNUAL WELLNESS EXAM: Primary | ICD-10-CM

## 2024-07-22 DIAGNOSIS — L30.9 ECZEMA, UNSPECIFIED TYPE: ICD-10-CM

## 2024-07-22 DIAGNOSIS — Z11.4 SCREENING FOR HIV (HUMAN IMMUNODEFICIENCY VIRUS): ICD-10-CM

## 2024-07-22 DIAGNOSIS — Z78.0 ASYMPTOMATIC MENOPAUSAL STATE: ICD-10-CM

## 2024-07-22 PROCEDURE — 99213 OFFICE O/P EST LOW 20 MIN: CPT | Mod: 25 | Performed by: FAMILY MEDICINE

## 2024-07-22 PROCEDURE — 99397 PER PM REEVAL EST PAT 65+ YR: CPT | Performed by: FAMILY MEDICINE

## 2024-07-22 RX ORDER — VARENICLINE TARTRATE 0.5 (11)-1
KIT ORAL
Qty: 53 TABLET | Refills: 0 | Status: SHIPPED | OUTPATIENT
Start: 2024-07-22

## 2024-07-22 RX ORDER — BETAMETHASONE DIPROPIONATE 0.5 MG/G
CREAM TOPICAL
Qty: 60 G | Refills: 6 | Status: SHIPPED | OUTPATIENT
Start: 2024-07-22

## 2024-07-22 RX ORDER — VARENICLINE TARTRATE 1 MG/1
1 TABLET, FILM COATED ORAL 2 TIMES DAILY
Qty: 60 TABLET | Refills: 2 | Status: SHIPPED | OUTPATIENT
Start: 2024-07-22

## 2024-07-22 RX ORDER — FLUOXETINE 40 MG/1
CAPSULE ORAL
Qty: 90 CAPSULE | Refills: 0 | Status: SHIPPED | OUTPATIENT
Start: 2024-07-22

## 2024-07-22 RX ORDER — ALBUTEROL SULFATE 90 UG/1
2 AEROSOL, METERED RESPIRATORY (INHALATION) EVERY 4 HOURS PRN
Qty: 18 G | Refills: 11 | Status: SHIPPED | OUTPATIENT
Start: 2024-07-22

## 2024-07-22 ASSESSMENT — ANXIETY QUESTIONNAIRES
GAD7 TOTAL SCORE: 3
7. FEELING AFRAID AS IF SOMETHING AWFUL MIGHT HAPPEN: NOT AT ALL
2. NOT BEING ABLE TO STOP OR CONTROL WORRYING: NOT AT ALL
IF YOU CHECKED OFF ANY PROBLEMS ON THIS QUESTIONNAIRE, HOW DIFFICULT HAVE THESE PROBLEMS MADE IT FOR YOU TO DO YOUR WORK, TAKE CARE OF THINGS AT HOME, OR GET ALONG WITH OTHER PEOPLE: NOT DIFFICULT AT ALL
GAD7 TOTAL SCORE: 3
3. WORRYING TOO MUCH ABOUT DIFFERENT THINGS: SEVERAL DAYS
7. FEELING AFRAID AS IF SOMETHING AWFUL MIGHT HAPPEN: NOT AT ALL
GAD7 TOTAL SCORE: 3
5. BEING SO RESTLESS THAT IT IS HARD TO SIT STILL: NOT AT ALL
8. IF YOU CHECKED OFF ANY PROBLEMS, HOW DIFFICULT HAVE THESE MADE IT FOR YOU TO DO YOUR WORK, TAKE CARE OF THINGS AT HOME, OR GET ALONG WITH OTHER PEOPLE?: NOT DIFFICULT AT ALL
1. FEELING NERVOUS, ANXIOUS, OR ON EDGE: SEVERAL DAYS
6. BECOMING EASILY ANNOYED OR IRRITABLE: SEVERAL DAYS
4. TROUBLE RELAXING: NOT AT ALL

## 2024-07-22 ASSESSMENT — PATIENT HEALTH QUESTIONNAIRE - PHQ9
SUM OF ALL RESPONSES TO PHQ QUESTIONS 1-9: 4
SUM OF ALL RESPONSES TO PHQ QUESTIONS 1-9: 4

## 2024-07-22 ASSESSMENT — PAIN SCALES - GENERAL: PAINLEVEL: NO PAIN (0)

## 2024-07-22 NOTE — PROGRESS NOTES
Preventive Care Visit  [facility]  Lorena Brwon MD, MD, Family Medicine  Jul 22, 2024      Assessment & Plan         ICD-10-CM    1. Encounter for Medicare annual wellness exam  Z00.00       2. Mild intermittent asthma without complication  J45.20 albuterol (PROAIR HFA/PROVENTIL HFA/VENTOLIN HFA) 108 (90 Base) MCG/ACT inhaler      3. Chronic granulomatous disease (H)  D71       4. Anxiety disorder due to medical condition  F06.4       5. Symptomatic menopausal or female climacteric states  N95.1 DEXA HIP/PELVIS/SPINE - Future      6. Infiltrating ductal carcinoma of central portion of left breast in female (H)  C50.112       7. Chronic depressive personality disorder  F34.1 FLUoxetine (PROZAC) 40 MG capsule      8. Eczema, unspecified type  L30.9 augmented betamethasone dipropionate (DIPROLENE AF) 0.05 % external cream      9. Screening for HIV (human immunodeficiency virus)  Z11.4       10. Tobacco abuse disorder  Z72.0 varenicline (CHANTIX NUZHAT) 0.5 MG X 11 & 1 MG X 42 tablet     varenicline (CHANTIX) 1 MG tablet      11. Asymptomatic menopausal state  Z78.0 DEXA HIP/PELVIS/SPINE - Future          Patient has a history of normal Pap and is due for Pap smear shortly.  Though she is not quite at a year as timeframe from her colposcopy.  She has been a smoker and is interested in quitting smoking and she knows that this will potentially help improve her HPV recovery.  We did offer Chantix and have prescribed her a wean off and maintenance dosing of these and also have offered to quit plan if she is interested but she is not at this time.  She does have asthma and chronic condylomatous conditions and so it likely will be able to be breathing better if she was able to get off of the cigarettes though she reports only occasionally using albuterol as needed and usually respiring before she finishes even a bottle.  Also she has a history of depression for which she is doing quite well with her 40 mg  of Prozac which is maintaining regularly and refills were given today.  Plan follow-up 6 months for mood and it is okay to wait for her Pap smear results until this time of plan follow-up as we can see with the smoking cessation changes are able to do for her as well.    No LOS data to display   Time spent by me doing chart review, history and exam, documentation and further activities per the note    Lorena Brown MD     Patient has been advised of split billing requirements and indicates understanding: Yes        Counseling  Appropriate preventive services were addressed with this patient via screening, questionnaire, or discussion as appropriate for fall prevention, nutrition, physical activity, Tobacco-use cessation, weight loss and cognition.  Checklist reviewing preventive services available has been given to the patient.  Reviewed patient's diet, addressing concerns and/or questions.   She is at risk for lack of exercise and has been provided with information to increase physical activity for the benefit of her well-being.   The patient reports drinking more than 3 alcoholic drinks per day and/or more than 7 drhnks per week. The patient was counseled and given information about possible harmful effects of excessive alcohol intake.Patient reported safety concerns were addressed today.        Susan Frank is a 64 year old, presenting for the following:  Wellness Visit        7/22/2024     1:14 PM   Additional Questions   Roomed by lori   Accompanied by alone         7/22/2024     1:14 PM   Patient Reported Additional Medications   Patient reports taking the following new medications none        Health Care Directive  Patient does not have a Health Care Directive or Living Will: Discussed advance care planning with patient; information given to patient to review.    HPI        7/20/2024   General Health   How would you rate your overall physical health? Good   Feel stress (tense, anxious, or unable to  sleep) To some extent      (!) STRESS CONCERN      7/20/2024   Nutrition   Diet: Regular (no restrictions)            7/20/2024   Exercise   Days per week of moderate/strenous exercise 2 days   Average minutes spent exercising at this level 40 min      (!) EXERCISE CONCERN      7/20/2024   Social Factors   Frequency of gathering with friends or relatives Twice a week   Worry food won't last until get money to buy more No   Food not last or not have enough money for food? No   Do you have housing? (Housing is defined as stable permanent housing and does not include staying ouside in a car, in a tent, in an abandoned building, in an overnight shelter, or couch-surfing.) Yes   Are you worried about losing your housing? No   Lack of transportation? No   Unable to get utilities (heat,electricity)? No            7/20/2024   Fall Risk   Fallen 2 or more times in the past year? No   Trouble with walking or balance? No             7/20/2024   Activities of Daily Living- Home Safety   Needs help with the following daily activites None of the above   Safety concerns in the home No grab bars in the bathroom            7/20/2024   Dental   Dentist two times every year? Yes            7/20/2024   Hearing Screening   Hearing concerns? None of the above            7/20/2024   Driving Risk Screening   Patient/family members have concerns about driving No            7/20/2024   General Alertness/Fatigue Screening   Have you been more tired than usual lately? No            7/20/2024   Urinary Incontinence Screening   Bothered by leaking urine in past 6 months No            7/20/2024   TB Screening   Were you born outside of the [place]? No          Today's PHQ-9 Score:       7/22/2024    12:56 PM   PHQ-9 SCORE   PHQ-9 Total Score MyChart 4 (Minimal depression)   PHQ-9 Total Score 4         7/20/2024   Substance Use   Alcohol more than 3/day or more than 7/wk Yes   How often do you have a drink containing alcohol 2 to 3 times a week   How  many alcohol drinks on typical day 1 or 2   How often do you have 5+ drinks at one occasion Less than monthly   Audit 2/3 Score 1   How often not able to stop drinking once started Never   How often failed to do what normally expected Never   How often needed first drink in am after a heavy drinking session Never   How often feeling of guilt or remorse after drinking Less than monthly   How often unable to remember what happened the night before Never   Have you or someone else been injured because of your drinking No   Has anyone been concerned or suggested you cut down on drinking No   TOTAL SCORE - AUDIT 5   Do you have a current opioid prescription? No   How severe/bad is pain from 1 to 10? 0/10 (No Pain)   Do you use any other substances recreationally? No        Social History     Tobacco Use    Smoking status: Every Day     Current packs/day: 0.50     Average packs/day: 0.5 packs/day for 25.0 years (12.5 ttl pk-yrs)     Types: Cigarettes    Smokeless tobacco: Never    Tobacco comments:     7-8cigarettes a day/has cut back   Vaping Use    Vaping status: Former   Substance Use Topics    Alcohol use: Yes     Comment: 3-4 X per week.  About 7 drinks per week.    Drug use: No           7/8/2024   LAST FHS-7 RESULTS   1st degree relative breast or ovarian cancer No   Any relative bilateral breast cancer No   Any male have breast cancer No   Any ONE woman have BOTH breast AND ovarian cancer No   Any woman with breast cancer before 50yrs No   2 or more relatives with breast AND/OR ovarian cancer No   2 or more relatives with breast AND/OR bowel cancer No           Mammogram Screening - Mammogram every 1-2 years updated in Health Maintenance based on mutual decision making          7/20/2024   One time HIV Screening   Previous HIV test? No        History of abnormal Pap smear: YES - reflected in Problem List and Health Maintenance accordingly        Latest Ref Rng & Units 9/20/2023     1:40 PM 8/15/2022     1:35 PM  7/25/2014    12:00 AM   PAP / HPV   PAP  Atypical squamous cells of undetermined significance (ASC-US)  Negative for Intraepithelial Lesion or Malignancy (NILM)     PAP (Historical)    ASC-US    HPV 16 DNA Negative Negative  Negative     HPV 18 DNA Negative Negative  Negative     Other HR HPV Negative Positive  Positive       ASCVD Risk   The 10-year ASCVD risk score (Miller TAO, et al., 2019) is: 7.3%    Values used to calculate the score:      Age: 64 years      Sex: Female      Is Non- : No      Diabetic: No      Tobacco smoker: Yes      Systolic Blood Pressure: 128 mmHg      Is BP treated: No      HDL Cholesterol: 91 mg/dL      Total Cholesterol: 157 mg/dL            Reviewed and updated as needed this visit by Provider   Tobacco  Allergies  Meds  Problems  Med Hx  Surg Hx  Fam Hx              Current providers sharing in care for this patient include:  Patient Care Team:  Lorena Brown MD as PCP - General (Family Medicine)  Shan Salvador MD as MD (Radiation Oncology)  Abigail Velez APRN CNP as Nurse Practitioner (Nurse Practitioner)  Lorena Brown MD as Assigned PCP  Elizabeth Price NP as Assigned Cancer Care Provider  Gabriela Gardner DO as Assigned OBGYN Provider    The following health maintenance items are reviewed in Epic and correct as of today:  Health Maintenance   Topic Date Due    DEXA  Never done    DEPRESSION ACTION PLAN  Never done    HIV SCREENING  Never done    ASTHMA ACTION PLAN  01/07/2020    LUNG CANCER SCREENING  08/29/2023    COVID-19 Vaccine (7 - 2023-24 season) 12/11/2023    INFLUENZA VACCINE (1) 09/01/2024    NICOTINE/TOBACCO CESSATION COUNSELING Q 1 YR  09/20/2024    ANNUAL REVIEW OF HM ORDERS  09/20/2024    COLORECTAL CANCER SCREENING  10/01/2024    PAP FOLLOW-UP  11/27/2024    HPV FOLLOW-UP  11/27/2024    LEBRON ASSESSMENT  01/22/2025    ASTHMA CONTROL TEST  01/22/2025    PHQ-9  01/22/2025    ADVANCE CARE PLANNING  02/03/2025    MAMMO  "SCREENING  07/08/2025    MEDICARE ANNUAL WELLNESS VISIT  07/22/2025    FALL RISK ASSESSMENT  07/22/2025    GLUCOSE  06/14/2026    LIPID  08/15/2027    DTAP/TDAP/TD IMMUNIZATION (3 - Td or Tdap) 03/12/2033    HEPATITIS C SCREENING  Completed    Pneumococcal Vaccine: 65+ Years  Completed    ZOSTER IMMUNIZATION  Completed    RSV VACCINE (Pregnancy & 60+)  Completed    IPV IMMUNIZATION  Aged Out    HPV IMMUNIZATION  Aged Out    MENINGITIS IMMUNIZATION  Aged Out    RSV MONOCLONAL ANTIBODY  Aged Out    PAP  Discontinued            Objective    Exam  /72   Pulse 102   Temp 98.6  F (37  C) (Temporal)   Resp 16   Ht 1.711 m (5' 7.36\")   Wt 70.3 kg (155 lb)   LMP 04/02/2014   SpO2 97%   BMI 24.02 kg/m     Estimated body mass index is 24.02 kg/m  as calculated from the following:    Height as of this encounter: 1.711 m (5' 7.36\").    Weight as of this encounter: 70.3 kg (155 lb).    Physical Exam  GENERAL: alert and no distress  EYES: Eyes grossly normal to inspection, PERRL and conjunctivae and sclerae normal  HENT: ear canals and TM's normal, nose and mouth without ulcers or lesions  NECK: no adenopathy, no asymmetry, masses, or scars  RESP: lungs clear to auscultation - no rales, rhonchi or wheezes  CV: regular rate and rhythm, normal S1 S2, no S3 or S4, no murmur, click or rub, no peripheral edema  ABDOMEN: soft, nontender, no hepatosplenomegaly, no masses and bowel sounds normal   (female): normal female external genitalia, normal urethral meatus, normal vaginal mucosa  MS: no gross musculoskeletal defects noted, no edema  SKIN: no suspicious lesions or rashes  NEURO: Normal strength and tone, mentation intact and speech normal  PSYCH: mentation appears normal, affect normal/bright        7/22/2024   Mini Cog   Clock Draw Score 2 Normal   3 Item Recall 2 objects recalled   Mini Cog Total Score 4            Vision Screen  Patient wears corrective lenses (select all that apply): Worn during vision " screen  Vision Screen Results: Pass      Signed Electronically by: Lorena Brown MD, MD    Answers submitted by the patient for this visit:  Patient Health Questionnaire (Submitted on 7/22/2024)  PHQ9 TOTAL SCORE: 4  LEBRON-7 (Submitted on 7/22/2024)  LEBRON 7 TOTAL SCORE: 3

## 2024-07-22 NOTE — PATIENT INSTRUCTIONS
Patient Education   Preventive Care Advice   This is general advice given by our system to help you stay healthy. However, your care team may have specific advice just for you. Please talk to your care team about your preventive care needs.  Nutrition  Eat 5 or more servings of fruits and vegetables each day.  Try wheat bread, brown rice and whole grain pasta (instead of white bread, rice, and pasta).  Get enough calcium and vitamin D. Check the label on foods and aim for 100% of the RDA (recommended daily allowance).  Lifestyle  Exercise at least 150 minutes each week  (30 minutes a day, 5 days a week).  Do muscle strengthening activities 2 days a week. These help control your weight and prevent disease.  No smoking.  Wear sunscreen to prevent skin cancer.  Have a dental exam and cleaning every 6 months.  Yearly exams  See your health care team every year to talk about:  Any changes in your health.  Any medicines your care team has prescribed.  Preventive care, family planning, and ways to prevent chronic diseases.  Shots (vaccines)   HPV shots (up to age 26), if you've never had them before.  Hepatitis B shots (up to age 59), if you've never had them before.  COVID-19 shot: Get this shot when it's due.  Flu shot: Get a flu shot every year.  Tetanus shot: Get a tetanus shot every 10 years.  Pneumococcal, hepatitis A, and RSV shots: Ask your care team if you need these based on your risk.  Shingles shot (for age 50 and up)  General health tests  Diabetes screening:  Starting at age 35, Get screened for diabetes at least every 3 years.  If you are younger than age 35, ask your care team if you should be screened for diabetes.  Cholesterol test: At age 39, start having a cholesterol test every 5 years, or more often if advised.  Bone density scan (DEXA): At age 50, ask your care team if you should have this scan for osteoporosis (brittle bones).  Hepatitis C: Get tested at least once in your life.  STIs (sexually  transmitted infections)  Before age 24: Ask your care team if you should be screened for STIs.  After age 24: Get screened for STIs if you're at risk. You are at risk for STIs (including HIV) if:  You are sexually active with more than one person.  You don't use condoms every time.  You or a partner was diagnosed with a sexually transmitted infection.  If you are at risk for HIV, ask about PrEP medicine to prevent HIV.  Get tested for HIV at least once in your life, whether you are at risk for HIV or not.  Cancer screening tests  Cervical cancer screening: If you have a cervix, begin getting regular cervical cancer screening tests starting at age 21.  Breast cancer scan (mammogram): If you've ever had breasts, begin having regular mammograms starting at age 40. This is a scan to check for breast cancer.  Colon cancer screening: It is important to start screening for colon cancer at age 45.  Have a colonoscopy test every 10 years (or more often if you're at risk) Or, ask your provider about stool tests like a FIT test every year or Cologuard test every 3 years.  To learn more about your testing options, visit:   .  For help making a decision, visit:   https://bit.ly/nu71752.  Prostate cancer screening test: If you have a prostate, ask your care team if a prostate cancer screening test (PSA) at age 55 is right for you.  Lung cancer screening: If you are a current or former smoker ages 50 to 80, ask your care team if ongoing lung cancer screenings are right for you.  For informational purposes only. Not to replace the advice of your health care provider. Copyright   2023 [facility]. All rights reserved. Clinically reviewed by the [facility] Transitions Program. [place] 046195 - REV 01/24.  Learning About Activities of Daily Living  What are activities of daily living?     Activities of daily living (ADLs) are the basic self-care tasks you do every day. These include eating, bathing, dressing,  and moving around.  As you age, and if you have health problems, you may find that it's harder to do some of these tasks. If so, your doctor can suggest ideas that may help.  To measure what kind of help you may need, your doctor will ask how well you are able to do ADLs. Let your doctor know if there are any tasks that you are having trouble doing. This is an important first step to getting help. And when you have the help you need, you can stay as independent as possible.  How will a doctor assess your ADLs?  Asking about ADLs is part of a routine health checkup your doctor will likely do as you age. Your health check might be done in a doctor's office, in your home, or at a hospital. The goal is to find out if you are having any problems that could make it hard to care for yourself or that make it unsafe for you to be on your own.  To measure your ADLs, your doctor will ask how hard it is for you to do routine tasks. Your doctor may also want to know if you have changed the way you do a task because of a health problem. Your doctor may watch how you:  Walk back and forth.  Keep your balance while you stand or walk.  Move from sitting to standing or from a bed to a chair.  Button or unbutton a shirt or sweater.  Remove and put on your shoes.  It's common to feel a little worried or anxious if you find you can't do all the things you used to be able to do. Talking with your doctor about ADLs is a way to make sure you're as safe as possible and able to care for yourself as well as you can. You may want to bring a caregiver, friend, or family member to your checkup. They can help you talk to your doctor.  Follow-up care is a key part of your treatment and safety. Be sure to make and go to all appointments, and call your doctor if you are having problems. It's also a good idea to know your test results and keep a list of the medicines you take.  Current as of: October 24, 2023               Content Version: 14.0     "8442-6408 [employer].   Care instructions adapted under license by your healthcare professional. If you have questions about a medical condition or this instruction, always ask your healthcare professional. [employer] disclaims any warranty or liability for your use of this information.      9 Ways to Cut Back on Drinking  Maybe you've found yourself drinking more alcohol than you'd prefer. If you want to cut back, here are some ideas to try.    Think before you drink.  Do you really want a drink, or is it just a habit? If you're used to having a drink at a certain time, try doing something else then.     Look for substitutes.  Find some no-alcohol drinks that you enjoy, like flavored seltzer water, tea with honey, or tonic with a slice of lime. Or try alcohol-free beer or \"virgin\" cocktails (without the alcohol).     Drink more water.  Use water to quench your thirst. Drink a glass of water before you have any alcohol. Have another glass along with every drink or between drinks.     Shrink your drink.  For example, have a bottle of beer instead of a pint. Use a smaller glass for wine. Choose drinks with lower alcohol content (ABV%). Or use less liquor and more mixer in cocktails.     Slow down.  It's easy to drink quickly and without thinking about it. Pay attention, and make each drink last longer.     Do the math.  Total up how much you spend on alcohol each month. How much is that a year? If you cut back, what could you do with the money you save?     Take a break.  Choose a day or two each week when you won't drink at all. Notice how you feel on those days, physically and emotionally. How did you sleep? Do you feel better? Over time, add more break days.     Count calories.  Would you like to lose some weight? For some people that's a good motivator for cutting back. Figure out how many calories are in each drink. How many does that add up to in a day? In a week? In a month?     " "Practice saying no.  Be ready when someone offers you a drink. Try: \"Thanks, I've had enough.\" Or \"Thanks, but I'm cutting back.\" Or \"No, thanks. I feel better when I drink less.\"   Current as of: November 15, 2023               Content Version: 14.0    9108-3779 [employer].   Care instructions adapted under license by your healthcare professional. If you have questions about a medical condition or this instruction, always ask your healthcare professional. [employer] disclaims any warranty or liability for your use of this information.       "

## 2024-09-05 ENCOUNTER — PATIENT OUTREACH (OUTPATIENT)
Dept: FAMILY MEDICINE | Facility: OTHER | Age: 64
End: 2024-09-05
Payer: COMMERCIAL

## 2024-09-05 DIAGNOSIS — R87.810 CERVICAL HIGH RISK HPV (HUMAN PAPILLOMAVIRUS) TEST POSITIVE: Primary | ICD-10-CM

## 2025-03-05 ENCOUNTER — MYC MEDICAL ADVICE (OUTPATIENT)
Dept: FAMILY MEDICINE | Facility: OTHER | Age: 65
End: 2025-03-05
Payer: COMMERCIAL

## 2025-03-25 ASSESSMENT — ASTHMA QUESTIONNAIRES
QUESTION_3 LAST FOUR WEEKS HOW OFTEN DID YOUR ASTHMA SYMPTOMS (WHEEZING, COUGHING, SHORTNESS OF BREATH, CHEST TIGHTNESS OR PAIN) WAKE YOU UP AT NIGHT OR EARLIER THAN USUAL IN THE MORNING: NOT AT ALL
QUESTION_1 LAST FOUR WEEKS HOW MUCH OF THE TIME DID YOUR ASTHMA KEEP YOU FROM GETTING AS MUCH DONE AT WORK, SCHOOL OR AT HOME: NONE OF THE TIME
ACT_TOTALSCORE: 21
QUESTION_4 LAST FOUR WEEKS HOW OFTEN HAVE YOU USED YOUR RESCUE INHALER OR NEBULIZER MEDICATION (SUCH AS ALBUTEROL): TWO OR THREE TIMES PER WEEK
QUESTION_5 LAST FOUR WEEKS HOW WOULD YOU RATE YOUR ASTHMA CONTROL: WELL CONTROLLED
QUESTION_2 LAST FOUR WEEKS HOW OFTEN HAVE YOU HAD SHORTNESS OF BREATH: ONCE OR TWICE A WEEK

## 2025-03-25 ASSESSMENT — ANXIETY QUESTIONNAIRES
4. TROUBLE RELAXING: NOT AT ALL
3. WORRYING TOO MUCH ABOUT DIFFERENT THINGS: NOT AT ALL
1. FEELING NERVOUS, ANXIOUS, OR ON EDGE: NOT AT ALL
8. IF YOU CHECKED OFF ANY PROBLEMS, HOW DIFFICULT HAVE THESE MADE IT FOR YOU TO DO YOUR WORK, TAKE CARE OF THINGS AT HOME, OR GET ALONG WITH OTHER PEOPLE?: NOT DIFFICULT AT ALL
GAD7 TOTAL SCORE: 0
GAD7 TOTAL SCORE: 0
5. BEING SO RESTLESS THAT IT IS HARD TO SIT STILL: NOT AT ALL
GAD7 TOTAL SCORE: 0
6. BECOMING EASILY ANNOYED OR IRRITABLE: NOT AT ALL
7. FEELING AFRAID AS IF SOMETHING AWFUL MIGHT HAPPEN: NOT AT ALL
IF YOU CHECKED OFF ANY PROBLEMS ON THIS QUESTIONNAIRE, HOW DIFFICULT HAVE THESE PROBLEMS MADE IT FOR YOU TO DO YOUR WORK, TAKE CARE OF THINGS AT HOME, OR GET ALONG WITH OTHER PEOPLE: NOT DIFFICULT AT ALL
7. FEELING AFRAID AS IF SOMETHING AWFUL MIGHT HAPPEN: NOT AT ALL
2. NOT BEING ABLE TO STOP OR CONTROL WORRYING: NOT AT ALL

## 2025-03-25 ASSESSMENT — PATIENT HEALTH QUESTIONNAIRE - PHQ9
SUM OF ALL RESPONSES TO PHQ QUESTIONS 1-9: 6
10. IF YOU CHECKED OFF ANY PROBLEMS, HOW DIFFICULT HAVE THESE PROBLEMS MADE IT FOR YOU TO DO YOUR WORK, TAKE CARE OF THINGS AT HOME, OR GET ALONG WITH OTHER PEOPLE: NOT DIFFICULT AT ALL
SUM OF ALL RESPONSES TO PHQ QUESTIONS 1-9: 6

## 2025-03-26 ENCOUNTER — OFFICE VISIT (OUTPATIENT)
Dept: FAMILY MEDICINE | Facility: OTHER | Age: 65
End: 2025-03-26
Payer: COMMERCIAL

## 2025-03-26 VITALS
WEIGHT: 146.5 LBS | BODY MASS INDEX: 22.99 KG/M2 | TEMPERATURE: 98.1 F | RESPIRATION RATE: 21 BRPM | DIASTOLIC BLOOD PRESSURE: 80 MMHG | SYSTOLIC BLOOD PRESSURE: 140 MMHG | OXYGEN SATURATION: 97 % | HEIGHT: 67 IN | HEART RATE: 100 BPM

## 2025-03-26 DIAGNOSIS — F34.1 CHRONIC DEPRESSIVE PERSONALITY DISORDER: Primary | ICD-10-CM

## 2025-03-26 DIAGNOSIS — D71 CHRONIC GRANULOMATOUS DISEASE (H): ICD-10-CM

## 2025-03-26 DIAGNOSIS — Z17.1 ESTROGEN RECEPTOR NEGATIVE CARCINOMA OF BREAST, LEFT (H): ICD-10-CM

## 2025-03-26 DIAGNOSIS — C50.912 ESTROGEN RECEPTOR NEGATIVE CARCINOMA OF BREAST, LEFT (H): ICD-10-CM

## 2025-03-26 DIAGNOSIS — J30.1 NON-SEASONAL ALLERGIC RHINITIS DUE TO POLLEN: ICD-10-CM

## 2025-03-26 DIAGNOSIS — Z87.891 HISTORY OF TOBACCO USE, PRESENTING HAZARDS TO HEALTH: ICD-10-CM

## 2025-03-26 PROCEDURE — 3078F DIAST BP <80 MM HG: CPT | Performed by: FAMILY MEDICINE

## 2025-03-26 PROCEDURE — 1126F AMNT PAIN NOTED NONE PRSNT: CPT | Performed by: FAMILY MEDICINE

## 2025-03-26 PROCEDURE — 99214 OFFICE O/P EST MOD 30 MIN: CPT | Performed by: FAMILY MEDICINE

## 2025-03-26 PROCEDURE — 3077F SYST BP >= 140 MM HG: CPT | Performed by: FAMILY MEDICINE

## 2025-03-26 RX ORDER — BUPROPION HYDROCHLORIDE 150 MG/1
150 TABLET ORAL EVERY MORNING
Qty: 90 TABLET | Refills: 0 | Status: SHIPPED | OUTPATIENT
Start: 2025-03-26

## 2025-03-26 RX ORDER — FLUOXETINE HYDROCHLORIDE 40 MG/1
CAPSULE ORAL
Qty: 90 CAPSULE | Refills: 1 | Status: SHIPPED | OUTPATIENT
Start: 2025-03-26

## 2025-03-26 ASSESSMENT — PAIN SCALES - GENERAL: PAINLEVEL_OUTOF10: NO PAIN (0)

## 2025-03-26 NOTE — PROGRESS NOTES
Assessment & Plan         ICD-10-CM    1. 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 Estrogen receptor negative carcinoma of breast, left (H)  C50.912     Z17.1       3. Chronic granulomatous disease (H)  D71       4. History of tobacco use, presenting hazards to health  Z87.891 buPROPion (WELLBUTRIN XL) 150 MG 24 hr tablet      5. Non-seasonal allergic rhinitis due to pollen  J30.1           Consent was obtained from the patient to use an AI documentation tool in the creation of this note.    Assessment & Plan  Chronic depressive personality disorder:  - Medication is helping manage symptoms. No changes in medication dosage recommended at this time.  - Continue current medication regimen. Consider potential future adjustments as needed.    History of tobacco use, presenting hazards to health:  - Chantix was ineffective for smoking cessation. Physical addiction to nicotine persists.  - Start wellbutrin (bupropion) for smoking cessation. Plan to quit smoking after two weeks of medication. Continue for approximately three months.  - Discussed potential interaction with Prozac and the risk of seizures, though no history of seizures reported. Monitor for any changes in mood or side effects.    Cervical cancer screening:  - Previous abnormal Pap smear with HPV and ASCUS. Patient wishes to continue screening.  - Schedule Pap smear during the summer physical exam.    Allergies and asthma:  - Allergic reactions in certain locations, such as [place], causing respiratory issues.  - Use Flonase for short-term relief. Consider nasal rinses and sprays for managing symptoms.  - Discussed the possibility of using over-the-counter medications like Allegra, Claritin, or Zyrtec, and the use of Benadryl at night if needed. Consideration of allergy injections if symptoms persist in specific locations.      Quitting smoking, will monitor bp closely and recheck this summer    I spent a total  of 16 minutes on the day of the visit.   Time spent by me today doing chart review, history and exam, documentation and further activities per the note    Lorena Brown MD           Nicotine/Tobacco Cessation  She reports that she has been smoking cigarettes. She has a 12.5 pack-year smoking history. She has never used smokeless tobacco.  Nicotine/Tobacco Cessation Plan  Pharmacotherapies : bupropion (Zyban)            Susan Frank is a 64 year old, presenting for the following health issues:  Recheck Medication        3/26/2025     3:37 PM   Additional Questions   Roomed by nola   Accompanied by self         3/26/2025     3:37 PM   Patient Reported Additional Medications   Patient reports taking the following new medications [employer]     Via the Health Maintenance questionnaire, the patient has reported the following services have been completed -Colonscopy: [facility] 2020-05-06, this information has not been sent to the abstraction team.  History of Present Illness       Mental Health Follow-up:  Patient presents to follow-up on Depression & Anxiety.Patient's depression since last visit has been:  Good  The patient is not having other symptoms associated with depression.  Patient's anxiety since last visit has been:  Good  The patient is not having other symptoms associated with anxiety.  Any significant life events: No  Patient is not feeling anxious or having panic attacks.  Patient has no concerns about alcohol or drug use.    She eats 0-1 servings of fruits and vegetables daily.She consumes 0 sweetened beverage(s) daily.She exercises with enough effort to increase her heart rate 9 or less minutes per day.  She exercises with enough effort to increase her heart rate 3 or less days per week.   She is taking medications regularly.   - Experienced difficulty falling asleep at night  - Attempted smoking cessation with Chantix, which was ineffective  - History of asthma and allergies,  "exacerbated during travel to [place] in January, leading to severe breathing difficulties  - Uses albuterol inhaler for asthma management  - Previously managed allergies with over-the-counter medications, but experiences worsened symptoms in certain locations  - Last Pap smear in September 2023, with a history of abnormal results (HPV and ASCUS)              Review of Systems  Constitutional, HEENT, cardiovascular, pulmonary, GI, , musculoskeletal, neuro, skin, endocrine and psych systems are negative, except as otherwise noted.      Objective    BP (!) 151/78   Pulse 100   Temp 98.1  F (36.7  C) (Temporal)   Resp 21   Ht 1.711 m (5' 7.36\")   Wt 66.5 kg (146 lb 8 oz)   LMP 04/02/2014   SpO2 97%   BMI 22.70 kg/m    Body mass index is 22.7 kg/m .  Physical Exam   GENERAL: alert and no distress  RESP: lungs clear to auscultation - no rales, rhonchi or wheezes  CV: regular rate and rhythm, normal S1 S2, no S3 or S4, no murmur, click or rub, no peripheral edema  ABDOMEN: soft, nontender, no hepatosplenomegaly, no masses and bowel sounds normal  MS: no gross musculoskeletal defects noted, no edema  SKIN: no suspicious lesions or rashes  NEURO: Normal strength and tone, mentation intact and speech normal  PSYCH: mentation appears normal, affect normal/bright            Signed Electronically by: Lorena Brown MD, MD    "

## 2025-06-09 ENCOUNTER — PATIENT OUTREACH (OUTPATIENT)
Dept: CARE COORDINATION | Facility: CLINIC | Age: 65
End: 2025-06-09
Payer: COMMERCIAL

## 2025-06-30 ENCOUNTER — PATIENT OUTREACH (OUTPATIENT)
Dept: CARE COORDINATION | Facility: CLINIC | Age: 65
End: 2025-06-30
Payer: COMMERCIAL

## 2025-07-15 ENCOUNTER — HOSPITAL ENCOUNTER (OUTPATIENT)
Dept: MAMMOGRAPHY | Facility: CLINIC | Age: 65
Discharge: HOME | End: 2025-07-15
Attending: FAMILY MEDICINE
Payer: COMMERCIAL

## 2025-07-15 DIAGNOSIS — Z12.31 VISIT FOR SCREENING MAMMOGRAM: ICD-10-CM

## 2025-07-15 PROCEDURE — 77063 BREAST TOMOSYNTHESIS BI: CPT

## 2025-08-04 ENCOUNTER — OFFICE VISIT (OUTPATIENT)
Dept: FAMILY MEDICINE | Facility: OTHER | Age: 65
End: 2025-08-04
Payer: COMMERCIAL

## 2025-08-04 VITALS
HEIGHT: 67 IN | OXYGEN SATURATION: 99 % | TEMPERATURE: 97.5 F | BODY MASS INDEX: 21.82 KG/M2 | WEIGHT: 139 LBS | RESPIRATION RATE: 13 BRPM | DIASTOLIC BLOOD PRESSURE: 70 MMHG | HEART RATE: 103 BPM | SYSTOLIC BLOOD PRESSURE: 138 MMHG

## 2025-08-04 DIAGNOSIS — F34.1 CHRONIC DEPRESSIVE PERSONALITY DISORDER: ICD-10-CM

## 2025-08-04 DIAGNOSIS — F17.200 NICOTINE DEPENDENCE, UNCOMPLICATED, UNSPECIFIED NICOTINE PRODUCT TYPE: ICD-10-CM

## 2025-08-04 DIAGNOSIS — Z87.891 HISTORY OF TOBACCO USE, PRESENTING HAZARDS TO HEALTH: ICD-10-CM

## 2025-08-04 DIAGNOSIS — Z12.11 SCREEN FOR COLON CANCER: ICD-10-CM

## 2025-08-04 DIAGNOSIS — J45.20 MILD INTERMITTENT ASTHMA WITHOUT COMPLICATION: ICD-10-CM

## 2025-08-04 DIAGNOSIS — I25.10 CORONARY ARTERY DISEASE INVOLVING NATIVE CORONARY ARTERY OF NATIVE HEART WITHOUT ANGINA PECTORIS: ICD-10-CM

## 2025-08-04 DIAGNOSIS — R87.810 ASCUS WITH POSITIVE HIGH RISK HPV CERVICAL: ICD-10-CM

## 2025-08-04 DIAGNOSIS — Z12.4 CERVICAL CANCER SCREENING: ICD-10-CM

## 2025-08-04 DIAGNOSIS — Z78.0 ASYMPTOMATIC POSTMENOPAUSAL STATUS: ICD-10-CM

## 2025-08-04 DIAGNOSIS — Z00.00 ENCOUNTER FOR MEDICARE ANNUAL WELLNESS EXAM: Primary | ICD-10-CM

## 2025-08-04 DIAGNOSIS — R87.610 ASCUS WITH POSITIVE HIGH RISK HPV CERVICAL: ICD-10-CM

## 2025-08-04 LAB
ALBUMIN SERPL BCG-MCNC: 4 G/DL (ref 3.5–5.2)
ALP SERPL-CCNC: 87 U/L (ref 40–150)
ALT SERPL W P-5'-P-CCNC: 22 U/L (ref 0–50)
ANION GAP SERPL CALCULATED.3IONS-SCNC: 13 MMOL/L (ref 7–15)
AST SERPL W P-5'-P-CCNC: 32 U/L (ref 0–45)
BILIRUB SERPL-MCNC: 1.1 MG/DL
BUN SERPL-MCNC: 15.3 MG/DL (ref 8–23)
CALCIUM SERPL-MCNC: 9.1 MG/DL (ref 8.8–10.4)
CHLORIDE SERPL-SCNC: 96 MMOL/L (ref 98–107)
CHOLEST SERPL-MCNC: 144 MG/DL
CREAT SERPL-MCNC: 0.77 MG/DL (ref 0.51–0.95)
EGFRCR SERPLBLD CKD-EPI 2021: 85 ML/MIN/1.73M2
FASTING STATUS PATIENT QL REPORTED: NO
FASTING STATUS PATIENT QL REPORTED: NO
GLUCOSE SERPL-MCNC: 124 MG/DL (ref 70–99)
HCO3 SERPL-SCNC: 23 MMOL/L (ref 22–29)
HDLC SERPL-MCNC: 86 MG/DL
LDLC SERPL CALC-MCNC: 48 MG/DL
NONHDLC SERPL-MCNC: 58 MG/DL
POTASSIUM SERPL-SCNC: 3.7 MMOL/L (ref 3.4–5.3)
PROT SERPL-MCNC: 6.9 G/DL (ref 6.4–8.3)
SODIUM SERPL-SCNC: 132 MMOL/L (ref 135–145)
TRIGL SERPL-MCNC: 51 MG/DL

## 2025-08-04 PROCEDURE — 3078F DIAST BP <80 MM HG: CPT | Performed by: FAMILY MEDICINE

## 2025-08-04 PROCEDURE — 3075F SYST BP GE 130 - 139MM HG: CPT | Performed by: FAMILY MEDICINE

## 2025-08-04 PROCEDURE — 80061 LIPID PANEL: CPT | Performed by: FAMILY MEDICINE

## 2025-08-04 PROCEDURE — 87624 HPV HI-RISK TYP POOLED RSLT: CPT | Mod: GZ | Performed by: FAMILY MEDICINE

## 2025-08-04 PROCEDURE — 80053 COMPREHEN METABOLIC PANEL: CPT | Performed by: FAMILY MEDICINE

## 2025-08-04 PROCEDURE — 99214 OFFICE O/P EST MOD 30 MIN: CPT | Mod: 25 | Performed by: FAMILY MEDICINE

## 2025-08-04 PROCEDURE — G0439 PPPS, SUBSEQ VISIT: HCPCS | Performed by: FAMILY MEDICINE

## 2025-08-04 PROCEDURE — 36415 COLL VENOUS BLD VENIPUNCTURE: CPT | Performed by: FAMILY MEDICINE

## 2025-08-04 PROCEDURE — 1126F AMNT PAIN NOTED NONE PRSNT: CPT | Performed by: FAMILY MEDICINE

## 2025-08-04 RX ORDER — FLUOXETINE HYDROCHLORIDE 40 MG/1
CAPSULE ORAL
Qty: 90 CAPSULE | Refills: 1 | Status: SHIPPED | OUTPATIENT
Start: 2025-08-04

## 2025-08-04 RX ORDER — ALBUTEROL SULFATE 90 UG/1
2 INHALANT RESPIRATORY (INHALATION) EVERY 4 HOURS PRN
Qty: 18 G | Refills: 11 | Status: SHIPPED | OUTPATIENT
Start: 2025-08-04

## 2025-08-04 SDOH — HEALTH STABILITY: PHYSICAL HEALTH: ON AVERAGE, HOW MANY DAYS PER WEEK DO YOU ENGAGE IN MODERATE TO STRENUOUS EXERCISE (LIKE A BRISK WALK)?: 2 DAYS

## 2025-08-04 SDOH — HEALTH STABILITY: PHYSICAL HEALTH: ON AVERAGE, HOW MANY MINUTES DO YOU ENGAGE IN EXERCISE AT THIS LEVEL?: 30 MIN

## 2025-08-04 ASSESSMENT — PAIN SCALES - GENERAL: PAINLEVEL_OUTOF10: NO PAIN (0)

## 2025-08-04 ASSESSMENT — SOCIAL DETERMINANTS OF HEALTH (SDOH): HOW OFTEN DO YOU GET TOGETHER WITH FRIENDS OR RELATIVES?: TWICE A WEEK

## 2025-08-04 ASSESSMENT — PATIENT HEALTH QUESTIONNAIRE - PHQ9
SUM OF ALL RESPONSES TO PHQ QUESTIONS 1-9: 4
10. IF YOU CHECKED OFF ANY PROBLEMS, HOW DIFFICULT HAVE THESE PROBLEMS MADE IT FOR YOU TO DO YOUR WORK, TAKE CARE OF THINGS AT HOME, OR GET ALONG WITH OTHER PEOPLE: NOT DIFFICULT AT ALL
SUM OF ALL RESPONSES TO PHQ QUESTIONS 1-9: 4

## 2025-08-05 ENCOUNTER — TELEPHONE (OUTPATIENT)
Dept: GASTROENTEROLOGY | Facility: CLINIC | Age: 65
End: 2025-08-05
Payer: COMMERCIAL

## 2025-08-06 LAB
HPV HR 12 DNA CVX QL NAA+PROBE: POSITIVE
HPV16 DNA CVX QL NAA+PROBE: NEGATIVE
HPV18 DNA CVX QL NAA+PROBE: NEGATIVE
HUMAN PAPILLOMA VIRUS FINAL DIAGNOSIS: ABNORMAL

## 2025-08-11 LAB
BKR AP ASSOCIATED HPV REPORT: ABNORMAL
BKR LAB AP GYN ADEQUACY: ABNORMAL
BKR LAB AP GYN INTERPRETATION: ABNORMAL
BKR LAB AP PREVIOUS ABNORMAL: ABNORMAL
PATH REPORT.COMMENTS IMP SPEC: ABNORMAL
PATH REPORT.COMMENTS IMP SPEC: ABNORMAL
PATH REPORT.RELEVANT HX SPEC: ABNORMAL

## 2025-08-12 ENCOUNTER — TELEPHONE (OUTPATIENT)
Dept: FAMILY MEDICINE | Facility: OTHER | Age: 65
End: 2025-08-12

## 2025-08-12 ENCOUNTER — PATIENT OUTREACH (OUTPATIENT)
Dept: FAMILY MEDICINE | Facility: OTHER | Age: 65
End: 2025-08-12

## 2025-08-12 ENCOUNTER — HOSPITAL ENCOUNTER (OUTPATIENT)
Dept: CT IMAGING | Facility: CLINIC | Age: 65
End: 2025-08-12
Attending: FAMILY MEDICINE
Payer: COMMERCIAL

## 2025-08-12 DIAGNOSIS — Z87.891 HISTORY OF TOBACCO USE, PRESENTING HAZARDS TO HEALTH: ICD-10-CM

## 2025-08-12 PROCEDURE — 71271 CT THORAX LUNG CANCER SCR C-: CPT

## 2025-08-13 ENCOUNTER — PATIENT OUTREACH (OUTPATIENT)
Dept: ONCOLOGY | Facility: CLINIC | Age: 65
End: 2025-08-13
Payer: COMMERCIAL

## 2025-08-14 ENCOUNTER — TELEPHONE (OUTPATIENT)
Dept: PULMONOLOGY | Facility: CLINIC | Age: 65
End: 2025-08-14
Payer: COMMERCIAL

## 2025-08-29 DIAGNOSIS — L30.9 ECZEMA, UNSPECIFIED TYPE: ICD-10-CM

## 2025-08-29 DIAGNOSIS — Z72.0 TOBACCO ABUSE DISORDER: ICD-10-CM

## 2025-09-02 RX ORDER — BETAMETHASONE DIPROPIONATE 0.5 MG/G
CREAM TOPICAL 2 TIMES DAILY
Qty: 60 G | Refills: 6 | Status: SHIPPED | OUTPATIENT
Start: 2025-09-02

## 2025-09-02 RX ORDER — VARENICLINE TARTRATE 1 MG/1
1 TABLET, FILM COATED ORAL 2 TIMES DAILY
Qty: 60 TABLET | Refills: 2 | Status: SHIPPED | OUTPATIENT
Start: 2025-09-02

## 2025-09-25 ENCOUNTER — PRE VISIT (OUTPATIENT)
Dept: PULMONOLOGY | Facility: CLINIC | Age: 65
End: 2025-09-25

## (undated) DEVICE — GOWN XLG DISP 9545

## (undated) DEVICE — ADHESIVE SWIFTSET 0.8ML OCTYL SS6

## (undated) DEVICE — SOL WATER IRRIG 1000ML BOTTLE 07139-09

## (undated) DEVICE — NDL 25GA 1.5" 305127

## (undated) DEVICE — DRSG KERLIX FLUFFS X5

## (undated) DEVICE — COVER NEOPROBE SOFTFLEX 5X96" W/BANDS 20-PC596

## (undated) DEVICE — DRSG TEGADERM 2 3/8X2 3/4" 1624W

## (undated) DEVICE — PACK HAND

## (undated) DEVICE — SOL NACL 0.9% IRRIG 1000ML BOTTLE 07138-09

## (undated) DEVICE — PREP CHLORAPREP 26ML TINTED ORANGE  260815

## (undated) DEVICE — LIGHT HANDLE X2

## (undated) DEVICE — SU SILK 2-0 SH 30" K833H

## (undated) DEVICE — SU MONOCRYL 4-0 PS-2 18" UND Y496G

## (undated) DEVICE — SYR 10ML LL W/O NDL

## (undated) DEVICE — BNDG ELASTIC 2"X5YDS UNSTERILE 6611-20

## (undated) DEVICE — Device

## (undated) DEVICE — DRAPE C-ARM 60X42" 1013

## (undated) DEVICE — SU VICRYL 2-0 SH 27" UND J417H

## (undated) DEVICE — GLOVE PROTEXIS BLUE W/NEU-THERA 7.0  2D73EB70

## (undated) DEVICE — LABEL MEDICATION SYSTEM  3304

## (undated) DEVICE — STOCKING SLEEVE COMPRESSION CALF MED

## (undated) DEVICE — MARKER MARGIN MARKER STD 6 COLOR SGL USE MMS6

## (undated) DEVICE — NDL 22GA 1.5"

## (undated) DEVICE — GUIDE WIRE TROCAR TIP 1.35MM AR-8943-01

## (undated) DEVICE — DECANTER BAG 2002S

## (undated) DEVICE — DECANTER VIAL 2006S

## (undated) DEVICE — SU VICRYL 3-0 SH 27" J316H

## (undated) DEVICE — SPONGE RAY-TEC 4X8" 7318

## (undated) DEVICE — SU VICRYL 3-0 SH 27" UND J416H

## (undated) DEVICE — GLOVE PROTEXIS W/NEU-THERA 7.5  2D73TE75

## (undated) DEVICE — GLOVE PROTEXIS W/NEU-THERA 8.0  2D73TE80

## (undated) DEVICE — BLADE KNIFE SURG 15 371115

## (undated) DEVICE — PROBE COVER ULTRASOUND 6X96"

## (undated) DEVICE — PACK LAPAROTOMY CUSTOM LAKES

## (undated) DEVICE — ADH FLOSEAL W/HUMAN THROMBIN 5ML

## (undated) DEVICE — GLOVE PROTEXIS BLUE W/NEU-THERA 8.0  2D73EB80

## (undated) DEVICE — SU VICRYL 2-0 REEL 54" UND J286G

## (undated) DEVICE — SU STRATAFIX 3-0 MH 12" PS-2 SXMD1B103

## (undated) DEVICE — ESU PENCIL W/COATED BLADE E2450H

## (undated) DEVICE — SYR BULB IRRIG 50ML LATEX FREE 0035280

## (undated) DEVICE — BNDG ELASTIC 3"X5YDS UNSTERILE 6611-30

## (undated) DEVICE — TUBING SUCTION 12"X1/4" N612

## (undated) DEVICE — DRAPE SHEET REV FOLD 3/4 9349

## (undated) DEVICE — BLADE KNIFE SURG 11 371111

## (undated) DEVICE — SLING ARM LG 79-99157

## (undated) DEVICE — NDL COUNTER 20CT 31142493

## (undated) DEVICE — GLOVE PROTEXIS MICRO 7.0  2D73PM70

## (undated) DEVICE — SU VICRYL 2-0 CT-2 27" UND J269H

## (undated) DEVICE — BASIN SET MINOR DISP

## (undated) DEVICE — SUCTION TIP YANKAUER STR K87

## (undated) DEVICE — SYR 10ML FINGER CONTROL W/O NDL 309695

## (undated) DEVICE — DRSG GAUZE 4X4" TRAY

## (undated) RX ORDER — ROPIVACAINE HYDROCHLORIDE 5 MG/ML
INJECTION, SOLUTION EPIDURAL; INFILTRATION; PERINEURAL
Status: DISPENSED
Start: 2021-06-23

## (undated) RX ORDER — EPHEDRINE SULFATE 50 MG/ML
INJECTION, SOLUTION INTRAMUSCULAR; INTRAVENOUS; SUBCUTANEOUS
Status: DISPENSED
Start: 2019-01-23

## (undated) RX ORDER — PROPOFOL 10 MG/ML
INJECTION, EMULSION INTRAVENOUS
Status: DISPENSED
Start: 2020-06-29

## (undated) RX ORDER — KETOROLAC TROMETHAMINE 30 MG/ML
INJECTION, SOLUTION INTRAMUSCULAR; INTRAVENOUS
Status: DISPENSED
Start: 2019-01-23

## (undated) RX ORDER — NEOSTIGMINE METHYLSULFATE 1 MG/ML
VIAL (ML) INJECTION
Status: DISPENSED
Start: 2019-01-23

## (undated) RX ORDER — CEFAZOLIN SODIUM 2 G/100ML
INJECTION, SOLUTION INTRAVENOUS
Status: DISPENSED
Start: 2021-06-23

## (undated) RX ORDER — MAGNESIUM SULFATE HEPTAHYDRATE 40 MG/ML
INJECTION, SOLUTION INTRAVENOUS
Status: DISPENSED
Start: 2021-06-23

## (undated) RX ORDER — FENTANYL CITRATE 50 UG/ML
INJECTION, SOLUTION INTRAMUSCULAR; INTRAVENOUS
Status: DISPENSED
Start: 2021-06-23

## (undated) RX ORDER — PROPOFOL 10 MG/ML
INJECTION, EMULSION INTRAVENOUS
Status: DISPENSED
Start: 2021-06-23

## (undated) RX ORDER — FENTANYL CITRATE 50 UG/ML
INJECTION, SOLUTION INTRAMUSCULAR; INTRAVENOUS
Status: DISPENSED
Start: 2019-01-23

## (undated) RX ORDER — LIDOCAINE HYDROCHLORIDE 10 MG/ML
INJECTION, SOLUTION INFILTRATION; PERINEURAL
Status: DISPENSED
Start: 2021-06-23

## (undated) RX ORDER — EPHEDRINE SULFATE 50 MG/ML
INJECTION, SOLUTION INTRAMUSCULAR; INTRAVENOUS; SUBCUTANEOUS
Status: DISPENSED
Start: 2021-06-23

## (undated) RX ORDER — DEXAMETHASONE SODIUM PHOSPHATE 10 MG/ML
INJECTION, SOLUTION INTRAMUSCULAR; INTRAVENOUS
Status: DISPENSED
Start: 2021-06-23

## (undated) RX ORDER — LIDOCAINE HYDROCHLORIDE 10 MG/ML
INJECTION, SOLUTION EPIDURAL; INFILTRATION; INTRACAUDAL; PERINEURAL
Status: DISPENSED
Start: 2021-06-23

## (undated) RX ORDER — CEFAZOLIN SODIUM 2 G/100ML
INJECTION, SOLUTION INTRAVENOUS
Status: DISPENSED
Start: 2019-01-23

## (undated) RX ORDER — HYDROMORPHONE HYDROCHLORIDE 1 MG/ML
INJECTION, SOLUTION INTRAMUSCULAR; INTRAVENOUS; SUBCUTANEOUS
Status: DISPENSED
Start: 2019-01-23

## (undated) RX ORDER — PHENYLEPHRINE HCL IN 0.9% NACL 1 MG/10 ML
SYRINGE (ML) INTRAVENOUS
Status: DISPENSED
Start: 2019-01-23

## (undated) RX ORDER — PHENYLEPHRINE HYDROCHLORIDE 25 MG/ML
SOLUTION/ DROPS OPHTHALMIC
Status: DISPENSED
Start: 2020-06-10

## (undated) RX ORDER — PROPOFOL 10 MG/ML
INJECTION, EMULSION INTRAVENOUS
Status: DISPENSED
Start: 2021-06-07

## (undated) RX ORDER — DEXAMETHASONE SODIUM PHOSPHATE 10 MG/ML
INJECTION, SOLUTION INTRAMUSCULAR; INTRAVENOUS
Status: DISPENSED
Start: 2019-01-23

## (undated) RX ORDER — ALBUTEROL SULFATE 90 UG/1
AEROSOL, METERED RESPIRATORY (INHALATION)
Status: DISPENSED
Start: 2019-01-23

## (undated) RX ORDER — LIDOCAINE HYDROCHLORIDE 10 MG/ML
INJECTION, SOLUTION EPIDURAL; INFILTRATION; INTRACAUDAL; PERINEURAL
Status: DISPENSED
Start: 2021-06-07

## (undated) RX ORDER — LIDOCAINE HYDROCHLORIDE 10 MG/ML
INJECTION, SOLUTION EPIDURAL; INFILTRATION; INTRACAUDAL; PERINEURAL
Status: DISPENSED
Start: 2019-01-23

## (undated) RX ORDER — BUPIVACAINE HYDROCHLORIDE 5 MG/ML
INJECTION, SOLUTION PERINEURAL
Status: DISPENSED
Start: 2021-06-23

## (undated) RX ORDER — CYCLOPENTOLATE HYDROCHLORIDE 10 MG/ML
SOLUTION/ DROPS OPHTHALMIC
Status: DISPENSED
Start: 2020-06-10

## (undated) RX ORDER — HYDROXYZINE HYDROCHLORIDE 50 MG/1
TABLET, FILM COATED ORAL
Status: DISPENSED
Start: 2019-01-23

## (undated) RX ORDER — PROPOFOL 10 MG/ML
INJECTION, EMULSION INTRAVENOUS
Status: DISPENSED
Start: 2019-01-23

## (undated) RX ORDER — DEXAMETHASONE SODIUM PHOSPHATE 4 MG/ML
INJECTION, SOLUTION INTRA-ARTICULAR; INTRALESIONAL; INTRAMUSCULAR; INTRAVENOUS; SOFT TISSUE
Status: DISPENSED
Start: 2021-06-23

## (undated) RX ORDER — ONDANSETRON 2 MG/ML
INJECTION INTRAMUSCULAR; INTRAVENOUS
Status: DISPENSED
Start: 2019-01-23

## (undated) RX ORDER — ONDANSETRON 2 MG/ML
INJECTION INTRAMUSCULAR; INTRAVENOUS
Status: DISPENSED
Start: 2021-06-23

## (undated) RX ORDER — BUPIVACAINE HYDROCHLORIDE AND EPINEPHRINE 2.5; 5 MG/ML; UG/ML
INJECTION, SOLUTION EPIDURAL; INFILTRATION; INTRACAUDAL; PERINEURAL
Status: DISPENSED
Start: 2019-01-23

## (undated) RX ORDER — ACETAMINOPHEN 325 MG/1
TABLET ORAL
Status: DISPENSED
Start: 2021-06-23

## (undated) RX ORDER — TROPICAMIDE 10 MG/ML
SOLUTION/ DROPS OPHTHALMIC
Status: DISPENSED
Start: 2020-06-10

## (undated) RX ORDER — TROPICAMIDE 10 MG/ML
SOLUTION/ DROPS OPHTHALMIC
Status: DISPENSED
Start: 2020-06-29

## (undated) RX ORDER — GABAPENTIN 300 MG/1
CAPSULE ORAL
Status: DISPENSED
Start: 2021-06-23

## (undated) RX ORDER — PHENYLEPHRINE HYDROCHLORIDE 25 MG/ML
SOLUTION/ DROPS OPHTHALMIC
Status: DISPENSED
Start: 2020-06-29

## (undated) RX ORDER — LIDOCAINE HCL/EPINEPHRINE/PF 2%-1:200K
VIAL (ML) INJECTION
Status: DISPENSED
Start: 2021-06-23

## (undated) RX ORDER — HEPARIN SODIUM,PORCINE 10 UNIT/ML
VIAL (ML) INTRAVENOUS
Status: DISPENSED
Start: 2019-01-23

## (undated) RX ORDER — CYCLOPENTOLATE HYDROCHLORIDE 10 MG/ML
SOLUTION/ DROPS OPHTHALMIC
Status: DISPENSED
Start: 2020-06-29